# Patient Record
Sex: MALE | Race: WHITE | NOT HISPANIC OR LATINO | URBAN - METROPOLITAN AREA
[De-identification: names, ages, dates, MRNs, and addresses within clinical notes are randomized per-mention and may not be internally consistent; named-entity substitution may affect disease eponyms.]

---

## 2024-03-25 ENCOUNTER — OFFICE VISIT (OUTPATIENT)
Dept: PRIMARY CARE | Facility: CLINIC | Age: 69
End: 2024-03-25
Payer: MEDICARE

## 2024-03-25 VITALS
HEIGHT: 72 IN | OXYGEN SATURATION: 96 % | HEART RATE: 68 BPM | SYSTOLIC BLOOD PRESSURE: 124 MMHG | BODY MASS INDEX: 28.31 KG/M2 | WEIGHT: 209 LBS | DIASTOLIC BLOOD PRESSURE: 75 MMHG

## 2024-03-25 DIAGNOSIS — E78.5 HYPERLIPIDEMIA, UNSPECIFIED HYPERLIPIDEMIA TYPE: ICD-10-CM

## 2024-03-25 DIAGNOSIS — R53.83 MALAISE AND FATIGUE: Primary | ICD-10-CM

## 2024-03-25 DIAGNOSIS — Z13.220 SCREENING CHOLESTEROL LEVEL: ICD-10-CM

## 2024-03-25 DIAGNOSIS — R53.81 MALAISE AND FATIGUE: Primary | ICD-10-CM

## 2024-03-25 DIAGNOSIS — Z12.11 ENCOUNTER FOR SCREENING FOR MALIGNANT NEOPLASM OF COLON: ICD-10-CM

## 2024-03-25 PROCEDURE — 99497 ADVNCD CARE PLAN 30 MIN: CPT | Performed by: FAMILY MEDICINE

## 2024-03-25 PROCEDURE — G0444 DEPRESSION SCREEN ANNUAL: HCPCS | Performed by: FAMILY MEDICINE

## 2024-03-25 PROCEDURE — G0439 PPPS, SUBSEQ VISIT: HCPCS | Performed by: FAMILY MEDICINE

## 2024-03-25 PROCEDURE — 1170F FXNL STATUS ASSESSED: CPT | Performed by: FAMILY MEDICINE

## 2024-03-25 PROCEDURE — 99213 OFFICE O/P EST LOW 20 MIN: CPT | Performed by: FAMILY MEDICINE

## 2024-03-25 ASSESSMENT — PATIENT HEALTH QUESTIONNAIRE - PHQ9
1. LITTLE INTEREST OR PLEASURE IN DOING THINGS: NOT AT ALL
2. FEELING DOWN, DEPRESSED OR HOPELESS: NOT AT ALL
SUM OF ALL RESPONSES TO PHQ9 QUESTIONS 1 AND 2: 0

## 2024-03-25 ASSESSMENT — ENCOUNTER SYMPTOMS
CARDIOVASCULAR NEGATIVE: 1
NEUROLOGICAL NEGATIVE: 1
GASTROINTESTINAL NEGATIVE: 1
MUSCULOSKELETAL NEGATIVE: 1
RESPIRATORY NEGATIVE: 1

## 2024-03-25 ASSESSMENT — ACTIVITIES OF DAILY LIVING (ADL)
DOING_HOUSEWORK: INDEPENDENT
MANAGING_FINANCES: INDEPENDENT
DRESSING: INDEPENDENT
GROCERY_SHOPPING: INDEPENDENT
BATHING: INDEPENDENT
TAKING_MEDICATION: INDEPENDENT

## 2024-03-25 NOTE — PROGRESS NOTES
Subjective   Patient ID: Krish Batista is a 68 y.o. male who presents for Medicare Annual Wellness Visit Subsequent.  HPI  Had acute illness in feb getting better no   Review of Systems   Constitutional:         Diff sleeping has sad   HENT: Negative.     Respiratory: Negative.     Cardiovascular: Negative.    Gastrointestinal: Negative.    Genitourinary: Negative.    Musculoskeletal: Negative.    Neurological: Negative.        Objective   Physical Exam    Assessment/Plan   Problem List Items Addressed This Visit             ICD-10-CM    Malaise and fatigue - Primary R53.81, R53.83    Relevant Orders    Comprehensive Metabolic Panel    Lipid Panel    CBC     Other Visit Diagnoses         Codes    Screening cholesterol level     Z13.220    Relevant Orders    Lipid Panel    Hyperlipidemia, unspecified hyperlipidemia type     E78.5    Relevant Orders    Lipid Panel    Encounter for screening for malignant neoplasm of colon     Z12.11    Relevant Orders    Cologuard® colon cancer screening                 Matthew Small DO 03/25/24 3:36 PM

## 2024-03-28 ENCOUNTER — LAB (OUTPATIENT)
Dept: LAB | Facility: LAB | Age: 69
End: 2024-03-28
Payer: MEDICARE

## 2024-03-28 DIAGNOSIS — R53.81 MALAISE AND FATIGUE: ICD-10-CM

## 2024-03-28 DIAGNOSIS — Z13.220 SCREENING CHOLESTEROL LEVEL: ICD-10-CM

## 2024-03-28 DIAGNOSIS — R53.83 MALAISE AND FATIGUE: ICD-10-CM

## 2024-03-28 DIAGNOSIS — E78.5 HYPERLIPIDEMIA, UNSPECIFIED HYPERLIPIDEMIA TYPE: ICD-10-CM

## 2024-03-28 LAB
ALBUMIN SERPL BCP-MCNC: 4.2 G/DL (ref 3.4–5)
ALP SERPL-CCNC: 61 U/L (ref 33–136)
ALT SERPL W P-5'-P-CCNC: 8 U/L (ref 10–52)
ANION GAP SERPL CALC-SCNC: 11 MMOL/L (ref 10–20)
AST SERPL W P-5'-P-CCNC: 15 U/L (ref 9–39)
BILIRUB SERPL-MCNC: 0.6 MG/DL (ref 0–1.2)
BUN SERPL-MCNC: 19 MG/DL (ref 6–23)
CALCIUM SERPL-MCNC: 9.2 MG/DL (ref 8.6–10.6)
CHLORIDE SERPL-SCNC: 105 MMOL/L (ref 98–107)
CHOLEST SERPL-MCNC: 184 MG/DL (ref 0–199)
CHOLESTEROL/HDL RATIO: 3.4
CO2 SERPL-SCNC: 29 MMOL/L (ref 21–32)
CREAT SERPL-MCNC: 1.02 MG/DL (ref 0.5–1.3)
EGFRCR SERPLBLD CKD-EPI 2021: 80 ML/MIN/1.73M*2
ERYTHROCYTE [DISTWIDTH] IN BLOOD BY AUTOMATED COUNT: 16.9 % (ref 11.5–14.5)
GLUCOSE SERPL-MCNC: 94 MG/DL (ref 74–99)
HCT VFR BLD AUTO: 28.2 % (ref 41–52)
HDLC SERPL-MCNC: 53.8 MG/DL
HGB BLD-MCNC: 8.1 G/DL (ref 13.5–17.5)
LDLC SERPL CALC-MCNC: 116 MG/DL
MCH RBC QN AUTO: 23.5 PG (ref 26–34)
MCHC RBC AUTO-ENTMCNC: 28.7 G/DL (ref 32–36)
MCV RBC AUTO: 82 FL (ref 80–100)
NON HDL CHOLESTEROL: 130 MG/DL (ref 0–149)
NRBC BLD-RTO: 0 /100 WBCS (ref 0–0)
PLATELET # BLD AUTO: 351 X10*3/UL (ref 150–450)
POTASSIUM SERPL-SCNC: 4.3 MMOL/L (ref 3.5–5.3)
PROT SERPL-MCNC: 7 G/DL (ref 6.4–8.2)
RBC # BLD AUTO: 3.44 X10*6/UL (ref 4.5–5.9)
SODIUM SERPL-SCNC: 141 MMOL/L (ref 136–145)
TRIGL SERPL-MCNC: 73 MG/DL (ref 0–149)
VLDL: 15 MG/DL (ref 0–40)
WBC # BLD AUTO: 4.5 X10*3/UL (ref 4.4–11.3)

## 2024-03-28 PROCEDURE — 80053 COMPREHEN METABOLIC PANEL: CPT

## 2024-03-28 PROCEDURE — 80061 LIPID PANEL: CPT

## 2024-03-28 PROCEDURE — 36415 COLL VENOUS BLD VENIPUNCTURE: CPT

## 2024-03-28 PROCEDURE — 85027 COMPLETE CBC AUTOMATED: CPT

## 2024-04-23 LAB — NONINV COLON CA DNA+OCC BLD SCRN STL QL: POSITIVE

## 2024-04-28 DIAGNOSIS — R19.5 POSITIVE COLORECTAL CANCER SCREENING USING COLOGUARD TEST: Primary | ICD-10-CM

## 2024-05-13 ENCOUNTER — TELEPHONE (OUTPATIENT)
Dept: PRIMARY CARE | Facility: CLINIC | Age: 69
End: 2024-05-13
Payer: MEDICARE

## 2024-05-13 NOTE — TELEPHONE ENCOUNTER
----- Message from Matthew Small DO sent at 4/28/2024  7:46 PM EDT -----  Needs fu with gastroenterology

## 2024-05-13 NOTE — TELEPHONE ENCOUNTER
L/M for [place] to call back to hear results of Cologuard:    Needs fu with gastroenterology and the referral has been entered.

## 2024-05-28 ENCOUNTER — TELEPHONE (OUTPATIENT)
Dept: PRIMARY CARE | Facility: CLINIC | Age: 69
End: 2024-05-28
Payer: MEDICARE

## 2024-05-28 NOTE — TELEPHONE ENCOUNTER
L/M (2nd message) for patient to call back for results and further instructions from Dr. Ireland:  Needs fu with gastroenterology .

## 2024-06-06 ENCOUNTER — TELEPHONE (OUTPATIENT)
Dept: PRIMARY CARE | Facility: CLINIC | Age: 69
End: 2024-06-06
Payer: MEDICARE

## 2024-07-11 ENCOUNTER — OFFICE VISIT (OUTPATIENT)
Dept: PRIMARY CARE | Facility: CLINIC | Age: 69
End: 2024-07-11
Payer: MEDICARE

## 2024-07-11 ENCOUNTER — LAB (OUTPATIENT)
Dept: LAB | Facility: LAB | Age: 69
End: 2024-07-11
Payer: MEDICARE

## 2024-07-11 VITALS
HEART RATE: 125 BPM | SYSTOLIC BLOOD PRESSURE: 116 MMHG | DIASTOLIC BLOOD PRESSURE: 78 MMHG | HEIGHT: 73 IN | BODY MASS INDEX: 26.64 KG/M2 | OXYGEN SATURATION: 98 % | WEIGHT: 201 LBS

## 2024-07-11 DIAGNOSIS — D64.9 ANEMIA, UNSPECIFIED TYPE: ICD-10-CM

## 2024-07-11 DIAGNOSIS — R53.81 MALAISE AND FATIGUE: ICD-10-CM

## 2024-07-11 DIAGNOSIS — R53.83 MALAISE AND FATIGUE: ICD-10-CM

## 2024-07-11 DIAGNOSIS — D64.9 ANEMIA, UNSPECIFIED TYPE: Primary | ICD-10-CM

## 2024-07-11 PROCEDURE — 83550 IRON BINDING TEST: CPT

## 2024-07-11 PROCEDURE — 82607 VITAMIN B-12: CPT

## 2024-07-11 PROCEDURE — 83540 ASSAY OF IRON: CPT

## 2024-07-11 PROCEDURE — 85027 COMPLETE CBC AUTOMATED: CPT

## 2024-07-11 PROCEDURE — 82746 ASSAY OF FOLIC ACID SERUM: CPT

## 2024-07-11 PROCEDURE — 1036F TOBACCO NON-USER: CPT | Performed by: FAMILY MEDICINE

## 2024-07-11 PROCEDURE — 36415 COLL VENOUS BLD VENIPUNCTURE: CPT

## 2024-07-11 PROCEDURE — 99214 OFFICE O/P EST MOD 30 MIN: CPT | Performed by: FAMILY MEDICINE

## 2024-07-11 PROCEDURE — 80053 COMPREHEN METABOLIC PANEL: CPT

## 2024-07-11 ASSESSMENT — ENCOUNTER SYMPTOMS
MUSCULOSKELETAL NEGATIVE: 1
FATIGUE: 1
RESPIRATORY NEGATIVE: 1
BLOOD IN STOOL: 1
CARDIOVASCULAR NEGATIVE: 1

## 2024-07-11 ASSESSMENT — PATIENT HEALTH QUESTIONNAIRE - PHQ9
2. FEELING DOWN, DEPRESSED OR HOPELESS: NOT AT ALL
1. LITTLE INTEREST OR PLEASURE IN DOING THINGS: NOT AT ALL
SUM OF ALL RESPONSES TO PHQ9 QUESTIONS 1 AND 2: 0

## 2024-07-11 NOTE — PROGRESS NOTES
Subjective   Patient ID: Krish Batista is a 69 y.o. male who presents for Fatigue and Hoarseness.  HPI  Patient has finally come in after he tried to get in touch with him multiple times send positive Cologuard patient is anemic he still feels lethargic send him for some more blood work I already put in an a referral to a gastroenterologist and also get a put in a referral to a surgeon explained to the patient that these are all signs of colon cancer he said it is what it is says he wants to go on vacation in a week I told him he should probably stay and get the stuff taken care of because of its can get worse.  Review of Systems   Constitutional:  Positive for fatigue.   HENT: Negative.     Respiratory: Negative.     Cardiovascular: Negative.    Gastrointestinal:  Positive for blood in stool.   Genitourinary: Negative.    Musculoskeletal: Negative.        Objective   Physical Exam  Constitutional:       Appearance: Normal appearance.   HENT:      Head: Normocephalic.      Mouth/Throat:      Mouth: Mucous membranes are moist.   Cardiovascular:      Rate and Rhythm: Normal rate and regular rhythm.   Pulmonary:      Effort: Pulmonary effort is normal.      Breath sounds: Normal breath sounds.   Neurological:      Mental Status: He is alert.         Assessment/Plan   Problem List Items Addressed This Visit             ICD-10-CM    Malaise and fatigue R53.81, R53.83    Relevant Orders    Comprehensive metabolic panel     Other Visit Diagnoses         Codes    Anemia, unspecified type    -  Primary D64.9    Relevant Orders    CBC    Folate    Vitamin B12    Iron and TIBC    Referral to General Surgery    Comprehensive metabolic panel                 Matthew Small DO 07/11/24 2:37 PM

## 2024-07-12 LAB
ALBUMIN SERPL BCP-MCNC: 4.6 G/DL (ref 3.4–5)
ALP SERPL-CCNC: 58 U/L (ref 33–136)
ALT SERPL W P-5'-P-CCNC: 9 U/L (ref 10–52)
ANION GAP SERPL CALC-SCNC: 17 MMOL/L (ref 10–20)
AST SERPL W P-5'-P-CCNC: 14 U/L (ref 9–39)
BILIRUB SERPL-MCNC: 0.7 MG/DL (ref 0–1.2)
BUN SERPL-MCNC: 25 MG/DL (ref 6–23)
CALCIUM SERPL-MCNC: 9.9 MG/DL (ref 8.6–10.6)
CHLORIDE SERPL-SCNC: 102 MMOL/L (ref 98–107)
CO2 SERPL-SCNC: 23 MMOL/L (ref 21–32)
CREAT SERPL-MCNC: 1.1 MG/DL (ref 0.5–1.3)
EGFRCR SERPLBLD CKD-EPI 2021: 72 ML/MIN/1.73M*2
ERYTHROCYTE [DISTWIDTH] IN BLOOD BY AUTOMATED COUNT: 20.2 % (ref 11.5–14.5)
FOLATE SERPL-MCNC: >24 NG/ML
GLUCOSE SERPL-MCNC: 129 MG/DL (ref 74–99)
HCT VFR BLD AUTO: 27.6 % (ref 41–52)
HGB BLD-MCNC: 7.2 G/DL (ref 13.5–17.5)
IRON SATN MFR SERPL: ABNORMAL %
IRON SERPL-MCNC: 21 UG/DL (ref 35–150)
MCH RBC QN AUTO: 19.3 PG (ref 26–34)
MCHC RBC AUTO-ENTMCNC: 26.1 G/DL (ref 32–36)
MCV RBC AUTO: 74 FL (ref 80–100)
NRBC BLD-RTO: 0 /100 WBCS (ref 0–0)
PLATELET # BLD AUTO: 475 X10*3/UL (ref 150–450)
POTASSIUM SERPL-SCNC: 4.5 MMOL/L (ref 3.5–5.3)
PROT SERPL-MCNC: 7.4 G/DL (ref 6.4–8.2)
RBC # BLD AUTO: 3.74 X10*6/UL (ref 4.5–5.9)
SODIUM SERPL-SCNC: 137 MMOL/L (ref 136–145)
TIBC SERPL-MCNC: ABNORMAL UG/DL
UIBC SERPL-MCNC: >450 UG/DL (ref 110–370)
VIT B12 SERPL-MCNC: 327 PG/ML (ref 211–911)
WBC # BLD AUTO: 8.1 X10*3/UL (ref 4.4–11.3)

## 2024-07-23 ENCOUNTER — TELEPHONE (OUTPATIENT)
Dept: PRIMARY CARE | Facility: CLINIC | Age: 69
End: 2024-07-23
Payer: MEDICARE

## 2024-07-23 NOTE — TELEPHONE ENCOUNTER
Left a voicemail for the patient to call back. Please inform the patient that Dr. Ireland stated that he should go to the ER because of his anemia and his BW changes  ----- Message from Matthew Small sent at 7/23/2024  8:09 AM EDT -----  Patient shows significant anemia needs follow-up

## 2024-07-30 ENCOUNTER — TELEPHONE (OUTPATIENT)
Dept: PRIMARY CARE | Facility: CLINIC | Age: 69
End: 2024-07-30
Payer: MEDICARE

## 2024-07-31 ENCOUNTER — TELEPHONE (OUTPATIENT)
Dept: PRIMARY CARE | Facility: CLINIC | Age: 69
End: 2024-07-31
Payer: MEDICARE

## 2024-07-31 NOTE — TELEPHONE ENCOUNTER
Patient is in the hospital, he went to the ER yesterday.    Patient would like to speak with MA or  regarding labs results. I did inform that he should go to the ER but he would like to talk more about it.

## 2024-08-01 ENCOUNTER — APPOINTMENT (OUTPATIENT)
Dept: RADIOLOGY | Facility: HOSPITAL | Age: 69
End: 2024-08-01
Payer: MEDICARE

## 2024-08-01 ENCOUNTER — APPOINTMENT (OUTPATIENT)
Dept: CARDIOLOGY | Facility: HOSPITAL | Age: 69
End: 2024-08-01
Payer: MEDICARE

## 2024-08-01 ENCOUNTER — HOSPITAL ENCOUNTER (INPATIENT)
Facility: HOSPITAL | Age: 69
LOS: 2 days | Discharge: HOME | End: 2024-08-03
Attending: EMERGENCY MEDICINE | Admitting: STUDENT IN AN ORGANIZED HEALTH CARE EDUCATION/TRAINING PROGRAM
Payer: MEDICARE

## 2024-08-01 DIAGNOSIS — R53.83 MALAISE AND FATIGUE: ICD-10-CM

## 2024-08-01 DIAGNOSIS — I49.9 CARDIAC ARRHYTHMIA, UNSPECIFIED CARDIAC ARRHYTHMIA TYPE: ICD-10-CM

## 2024-08-01 DIAGNOSIS — R94.31 ABNORMAL ELECTROCARDIOGRAM (ECG) (EKG): ICD-10-CM

## 2024-08-01 DIAGNOSIS — R00.2 PALPITATIONS: ICD-10-CM

## 2024-08-01 DIAGNOSIS — I48.3 TYPICAL ATRIAL FLUTTER (MULTI): ICD-10-CM

## 2024-08-01 DIAGNOSIS — R53.81 MALAISE AND FATIGUE: ICD-10-CM

## 2024-08-01 DIAGNOSIS — D64.9 ANEMIA, UNSPECIFIED TYPE: ICD-10-CM

## 2024-08-01 DIAGNOSIS — R06.02 SHORTNESS OF BREATH: Primary | ICD-10-CM

## 2024-08-01 LAB
ABO GROUP (TYPE) IN BLOOD: NORMAL
ALBUMIN SERPL BCP-MCNC: 4.5 G/DL (ref 3.4–5)
ALP SERPL-CCNC: 56 U/L (ref 33–136)
ALT SERPL W P-5'-P-CCNC: 9 U/L (ref 10–52)
ANION GAP SERPL CALC-SCNC: 16 MMOL/L (ref 10–20)
ANTIBODY SCREEN: NORMAL
APPEARANCE UR: CLEAR
APTT PPP: 29 SECONDS (ref 27–38)
AST SERPL W P-5'-P-CCNC: 15 U/L (ref 9–39)
BASOPHILS # BLD AUTO: 0.04 X10*3/UL (ref 0–0.1)
BASOPHILS NFR BLD AUTO: 0.6 %
BILIRUB SERPL-MCNC: 0.7 MG/DL (ref 0–1.2)
BILIRUB UR STRIP.AUTO-MCNC: NEGATIVE MG/DL
BUN SERPL-MCNC: 19 MG/DL (ref 6–23)
CALCIUM SERPL-MCNC: 9.2 MG/DL (ref 8.6–10.3)
CARDIAC TROPONIN I PNL SERPL HS: 12 NG/L (ref 0–20)
CHLORIDE SERPL-SCNC: 102 MMOL/L (ref 98–107)
CO2 SERPL-SCNC: 21 MMOL/L (ref 21–32)
COLOR UR: COLORLESS
CREAT SERPL-MCNC: 1.16 MG/DL (ref 0.5–1.3)
DACRYOCYTES BLD QL SMEAR: NORMAL
EGFRCR SERPLBLD CKD-EPI 2021: 68 ML/MIN/1.73M*2
EOSINOPHIL # BLD AUTO: 0.01 X10*3/UL (ref 0–0.7)
EOSINOPHIL NFR BLD AUTO: 0.1 %
ERYTHROCYTE [DISTWIDTH] IN BLOOD BY AUTOMATED COUNT: 20.9 % (ref 11.5–14.5)
GLUCOSE SERPL-MCNC: 116 MG/DL (ref 74–99)
GLUCOSE UR STRIP.AUTO-MCNC: NORMAL MG/DL
HCT VFR BLD AUTO: 27.4 % (ref 41–52)
HGB BLD-MCNC: 7.4 G/DL (ref 13.5–17.5)
HGB RETIC QN: 21 PG (ref 28–38)
IMM GRANULOCYTES # BLD AUTO: 0.02 X10*3/UL (ref 0–0.7)
IMM GRANULOCYTES NFR BLD AUTO: 0.3 % (ref 0–0.9)
IMMATURE RETIC FRACTION: 25.5 %
INR PPP: 1.1 (ref 0.9–1.1)
IRON SATN MFR SERPL: 3 % (ref 25–45)
IRON SERPL-MCNC: 13 UG/DL (ref 35–150)
KETONES UR STRIP.AUTO-MCNC: NEGATIVE MG/DL
LACTATE SERPL-SCNC: 1.6 MMOL/L (ref 0.4–2)
LACTATE SERPL-SCNC: 2.2 MMOL/L (ref 0.4–2)
LDH SERPL L TO P-CCNC: 229 U/L (ref 84–246)
LEUKOCYTE ESTERASE UR QL STRIP.AUTO: NEGATIVE
LYMPHOCYTES # BLD AUTO: 0.98 X10*3/UL (ref 1.2–4.8)
LYMPHOCYTES NFR BLD AUTO: 13.8 %
MAGNESIUM SERPL-MCNC: 1.99 MG/DL (ref 1.6–2.4)
MAGNESIUM SERPL-MCNC: 2 MG/DL (ref 1.6–2.4)
MCH RBC QN AUTO: 19.9 PG (ref 26–34)
MCHC RBC AUTO-ENTMCNC: 27 G/DL (ref 32–36)
MCV RBC AUTO: 74 FL (ref 80–100)
MONOCYTES # BLD AUTO: 0.45 X10*3/UL (ref 0.1–1)
MONOCYTES NFR BLD AUTO: 6.3 %
NEUTROPHILS # BLD AUTO: 5.62 X10*3/UL (ref 1.2–7.7)
NEUTROPHILS NFR BLD AUTO: 78.9 %
NITRITE UR QL STRIP.AUTO: NEGATIVE
NRBC BLD-RTO: 0 /100 WBCS (ref 0–0)
OVALOCYTES BLD QL SMEAR: NORMAL
PH UR STRIP.AUTO: 6 [PH]
PLATELET # BLD AUTO: 422 X10*3/UL (ref 150–450)
POTASSIUM SERPL-SCNC: 4 MMOL/L (ref 3.5–5.3)
PROT SERPL-MCNC: 7.4 G/DL (ref 6.4–8.2)
PROT UR STRIP.AUTO-MCNC: NEGATIVE MG/DL
PROTHROMBIN TIME: 12.1 SECONDS (ref 9.8–12.8)
RBC # BLD AUTO: 3.72 X10*6/UL (ref 4.5–5.9)
RBC # UR STRIP.AUTO: NEGATIVE /UL
RBC MORPH BLD: NORMAL
RETICS #: 0.1 X10*6/UL (ref 0.02–0.11)
RETICS/RBC NFR AUTO: 2.5 % (ref 0.5–2)
RH FACTOR (ANTIGEN D): NORMAL
SODIUM SERPL-SCNC: 135 MMOL/L (ref 136–145)
SP GR UR STRIP.AUTO: 1
TARGETS BLD QL SMEAR: NORMAL
TIBC SERPL-MCNC: 457 UG/DL (ref 240–445)
TSH SERPL-ACNC: 1.73 MIU/L (ref 0.44–3.98)
UIBC SERPL-MCNC: 444 UG/DL (ref 110–370)
UROBILINOGEN UR STRIP.AUTO-MCNC: NORMAL MG/DL
WBC # BLD AUTO: 7.1 X10*3/UL (ref 4.4–11.3)

## 2024-08-01 PROCEDURE — 84484 ASSAY OF TROPONIN QUANT: CPT

## 2024-08-01 PROCEDURE — 83615 LACTATE (LD) (LDH) ENZYME: CPT | Performed by: STUDENT IN AN ORGANIZED HEALTH CARE EDUCATION/TRAINING PROGRAM

## 2024-08-01 PROCEDURE — 96376 TX/PRO/DX INJ SAME DRUG ADON: CPT

## 2024-08-01 PROCEDURE — 86920 COMPATIBILITY TEST SPIN: CPT

## 2024-08-01 PROCEDURE — 84443 ASSAY THYROID STIM HORMONE: CPT | Performed by: STUDENT IN AN ORGANIZED HEALTH CARE EDUCATION/TRAINING PROGRAM

## 2024-08-01 PROCEDURE — 83550 IRON BINDING TEST: CPT | Performed by: STUDENT IN AN ORGANIZED HEALTH CARE EDUCATION/TRAINING PROGRAM

## 2024-08-01 PROCEDURE — 2500000005 HC RX 250 GENERAL PHARMACY W/O HCPCS: Performed by: EMERGENCY MEDICINE

## 2024-08-01 PROCEDURE — 71046 X-RAY EXAM CHEST 2 VIEWS: CPT

## 2024-08-01 PROCEDURE — 86901 BLOOD TYPING SEROLOGIC RH(D): CPT

## 2024-08-01 PROCEDURE — G0378 HOSPITAL OBSERVATION PER HR: HCPCS

## 2024-08-01 PROCEDURE — 83735 ASSAY OF MAGNESIUM: CPT

## 2024-08-01 PROCEDURE — 85025 COMPLETE CBC W/AUTO DIFF WBC: CPT

## 2024-08-01 PROCEDURE — 82728 ASSAY OF FERRITIN: CPT | Mod: PARLAB | Performed by: STUDENT IN AN ORGANIZED HEALTH CARE EDUCATION/TRAINING PROGRAM

## 2024-08-01 PROCEDURE — 74177 CT ABD & PELVIS W/CONTRAST: CPT | Mod: FOREIGN READ | Performed by: RADIOLOGY

## 2024-08-01 PROCEDURE — 96375 TX/PRO/DX INJ NEW DRUG ADDON: CPT

## 2024-08-01 PROCEDURE — 96374 THER/PROPH/DIAG INJ IV PUSH: CPT

## 2024-08-01 PROCEDURE — 74177 CT ABD & PELVIS W/CONTRAST: CPT

## 2024-08-01 PROCEDURE — 2060000001 HC INTERMEDIATE ICU ROOM DAILY

## 2024-08-01 PROCEDURE — 83605 ASSAY OF LACTIC ACID: CPT

## 2024-08-01 PROCEDURE — 93005 ELECTROCARDIOGRAM TRACING: CPT

## 2024-08-01 PROCEDURE — 85730 THROMBOPLASTIN TIME PARTIAL: CPT

## 2024-08-01 PROCEDURE — 81003 URINALYSIS AUTO W/O SCOPE: CPT

## 2024-08-01 PROCEDURE — 85045 AUTOMATED RETICULOCYTE COUNT: CPT | Performed by: STUDENT IN AN ORGANIZED HEALTH CARE EDUCATION/TRAINING PROGRAM

## 2024-08-01 PROCEDURE — 2500000004 HC RX 250 GENERAL PHARMACY W/ HCPCS (ALT 636 FOR OP/ED): Performed by: EMERGENCY MEDICINE

## 2024-08-01 PROCEDURE — 83540 ASSAY OF IRON: CPT | Performed by: STUDENT IN AN ORGANIZED HEALTH CARE EDUCATION/TRAINING PROGRAM

## 2024-08-01 PROCEDURE — 84075 ASSAY ALKALINE PHOSPHATASE: CPT

## 2024-08-01 PROCEDURE — 83735 ASSAY OF MAGNESIUM: CPT | Performed by: STUDENT IN AN ORGANIZED HEALTH CARE EDUCATION/TRAINING PROGRAM

## 2024-08-01 PROCEDURE — 2550000001 HC RX 255 CONTRASTS: Performed by: EMERGENCY MEDICINE

## 2024-08-01 PROCEDURE — 99291 CRITICAL CARE FIRST HOUR: CPT | Mod: 25 | Performed by: EMERGENCY MEDICINE

## 2024-08-01 PROCEDURE — 99222 1ST HOSP IP/OBS MODERATE 55: CPT | Performed by: STUDENT IN AN ORGANIZED HEALTH CARE EDUCATION/TRAINING PROGRAM

## 2024-08-01 PROCEDURE — 71046 X-RAY EXAM CHEST 2 VIEWS: CPT | Mod: FOREIGN READ | Performed by: RADIOLOGY

## 2024-08-01 PROCEDURE — 36415 COLL VENOUS BLD VENIPUNCTURE: CPT

## 2024-08-01 PROCEDURE — 85610 PROTHROMBIN TIME: CPT

## 2024-08-01 RX ORDER — MULTIVIT-MIN/IRON FUM/FOLIC AC 7.5 MG-4
1 TABLET ORAL DAILY
COMMUNITY

## 2024-08-01 RX ORDER — DIGOXIN 0.25 MG/ML
250 INJECTION INTRAMUSCULAR; INTRAVENOUS ONCE
Status: COMPLETED | OUTPATIENT
Start: 2024-08-02 | End: 2024-08-02

## 2024-08-01 RX ORDER — DIGOXIN 0.25 MG/ML
500 INJECTION INTRAMUSCULAR; INTRAVENOUS ONCE
Status: COMPLETED | OUTPATIENT
Start: 2024-08-01 | End: 2024-08-01

## 2024-08-01 RX ORDER — DILTIAZEM HYDROCHLORIDE 5 MG/ML
10 INJECTION INTRAVENOUS ONCE
Status: COMPLETED | OUTPATIENT
Start: 2024-08-01 | End: 2024-08-01

## 2024-08-01 RX ORDER — PANTOPRAZOLE SODIUM 40 MG/10ML
40 INJECTION, POWDER, LYOPHILIZED, FOR SOLUTION INTRAVENOUS 2 TIMES DAILY
Status: DISCONTINUED | OUTPATIENT
Start: 2024-08-01 | End: 2024-08-03 | Stop reason: HOSPADM

## 2024-08-01 RX ORDER — DILTIAZEM HCL/D5W 125 MG/125
5-15 PLASTIC BAG, INJECTION (ML) INTRAVENOUS CONTINUOUS
Status: DISCONTINUED | OUTPATIENT
Start: 2024-08-01 | End: 2024-08-02

## 2024-08-01 RX ORDER — FERROUS SULFATE 325(65) MG
65 TABLET, DELAYED RELEASE (ENTERIC COATED) ORAL DAILY
COMMUNITY

## 2024-08-01 RX ORDER — POLYETHYLENE GLYCOL 3350 17 G/17G
17 POWDER, FOR SOLUTION ORAL DAILY
Status: DISCONTINUED | OUTPATIENT
Start: 2024-08-02 | End: 2024-08-03 | Stop reason: HOSPADM

## 2024-08-01 RX ORDER — DILTIAZEM HYDROCHLORIDE 5 MG/ML
15 INJECTION INTRAVENOUS ONCE
Status: COMPLETED | OUTPATIENT
Start: 2024-08-01 | End: 2024-08-01

## 2024-08-01 RX ADMIN — DIGOXIN 500 MCG: 0.25 INJECTION INTRAMUSCULAR; INTRAVENOUS at 18:25

## 2024-08-01 RX ADMIN — DILTIAZEM HYDROCHLORIDE 10 MG: 5 INJECTION, SOLUTION INTRAVENOUS at 16:49

## 2024-08-01 RX ADMIN — DILTIAZEM HYDROCHLORIDE 15 MG: 5 INJECTION, SOLUTION INTRAVENOUS at 14:52

## 2024-08-01 RX ADMIN — IOHEXOL 75 ML: 350 INJECTION, SOLUTION INTRAVENOUS at 16:33

## 2024-08-01 RX ADMIN — Medication 5 MG/HR: at 16:49

## 2024-08-01 SDOH — SOCIAL STABILITY: SOCIAL INSECURITY: DOES ANYONE TRY TO KEEP YOU FROM HAVING/CONTACTING OTHER FRIENDS OR DOING THINGS OUTSIDE YOUR HOME?: NO

## 2024-08-01 SDOH — SOCIAL STABILITY: SOCIAL INSECURITY: HAS ANYONE EVER THREATENED TO HURT YOUR FAMILY OR YOUR PETS?: NO

## 2024-08-01 SDOH — SOCIAL STABILITY: SOCIAL INSECURITY: DO YOU FEEL ANYONE HAS EXPLOITED OR TAKEN ADVANTAGE OF YOU FINANCIALLY OR OF YOUR PERSONAL PROPERTY?: NO

## 2024-08-01 SDOH — SOCIAL STABILITY: SOCIAL INSECURITY: ARE YOU OR HAVE YOU BEEN THREATENED OR ABUSED PHYSICALLY, EMOTIONALLY, OR SEXUALLY BY ANYONE?: NO

## 2024-08-01 SDOH — SOCIAL STABILITY: SOCIAL INSECURITY: ABUSE: ADULT

## 2024-08-01 SDOH — SOCIAL STABILITY: SOCIAL INSECURITY: HAVE YOU HAD ANY THOUGHTS OF HARMING ANYONE ELSE?: NO

## 2024-08-01 SDOH — SOCIAL STABILITY: SOCIAL INSECURITY: ARE THERE ANY APPARENT SIGNS OF INJURIES/BEHAVIORS THAT COULD BE RELATED TO ABUSE/NEGLECT?: NO

## 2024-08-01 SDOH — SOCIAL STABILITY: SOCIAL INSECURITY: DO YOU FEEL UNSAFE GOING BACK TO THE PLACE WHERE YOU ARE LIVING?: NO

## 2024-08-01 SDOH — SOCIAL STABILITY: SOCIAL INSECURITY: HAVE YOU HAD THOUGHTS OF HARMING ANYONE ELSE?: YES

## 2024-08-01 ASSESSMENT — COGNITIVE AND FUNCTIONAL STATUS - GENERAL
MOBILITY SCORE: 24
DAILY ACTIVITIY SCORE: 24
PATIENT BASELINE BEDBOUND: NO

## 2024-08-01 ASSESSMENT — LIFESTYLE VARIABLES
HAVE PEOPLE ANNOYED YOU BY CRITICIZING YOUR DRINKING: NO
HOW OFTEN DO YOU HAVE 6 OR MORE DRINKS ON ONE OCCASION: NEVER
TOTAL SCORE: 0
HOW MANY STANDARD DRINKS CONTAINING ALCOHOL DO YOU HAVE ON A TYPICAL DAY: PATIENT DOES NOT DRINK
EVER HAD A DRINK FIRST THING IN THE MORNING TO STEADY YOUR NERVES TO GET RID OF A HANGOVER: NO
HOW OFTEN DO YOU HAVE A DRINK CONTAINING ALCOHOL: NEVER
HAVE YOU EVER FELT YOU SHOULD CUT DOWN ON YOUR DRINKING: NO
AUDIT-C TOTAL SCORE: 0
EVER FELT BAD OR GUILTY ABOUT YOUR DRINKING: NO
AUDIT-C TOTAL SCORE: 0
SKIP TO QUESTIONS 9-10: 1

## 2024-08-01 ASSESSMENT — ACTIVITIES OF DAILY LIVING (ADL)
HEARING - RIGHT EAR: FUNCTIONAL
BATHING: INDEPENDENT
JUDGMENT_ADEQUATE_SAFELY_COMPLETE_DAILY_ACTIVITIES: YES
DRESSING YOURSELF: INDEPENDENT
GROOMING: INDEPENDENT
HEARING - LEFT EAR: FUNCTIONAL
PATIENT'S MEMORY ADEQUATE TO SAFELY COMPLETE DAILY ACTIVITIES?: YES
LACK_OF_TRANSPORTATION: NO
WALKS IN HOME: INDEPENDENT
ASSISTIVE_DEVICE: EYEGLASSES
FEEDING YOURSELF: INDEPENDENT
TOILETING: INDEPENDENT
ADEQUATE_TO_COMPLETE_ADL: YES

## 2024-08-01 ASSESSMENT — COLUMBIA-SUICIDE SEVERITY RATING SCALE - C-SSRS
6. HAVE YOU EVER DONE ANYTHING, STARTED TO DO ANYTHING, OR PREPARED TO DO ANYTHING TO END YOUR LIFE?: NO
2. HAVE YOU ACTUALLY HAD ANY THOUGHTS OF KILLING YOURSELF?: NO
1. IN THE PAST MONTH, HAVE YOU WISHED YOU WERE DEAD OR WISHED YOU COULD GO TO SLEEP AND NOT WAKE UP?: NO

## 2024-08-01 ASSESSMENT — PATIENT HEALTH QUESTIONNAIRE - PHQ9
SUM OF ALL RESPONSES TO PHQ9 QUESTIONS 1 & 2: 0
1. LITTLE INTEREST OR PLEASURE IN DOING THINGS: NOT AT ALL
2. FEELING DOWN, DEPRESSED OR HOPELESS: NOT AT ALL

## 2024-08-01 NOTE — PROGRESS NOTES
Pharmacy Medication History Review    Krish Batista is a 69 y.o. male admitted for No Principal Problem: There is no principal problem currently on the Problem List. Please update the Problem List and refresh.. Pharmacy reviewed the patient's [id number] medications and allergies for accuracy.    The list below reflectives the updated PTA list. Please review each medication in order reconciliation for additional clarification and justification.  Prior to Admission medications    Medication Sig Start Date End Date Authorizing Provider   ferrous sulfate 325 (65 Fe) MG EC tablet Take 65 mg by mouth once daily. Do not crush, chew, or split.   Historical Provider, MD   multivitamin with minerals tablet Take 1 tablet by mouth once daily.   Historical Provider, MD        The list below reflectives the updated allergy list. Please review each documented allergy for additional clarification and justification.  Allergies  Reviewed by Cam Schwartz LPN on 8/1/2024        Severity Reactions Comments    Amoxicillin Not Specified Unknown Pt cannot remember reaction            Below are additional concerns with the patient's PTA list.      Ivelisse Ann

## 2024-08-01 NOTE — ED PROCEDURE NOTE
Procedure  Critical Care    Performed by: Dileep Nobles MD  Authorized by: Dileep Nobles MD    Critical care provider statement:     Critical care time (minutes):  35    Critical care time was exclusive of:  Separately billable procedures and treating other patients and teaching time    Critical care was necessary to treat or prevent imminent or life-threatening deterioration of the following conditions:  CNS failure or compromise    Critical care was time spent personally by me on the following activities:  Ordering and performing treatments and interventions, ordering and review of laboratory studies, ordering and review of radiographic studies, re-evaluation of patient's condition, review of old charts, examination of patient, discussions with primary provider, discussions with consultants and evaluation of patient's response to treatment    Care discussed with: admitting provider                 Dileep Nobles MD  08/01/24 0398

## 2024-08-01 NOTE — ED TRIAGE NOTES
The patient was seen and examined in triage.     History of Present Illness: The patient is a 69-year-old male presenting to the emergency department with generalized weakness and recent abnormal labs at a primary care visit.  Patient states that over the past couple weeks, he has been feeling increasingly weak and fatigued.  He did just start taking iron supplements a couple weeks ago and notes that his stool is dark after taking these.  He denies any other hematochezia, hematemesis, or hematuria.  Patient does feel lightheaded when he moves from a seated to standing position.  He states that his hemoglobin was very low around 7 on his recent CBC.  No history of anemia or blood transfusions.  He denies any chest pain or shortness of breath at this time.  He does not take any blood thinners.  No fever, chills, nausea, vomiting.  Patient states that his abdomen is slightly tender in the lower quadrants.    Brief Physical Exam: Exam is limited by the patient sitting in a chair in triage.  Heart: Regular rate and rhythm.   Lungs: Clear to auscultation bilaterally.   Abdomen: Soft, nondistended, normoactive bowel sounds, mild tenderness palpation the lower quadrants.  No guarding or rebound.    Plan: Appropriate labs and diagnostic imaging were ordered.     For the remainder of the patient's workup and ED course, please refer to the main ED provider note. We discussed need for diagnostic testing including laboratory studies and imaging.  We also discussed that they may be asked to wait in the waiting room while these tests are pending.  They understand that if they choose to leave without having the testing completed or resulted that we cannot rule out acute life threatening illnesses and the risks involved could lead to worsening condition, permanent disability or even death.      Disclaimer: This note was dictated by speech recognition. Minor errors in transcription may be present. Please call if questions.

## 2024-08-01 NOTE — ED PROVIDER NOTES
HPI   Chief Complaint   Patient presents with    Rapid Heart Rate    Shortness of Breath     Pt arrives private auto from home. States increased Sob with exertion, rapid heart rate x 1 week +. States he's seen his doctor recently and was told his hemoglobin was low.        Krish is a generally healthy 69-year-old male presenting emergency department with generalized weakness and shortness of breath.  Patient states that over the past couple months, he has been feeling generally weak and fatigued.  He states this has worsened over the past couple weeks and he is now having associated shortness of breath.  He presented to his primary care provider earlier this week for a general wellness visit and labs drawn.  He reports that his CBC showed a decreased hemoglobin around 7.  Patient does report dark stools that have been there for several weeks, however they started when he started taking over-the-counter iron supplement.  Patient denies any history of anemia or blood transfusion.  He did recently travel by car to [place], but denies any pleuritic pain, hemoptysis, or history of DVT/PE.  Patient is not currently experiencing chest pain.  He does report some lower abdominal cramping.  No history of intra-abdominal procedures.  No fever, chills, nausea, vomiting, numbness, tingling.  Patient states that he does feel slightly lightheaded when he stands up from a seated position.  Otherwise, no urinary symptoms, hematuria, hematemesis, hematochezia.  Patient does not take blood thinners.  No known history of arrhythmias.              Patient History   Past Medical History:   Diagnosis Date    Personal history of other diseases of the circulatory system     History of Raynaud's syndrome     Past Surgical History:   Procedure Laterality Date    OTHER SURGICAL HISTORY  07/13/2021    Sinus surgery    OTHER SURGICAL HISTORY  07/13/2021    Testicular surgery    OTHER SURGICAL HISTORY  07/13/2021    Tonsillectomy     No family  history on file.  Social History     Tobacco Use    Smoking status: Never    Smokeless tobacco: Never   Substance Use Topics    Alcohol use: Not on file    Drug use: Not on file       Physical Exam   ED Triage Vitals [08/01/24 1420]   Temperature Heart Rate Respirations BP   36.7 °C (98.1 °F) (!) 151 20 149/75      Pulse Ox Temp src Heart Rate Source Patient Position   97 % -- -- --      BP Location FiO2 (%)     -- --       Physical Exam  Constitutional:       Comments: Patient is an elderly, generally well-appearing male that does not appear in acute distress.   HENT:      Mouth/Throat:      Mouth: Mucous membranes are moist.      Pharynx: Oropharynx is clear.   Eyes:      Extraocular Movements: Extraocular movements intact.   Cardiovascular:      Rate and Rhythm: Tachycardia present. Rhythm irregular.      Pulses: Normal pulses.      Heart sounds: Normal heart sounds.   Pulmonary:      Effort: Pulmonary effort is normal.      Breath sounds: Normal breath sounds.   Abdominal:      General: Abdomen is flat.      Palpations: Abdomen is soft.      Tenderness: There is abdominal tenderness.      Comments: Patient with mild tenderness palpation of his lower abdomen.  There is no guarding or rebound.  No distention.   Musculoskeletal:      Cervical back: Normal range of motion. No rigidity.   Skin:     Capillary Refill: Capillary refill takes less than 2 seconds.      Coloration: Skin is not pale.   Neurological:      General: No focal deficit present.      Mental Status: He is alert and oriented to person, place, and time.           ED Course & MDM   ED Course as of 08/01/24 1837   Thu Aug 01, 2024   1451 EKG obtained interpreted by me.  Supraventricular tachycardia.  Rate 168.  Occasional PVC.  No acute ischemia.  No STEMI []   1532 XR chest 2 views  IMPRESSION:  No radiographic evidence of acute cardiopulmonary disease.   []   1551 CBC demonstrates anemia. [MK]   1554 HEMOGLOBIN(!): 7.4 [AH]   1554 HEMATOCRIT(!):  27.4 []   1618 Lactate(!): 2.2 []   1743 EKG repeated and interpreted by me.  Atrial tachycardia.  Rate of 120.  Regular.  No acute ischemia.  No STEMI. []   1746 CT abdomen pelvis w IV contrast  No acute findings in the abdomen and pelvis.  Small hepatic lesions likely representing a combination of cysts  and/or small subcentimeter hemangiomas.  This is not felt to be of  acute clinical significance.  Borderline dilatation of the common bile duct, most likely normal for  this patient..   []      ED Course User Index  [] Melina Field PA-C  [] Dileep Nobles MD         Diagnoses as of 08/01/24 1837   Shortness of breath   Anemia, unspecified type   Palpitations   Cardiac arrhythmia, unspecified cardiac arrhythmia type                       [place] Coma Scale Score: 15                        Medical Decision Making  Krish is a generally healthy 69-year-old male presenting emergency department with generalized weakness and shortness of breath.     Medical Decision Making: He did not appear ill or toxic.  Vital signs reviewed. He is not tachypneic or hypoxic.  No fever.  Patient with rapid heart rate of 151 bpm EKG was obtained and shows questionable SVT at a rate of 160 bpm.  There is no acute ischemia or STEMI.  Patient was administered bolus of Cardizem. Workup included CBC, CMP, magnesium, troponin, APTT, PT/INR, lactate, type and screen, EKG, chest x-ray, CT abdomen pelvis with IV contrast.  CTA PE for pulmonary embolism was considered, but not added at this time as patient is denying pleuritic pain, hemoptysis, or unilateral leg swelling.  He did recently travel to [place], but suspicion is higher for arrhythmia versus PE.  Additionally, heparin would be contraindicated in the setting of significant anemia. No leukocytosis on CBC.  H&H are decreased at 7.4 and 27.4.  Hemoglobin is actually increased from 7.2 since last CBC three weeks ago. We will forego blood products.  Rectal examination was  unremarkable.  Patient is actually well-appearing and tan versus pale. CMP demonstrates hyperglycemia 116.  Sodium is mildly depleted at 135.  Otherwise, no NERI or electrolyte finding.  Troponin, magnesium, APTT, PT/INR unremarkable.  Lactate is elevated at 2.2.  Chest x-ray shows no radiographic evidence of acute cardiopulmonary process.  EKG shows rapid rate at 168 bpm.  Questionable SVT versus a flutter.  No acute ischemia or STEMI.  Again, troponin is normal.  Patient was given Cardizem bolus with mild improvement of his heart rate.  Heart rate remains elevated around 122 bpm.  I am unsure at this time if his heart rate is secondary to anemia versus new onset a flutter. At this time, another 10 mg bolus was ordered and patient was started on Cardizem drip.  He generally feels the same with this intervention. Patient does state SOB is completely improved. Dr. Nobles spoke with Dr. Archibald, cardiologist, who recommended digoxin and admission to the hospital.  I spoke with Dr. Mckeon who accepted patient for admission. Patient informed of plan and he was agreeable.  All question concerns were addressed.     Differential diagnoses considered: Electrolyte disturbance, NERI, arrhythmia, NSTEMI, STEMI, angina, pneumonia, pneumothorax, pleural effusion, CHF, anemia, intra-abdominal hemorrhage, diverticulitis, acute cystitis, etc.  Pulmonary embolism was considered in differentials.  Initially, we elected for no CTA PE as patient is anemic and heparin would have adverse effects on the patient.     Medications given: Cardizem, digoxin      Diagnosis: Anemia, cardiac arrhythmia, palpitations, shortness of breath    Plan: Admission          Procedure  Procedures     Melina Field PA-C  08/01/24 7783

## 2024-08-01 NOTE — H&P
Internal Medicine History and Physical   PATIENT NAME: Krish Batista  MRN: [id number]  DATE: 8/1/2024       Subjective   Krish Batista is a 69 y.o. male with a PMHx notable for anemia seen on outpatient labs [facility] and fatigue.  Patient reports increasing fatigue and nonfocal weakness over the last few weeks prompting his emergency room visit. Reported positive cologuard on outpatient screening, was referref to gastroenterology however states he was feeling ok so never followed up on an outpatient basis Patient endorsing dark stools however started after he began his iron supplementation.  He denies any history of anemia prior to this, no chest pain shortness of breath no history of DVT PE in the past denied any hemoptysis or hematemesis no fever chills nausea vomiting, no reported hematuria or hematochezia patient is not on any blood thinners no known history of arrhythmia in the past.  In the emergency room patient was found to be in atrial flutter, he did undergo CT abdomen and pelvis with IV contrast hemoglobin noted to be 7.4 increased from 7.2 on his last CBC ~3 weeks prior to admission. . Rectal exam performed by ER team unremarkable.  Lactate mildly elevated at 2.2 which trended to normal.  EKG questionable a flutter, troponin WNL, case was discussed with emergency room physician and cardiologist who recommended digoxin and Cardizem drip.  Patient admitted to medicine service at this time    A 10 point ROS was completed and is negative expect as stated in HPI.    PMHx: As above   Past Medical History:   Diagnosis Date    Personal history of other diseases of the circulatory system     History of Raynaud's syndrome     PSHx:   Past Surgical History:   Procedure Laterality Date    OTHER SURGICAL HISTORY  07/13/2021    Sinus surgery    OTHER SURGICAL HISTORY  07/13/2021    Testicular surgery    OTHER SURGICAL HISTORY  07/13/2021    Tonsillectomy     Social Hx: Reviewed ;  Social History: Tobacco -denies EtOH tobacco or illicit  Fam Hx: family history is not on file.        Objective     General: Pleasant and cooperative  HEENT: Normocephalic, atraumatic, mucus membranes moist.  Eyes: EOMI  Neck:  Trachea midline.    Chest: Symmetric chest expansion, CTA bilaterally   CV: A flutter  Abdomen: soft, non-tender, non-distended, no guarding or peritoneal signs   Extremities:  No lower extremity edema  Neurological:  no obvious focal deficits   Psych: appropriate affect and behavior   Skin:  Warm and dry       Medications     Scheduled medications     Continuous medications  dilTIAZem, 5-15 mg/hr, Last Rate: 15 mg/hr (08/01/24 1712)      PRN medications            Assessment / Plan     Microcytic anemia consistent with PIERRE, doubt any hemolysis type picture positive ColoGuard outpatient   New onset a flutter in the setting of anemia   Small hepatic lesion cyst or small subcentimeter hemangiomas  Borderline dilation of CBD most likely normal variant per radiology report    Patient with reported dark stools however is on iron supplementation denied any recent colonoscopy,  positive ColoGuard outpatient, never followed up with GI.   No NSAID use. Doubt any UGIB, no elevated BUN/Cr ratio. Will obtain anemia workup with LDH, Haptoglobin, reticulocyte count, serum iron, serum ferritin, TIBC, Vitamin B12 Level, Folate Level, TSH, consider SPEP outpatient (however no elevated gamma gap). Place IV PPI BID for now however doubt UGIB. Consider IV Venofer. gastroenterology consult. Question of previous positive cologuard outpatient.   Dig loaded in ER per cardiology recs will place 0.25mg IV x1 6 hours from initial dose, remains on cardizem gtt. Obtain TSH given above, mag level,  consult to cardiology. Low threshold for IV magnesium. No formal arrhythmia diagnosis in the past however pt states he has felt palpatations on and off over the years. Holding AC at this time given anemia, defer to cardiology  regarding indication of anticoagulation therapy, add on A1c to calculate [id number]        Diet: NPO midnight   DVT Prophylaxis: SCDS in acute anemia potential GIB   CODE STATUS: DNR-CCA with elective endotracheal intubation. Confirmed on admission     Discharge Planning:  Follow up outpatient for small hepatic cyst and possible dilation of CBD

## 2024-08-02 ENCOUNTER — APPOINTMENT (OUTPATIENT)
Dept: CARDIOLOGY | Facility: HOSPITAL | Age: 69
End: 2024-08-02
Payer: MEDICARE

## 2024-08-02 PROBLEM — I48.3 TYPICAL ATRIAL FLUTTER (MULTI): Status: ACTIVE | Noted: 2024-08-02

## 2024-08-02 LAB
ABO GROUP (TYPE) IN BLOOD: NORMAL
ALBUMIN SERPL BCP-MCNC: 3.7 G/DL (ref 3.4–5)
ALP SERPL-CCNC: 44 U/L (ref 33–136)
ALT SERPL W P-5'-P-CCNC: 7 U/L (ref 10–52)
ANION GAP SERPL CALC-SCNC: 11 MMOL/L (ref 10–20)
AORTIC VALVE MEAN GRADIENT: 3 MMHG
AORTIC VALVE PEAK VELOCITY: 1.05 M/S
AST SERPL W P-5'-P-CCNC: 12 U/L (ref 9–39)
ATRIAL RATE: 120 BPM
AV PEAK GRADIENT: 4.4 MMHG
AVA (PEAK VEL): 3.24 CM2
AVA (VTI): 3.58 CM2
BILIRUB SERPL-MCNC: 0.7 MG/DL (ref 0–1.2)
BLOOD EXPIRATION DATE: NORMAL
BUN SERPL-MCNC: 18 MG/DL (ref 6–23)
CALCIUM SERPL-MCNC: 8.6 MG/DL (ref 8.6–10.3)
CHLORIDE SERPL-SCNC: 108 MMOL/L (ref 98–107)
CO2 SERPL-SCNC: 23 MMOL/L (ref 21–32)
CREAT SERPL-MCNC: 1.12 MG/DL (ref 0.5–1.3)
DISPENSE STATUS: NORMAL
EGFRCR SERPLBLD CKD-EPI 2021: 71 ML/MIN/1.73M*2
EJECTION FRACTION APICAL 4 CHAMBER: 48.1
EJECTION FRACTION: 61 %
ERYTHROCYTE [DISTWIDTH] IN BLOOD BY AUTOMATED COUNT: 20.4 % (ref 11.5–14.5)
ERYTHROCYTE [DISTWIDTH] IN BLOOD BY AUTOMATED COUNT: 20.9 % (ref 11.5–14.5)
EST. AVERAGE GLUCOSE BLD GHB EST-MCNC: 108 MG/DL
FERRITIN SERPL-MCNC: 8 NG/ML (ref 20–300)
FOLATE SERPL-MCNC: 17.8 NG/ML
GLUCOSE BLD MANUAL STRIP-MCNC: 115 MG/DL (ref 74–99)
GLUCOSE BLD MANUAL STRIP-MCNC: 98 MG/DL (ref 74–99)
GLUCOSE SERPL-MCNC: 91 MG/DL (ref 74–99)
HBA1C MFR BLD: 5.4 %
HCT VFR BLD AUTO: 22.7 % (ref 41–52)
HCT VFR BLD AUTO: 25.5 % (ref 41–52)
HGB BLD-MCNC: 6.1 G/DL (ref 13.5–17.5)
HGB BLD-MCNC: 7.2 G/DL (ref 13.5–17.5)
HOLD SPECIMEN: NORMAL
LEFT ATRIUM VOLUME AREA LENGTH INDEX BSA: 27.9 ML/M2
LEFT VENTRICLE INTERNAL DIMENSION DIASTOLE: 4.5 CM (ref 3.5–6)
LEFT VENTRICULAR OUTFLOW TRACT DIAMETER: 2.2 CM
MAGNESIUM SERPL-MCNC: 2.09 MG/DL (ref 1.6–2.4)
MCH RBC QN AUTO: 19.9 PG (ref 26–34)
MCH RBC QN AUTO: 21.2 PG (ref 26–34)
MCHC RBC AUTO-ENTMCNC: 26.9 G/DL (ref 32–36)
MCHC RBC AUTO-ENTMCNC: 28.2 G/DL (ref 32–36)
MCV RBC AUTO: 74 FL (ref 80–100)
MCV RBC AUTO: 75 FL (ref 80–100)
NRBC BLD-RTO: 0 /100 WBCS (ref 0–0)
NRBC BLD-RTO: 0.4 /100 WBCS (ref 0–0)
P AXIS: 255 DEGREES
PLATELET # BLD AUTO: 304 X10*3/UL (ref 150–450)
PLATELET # BLD AUTO: 307 X10*3/UL (ref 150–450)
POTASSIUM SERPL-SCNC: 4.2 MMOL/L (ref 3.5–5.3)
PR INTERVAL: 156 MS
PRODUCT BLOOD TYPE: 6200
PRODUCT CODE: NORMAL
PROT SERPL-MCNC: 5.8 G/DL (ref 6.4–8.2)
Q ONSET: 251 MS
QRS COUNT: 20 BEATS
QRS DURATION: 87 MS
QT INTERVAL: 342 MS
QTC CALCULATION(BAZETT): 484 MS
QTC FREDERICIA: 430 MS
R AXIS: 38 DEGREES
RBC # BLD AUTO: 3.07 X10*6/UL (ref 4.5–5.9)
RBC # BLD AUTO: 3.4 X10*6/UL (ref 4.5–5.9)
RH FACTOR (ANTIGEN D): NORMAL
RIGHT VENTRICLE FREE WALL PEAK S': 14.4 CM/S
RIGHT VENTRICLE PEAK SYSTOLIC PRESSURE: 24.5 MMHG
SODIUM SERPL-SCNC: 138 MMOL/L (ref 136–145)
T AXIS: 42 DEGREES
T OFFSET: 422 MS
TRICUSPID ANNULAR PLANE SYSTOLIC EXCURSION: 1.7 CM
UNIT ABO: NORMAL
UNIT NUMBER: NORMAL
UNIT RH: NORMAL
UNIT VOLUME: 350
VENTRICULAR RATE: 120 BPM
VIT B12 SERPL-MCNC: 317 PG/ML (ref 211–911)
WBC # BLD AUTO: 5.5 X10*3/UL (ref 4.4–11.3)
WBC # BLD AUTO: 5.8 X10*3/UL (ref 4.4–11.3)
XM INTEP: NORMAL

## 2024-08-02 PROCEDURE — 2500000004 HC RX 250 GENERAL PHARMACY W/ HCPCS (ALT 636 FOR OP/ED)

## 2024-08-02 PROCEDURE — 36415 COLL VENOUS BLD VENIPUNCTURE: CPT | Performed by: STUDENT IN AN ORGANIZED HEALTH CARE EDUCATION/TRAINING PROGRAM

## 2024-08-02 PROCEDURE — 36430 TRANSFUSION BLD/BLD COMPNT: CPT

## 2024-08-02 PROCEDURE — G0378 HOSPITAL OBSERVATION PER HR: HCPCS

## 2024-08-02 PROCEDURE — P9016 RBC LEUKOCYTES REDUCED: HCPCS

## 2024-08-02 PROCEDURE — 85027 COMPLETE CBC AUTOMATED: CPT | Performed by: STUDENT IN AN ORGANIZED HEALTH CARE EDUCATION/TRAINING PROGRAM

## 2024-08-02 PROCEDURE — 93306 TTE W/DOPPLER COMPLETE: CPT

## 2024-08-02 PROCEDURE — 82947 ASSAY GLUCOSE BLOOD QUANT: CPT

## 2024-08-02 PROCEDURE — 99232 SBSQ HOSP IP/OBS MODERATE 35: CPT | Performed by: STUDENT IN AN ORGANIZED HEALTH CARE EDUCATION/TRAINING PROGRAM

## 2024-08-02 PROCEDURE — 2500000001 HC RX 250 WO HCPCS SELF ADMINISTERED DRUGS (ALT 637 FOR MEDICARE OP)

## 2024-08-02 PROCEDURE — 93306 TTE W/DOPPLER COMPLETE: CPT | Performed by: INTERNAL MEDICINE

## 2024-08-02 PROCEDURE — 2060000001 HC INTERMEDIATE ICU ROOM DAILY

## 2024-08-02 PROCEDURE — 85027 COMPLETE CBC AUTOMATED: CPT

## 2024-08-02 PROCEDURE — 99221 1ST HOSP IP/OBS SF/LOW 40: CPT | Performed by: NURSE PRACTITIONER

## 2024-08-02 PROCEDURE — 82746 ASSAY OF FOLIC ACID SERUM: CPT | Mod: PARLAB | Performed by: STUDENT IN AN ORGANIZED HEALTH CARE EDUCATION/TRAINING PROGRAM

## 2024-08-02 PROCEDURE — 83010 ASSAY OF HAPTOGLOBIN QUANT: CPT | Performed by: STUDENT IN AN ORGANIZED HEALTH CARE EDUCATION/TRAINING PROGRAM

## 2024-08-02 PROCEDURE — C9113 INJ PANTOPRAZOLE SODIUM, VIA: HCPCS | Performed by: STUDENT IN AN ORGANIZED HEALTH CARE EDUCATION/TRAINING PROGRAM

## 2024-08-02 PROCEDURE — 99222 1ST HOSP IP/OBS MODERATE 55: CPT

## 2024-08-02 PROCEDURE — 2500000004 HC RX 250 GENERAL PHARMACY W/ HCPCS (ALT 636 FOR OP/ED): Performed by: STUDENT IN AN ORGANIZED HEALTH CARE EDUCATION/TRAINING PROGRAM

## 2024-08-02 PROCEDURE — 82607 VITAMIN B-12: CPT | Mod: PARLAB | Performed by: STUDENT IN AN ORGANIZED HEALTH CARE EDUCATION/TRAINING PROGRAM

## 2024-08-02 PROCEDURE — 83036 HEMOGLOBIN GLYCOSYLATED A1C: CPT | Performed by: STUDENT IN AN ORGANIZED HEALTH CARE EDUCATION/TRAINING PROGRAM

## 2024-08-02 PROCEDURE — 2500000005 HC RX 250 GENERAL PHARMACY W/O HCPCS: Performed by: STUDENT IN AN ORGANIZED HEALTH CARE EDUCATION/TRAINING PROGRAM

## 2024-08-02 PROCEDURE — 83735 ASSAY OF MAGNESIUM: CPT

## 2024-08-02 PROCEDURE — 80053 COMPREHEN METABOLIC PANEL: CPT | Performed by: STUDENT IN AN ORGANIZED HEALTH CARE EDUCATION/TRAINING PROGRAM

## 2024-08-02 RX ORDER — METOPROLOL TARTRATE 25 MG/1
12.5 TABLET, FILM COATED ORAL 4 TIMES DAILY
Status: DISCONTINUED | OUTPATIENT
Start: 2024-08-02 | End: 2024-08-03

## 2024-08-02 RX ADMIN — PANTOPRAZOLE SODIUM 40 MG: 40 INJECTION, POWDER, FOR SOLUTION INTRAVENOUS at 09:29

## 2024-08-02 RX ADMIN — Medication 10 MG/HR: at 12:49

## 2024-08-02 RX ADMIN — PANTOPRAZOLE SODIUM 40 MG: 40 INJECTION, POWDER, FOR SOLUTION INTRAVENOUS at 19:53

## 2024-08-02 RX ADMIN — METOPROLOL TARTRATE 12.5 MG: 25 TABLET, FILM COATED ORAL at 17:01

## 2024-08-02 RX ADMIN — METOPROLOL TARTRATE 12.5 MG: 25 TABLET, FILM COATED ORAL at 19:52

## 2024-08-02 RX ADMIN — IRON SUCROSE 200 MG: 20 INJECTION, SOLUTION INTRAVENOUS at 15:14

## 2024-08-02 RX ADMIN — Medication 15 MG/HR: at 00:39

## 2024-08-02 RX ADMIN — PANTOPRAZOLE SODIUM 40 MG: 40 INJECTION, POWDER, FOR SOLUTION INTRAVENOUS at 00:27

## 2024-08-02 RX ADMIN — DIGOXIN 250 MCG: 0.25 INJECTION INTRAMUSCULAR; INTRAVENOUS at 00:27

## 2024-08-02 ASSESSMENT — COGNITIVE AND FUNCTIONAL STATUS - GENERAL
MOBILITY SCORE: 24
DAILY ACTIVITIY SCORE: 24

## 2024-08-02 ASSESSMENT — PAIN SCALES - GENERAL
PAINLEVEL_OUTOF10: 0 - NO PAIN
PAINLEVEL_OUTOF10: 0 - NO PAIN

## 2024-08-02 ASSESSMENT — ACTIVITIES OF DAILY LIVING (ADL): LACK_OF_TRANSPORTATION: NO

## 2024-08-02 NOTE — CONSULTS
Reason For Consult  Microcytic anemia, ?GIB with +Cologuard as an outpatient      This note was created using voice recognition transcription software. Despite proofreading, unintentional typographical errors may be present. Please contact the GI office with any questions or concerns.       This is a 68 yo Male with a PMH of anemia, and Raynaud's who presented to [facility] on 8/1/24 with reports of weakness/fatigue.  GI was consulted for Microcytic anemia, ?GIB with +Cologuard as an outpatient.          Subjective  States he has been having SOB, dizziness, lightheadedness, chest tightness for the last few weeks.  His nephew is a paramedic and advised him to take OTC iron.  Since then, he's had black stools.  No prior history of anemia.  No overt bleeding.  Denies any GI symptoms.      States he has previously been exposed to asbestos.         EGD-Denies   Colonoscopy-Denies   BM-Daily.  Normal consistency.  No bleeding or weight loss.  FHX-Dad had multiple myeloma, Mgrandfather had some sort of CA, sister had ovarian CA  SHX-No tobacco/illicits/ETOH  Ab Sx-Orchipexy  NSAIDs-Very seldom         Allergies: as mentioned in H&P      A 10 point review of system is negative except for what is mentioned in the HPI    Vital Signs: Reviewed    Physical Exam:  General: Polite, calm, resting  Skin:  Warm and dry, no jaundice  HEENT: No scleral icterus, no conjunctival pallor, normocephalic, atraumatic, mucous membranes moist  Neck:  atraumatic, trachea midline, no JVD  Chest:  Clear to auscultation bilaterally. No wheezes, rales, or rhonchi  CV:  Regular rate and rhythm.  Positive S1/S2  Abdomen: no distension, +BS, soft, non-tender to palpation, no rebound tenderness, no guarding, no rigidity, no discernible ascites   Extremities: no lower extremity edema, chronic pigmentary changes, no cyanosis  Neurological:  A&Ox3  Psychiatric: cooperative     Investigations:  Labs, radiological imaging and cardiac work up were  "reviewed      Objective:         8/1/2024     8:12 PM 8/1/2024    10:00 PM 8/1/2024    11:36 PM 8/2/2024     2:22 AM 8/2/2024     4:06 AM 8/2/2024     6:12 AM 8/2/2024     7:55 AM   Vitals   Systolic 133  109 98 96 90 106   Diastolic 79  60 54 51 56 58   Heart Rate 120  119 59  80 95   Temp 36.3 °C (97.3 °F)  36.4 °C (97.5 °F) 36.4 °C (97.5 °F) 36.4 °C (97.5 °F) 36.1 °C (97 °F) 36.2 °C (97.2 °F)   Resp   19    16   Height (in)  1.854 m (6' 0.99\")        Weight (lb)  200.84        BMI  26.5 kg/m2        BSA (m2)  2.17 m2               Medications:  pantoprazole, 40 mg, intravenous, BID  polyethylene glycol, 17 g, oral, Daily  psyllium, 1 packet, oral, Daily         Recent Results (from the past 72 hour(s))   CBC and Auto Differential    Collection Time: 08/01/24  2:49 PM   Result Value Ref Range    WBC 7.1 4.4 - 11.3 x10*3/uL    nRBC 0.0 0.0 - 0.0 /100 WBCs    RBC 3.72 (L) 4.50 - 5.90 x10*6/uL    Hemoglobin 7.4 (L) 13.5 - 17.5 g/dL    Hematocrit 27.4 (L) 41.0 - 52.0 %    MCV 74 (L) 80 - 100 fL    MCH 19.9 (L) 26.0 - 34.0 pg    MCHC 27.0 (L) 32.0 - 36.0 g/dL    RDW 20.9 (H) 11.5 - 14.5 %    Platelets 422 150 - 450 x10*3/uL    Neutrophils % 78.9 40.0 - 80.0 %    Immature Granulocytes %, Automated 0.3 0.0 - 0.9 %    Lymphocytes % 13.8 13.0 - 44.0 %    Monocytes % 6.3 2.0 - 10.0 %    Eosinophils % 0.1 0.0 - 6.0 %    Basophils % 0.6 0.0 - 2.0 %    Neutrophils Absolute 5.62 1.20 - 7.70 x10*3/uL    Immature Granulocytes Absolute, Automated 0.02 0.00 - 0.70 x10*3/uL    Lymphocytes Absolute 0.98 (L) 1.20 - 4.80 x10*3/uL    Monocytes Absolute 0.45 0.10 - 1.00 x10*3/uL    Eosinophils Absolute 0.01 0.00 - 0.70 x10*3/uL    Basophils Absolute 0.04 0.00 - 0.10 x10*3/uL   Comprehensive metabolic panel    Collection Time: 08/01/24  2:49 PM   Result Value Ref Range    Glucose 116 (H) 74 - 99 mg/dL    Sodium 135 (L) 136 - 145 mmol/L    Potassium 4.0 3.5 - 5.3 mmol/L    Chloride 102 98 - 107 mmol/L    Bicarbonate 21 21 - 32 mmol/L    " Anion Gap 16 10 - 20 mmol/L    Urea Nitrogen 19 6 - 23 mg/dL    Creatinine 1.16 0.50 - 1.30 mg/dL    eGFR 68 >60 mL/min/1.73m*2    Calcium 9.2 8.6 - 10.3 mg/dL    Albumin 4.5 3.4 - 5.0 g/dL    Alkaline Phosphatase 56 33 - 136 U/L    Total Protein 7.4 6.4 - 8.2 g/dL    AST 15 9 - 39 U/L    Bilirubin, Total 0.7 0.0 - 1.2 mg/dL    ALT 9 (L) 10 - 52 U/L   Magnesium    Collection Time: 08/01/24  2:49 PM   Result Value Ref Range    Magnesium 1.99 1.60 - 2.40 mg/dL   aPTT    Collection Time: 08/01/24  2:49 PM   Result Value Ref Range    aPTT 29 27 - 38 seconds   Protime-INR    Collection Time: 08/01/24  2:49 PM   Result Value Ref Range    Protime 12.1 9.8 - 12.8 seconds    INR 1.1 0.9 - 1.1   Troponin I, High Sensitivity    Collection Time: 08/01/24  2:49 PM   Result Value Ref Range    Troponin I, High Sensitivity 12 0 - 20 ng/L   Type And Screen    Collection Time: 08/01/24  2:49 PM   Result Value Ref Range    ABO TYPE AB     Rh TYPE POS     ANTIBODY SCREEN NEG    Lactate    Collection Time: 08/01/24  2:49 PM   Result Value Ref Range    Lactate 2.2 (H) 0.4 - 2.0 mmol/L   Morphology    Collection Time: 08/01/24  2:49 PM   Result Value Ref Range    RBC Morphology See Below     Target Cells Few     Ovalocytes Few     Teardrop Cells Few    TSH with reflex to Free T4 if abnormal    Collection Time: 08/01/24  2:49 PM   Result Value Ref Range    Thyroid Stimulating Hormone 1.73 0.44 - 3.98 mIU/L   Ferritin    Collection Time: 08/01/24  2:49 PM   Result Value Ref Range    Ferritin 8 (L) 20 - 300 ng/mL   Iron and TIBC    Collection Time: 08/01/24  2:49 PM   Result Value Ref Range    Iron 13 (L) 35 - 150 ug/dL    UIBC 444 (H) 110 - 370 ug/dL    TIBC 457 (H) 240 - 445 ug/dL    % Saturation 3 (L) 25 - 45 %   Reticulocytes    Collection Time: 08/01/24  2:49 PM   Result Value Ref Range    Retic % 2.5 (H) 0.5 - 2.0 %    Retic Absolute 0.095 0.017 - 0.110 x10*6/uL    Reticulocyte Hemoglobin 21 (L) 28 - 38 pg    Immature Retic fraction  25.5 (H) <=16.0 %   Lactate Dehydrogenase    Collection Time: 08/01/24  2:49 PM   Result Value Ref Range     84 - 246 U/L   Magnesium    Collection Time: 08/01/24  2:49 PM   Result Value Ref Range    Magnesium 2.00 1.60 - 2.40 mg/dL   Urinalysis with Reflex Culture and Microscopic    Collection Time: 08/01/24  4:42 PM   Result Value Ref Range    Color, Urine Colorless (N) Light-Yellow, Yellow, Dark-Yellow    Appearance, Urine Clear Clear    Specific Gravity, Urine 1.003 (N) 1.005 - 1.035    pH, Urine 6.0 5.0, 5.5, 6.0, 6.5, 7.0, 7.5, 8.0    Protein, Urine NEGATIVE NEGATIVE, 10 (TRACE), 20 (TRACE) mg/dL    Glucose, Urine Normal Normal mg/dL    Blood, Urine NEGATIVE NEGATIVE    Ketones, Urine NEGATIVE NEGATIVE mg/dL    Bilirubin, Urine NEGATIVE NEGATIVE    Urobilinogen, Urine Normal Normal mg/dL    Nitrite, Urine NEGATIVE NEGATIVE    Leukocyte Esterase, Urine NEGATIVE NEGATIVE   Extra Urine Gray Tube    Collection Time: 08/01/24  4:42 PM   Result Value Ref Range    Extra Tube Hold for add-ons.    Lactate    Collection Time: 08/01/24  4:42 PM   Result Value Ref Range    Lactate 1.6 0.4 - 2.0 mmol/L   CBC    Collection Time: 08/02/24  5:14 AM   Result Value Ref Range    WBC 5.8 4.4 - 11.3 x10*3/uL    nRBC 0.0 0.0 - 0.0 /100 WBCs    RBC 3.07 (L) 4.50 - 5.90 x10*6/uL    Hemoglobin 6.1 (LL) 13.5 - 17.5 g/dL    Hematocrit 22.7 (L) 41.0 - 52.0 %    MCV 74 (L) 80 - 100 fL    MCH 19.9 (L) 26.0 - 34.0 pg    MCHC 26.9 (L) 32.0 - 36.0 g/dL    RDW 20.9 (H) 11.5 - 14.5 %    Platelets 304 150 - 450 x10*3/uL   Comprehensive metabolic panel    Collection Time: 08/02/24  5:14 AM   Result Value Ref Range    Glucose 91 74 - 99 mg/dL    Sodium 138 136 - 145 mmol/L    Potassium 4.2 3.5 - 5.3 mmol/L    Chloride 108 (H) 98 - 107 mmol/L    Bicarbonate 23 21 - 32 mmol/L    Anion Gap 11 10 - 20 mmol/L    Urea Nitrogen 18 6 - 23 mg/dL    Creatinine 1.12 0.50 - 1.30 mg/dL    eGFR 71 >60 mL/min/1.73m*2    Calcium 8.6 8.6 - 10.3 mg/dL     Albumin 3.7 3.4 - 5.0 g/dL    Alkaline Phosphatase 44 33 - 136 U/L    Total Protein 5.8 (L) 6.4 - 8.2 g/dL    AST 12 9 - 39 U/L    Bilirubin, Total 0.7 0.0 - 1.2 mg/dL    ALT 7 (L) 10 - 52 U/L   Hemoglobin A1C    Collection Time: 08/02/24  5:14 AM   Result Value Ref Range    Hemoglobin A1C 5.4 see below %    Estimated Average Glucose 108 Not Established mg/dL   Magnesium    Collection Time: 08/02/24  5:14 AM   Result Value Ref Range    Magnesium 2.09 1.60 - 2.40 mg/dL   Prepare RBC: 1 Units    Collection Time: 08/02/24  6:58 AM   Result Value Ref Range    PRODUCT CODE O9977Y97     Unit Number [id number]     Unit ABO A     Unit RH POS     XM INTEP COMP     Dispense Status XM     Blood Expiration Date 8/21/2024 11:59:00 PM EDT     PRODUCT BLOOD TYPE 6200     UNIT VOLUME 350    Verify ABO/Rh Group Test (VERAB)    Collection Time: 08/02/24  7:24 AM   Result Value Ref Range    ABO TYPE AB     Rh TYPE POS    Lavender Top    Collection Time: 08/02/24  7:29 AM   Result Value Ref Range    Extra Tube Hold for add-ons.    SST TOP    Collection Time: 08/02/24  7:29 AM   Result Value Ref Range    Extra Tube Hold for add-ons.    POCT GLUCOSE    Collection Time: 08/02/24  7:54 AM   Result Value Ref Range    POCT Glucose 115 (H) 74 - 99 mg/dL          Assessment:  This is a 70 yo Male with a PMH of anemia, and Raynaud's who presented to [facility] on 8/1/24 with reports of weakness/fatigue.  GI was consulted for Microcytic anemia, ?GIB with +Cologuard as an outpatient.  Symptomatic of anemia.  Recently started OTC iron by recommendation of a family member.  No prior scopes performed.  Most likely anemia of chronic disease.  Possible PIERRE.  No overt bleeding.         Plan  1.)  Microcytic anemia, ?GIB with +Cologuard as an outpatient-Outpatient EGD/colonoscopy.  I will have my office arrange this.      Will sign off.      Discussed patient with Dr. Sarmiento.    I spent 60 minutes in the professional and overall care of this  patient.      08/02/24 at 10:19 AM - LEYLA Skelton-CNP

## 2024-08-02 NOTE — PROGRESS NOTES
08/02/24 1120   Discharge Planning   Living Arrangements Alone   Support Systems Family members   Assistance Needed Independent, patient does drive   Type of Residence Private residence   Number of Stairs to Enter Residence 3   Home or Post Acute Services None   Expected Discharge Disposition Home   Financial Resource Strain   How hard is it for you to pay for the very basics like food, housing, medical care, and heating? Not hard   Housing Stability   In the last 12 months, was there a time when you were not able to pay the mortgage or rent on time? N   At any time in the past 12 months, were you homeless or living in a shelter (including now)? N   Transportation Needs   In the past 12 months, has lack of transportation kept you from medical appointments or from getting medications? no   In the past 12 months, has lack of transportation kept you from meetings, work, or from getting things needed for daily living? No     Met with patient at bedside, introduced self and role on care transitions team. Admission assessment completed with patient. Address, insurance and contact information verified. Patient lives at home. Patient preference is to return home at discharge. Patient has declined any home going needs.  PCP: Dr. Matthew Small, last visit was July 11th  Pharmacy: [facility] in [place], patient able to afford and obtain his home medications

## 2024-08-02 NOTE — CONSULTS
Inpatient consult to Cardiology  Consult performed by: Dileep Tristan DO  Consult ordered by: Gray Singleton DO  Reason for consult: Atrial flutter          Reason For Consult  Atrial flutter    History Of Present Illness  Krish Batista is a 69 y.o. male presenting with generalized weakness.    HPI  Krish Batista is a 69 y.o. male with a past medical history of anemia presents to [facility] ED with generalized weakness.  He states that over the past few months he has progressively gotten weaker and is felt more short of breath on exertion.  He states he has some mild midsternal nonradiating chest tightness when doing activities of daily living.  Of note, he has a positive Cologuard from 4/2024 that he has never followed up on.  He denies any fever, chills, nausea/vomiting, coughing, palpitations, diaphoresis, numbness/tingling.    ED course: Afebrile.   RR 20 /75 SpO2 97% RA.  EKG significant for , SVT, no acute ischemic changes.  Repeat EKG , atrial tachycardia, no acute ischemic changes.  Labs are significant for hemoglobin 7.4,, troponin 12, negative x1, lactate 2.2.  CXR no acute cardiopulmonary process.  CT A/P with contrast showed no acute findings with small hepatic lesions likely representing a combination of cysts.  Patient was administered bolus of Cardizem 10mg x2.  Cardiology was consulted and started digoxin.     Past Medical History  He has a past medical history of Personal history of other diseases of the circulatory system.    Surgical History  He has a past surgical history that includes Other surgical history (07/13/2021); Other surgical history (07/13/2021); and Other surgical history (07/13/2021).     Social History  He reports that he has never smoked. He has never used smokeless tobacco. No history on file for alcohol use and drug use.    Family History  No family history on file.     Allergies  Amoxicillin    Review of Systems  See HPI     Physical  Exam  Constitutional: well developed, awake, alert, no acute distress  ENMT: mucous membranes moist, EOMI, conjunctivae clear  Head/Neck: normocephalic, atraumatic; supple, trachea midline  Respiratory/Thorax: patent airways, CTAB; no wheezes, rales, or rhonchi  Cardiovascular: Irregular rhythm  Gastrointestinal: soft, nondistended, non-tender, bowel sounds appreciated  Extremities: palpable peripheral pulses, no edema or cyanosis  Neurological: AO x3, no focal deficits  Psychological: Normal  Skin: Pale but warm and dry       Last Recorded Vitals  /58 (BP Location: Right arm, Patient Position: Lying)   Pulse 95   Temp 36.2 °C (97.2 °F) (Temporal)   Resp 16   Wt 91.1 kg (200 lb 13.4 oz)   SpO2 99%        Assessment/Plan     1.  Atrial flutter  2.  Dyspnea on exertion  3.  Iron deficiency anemia in setting of positive Cologuard    -In ED, EKG significant for , SVT, no acute ischemic changes.  Patient was given bolus Cardizem 15 mg and repeat EKG , atrial tachycardia, no acute ischemic changes.  Patient was given bolus Cardizem 10 mg and began digoxin at 500 mcg and to 50 mcg before switching back to Cardizem drip.  -Patient has no prior diagnosis of arrhythmia and has never seen a cardiologist.  -Patient's new onset arrhythmia may be due to anemia and and thus may have resolution of this arrhythmia by addressing cause of anemia.  -Given patient's low hemoglobin and current bleeding, [id number] 1, anticoagulation will be held at this time.  Will consider restarting after bleeding has been stabilized.  -Will wean off Cardizem drip and begin metoprolol 12.5 mg every 6h.  Pending echo results to evaluate heart for any abnormalities that may be contributing to arrhythmia.      Dr. Dileep Tristan   Internal Medicine PGY-1  Available on [facility] for any questions.    This is a preliminary note pending signature from the attending physician.

## 2024-08-02 NOTE — PROGRESS NOTES
Assessment / Plan   Krish Batista is a 69 y.o. male with a past medical history of anemia presented 8/1 for increased weakness and fatigue.    #Iron deficiency anemia  -In the setting of positive Cologuard on 4/9/2024, concerning for slow GI bleed from possible colon cancer  -Patient was given outpatient referral to GI at time of positive test but never followed up  -Hemoglobin 7.4, MCV 74 on admission, iron studies indicating iron deficiency anemia, LDH within normal limits  -CT A/P 8/1 showing no acute findings in the abdomen or pelvis  -Given 1 unit PRBCs 8/2  -IV PPI BID, will give IV iron 200 mg x 3 days, anemia workup with B12, folate, haptoglobin pending. GI consulted    #New onset atrial flutter  -Likely secondary to increased demand from anemia as above  -EKG 8/1 showing ectopic atrial tachycardia, heart rate of 120, prolonged QT  -Loaded with digoxin in the ED and placed on Cardizem drip  -TSH normal, [id number] 1, holding anticoagulation in the setting of possible GI bleed as above  -Cardiology consulted    #Incidental CT findings  -CT A/P 8/1 showing small hepatic lesions likely representing cysts    Mark Dickens MD  PGY-1, Internal Medicine    This is a preliminary note, please await attending attestation for final recommendations    Subjective     Patient seen and examined. No acute overnight events.  He says that his fatigue started becoming a major issue at the end of May or early June.  Says that in the last couple weeks he has been unable to even take a shower without becoming short of breath and needing to take a break.  He also says that he had a positive Cologuard this spring and was scheduled for a colonoscopy but had a family vacation during the time of his appointment and was lost to follow-up after that.  He denies any recent blood in his stool, states that his stools are dark because he takes iron pills.  He has felt some palpitations recently but never thought of them as an  "issue.    Objective   Physical Exam  Vitals reviewed.   Constitutional:       Appearance: Normal appearance.   Cardiovascular:      Rate and Rhythm: Tachycardia present.   Pulmonary:      Effort: Pulmonary effort is normal.      Breath sounds: Normal breath sounds.   Abdominal:      General: Abdomen is flat.      Palpations: Abdomen is soft.      Tenderness: There is no abdominal tenderness.   Musculoskeletal:      Right lower leg: No edema.      Left lower leg: No edema.   Skin:     General: Skin is warm and dry.   Neurological:      General: No focal deficit present.      Mental Status: He is alert and oriented to person, place, and time.       Last Recorded Vitals  Blood pressure 90/56, pulse 80, temperature 36.1 °C (97 °F), resp. rate 19, height 1.854 m (6' 0.99\"), weight 91.1 kg (200 lb 13.4 oz), SpO2 98%.  Intake/Output last 3 Shifts:  I/O last 3 completed shifts:  In: 154.7 (1.7 mL/kg) [I.V.:154.7 (1.7 mL/kg)]  Out: - (0 mL/kg)   Weight: 91.1 kg     Last CBC & BMP  Lab Results   Component Value Date    GLUCOSE 91 08/02/2024    CALCIUM 8.6 08/02/2024     08/02/2024    K 4.2 08/02/2024    CO2 23 08/02/2024     (H) 08/02/2024    BUN 18 08/02/2024    CREATININE 1.12 08/02/2024     Lab Results   Component Value Date    WBC 5.8 08/02/2024    HGB 6.1 (LL) 08/02/2024    HCT 22.7 (L) 08/02/2024    MCV 74 (L) 08/02/2024     08/02/2024       "

## 2024-08-03 VITALS
HEART RATE: 71 BPM | HEIGHT: 73 IN | TEMPERATURE: 96.4 F | BODY MASS INDEX: 26.62 KG/M2 | SYSTOLIC BLOOD PRESSURE: 109 MMHG | RESPIRATION RATE: 18 BRPM | OXYGEN SATURATION: 100 % | DIASTOLIC BLOOD PRESSURE: 65 MMHG | WEIGHT: 200.84 LBS

## 2024-08-03 PROBLEM — R06.02 SHORTNESS OF BREATH: Status: RESOLVED | Noted: 2024-08-01 | Resolved: 2024-08-03

## 2024-08-03 PROBLEM — I48.3 TYPICAL ATRIAL FLUTTER (MULTI): Status: RESOLVED | Noted: 2024-08-02 | Resolved: 2024-08-03

## 2024-08-03 LAB
ALBUMIN SERPL BCP-MCNC: 3.8 G/DL (ref 3.4–5)
ANION GAP SERPL CALC-SCNC: 13 MMOL/L (ref 10–20)
BUN SERPL-MCNC: 19 MG/DL (ref 6–23)
CALCIUM SERPL-MCNC: 9 MG/DL (ref 8.6–10.3)
CHLORIDE SERPL-SCNC: 106 MMOL/L (ref 98–107)
CO2 SERPL-SCNC: 25 MMOL/L (ref 21–32)
CREAT SERPL-MCNC: 1.18 MG/DL (ref 0.5–1.3)
EGFRCR SERPLBLD CKD-EPI 2021: 66 ML/MIN/1.73M*2
ERYTHROCYTE [DISTWIDTH] IN BLOOD BY AUTOMATED COUNT: 20.4 % (ref 11.5–14.5)
GLUCOSE SERPL-MCNC: 99 MG/DL (ref 74–99)
HAPTOGLOB SERPL-MCNC: 107 MG/DL (ref 37–246)
HCT VFR BLD AUTO: 27 % (ref 41–52)
HGB BLD-MCNC: 7.6 G/DL (ref 13.5–17.5)
MAGNESIUM SERPL-MCNC: 2.02 MG/DL (ref 1.6–2.4)
MCH RBC QN AUTO: 20.9 PG (ref 26–34)
MCHC RBC AUTO-ENTMCNC: 28.1 G/DL (ref 32–36)
MCV RBC AUTO: 74 FL (ref 80–100)
NRBC BLD-RTO: 0.5 /100 WBCS (ref 0–0)
PHOSPHATE SERPL-MCNC: 3.7 MG/DL (ref 2.5–4.9)
PLATELET # BLD AUTO: 312 X10*3/UL (ref 150–450)
POTASSIUM SERPL-SCNC: 4.7 MMOL/L (ref 3.5–5.3)
RBC # BLD AUTO: 3.64 X10*6/UL (ref 4.5–5.9)
SODIUM SERPL-SCNC: 139 MMOL/L (ref 136–145)
WBC # BLD AUTO: 6 X10*3/UL (ref 4.4–11.3)

## 2024-08-03 PROCEDURE — 2500000005 HC RX 250 GENERAL PHARMACY W/O HCPCS: Performed by: STUDENT IN AN ORGANIZED HEALTH CARE EDUCATION/TRAINING PROGRAM

## 2024-08-03 PROCEDURE — G0378 HOSPITAL OBSERVATION PER HR: HCPCS

## 2024-08-03 PROCEDURE — C9113 INJ PANTOPRAZOLE SODIUM, VIA: HCPCS | Performed by: STUDENT IN AN ORGANIZED HEALTH CARE EDUCATION/TRAINING PROGRAM

## 2024-08-03 PROCEDURE — 36415 COLL VENOUS BLD VENIPUNCTURE: CPT

## 2024-08-03 PROCEDURE — 83735 ASSAY OF MAGNESIUM: CPT

## 2024-08-03 PROCEDURE — 99239 HOSP IP/OBS DSCHRG MGMT >30: CPT | Performed by: STUDENT IN AN ORGANIZED HEALTH CARE EDUCATION/TRAINING PROGRAM

## 2024-08-03 PROCEDURE — 99232 SBSQ HOSP IP/OBS MODERATE 35: CPT | Performed by: STUDENT IN AN ORGANIZED HEALTH CARE EDUCATION/TRAINING PROGRAM

## 2024-08-03 PROCEDURE — 2500000004 HC RX 250 GENERAL PHARMACY W/ HCPCS (ALT 636 FOR OP/ED)

## 2024-08-03 PROCEDURE — 36430 TRANSFUSION BLD/BLD COMPNT: CPT

## 2024-08-03 PROCEDURE — 2500000001 HC RX 250 WO HCPCS SELF ADMINISTERED DRUGS (ALT 637 FOR MEDICARE OP): Performed by: STUDENT IN AN ORGANIZED HEALTH CARE EDUCATION/TRAINING PROGRAM

## 2024-08-03 PROCEDURE — 80069 RENAL FUNCTION PANEL: CPT

## 2024-08-03 PROCEDURE — 2500000001 HC RX 250 WO HCPCS SELF ADMINISTERED DRUGS (ALT 637 FOR MEDICARE OP)

## 2024-08-03 PROCEDURE — 85027 COMPLETE CBC AUTOMATED: CPT

## 2024-08-03 PROCEDURE — 2500000004 HC RX 250 GENERAL PHARMACY W/ HCPCS (ALT 636 FOR OP/ED): Performed by: STUDENT IN AN ORGANIZED HEALTH CARE EDUCATION/TRAINING PROGRAM

## 2024-08-03 RX ORDER — METOPROLOL TARTRATE 50 MG/1
50 TABLET ORAL 2 TIMES DAILY
Qty: 60 TABLET | Refills: 1 | Status: SHIPPED | OUTPATIENT
Start: 2024-08-03 | End: 2024-08-06 | Stop reason: SDUPTHER

## 2024-08-03 RX ORDER — METOPROLOL TARTRATE 25 MG/1
25 TABLET, FILM COATED ORAL 4 TIMES DAILY
Status: DISCONTINUED | OUTPATIENT
Start: 2024-08-03 | End: 2024-08-03

## 2024-08-03 RX ORDER — PANTOPRAZOLE SODIUM 40 MG/1
40 TABLET, DELAYED RELEASE ORAL DAILY
Qty: 30 TABLET | Refills: 1 | Status: SHIPPED | OUTPATIENT
Start: 2024-08-03 | End: 2024-10-02

## 2024-08-03 RX ORDER — METOPROLOL TARTRATE 50 MG/1
50 TABLET ORAL 2 TIMES DAILY
Status: DISCONTINUED | OUTPATIENT
Start: 2024-08-03 | End: 2024-08-03 | Stop reason: HOSPADM

## 2024-08-03 RX ORDER — METOPROLOL TARTRATE 1 MG/ML
5 INJECTION, SOLUTION INTRAVENOUS ONCE
Status: COMPLETED | OUTPATIENT
Start: 2024-08-03 | End: 2024-08-03

## 2024-08-03 RX ORDER — METOPROLOL TARTRATE 25 MG/1
12.5 TABLET, FILM COATED ORAL 4 TIMES DAILY
Status: DISCONTINUED | OUTPATIENT
Start: 2024-08-03 | End: 2024-08-03

## 2024-08-03 RX ADMIN — METOPROLOL TARTRATE 50 MG: 50 TABLET, FILM COATED ORAL at 13:10

## 2024-08-03 RX ADMIN — METOPROLOL TARTRATE 12.5 MG: 25 TABLET, FILM COATED ORAL at 05:33

## 2024-08-03 RX ADMIN — PANTOPRAZOLE SODIUM 40 MG: 40 INJECTION, POWDER, FOR SOLUTION INTRAVENOUS at 09:40

## 2024-08-03 RX ADMIN — POLYETHYLENE GLYCOL 3350 17 G: 17 POWDER, FOR SOLUTION ORAL at 09:53

## 2024-08-03 RX ADMIN — IRON SUCROSE 200 MG: 20 INJECTION, SOLUTION INTRAVENOUS at 05:33

## 2024-08-03 RX ADMIN — METOPROLOL TARTRATE 5 MG: 5 INJECTION INTRAVENOUS at 00:57

## 2024-08-03 RX ADMIN — PSYLLIUM HUSK 1 PACKET: 3.4 POWDER ORAL at 09:00

## 2024-08-03 ASSESSMENT — COGNITIVE AND FUNCTIONAL STATUS - GENERAL: MOBILITY SCORE: 24

## 2024-08-03 ASSESSMENT — PAIN SCALES - GENERAL: PAINLEVEL_OUTOF10: 0 - NO PAIN

## 2024-08-03 NOTE — CARE PLAN
The patient's goals for the shift include      The clinical goals for the shift include BP stable    Problem: Pain  Goal: Performs ADL's with improved pain control throughout shift  8/3/2024 0750 by Collette Smith, RN  Outcome: Progressing  8/3/2024 0749 by Collette Smith, RN  Outcome: Progressing

## 2024-08-03 NOTE — DISCHARGE SUMMARY
Discharge Diagnosis  Shortness of breath    Issues Requiring Follow-Up  Follow up for EGD and colonoscopy    Discharge Meds     Your medication list        START taking these medications        Instructions Last Dose Given Next Dose Due   metoprolol tartrate 50 mg tablet  Commonly known as: Lopressor      Take 1 tablet by mouth 2 times a day.       pantoprazole 40 mg EC tablet  Commonly known as: ProtoNix      Take 1 tablet (40 mg) by mouth once daily. Do not crush, chew, or split.              CONTINUE taking these medications        Instructions Last Dose Given Next Dose Due   ferrous sulfate 325 (65 Fe) MG EC tablet           multivitamin with minerals tablet                     Where to Get Your Medications        These medications were sent to [facility]/pharmacy #[id number] - [place], [place] - [address] [facility] [address]  [address] [address]      Phone: [phone]   metoprolol tartrate 50 mg tablet  pantoprazole 40 mg EC tablet       Hospital Course   70 yo M with PMHx notable for anemia seen on outpatient labs presented to [facility] with increased generalized weakness and fatigue.  He reported increasing fatigue and nonfocal weakness over the last few weeks prompting his emergency room visit. He did have a recent positive cologuard on outpatient screening, was referref to gastroenterology however states he was feeling ok so never followed up on an outpatient basis. He endorsed dark stools however this started after he began his iron supplementation.   In the emergency room he was found to be in typical atrial flutter with RVR and also found to have a microcytic anemia with Hgb of 7.4 which did drop to 6.1. He was given a unit of PRBCs and started on IV iron. In addition, he was started on an empiric PPI. CT abdomen/pelvis was grossly unremarkable with regards to pathology that may contribute to his anemia. His Hgb remained stable after transfusion. GI was consulted and recommended outpatient EGD and  colonoscopy which they will arrange as an outpatient. He was started on a cardizem drip and digoxin loaded with regards to his new atrial flutter. Cardiology was consulted and he was transitioned to PO lopressor and did spontaneously convert to NSR prior to discharge. ECHO with intact EF. He was not anticoagulated due to his anemia in addition to low CHADSVasc. An order for an outpatient holter was placed (should be mailed to him) to assess for burden of PAF/flutter. He was cleared by both GI and cardiology for discharge. Pt discharged home in stable condition and arrangements will be made for an outpatient EGD/colonoscopy.     35 min   Pertinent Physical Exam At Time of Discharge  Physical Exam  G: aox3, NAD, cooperative  HENT: neck supple, no JVD  Eyes: clear sclera  CV: RRR s1 s2  L: clear  Abd: soft, NT, non distended  Ext: no c/c/e  N: no appreciable acute focal deficits  Psych: appropriate mood and behavior      Jason Paulino, DO

## 2024-08-03 NOTE — PROGRESS NOTES
Subjective Data:  Patient converted back into normal sinus rhythm.  He is doing well overall.  His most recent hemoglobin 7.6.     Objective Data:  Last Recorded Vitals:  Vitals:    08/02/24 2354 08/03/24 0406 08/03/24 0756 08/03/24 1111   BP: 118/65 130/63 131/75 109/65   BP Location: Right arm Right arm Right arm Right arm   Patient Position: Lying Lying Lying Sitting   Pulse: 105 91 95 71   Resp: 18 18 18 18   Temp: 36.2 °C (97.2 °F) 36.4 °C (97.5 °F) 35.9 °C (96.6 °F) 35.8 °C (96.4 °F)   TempSrc: Temporal Temporal Temporal Temporal   SpO2: 98% 98% 96% 100%   Weight:       Height:           Last Labs:  CBC - 8/3/2024:  5:45 AM  6.0 7.6 312    27.0      CMP - 8/3/2024:  5:45 AM  9.0 5.8 12 --- 0.7   3.7 3.8 7 44      PTT - 8/1/2024:  2:49 PM  1.1   12.1 29     TROPHS   Date/Time Value Ref Range Status   08/01/2024 02:49 PM 12 0 - 20 ng/L Final     HGBA1C   Date/Time Value Ref Range Status   08/02/2024 05:14 AM 5.4 see below % Final     LDLCALC   Date/Time Value Ref Range Status   03/28/2024 11:11  <=99 mg/dL Final     Comment:                                 Near   Borderline      AGE      Desirable  Optimal    High     High     Very High     0-19 Y     0 - 109     ---    110-129   >/= 130     ----    20-24 Y     0 - 119     ---    120-159   >/= 160     ----      >24 Y     0 -  99   100-129  130-159   160-189     >/=190       VLDL   Date/Time Value Ref Range Status   03/28/2024 11:11 AM 15 0 - 40 mg/dL Final      Last I/O:  I/O last 3 completed shifts:  In: 613 (6.7 mL/kg) [I.V.:248.4 (2.7 mL/kg); Blood:364.6]  Out: - (0 mL/kg)   Weight: 91.1 kg     Past Cardiology Tests (Last 3 Years):  EKG:  ECG 12 lead 08/01/2024 (Preliminary)    Echo:  Transthoracic Echo (TTE) Complete 08/02/2024    Ejection Fractions:  EF   Date/Time Value Ref Range Status   08/02/2024 10:35 AM 61 %      Cath:  No results found for this or any previous visit from the past 1095 days.    Stress Test:  No results found for this or any  previous visit from the past 1095 days.    Cardiac Imaging:  No results found for this or any previous visit from the past 1095 days.      Inpatient Medications:  Scheduled medications   Medication Dose Route Frequency    iron sucrose  200 mg intravenous Daily    metoprolol tartrate  25 mg oral 4x daily    pantoprazole  40 mg intravenous BID    polyethylene glycol  17 g oral Daily    psyllium  1 packet oral Daily     PRN medications   Medication     Continuous Medications   Medication Dose Last Rate       Physical Exam:  General: No acute distress,  A&O x3  Skin: Warm and dry  Neck: JVD is not elevated  ENT: Moist mucous membranes no lesions appreciated  Pulmonary: CTAB  Cards: Regular rate rhythm, no murmurs gallops or rubs appreciated normal S1-S2  Abdomen: Soft nontender nondistended  Extremities: No edema or cyanosis  Psych: Appropriate mood and affect        Assessment/Plan     1.  Newly discovered atrial fibrillation/flutter with RVR in the setting of severe micro anemia.  CHADS2 Vascor of 1.  No need to initiate oral anticoagulation.  spontaneous conversion back into normal sinus rhythm.  Echo results reviewed.  Consolidate oral metoprolol to tartrate to 50 mg twice a day.  Outpatient Holter monitor to assess burden of atrial fibrillation.    2.  Severe microcytic anemia: Hemoglobin 6 which improved with transfusion now to 7.6.  GI following.  Plans for eventual EGD/colonoscopy as an outpatient    Code Status:  Full Code    Luis Daniel Archibald MD PhD

## 2024-08-03 NOTE — CARE PLAN
The patient's goals for the shift include      The clinical goals for the shift include BP stable      Problem: Pain  Goal: Performs ADL's with improved pain control throughout shift  Outcome: Progressing

## 2024-08-05 ENCOUNTER — TELEPHONE (OUTPATIENT)
Dept: GASTROENTEROLOGY | Facility: CLINIC | Age: 69
End: 2024-08-05
Payer: MEDICARE

## 2024-08-05 ENCOUNTER — TELEPHONE (OUTPATIENT)
Dept: PRIMARY CARE | Facility: CLINIC | Age: 69
End: 2024-08-05
Payer: MEDICARE

## 2024-08-05 ENCOUNTER — PATIENT OUTREACH (OUTPATIENT)
Dept: PRIMARY CARE | Facility: CLINIC | Age: 69
End: 2024-08-05
Payer: MEDICARE

## 2024-08-05 PROBLEM — Z86.79 HISTORY OF CARDIOVASCULAR DISORDER: Status: ACTIVE | Noted: 2024-08-05

## 2024-08-05 PROBLEM — H66.90 OTITIS MEDIA: Status: ACTIVE | Noted: 2024-08-05

## 2024-08-05 LAB
ATRIAL RATE: 120 BPM
ATRIAL RATE: 83 BPM
P AXIS: -67 DEGREES
P AXIS: 255 DEGREES
PR INTERVAL: 156 MS
PR INTERVAL: 82 MS
Q ONSET: 251 MS
Q ONSET: 251 MS
QRS COUNT: 20 BEATS
QRS COUNT: 26 BEATS
QRS DURATION: 85 MS
QRS DURATION: 87 MS
QT INTERVAL: 287 MS
QT INTERVAL: 342 MS
QTC CALCULATION(BAZETT): 480 MS
QTC CALCULATION(BAZETT): 484 MS
QTC FREDERICIA: 404 MS
QTC FREDERICIA: 430 MS
R AXIS: 38 DEGREES
R AXIS: 43 DEGREES
T AXIS: -8 DEGREES
T AXIS: 42 DEGREES
T OFFSET: 394 MS
T OFFSET: 422 MS
VENTRICULAR RATE: 120 BPM
VENTRICULAR RATE: 168 BPM

## 2024-08-05 NOTE — PROGRESS NOTES
Discharge Facility: [facility]  Discharge Diagnosis: Shortness of Breath  Admission Date: 8/1/24  Discharge Date: 8/3/24    PCP Appointment Date: None avail. Task to office  Specialist Appointment Date: 8/6/24 (Cardio)  Hospital Encounter and Summary Linked: Yes    See discharge assessment below for further details    Engagement  Call Start Time: 1310 (8/5/2024  1:15 PM)    Medications  Medications reviewed with patient/caregiver?: Yes (8/5/2024  1:15 PM)  Is the patient having any side effects they believe may be caused by any medication additions or changes?: No (8/5/2024  1:15 PM)  Does the patient have all medications ordered at discharge?: Yes (8/5/2024  1:15 PM)  Care Management Interventions: No intervention needed (8/5/2024  1:15 PM)  Prescription Comments: pt sent home with script (8/5/2024  1:15 PM)  Is the patient taking all medications as directed (includes completed medication regime)?: Yes (8/5/2024  1:15 PM)  Care Management Interventions: Provided patient education (8/5/2024  1:15 PM)  Medication Comments: Pt denies problems obtaining or affording medication (8/5/2024  1:15 PM)    Appointments  Does the patient have a primary care provider?: Yes (no avail apt. task to office) (8/5/2024  1:15 PM)  Care Management Interventions: Educated patient on importance of making appointment (8/5/2024  1:15 PM)  Has the patient kept scheduled appointments due by today?: Yes (8/5/2024  1:15 PM)  Care Management Interventions: Educated on importance of keeping appointment (8/5/2024  1:15 PM)    Self Management  What is the home health agency?: n/a (8/5/2024  1:15 PM)  Has home health visited the patient within 72 hours of discharge?: Not applicable (8/5/2024  1:15 PM)    Patient Teaching  Does the patient have access to their discharge instructions?: Yes (8/5/2024  1:15 PM)  Care Management Interventions: Reviewed instructions with patient (8/5/2024  1:15 PM)  What is the patient's perception of their  health status since discharge?: New symptoms unrelated to diagnosis (8/5/2024  1:15 PM)  Is the patient/caregiver able to teach back the hierarchy of who to call/visit for symptoms/problems? PCP, Specialist, Home Health nurse, Urgent Care, ED, 911: Yes (8/5/2024  1:15 PM)  Patient/Caregiver Education Comments: This CM spoke with pt via phone. Pt reports doing well at home since discharge. New meds reviewed. Pt denies CP and SOB. Pt aware of my availability for non-emergent concerns. Contact info provided to patient (8/5/2024  1:15 PM)      Bernabe Mcclure LPN

## 2024-08-05 NOTE — TELEPHONE ENCOUNTER
CALLED PT FOR A HOSPITAL FOLLOW UP VISIT AND HE SAID HE WAS GOING TO CARDIOLOGIST TOMORROW AND WILL MAKE AN APPT IF NEEDS IT.

## 2024-08-06 ENCOUNTER — OFFICE VISIT (OUTPATIENT)
Dept: CARDIOLOGY | Facility: CLINIC | Age: 69
End: 2024-08-06
Payer: MEDICARE

## 2024-08-06 ENCOUNTER — HOSPITAL ENCOUNTER (OUTPATIENT)
Dept: CARDIOLOGY | Facility: CLINIC | Age: 69
Discharge: HOME | End: 2024-08-06
Payer: MEDICARE

## 2024-08-06 VITALS
WEIGHT: 203 LBS | HEIGHT: 73 IN | OXYGEN SATURATION: 99 % | DIASTOLIC BLOOD PRESSURE: 67 MMHG | HEART RATE: 68 BPM | SYSTOLIC BLOOD PRESSURE: 133 MMHG | BODY MASS INDEX: 26.9 KG/M2

## 2024-08-06 DIAGNOSIS — I48.3 TYPICAL ATRIAL FLUTTER (MULTI): ICD-10-CM

## 2024-08-06 DIAGNOSIS — I48.0 PAROXYSMAL ATRIAL FIBRILLATION (MULTI): Primary | ICD-10-CM

## 2024-08-06 DIAGNOSIS — D50.9 MICROCYTIC ANEMIA: ICD-10-CM

## 2024-08-06 DIAGNOSIS — I48.0 PAROXYSMAL ATRIAL FIBRILLATION (MULTI): ICD-10-CM

## 2024-08-06 LAB — BODY SURFACE AREA: 2.18 M2

## 2024-08-06 PROCEDURE — 99214 OFFICE O/P EST MOD 30 MIN: CPT | Performed by: STUDENT IN AN ORGANIZED HEALTH CARE EDUCATION/TRAINING PROGRAM

## 2024-08-06 PROCEDURE — 3008F BODY MASS INDEX DOCD: CPT | Performed by: STUDENT IN AN ORGANIZED HEALTH CARE EDUCATION/TRAINING PROGRAM

## 2024-08-06 PROCEDURE — 93246 EXT ECG>7D<15D RECORDING: CPT

## 2024-08-06 PROCEDURE — 1111F DSCHRG MED/CURRENT MED MERGE: CPT | Performed by: STUDENT IN AN ORGANIZED HEALTH CARE EDUCATION/TRAINING PROGRAM

## 2024-08-06 PROCEDURE — 1159F MED LIST DOCD IN RCRD: CPT | Performed by: STUDENT IN AN ORGANIZED HEALTH CARE EDUCATION/TRAINING PROGRAM

## 2024-08-06 RX ORDER — METOPROLOL TARTRATE 50 MG/1
50 TABLET ORAL 2 TIMES DAILY
Qty: 60 TABLET | Refills: 3 | Status: SHIPPED | OUTPATIENT
Start: 2024-09-02 | End: 2025-09-02

## 2024-08-06 ASSESSMENT — COLUMBIA-SUICIDE SEVERITY RATING SCALE - C-SSRS
6. HAVE YOU EVER DONE ANYTHING, STARTED TO DO ANYTHING, OR PREPARED TO DO ANYTHING TO END YOUR LIFE?: NO
1. IN THE PAST MONTH, HAVE YOU WISHED YOU WERE DEAD OR WISHED YOU COULD GO TO SLEEP AND NOT WAKE UP?: NO
2. HAVE YOU ACTUALLY HAD ANY THOUGHTS OF KILLING YOURSELF?: NO

## 2024-08-06 ASSESSMENT — PATIENT HEALTH QUESTIONNAIRE - PHQ9
SUM OF ALL RESPONSES TO PHQ9 QUESTIONS 1 AND 2: 0
2. FEELING DOWN, DEPRESSED OR HOPELESS: NOT AT ALL
1. LITTLE INTEREST OR PLEASURE IN DOING THINGS: NOT AT ALL

## 2024-08-06 NOTE — PROGRESS NOTES
"Chief Complaint:   No chief complaint on file.     History Of Present Illness:    Krish Batista is a 69 y.o. male most recently seen in the hospital for episode of newly discovered atrial fibrillation in the context of severe microcytic anemia with a hemoglobin globin dropping down to 6.  Received transfusion and was discharged with his hemoglobin 7.6 hematocrit of 27.  CT abdomen pelvis was unremarkable.  Patient was treated with empiric PPI.  GI saw patient plan for outpatient EGD and colonoscopy.    Concerns of atrial fibrillation patient responded well to Cardizem drip and did digoxin load.  Echo showed normal ejection fraction without significant valvular pathology.  CHADS2 Vascor of 1 based on age.  Anticoagulation was not initiated in this context.  Patient was discharged and is here for follow-up.    Overall he feels fatigued and tired.  Has a lot of abdominal discomfort has a planned outpatient follow-up with GI in October of this year.  He is compliant metoprolol is taken over-the-counter iron supplements.     Last Recorded Vitals:  Vitals:    08/06/24 1404   BP: 133/67   BP Location: Right arm   Patient Position: Sitting   Pulse: 68   SpO2: 99%   Weight: 92.1 kg (203 lb)   Height: 1.854 m (6' 1\")       Review of Systems  ROS      Allergies:  Amoxicillin    Outpatient Medications:  Current Outpatient Medications   Medication Instructions    ferrous sulfate 65 mg, oral, Daily, Do not crush, chew, or split.    [START ON 9/2/2024] metoprolol tartrate (LOPRESSOR) 50 mg, oral, 2 times daily    multivitamin with minerals tablet 1 tablet, oral, Daily    pantoprazole (PROTONIX) 40 mg, oral, Daily, Do not crush, chew, or split.       Physical Exam:  General: No acute distress,  A&O x3  Skin: Warm and dry  Neck: JVD is not elevated  ENT: Moist mucous membranes no lesions appreciated  Pulmonary: CTAB  Cards: Regular rate rhythm, no murmurs gallops or rubs appreciated normal S1-S2  Abdomen: Soft nontender " nondistended  Extremities: No edema or cyanosis  Psych: Appropriate mood and affect        Last Labs:  CBC -  Lab Results   Component Value Date    WBC 6.0 08/03/2024    HGB 7.6 (L) 08/03/2024    HCT 27.0 (L) 08/03/2024    MCV 74 (L) 08/03/2024     08/03/2024       CMP -  Lab Results   Component Value Date    CALCIUM 9.0 08/03/2024    PHOS 3.7 08/03/2024    PROT 5.8 (L) 08/02/2024    ALBUMIN 3.8 08/03/2024    AST 12 08/02/2024    ALT 7 (L) 08/02/2024    ALKPHOS 44 08/02/2024    BILITOT 0.7 08/02/2024       LIPID PANEL -   Lab Results   Component Value Date    CHOL 184 03/28/2024    TRIG 73 03/28/2024    HDL 53.8 03/28/2024    CHHDL 3.4 03/28/2024    VLDL 15 03/28/2024    NHDL 130 03/28/2024       RENAL FUNCTION PANEL -   Lab Results   Component Value Date    GLUCOSE 99 08/03/2024     08/03/2024    K 4.7 08/03/2024     08/03/2024    CO2 25 08/03/2024    ANIONGAP 13 08/03/2024    BUN 19 08/03/2024    CREATININE 1.18 08/03/2024    CALCIUM 9.0 08/03/2024    PHOS 3.7 08/03/2024    ALBUMIN 3.8 08/03/2024        Lab Results   Component Value Date    HGBA1C 5.4 08/02/2024       Last Cardiology Tests:  ECG:  Encounter Date: 08/01/24   ECG 12 lead   Result Value    Ventricular Rate 120    Atrial Rate 120    AL Interval 156    QRS Duration 87    QT Interval 342    QTC Calculation(Bazett) 484    P Axis 255    R Axis 38    T Axis 42    QRS Count 20    Q Onset 251    T Offset 422    QTC Fredericia 430    Narrative    Ectopic atrial tachycardia, unifocal  Abnormal R-wave progression, early transition  ST depr, consider ischemia, inferior leads  Borderline prolonged QT interval    See ED provider note for full interpretation and clinical correlation  Confirmed by Ema Messer ([id number]) on 8/5/2024 5:52:21 PM        Echo:  No results found for this or any previous visit from the past 1095 days.       Ejection Fractions:  EF   Date/Time Value Ref Range Status   08/02/2024 10:35 AM 61 %      Assessment and  Plan    1.  Newly discovered atrial fibrillation/flutter with RVR in the setting of severe micro anemia.  CHADS2 Vascor of 1.  Spontaneous conversion back into normal sinus rhythm.  Normal LV function with severe normal pathology per echocardiogram.  Continue metoprolol tartrate 50 mg twice daily.   -Holter monitor today to assess burden of atrial fibrillation  -No need to initiate oral anticoagulation due to low [id number] score      2.  Severe microcytic anemia: Hemoglobin 6 which improved with transfusion discharged with hemoglobin of 7.6.  He is on PPIs and taking iron supplements.  has an appointment with GI in October.  Needs EGD/colonoscopy.      14-day Holter monitor  Follow-up in 6 months    (This note was generated with voice recognition software and may contain errors including spelling, grammar, syntax and missed recognition of what was dictated, of which may not have been fully corrected)       Update: Patient went EGD and colonoscopy.  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  He is slated to undergo surgery.  Surgical risk is as follows.  RCRI score of 0.  Intermediate risk surgery.  Functionally intact at baseline.  Baseline ECG is normal sinus rhythm.  Episodes of atrial fibrillation that occurred in the setting of severe microcytic anemia spontaneous conversion back to normal sinus rhythm.  Normal biventricular size and function without significant valve pathology.  Patient is otherwise medically optimized.  His overall risk of major adverse cardiac events is low to intermediate at best and therefore nonprohibitive to proceed with surgery.        Luis Daniel Archibald MD PhD

## 2024-08-06 NOTE — H&P (VIEW-ONLY)
"Chief Complaint:   No chief complaint on file.     History Of Present Illness:    Krish Batista is a 69 y.o. male most recently seen in the hospital for episode of newly discovered atrial fibrillation in the context of severe microcytic anemia with a hemoglobin globin dropping down to 6.  Received transfusion and was discharged with his hemoglobin 7.6 hematocrit of 27.  CT abdomen pelvis was unremarkable.  Patient was treated with empiric PPI.  GI saw patient plan for outpatient EGD and colonoscopy.    Concerns of atrial fibrillation patient responded well to Cardizem drip and did digoxin load.  Echo showed normal ejection fraction without significant valvular pathology.  CHADS2 Vascor of 1 based on age.  Anticoagulation was not initiated in this context.  Patient was discharged and is here for follow-up.    Overall he feels fatigued and tired.  Has a lot of abdominal discomfort has a planned outpatient follow-up with GI in October of this year.  He is compliant metoprolol is taken over-the-counter iron supplements.     Last Recorded Vitals:  Vitals:    08/06/24 1404   BP: 133/67   BP Location: Right arm   Patient Position: Sitting   Pulse: 68   SpO2: 99%   Weight: 92.1 kg (203 lb)   Height: 1.854 m (6' 1\")       Review of Systems  ROS      Allergies:  Amoxicillin    Outpatient Medications:  Current Outpatient Medications   Medication Instructions    ferrous sulfate 65 mg, oral, Daily, Do not crush, chew, or split.    [START ON 9/2/2024] metoprolol tartrate (LOPRESSOR) 50 mg, oral, 2 times daily    multivitamin with minerals tablet 1 tablet, oral, Daily    pantoprazole (PROTONIX) 40 mg, oral, Daily, Do not crush, chew, or split.       Physical Exam:  General: No acute distress,  A&O x3  Skin: Warm and dry  Neck: JVD is not elevated  ENT: Moist mucous membranes no lesions appreciated  Pulmonary: CTAB  Cards: Regular rate rhythm, no murmurs gallops or rubs appreciated normal S1-S2  Abdomen: Soft nontender " nondistended  Extremities: No edema or cyanosis  Psych: Appropriate mood and affect        Last Labs:  CBC -  Lab Results   Component Value Date    WBC 6.0 08/03/2024    HGB 7.6 (L) 08/03/2024    HCT 27.0 (L) 08/03/2024    MCV 74 (L) 08/03/2024     08/03/2024       CMP -  Lab Results   Component Value Date    CALCIUM 9.0 08/03/2024    PHOS 3.7 08/03/2024    PROT 5.8 (L) 08/02/2024    ALBUMIN 3.8 08/03/2024    AST 12 08/02/2024    ALT 7 (L) 08/02/2024    ALKPHOS 44 08/02/2024    BILITOT 0.7 08/02/2024       LIPID PANEL -   Lab Results   Component Value Date    CHOL 184 03/28/2024    TRIG 73 03/28/2024    HDL 53.8 03/28/2024    CHHDL 3.4 03/28/2024    VLDL 15 03/28/2024    NHDL 130 03/28/2024       RENAL FUNCTION PANEL -   Lab Results   Component Value Date    GLUCOSE 99 08/03/2024     08/03/2024    K 4.7 08/03/2024     08/03/2024    CO2 25 08/03/2024    ANIONGAP 13 08/03/2024    BUN 19 08/03/2024    CREATININE 1.18 08/03/2024    CALCIUM 9.0 08/03/2024    PHOS 3.7 08/03/2024    ALBUMIN 3.8 08/03/2024        Lab Results   Component Value Date    HGBA1C 5.4 08/02/2024       Last Cardiology Tests:  ECG:  Encounter Date: 08/01/24   ECG 12 lead   Result Value    Ventricular Rate 120    Atrial Rate 120    IL Interval 156    QRS Duration 87    QT Interval 342    QTC Calculation(Bazett) 484    P Axis 255    R Axis 38    T Axis 42    QRS Count 20    Q Onset 251    T Offset 422    QTC Fredericia 430    Narrative    Ectopic atrial tachycardia, unifocal  Abnormal R-wave progression, early transition  ST depr, consider ischemia, inferior leads  Borderline prolonged QT interval    See ED provider note for full interpretation and clinical correlation  Confirmed by Ema Messer ([id number]) on 8/5/2024 5:52:21 PM        Echo:  No results found for this or any previous visit from the past 1095 days.       Ejection Fractions:  EF   Date/Time Value Ref Range Status   08/02/2024 10:35 AM 61 %      Assessment and  Plan    1.  Newly discovered atrial fibrillation/flutter with RVR in the setting of severe micro anemia.  CHADS2 Vascor of 1.  Spontaneous conversion back into normal sinus rhythm.  Normal LV function with severe normal pathology per echocardiogram.  Continue metoprolol tartrate 50 mg twice daily.   -Holter monitor today to assess burden of atrial fibrillation  -No need to initiate oral anticoagulation due to low [id number] score      2.  Severe microcytic anemia: Hemoglobin 6 which improved with transfusion discharged with hemoglobin of 7.6.  He is on PPIs and taking iron supplements.  has an appointment with GI in October.  Needs EGD/colonoscopy.      14-day Holter monitor  Follow-up in 6 months    (This note was generated with voice recognition software and may contain errors including spelling, grammar, syntax and missed recognition of what was dictated, of which may not have been fully corrected)     Luis Daneil Archibald MD PhD

## 2024-08-07 ENCOUNTER — TELEPHONE (OUTPATIENT)
Dept: PRIMARY CARE | Facility: CLINIC | Age: 69
End: 2024-08-07
Payer: MEDICARE

## 2024-08-07 NOTE — TELEPHONE ENCOUNTER
Patient went to the ED on 8/1/24. He is following up with GI and Cardiology   ----- Message from Matthwe Small sent at 7/23/2024  8:09 AM EDT -----  Patient shows significant anemia needs follow-up

## 2024-08-08 DIAGNOSIS — Z12.11 ENCOUNTER FOR SCREENING COLONOSCOPY: Primary | ICD-10-CM

## 2024-08-08 RX ORDER — POLYETHYLENE GLYCOL 3350, SODIUM SULFATE ANHYDROUS, SODIUM BICARBONATE, SODIUM CHLORIDE, POTASSIUM CHLORIDE 236; 22.74; 6.74; 5.86; 2.97 G/4L; G/4L; G/4L; G/4L; G/4L
4 POWDER, FOR SOLUTION ORAL ONCE
Qty: 4000 ML | Refills: 0 | Status: SHIPPED | OUTPATIENT
Start: 2024-08-08 | End: 2024-08-08

## 2024-08-15 ENCOUNTER — ANESTHESIA EVENT (OUTPATIENT)
Dept: GASTROENTEROLOGY | Facility: HOSPITAL | Age: 69
End: 2024-08-15
Payer: MEDICARE

## 2024-08-15 ENCOUNTER — ANESTHESIA (OUTPATIENT)
Dept: GASTROENTEROLOGY | Facility: HOSPITAL | Age: 69
End: 2024-08-15
Payer: MEDICARE

## 2024-08-15 ENCOUNTER — HOSPITAL ENCOUNTER (OUTPATIENT)
Dept: GASTROENTEROLOGY | Facility: HOSPITAL | Age: 69
Discharge: HOME | End: 2024-08-15
Payer: MEDICARE

## 2024-08-15 VITALS
RESPIRATION RATE: 16 BRPM | TEMPERATURE: 97.7 F | SYSTOLIC BLOOD PRESSURE: 127 MMHG | HEART RATE: 51 BPM | OXYGEN SATURATION: 100 % | DIASTOLIC BLOOD PRESSURE: 60 MMHG

## 2024-08-15 DIAGNOSIS — D64.9 ANEMIA, UNSPECIFIED TYPE: ICD-10-CM

## 2024-08-15 DIAGNOSIS — R19.5 POSITIVE COLORECTAL CANCER SCREENING USING COLOGUARD TEST: ICD-10-CM

## 2024-08-15 PROCEDURE — 3700000002 HC GENERAL ANESTHESIA TIME - EACH INCREMENTAL 1 MINUTE

## 2024-08-15 PROCEDURE — 7100000009 HC PHASE TWO TIME - INITIAL BASE CHARGE

## 2024-08-15 PROCEDURE — 2500000004 HC RX 250 GENERAL PHARMACY W/ HCPCS (ALT 636 FOR OP/ED): Performed by: STUDENT IN AN ORGANIZED HEALTH CARE EDUCATION/TRAINING PROGRAM

## 2024-08-15 PROCEDURE — 2500000004 HC RX 250 GENERAL PHARMACY W/ HCPCS (ALT 636 FOR OP/ED): Performed by: NURSE ANESTHETIST, CERTIFIED REGISTERED

## 2024-08-15 PROCEDURE — 43239 EGD BIOPSY SINGLE/MULTIPLE: CPT | Performed by: STUDENT IN AN ORGANIZED HEALTH CARE EDUCATION/TRAINING PROGRAM

## 2024-08-15 PROCEDURE — 3700000001 HC GENERAL ANESTHESIA TIME - INITIAL BASE CHARGE

## 2024-08-15 PROCEDURE — 45381 COLONOSCOPY SUBMUCOUS NJX: CPT | Performed by: STUDENT IN AN ORGANIZED HEALTH CARE EDUCATION/TRAINING PROGRAM

## 2024-08-15 PROCEDURE — 2500000005 HC RX 250 GENERAL PHARMACY W/O HCPCS: Performed by: NURSE ANESTHETIST, CERTIFIED REGISTERED

## 2024-08-15 PROCEDURE — 7100000010 HC PHASE TWO TIME - EACH INCREMENTAL 1 MINUTE

## 2024-08-15 PROCEDURE — 2720000007 HC OR 272 NO HCPCS

## 2024-08-15 RX ORDER — LIDOCAINE HCL/PF 100 MG/5ML
SYRINGE (ML) INTRAVENOUS AS NEEDED
Status: DISCONTINUED | OUTPATIENT
Start: 2024-08-15 | End: 2024-08-15

## 2024-08-15 RX ORDER — FENTANYL CITRATE 50 UG/ML
INJECTION, SOLUTION INTRAMUSCULAR; INTRAVENOUS AS NEEDED
Status: DISCONTINUED | OUTPATIENT
Start: 2024-08-15 | End: 2024-08-15

## 2024-08-15 RX ORDER — SODIUM CHLORIDE, SODIUM LACTATE, POTASSIUM CHLORIDE, CALCIUM CHLORIDE 600; 310; 30; 20 MG/100ML; MG/100ML; MG/100ML; MG/100ML
20 INJECTION, SOLUTION INTRAVENOUS CONTINUOUS
Status: DISCONTINUED | OUTPATIENT
Start: 2024-08-15 | End: 2024-08-16 | Stop reason: HOSPADM

## 2024-08-15 RX ORDER — ONDANSETRON HYDROCHLORIDE 2 MG/ML
4 INJECTION, SOLUTION INTRAVENOUS ONCE AS NEEDED
Status: DISCONTINUED | OUTPATIENT
Start: 2024-08-15 | End: 2024-08-16 | Stop reason: HOSPADM

## 2024-08-15 RX ORDER — PROPOFOL 10 MG/ML
INJECTION, EMULSION INTRAVENOUS AS NEEDED
Status: DISCONTINUED | OUTPATIENT
Start: 2024-08-15 | End: 2024-08-15

## 2024-08-15 SDOH — HEALTH STABILITY: MENTAL HEALTH: CURRENT SMOKER: 0

## 2024-08-15 ASSESSMENT — PAIN SCALES - GENERAL
PAINLEVEL_OUTOF10: 0 - NO PAIN
PAIN_LEVEL: 1
PAINLEVEL_OUTOF10: 0 - NO PAIN

## 2024-08-15 ASSESSMENT — PAIN - FUNCTIONAL ASSESSMENT
PAIN_FUNCTIONAL_ASSESSMENT: 0-10
PAIN_FUNCTIONAL_ASSESSMENT: 0-10

## 2024-08-15 NOTE — ANESTHESIA PREPROCEDURE EVALUATION
Patient: Krish Batista    Procedure Information       Date/Time: 08/15/24 1100    Scheduled providers: Alton Steiner MD; Kendall Nichols MD    Procedures:       COLONOSCOPY      EGD    Location: [facility]            Relevant Problems   Anesthesia (within normal limits)      Cardiac   (+) Paroxysmal atrial fibrillation (Multi)   (+) Typical atrial flutter (Multi)      Hematology   (+) Microcytic anemia       Clinical information reviewed:   Tobacco  Allergies  Meds  Problems  Med Hx  Surg Hx   Fam Hx  Soc   Hx        NPO Detail:  NPO/Void Status  Date of Last Liquid: 08/14/24  Time of Last Liquid: 2000  Date of Last Solid: 08/13/24  Time of Last Solid: 1000  Last Intake Type: Light meal  Time of Last Void: 1026         Physical Exam    Airway  Mallampati: II  TM distance: >3 FB  Neck ROM: full     Cardiovascular - normal exam     Dental - normal exam     Pulmonary - normal exam     Abdominal - normal exam             Anesthesia Plan    History of general anesthesia?: yes  History of complications of general anesthesia?: no    ASA 3     MAC     The patient is not a current smoker.    intravenous induction   Anesthetic plan and risks discussed with patient.    Plan discussed with CRNA.

## 2024-08-15 NOTE — ANESTHESIA POSTPROCEDURE EVALUATION
Patient: Krish Batista    Procedure Summary       Date: 08/15/24 Room / Location: [facility]    Anesthesia Start: 1053 Anesthesia Stop:     Procedures:       COLONOSCOPY      EGD Diagnosis:       Positive colorectal cancer screening using Cologuard test      Anemia, unspecified type    Scheduled Providers: Alton Steiner MD; Kendall Nichols MD Responsible Provider: Kendall Nichols MD    Anesthesia Type: MAC ASA Status: 3            Anesthesia Type: MAC    Vitals Value Taken Time   BP 84/50 08/15/24 1136   Temp 36.5 08/15/24 1137   Pulse 54 08/15/24 1137   Resp 16 08/15/24 1137   SpO2 100 % 08/15/24 1135   Vitals shown include unfiled device data.    Anesthesia Post Evaluation    Patient location during evaluation: bedside  Patient participation: complete - patient participated  Level of consciousness: awake  Pain score: 1  Pain management: adequate  Multimodal analgesia pain management approach  Airway patency: patent  Cardiovascular status: acceptable  Respiratory status: acceptable  Hydration status: acceptable  Postoperative Nausea and Vomiting: none        There were no known notable events for this encounter.

## 2024-08-15 NOTE — DISCHARGE INSTRUCTIONS

## 2024-08-16 ENCOUNTER — PATIENT OUTREACH (OUTPATIENT)
Dept: PRIMARY CARE | Facility: CLINIC | Age: 69
End: 2024-08-16
Payer: MEDICARE

## 2024-08-16 LAB
LAB AP ASR DISCLAIMER: NORMAL
LAB AP BLOCK FOR ADDITIONAL STUDIES: NORMAL
LABORATORY COMMENT REPORT: NORMAL
PATH REPORT.FINAL DX SPEC: NORMAL
PATH REPORT.GROSS SPEC: NORMAL
PATH REPORT.RELEVANT HX SPEC: NORMAL
PATH REPORT.TOTAL CANCER: NORMAL

## 2024-08-16 NOTE — PROGRESS NOTES
Call regarding appt. with PCP on (n/a) after hospitalization.  At time of outreach call the patient feels as if their condition has (improved) since last visit.  Reviewed the PCP appointment with the pt and addressed any questions or concerns.    Bernabe Mcclure LPN

## 2024-08-19 ENCOUNTER — TELEPHONE (OUTPATIENT)
Dept: SURGERY | Facility: CLINIC | Age: 69
End: 2024-08-19
Payer: MEDICARE

## 2024-08-20 LAB
LAB AP ASR DISCLAIMER: NORMAL
LAB AP BLOCK FOR ADDITIONAL STUDIES: NORMAL
LABORATORY COMMENT REPORT: NORMAL
PATH REPORT.ADDENDUM SPEC: NORMAL
PATH REPORT.FINAL DX SPEC: NORMAL
PATH REPORT.GROSS SPEC: NORMAL
PATH REPORT.RELEVANT HX SPEC: NORMAL
PATH REPORT.TOTAL CANCER: NORMAL

## 2024-08-20 NOTE — PROGRESS NOTES
History Of Present Illness :  Krish Batista is a 69 y.o. male who presents on referral from Dr. Alton Steiner for surgical evaluation and treatment of a biopsy-proven left colon adenocarcinoma.  The patient's primary physician is Dr. Matthew Small.      The patient saw Dr. Small on 3/25/2024 complaining of fatigue and malaise.  Workup ensued.  Laboratory values from 3/28/2024 revealed significant anemia at 8.1 and 28.2.  Cologuard was ordered and this was collected on 4/9/2024.  This resulted as positive on 4/23/2024.  Shortly thereafter, on 4/28/2024, Dr. Small referred the patient to gastroenterology.  The patient however failed to follow through.  Dr. Small saw him back in the office on 7/11/2024 to emphasize the importance of the referral.  He was also referred to general surgery.  Again the patient never followed through.  Repeat H&H on 7/11/2024 was 7.2 and 27.6.    The patient ultimately presented to the emergency department at [facility] on 8/1/2024 secondary to increasing fatigue and weakness.  He was diagnosed with severe microcytic anemia.  H&H on admission was 7.4 and 27.4.  He was transfused 2 units of packed red blood cells.  Gastroenterology was consulted and recommended outpatient follow-up upper and lower endoscopy.  On discharge 2 days later, on 8/3/2024, his H&H was 7.6 and 27.0.   In the emergency department, the patient was found to be in atrial flutter/fibrillation with RVR.  Cardiology was consulted.  He spontaneously converted to normal sinus rhythm.  Echocardiogram during that admission revealed normal ejection fraction without significant valvular pathology.  Anticoagulation was not initiated.    The patient did follow-up with Dr. Luis Daniel Archibald from cardiology on 8/6/2024.  Recommendation was continued metoprolol 50 mg p.o. twice daily with a Holter monitor.  Again, anticoagulation was not recommended.    Thereafter, the patient underwent upper and lower endoscopy on 8/15/2024 per   Alton Steiner as an outpatient:    EGD revealed:  Findings  Z-line 42 cm from the incisors. Mild erythema of the left corniculate tubercle in the larynx  Mild erythematous mucosa in the body of the stomach and antrum; performed cold forceps biopsy to rule out H. pylori  The duodenal bulb, 1st part of the duodenum and 2nd part of the duodenum appeared normal. Performed random biopsy using biopsy forceps to rule out celiac disease.  Colonoscopy revealed:  Findings  Internal small hemorrhoids  One 8 mm sessile polyp 20 cm from the anal verge; lift performed with eleview injected into the submucosa; completely removed target lesion en bloc by EMR and retrieved specimen. EMR was performed with a cold snare. Post-procedure bleeding was not visualized  Single friable and ulcerated mass (not traversable) 40 cm from the anal verge, covering the whole circumference; bleeding occurred before intervention; performed cold forceps biopsy with partial removal; tattooed distal to the finding with spot    Pathology revealed:   FINAL DIAGNOSIS   A.  Duodenum, first part, biopsy:  Duodenal mucosa within normal limits.     B.  Stomach, biopsy:  Antrum and corpus mucosa within normal limits.     C.  Colon, mass at 40 cm, biopsy:  Fragments of invasive adenocarcinoma, moderately differentiated.    D.  Colon, 20 cm, polypectomy:  Tubular adenoma.     On admission to the hospital on 8/1/2024, the patient did undergo imaging.  PA lateral chest x-ray was negative.  CT scan was performed of the abdomen pelvis with IV contrast.  I personally reviewed the report and the images.  This was read as essentially negative.  Please see the report for details.  On my retrospective review, there may be a mass at the splenic flexure.      To me, the patient notes a similar history as above.  He notes that he did not pursue workup with a Cologuard initially because he had more important family things to take care of.  Nonetheless, the patient ultimately  presented to the emergency department on the advice of Dr. Small as his tiredness and fatigue progressed to the point that he could not walk back and forth to the mailbox.  He became very tired and somewhat short of breath.    Since discharge from the hospital, the patient still has tiredness and fatigue.  He is eating.  He notes that he does have left mid abdominal pain for a number of months.  He notes that the tiredness and fatigue started in March of this year but really worsened in May and June.  At that same time he noticed some black stools.  He noted occasional blood with the bowel movements also.  He denies fever chills sweats nausea vomiting diarrhea or constipation.    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  He still has a high riding right testicle.    Patient is single and lives in a house in [place].  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  He has never been .  He has no children.  He presents today general Arriola, his niece and medical POA.    Past Medical History   Past Medical History:   Diagnosis Date    Personal history of other diseases of the circulatory system     History of Raynaud's syndrome        Surgical History    Past Surgical History:   Procedure Laterality Date    OTHER SURGICAL HISTORY  07/13/2021    Sinus surgery    OTHER SURGICAL HISTORY  07/13/2021    Testicular surgery    OTHER SURGICAL HISTORY  07/13/2021    Tonsillectomy        Allergies   Allergies   Allergen Reactions    Amoxicillin Unknown     Pt cannot remember reaction        Home Meds  Current Outpatient Medications   Medication Instructions    ferrous sulfate 65 mg, oral, Daily, Do not crush, chew, or split.    [START ON 9/2/2024] metoprolol tartrate (LOPRESSOR) 50 mg, oral, 2 times daily    multivitamin with minerals tablet 1 tablet, oral, Daily    pantoprazole (PROTONIX) 40 mg, oral, Daily, Do not crush, chew, or  split.        Family History    No family history on file.     Social History  Social History     Tobacco Use    Smoking status: Never    Smokeless tobacco: Never        Review Of Systems    Review of Systems    General: Not Present- Obesity, HIV, MRSA, Recent Cold/Flu,  and VRE.  HEENT: Not Present- Migraine, Cataracts, Glaucoma, Macular Degeneration and Retinal Detachment.  Respiratory:Not Present- Asthma, Chronic Cough, Difficulty Breathing on Exertion, Difficulty Breathing at Rest, Emphysema, Frequent Bronchitis, Home CPAP/BiPAP, Home Oxygen, Pulmonary Embolus, Pneumonia/TB, Sleep Apnea and Snoring.  Cardiovascular: Not Present- Chest Pain, Congestive Heart Failure, Heart Attack, Coronary Artery Disease, Heart Stent, High Cholesterol/Lipids,Hypertension, Internal Defibrillator, Irregular Heart Beat, Mitral Valve Prolapse, Murmur, Pacemaker and Peripheral Vascular Disease.  Gastrointestinal: Not Present- Heartburn, Hepatitis, Hiatal Hernia, Jaundice, Reflux, Stomach Ulcer and IBS.  Male Genitourinary: Not Present- Enlarged Prostate, Kidney Failure, Kidney Stones, Dialysis and Urinary Tract Infection.  Musculoskeletal: Not Present- Arthritis, Back Pain and Fibromyalgia.  Neurological: Not Present- Headaches, Numbness, Tingling, Seizures, Stroke,  Shunt and Weakness.  Psychiatric: Not Present- Anxiety, Bipolar, Depression and Panic Attacks.  Endocrine: Not Present- Diabetes, Hyperthyroidism, Hypothyroidism and Low Blood Sugar.  Hematology: Not Present- Abnormal Bleeding and Blood Clots.    Vitals  There were no vitals taken for this visit.     Physical Exam   General Complete Physical Exam    The physical exam findings are as follows:    General Appearance - Cooperative and Well groomed. Not in acute distress. Build & Nutrition - Well nourished.  Posture - Normal posture. Gait - Normal. Hydration - Well hydrated.    Integumentary  General Characteristics: Overall examination of the patient's skin reveals - no  rashes, no suspicious lesions, no bruises and no evidence of scars. Color - normal coloration of skin. Skin Moisture - normal skin moisture. Temperature - normal  warmth is noted. Texture - normal skin texture.    Head and Neck  Head - normocephalic, atraumatic with no lesions or palpable masses.  Neck  Global Assessment - full range of motion. no bruit auscultated on the right, no bruit auscultated on the left, non-tender, no lymphadenopathy, no palpable mass on the right and no palpable mass on the left.  Trachea - midline.  Thyroid Gland Characteristics - no palpable nodules, normal position, symmetric and smooth.    Eyes  Sclera/Conjunctiva - Bilateral - Normal. Pupil - Bilateral - Accommodating, Direct reaction to light normal and Equal.    ENMT  Global Assessment  Upon examination of the ears, nose, mouth and throat - examination of the oral cavity reveals normal lips, teeth, gums and oral mucosa and hard and soft palates, tongue, tonsils and posterior pharynx are moist and symmetric without lesions.    Chest and Lung Exam  Inspection: Shape - Symmetric. Movements - Symmetrical. Accessory muscles - No use of accessory muscles in breathing.  Percussion:  Quality and Intensity: - Percussion normal.  Palpation: - Palpation normal.  Auscultation:  Breath sounds: - Normal and equal bilaterally.  Adventitious sounds: - none bilaterally.    Cardiovascular  Inspection: Carotid artery - Bilateral - Inspection Normal.  Palpation/Percussion: Examination by palpation and percussion reveals - No Thrills.  Cardiac Borders - Normal.  Auscultation: Rhythm - Regular. Rate: Regular.  Heart Sounds - Normal heart sounds, S1 WNL and S2 WNL.  Murmurs & Other Heart Sounds: Auscultation of the heart reveals - No Murmurs or other extra heart sounds.    Abdomen  Inspection: Inspection of the abdomen reveals - No Hernias.  Palpation/Percussion: Palpation and Percussion of the abdomen reveal -left mid abdominal tenderness with also some  tenderness in the left lower quadrant; there may be a slight fullness in the left mid abdomen associated with the tenderness; and No Palpable abdominal masses.  Liver: - Normal.  Spleen: - Normal.  Auscultation: Auscultation of the abdomen reveals - Bowel sounds normal.  Surgical scars: Tangential right inguinal    Inguinal and External Genitalia    The patient was examined supine, and standing, with and without Valsalva. There is no evidence of inguinal or femoral hernia or lymphadenopathy bilaterally. The testes are mildly atrophic and symmetric, with no masses, nodules, or tenderness.  The patient's left testicle in its normal position at the base of the scrotum.  The right testicle is very high riding near the right external ring.    Rectal - Did not examine.    Peripheral Vascular  Lower Extremity: Inspection - Bilateral - No Varicose veins.  Palpation: Edema - Bilateral - No edema.    Musculoskeletal  Global Assessment  Right Upper Extremity - no deformities, masses or tenderness, no known fractures, normal strength and tone and  normal range of motion without pain. Left Upper Extremity - no deformities, masses or tenderness, no known fractures,  normal strength and tone and normal range of motion without pain. Right Lower Extremity - no deformities, masses or  tenderness, no known fractures, normal strength and tone and normal range of motion without pain. Left Lower  Extremity - no deformities, masses or tenderness, no known fractures, normal strength and tone and normal range of  motion without pain.    Lymphatic  Head & Neck  General Head & Neck Lymphatics:  Bilateral: Description - Normal.  Axillary  General Axillary Region:  Bilateral: Description - Normal.  Femoral & Inguinal  Generalized Femoral & Inguinal Lymphatics:  Bilateral: Description - Normal.    Assessment/Plan     Mr. Batista has a bleeding adenocarcinoma of the left colon.  This is biopsy-proven on colonoscopy.  The bleeding has caused  anemia.  This is required 2 units PRBC on recent hospital admission.  This is circumferential but does not appear to be near obstructing at this point.  This is 40 cm from the anal verge.  The exact location cannot be identified on CAT scan imaging although there is a suggestion of the splenic flexure.  However, he is tender along the left mid abdomen.  I think this is likely located somewhere between the distal transverse colon and the mid descending colon.    Definitive operative therapy is indicated and recommended, namely a segmental colectomy.  He is a good candidate for laparoscopic approach.  Mobilization of the splenic flexure will be necessary.  The lesion has been tattooed.  This will be a laparoscopic, possible open procedure.    Laparoscopic CPT 17467+07264  Open CPT 23278+33620    Prior to proceeding, completion of staging will be necessary.  He has had a satisfactory CT scan abdomen pelvis.  I will order a CT of the chest with IV contrast.  He will also need a serum CEA level in addition to repeat CBC and BMP.    In addition, clearance will need to be obtained from Dr. Luis Daniel Archibald his cardiologist.  He did have A-fib with RVR transiently during his recent hospital admission.  He has been in sinus rhythm since.  He has no functional or valvular issue with his heart.  Holter monitor is pending.  I have reached out to Dr. Archibald.    We will proceed with the operation on Thursday, 8/29/2024 at [facility].  The patient require a routine preoperative antibiotic and mechanical bowel preparation, and this has been prescribed and explained.  I have given him a detailed instructional handout regarding this preparation..  We will follow the  Enhanced Recovery Protocol (EHR).  He will see PAT (preadmission testing) preoperatively.      Colon Cancer Surgery Consent: The procedure was explained to the patient in detail, including the risks, benefits and alternatives. The risks include, but not limited to,  infection, bleeding, hematoma, seroma, poor wound healing, incisional hernia, evisceration, need for further surgery, postoperative bowel obstruction, anastomotic leak, fistula, and local or distant recurrence of the cancer.  The patient agreed with the plan and signed the electronic consent.

## 2024-08-23 ENCOUNTER — LAB (OUTPATIENT)
Dept: LAB | Facility: LAB | Age: 69
End: 2024-08-23
Payer: MEDICARE

## 2024-08-23 ENCOUNTER — OFFICE VISIT (OUTPATIENT)
Dept: SURGERY | Facility: CLINIC | Age: 69
End: 2024-08-23
Payer: MEDICARE

## 2024-08-23 ENCOUNTER — PREP FOR PROCEDURE (OUTPATIENT)
Dept: SURGERY | Facility: HOSPITAL | Age: 69
End: 2024-08-23

## 2024-08-23 ENCOUNTER — PREP FOR PROCEDURE (OUTPATIENT)
Dept: SURGERY | Facility: CLINIC | Age: 69
End: 2024-08-23

## 2024-08-23 VITALS
HEIGHT: 73 IN | DIASTOLIC BLOOD PRESSURE: 65 MMHG | OXYGEN SATURATION: 96 % | RESPIRATION RATE: 16 BRPM | BODY MASS INDEX: 28.28 KG/M2 | WEIGHT: 213.4 LBS | HEART RATE: 65 BPM | SYSTOLIC BLOOD PRESSURE: 148 MMHG | TEMPERATURE: 98 F

## 2024-08-23 DIAGNOSIS — C18.9 ADENOCARCINOMA, COLON (MULTI): Primary | ICD-10-CM

## 2024-08-23 DIAGNOSIS — I48.0 PAROXYSMAL ATRIAL FIBRILLATION (MULTI): ICD-10-CM

## 2024-08-23 DIAGNOSIS — C18.9 COLON ADENOCARCINOMA (MULTI): ICD-10-CM

## 2024-08-23 DIAGNOSIS — C18.9 COLON ADENOCARCINOMA (MULTI): Primary | ICD-10-CM

## 2024-08-23 DIAGNOSIS — C18.9 ADENOCARCINOMA, COLON (MULTI): ICD-10-CM

## 2024-08-23 DIAGNOSIS — D64.9 ANEMIA, UNSPECIFIED TYPE: ICD-10-CM

## 2024-08-23 DIAGNOSIS — D50.0 IRON DEFICIENCY ANEMIA DUE TO CHRONIC BLOOD LOSS: ICD-10-CM

## 2024-08-23 LAB
ABO GROUP (TYPE) IN BLOOD: NORMAL
ANION GAP SERPL CALC-SCNC: 14 MMOL/L (ref 10–20)
ANTIBODY SCREEN: NORMAL
BUN SERPL-MCNC: 16 MG/DL (ref 6–23)
CALCIUM SERPL-MCNC: 9.2 MG/DL (ref 8.6–10.3)
CEA SERPL-MCNC: 1.5 UG/L
CHLORIDE SERPL-SCNC: 106 MMOL/L (ref 98–107)
CO2 SERPL-SCNC: 23 MMOL/L (ref 21–32)
CREAT SERPL-MCNC: 1.02 MG/DL (ref 0.5–1.3)
EGFRCR SERPLBLD CKD-EPI 2021: 79 ML/MIN/1.73M*2
ERYTHROCYTE [DISTWIDTH] IN BLOOD BY AUTOMATED COUNT: 25.3 % (ref 11.5–14.5)
GLUCOSE SERPL-MCNC: 94 MG/DL (ref 74–99)
HCT VFR BLD AUTO: 32.2 % (ref 41–52)
HGB BLD-MCNC: 9 G/DL (ref 13.5–17.5)
MCH RBC QN AUTO: 23.1 PG (ref 26–34)
MCHC RBC AUTO-ENTMCNC: 28 G/DL (ref 32–36)
MCV RBC AUTO: 83 FL (ref 80–100)
NRBC BLD-RTO: 0 /100 WBCS (ref 0–0)
PLATELET # BLD AUTO: 292 X10*3/UL (ref 150–450)
POTASSIUM SERPL-SCNC: 4.7 MMOL/L (ref 3.5–5.3)
RBC # BLD AUTO: 3.89 X10*6/UL (ref 4.5–5.9)
RH FACTOR (ANTIGEN D): NORMAL
SODIUM SERPL-SCNC: 138 MMOL/L (ref 136–145)
WBC # BLD AUTO: 6.2 X10*3/UL (ref 4.4–11.3)

## 2024-08-23 PROCEDURE — 86901 BLOOD TYPING SEROLOGIC RH(D): CPT

## 2024-08-23 PROCEDURE — 36415 COLL VENOUS BLD VENIPUNCTURE: CPT

## 2024-08-23 PROCEDURE — 82378 CARCINOEMBRYONIC ANTIGEN: CPT

## 2024-08-23 PROCEDURE — 86900 BLOOD TYPING SEROLOGIC ABO: CPT

## 2024-08-23 PROCEDURE — 86850 RBC ANTIBODY SCREEN: CPT

## 2024-08-23 PROCEDURE — 80048 BASIC METABOLIC PNL TOTAL CA: CPT

## 2024-08-23 PROCEDURE — 85027 COMPLETE CBC AUTOMATED: CPT

## 2024-08-23 RX ORDER — NEOMYCIN SULFATE 500 MG/1
TABLET ORAL
Qty: 6 TABLET | Refills: 0 | Status: SHIPPED | OUTPATIENT
Start: 2024-08-23

## 2024-08-23 RX ORDER — GABAPENTIN 300 MG/1
300 CAPSULE ORAL ONCE
OUTPATIENT
Start: 2024-08-29

## 2024-08-23 RX ORDER — ACETAMINOPHEN 325 MG/1
1000 TABLET ORAL ONCE
OUTPATIENT
Start: 2024-08-23 | End: 2024-08-23

## 2024-08-23 RX ORDER — ALVIMOPAN 12 MG/1
12 CAPSULE ORAL ONCE
OUTPATIENT
Start: 2024-08-29 | End: 2024-09-05

## 2024-08-23 RX ORDER — HEPARIN SODIUM 5000 [USP'U]/ML
5000 INJECTION, SOLUTION INTRAVENOUS; SUBCUTANEOUS ONCE
OUTPATIENT
Start: 2024-08-23 | End: 2024-08-23

## 2024-08-23 RX ORDER — METRONIDAZOLE 500 MG/1
500 TABLET ORAL 3 TIMES DAILY
Qty: 3 TABLET | Refills: 0 | Status: SHIPPED | OUTPATIENT
Start: 2024-08-23 | End: 2024-08-24

## 2024-08-23 RX ORDER — METRONIDAZOLE 500 MG/100ML
500 INJECTION, SOLUTION INTRAVENOUS ONCE
OUTPATIENT
Start: 2024-08-23 | End: 2024-08-23

## 2024-08-23 RX ORDER — POLYETHYLENE GLYCOL 3350, SODIUM SULFATE ANHYDROUS, SODIUM BICARBONATE, SODIUM CHLORIDE, POTASSIUM CHLORIDE 236; 22.74; 6.74; 5.86; 2.97 G/4L; G/4L; G/4L; G/4L; G/4L
POWDER, FOR SOLUTION ORAL
Qty: 4000 ML | Refills: 0 | Status: SHIPPED | OUTPATIENT
Start: 2024-08-23

## 2024-08-23 RX ORDER — SODIUM CHLORIDE, SODIUM LACTATE, POTASSIUM CHLORIDE, CALCIUM CHLORIDE 600; 310; 30; 20 MG/100ML; MG/100ML; MG/100ML; MG/100ML
100 INJECTION, SOLUTION INTRAVENOUS CONTINUOUS
OUTPATIENT
Start: 2024-08-23

## 2024-08-23 ASSESSMENT — PAIN SCALES - GENERAL: PAINLEVEL: 5

## 2024-08-26 ENCOUNTER — HOSPITAL ENCOUNTER (OUTPATIENT)
Dept: RADIOLOGY | Facility: CLINIC | Age: 69
Discharge: HOME | End: 2024-08-26
Payer: MEDICARE

## 2024-08-26 ENCOUNTER — PRE-ADMISSION TESTING (OUTPATIENT)
Dept: PREADMISSION TESTING | Facility: HOSPITAL | Age: 69
End: 2024-08-26
Payer: MEDICARE

## 2024-08-26 DIAGNOSIS — Z01.818 PREOP TESTING: Primary | ICD-10-CM

## 2024-08-26 DIAGNOSIS — C18.9 COLON ADENOCARCINOMA (MULTI): ICD-10-CM

## 2024-08-26 LAB
ERYTHROCYTE [DISTWIDTH] IN BLOOD BY AUTOMATED COUNT: 24.8 % (ref 11.5–14.5)
HCT VFR BLD AUTO: 31.8 % (ref 41–52)
HGB BLD-MCNC: 9.1 G/DL (ref 13.5–17.5)
MCH RBC QN AUTO: 23.3 PG (ref 26–34)
MCHC RBC AUTO-ENTMCNC: 28.6 G/DL (ref 32–36)
MCV RBC AUTO: 82 FL (ref 80–100)
NRBC BLD-RTO: 0 /100 WBCS (ref 0–0)
PLATELET # BLD AUTO: 259 X10*3/UL (ref 150–450)
RBC # BLD AUTO: 3.9 X10*6/UL (ref 4.5–5.9)
WBC # BLD AUTO: 4 X10*3/UL (ref 4.4–11.3)

## 2024-08-26 PROCEDURE — 93005 ELECTROCARDIOGRAM TRACING: CPT

## 2024-08-26 PROCEDURE — 2550000001 HC RX 255 CONTRASTS: Performed by: SURGERY

## 2024-08-26 PROCEDURE — 71260 CT THORAX DX C+: CPT

## 2024-08-26 PROCEDURE — 85027 COMPLETE CBC AUTOMATED: CPT

## 2024-08-26 PROCEDURE — 36415 COLL VENOUS BLD VENIPUNCTURE: CPT

## 2024-08-26 PROCEDURE — 71260 CT THORAX DX C+: CPT | Performed by: RADIOLOGY

## 2024-08-26 ASSESSMENT — DUKE ACTIVITY SCORE INDEX (DASI)
CAN YOU HAVE SEXUAL RELATIONS: YES
CAN YOU PARTICIPATE IN MODERATE RECREATIONAL ACTIVITIES LIKE GOLF, BOWLING, DANCING, DOUBLES TENNIS OR THROWING A BASEBALL OR FOOTBALL: NO
CAN YOU WALK INDOORS, SUCH AS AROUND YOUR HOUSE: YES
CAN YOU TAKE CARE OF YOURSELF (EAT, DRESS, BATHE, OR USE TOILET): YES
CAN YOU DO HEAVY WORK AROUND THE HOUSE LIKE SCRUBBING FLOORS OR LIFTING AND MOVING HEAVY FURNITURE: NO
CAN YOU WALK A BLOCK OR TWO ON LEVEL GROUND: YES
CAN YOU DO LIGHT WORK AROUND THE HOUSE LIKE DUSTING OR WASHING DISHES: YES
CAN YOU RUN A SHORT DISTANCE: YES
CAN YOU PARTICIPATE IN STRENOUS SPORTS LIKE SWIMMING, SINGLES TENNIS, FOOTBALL, BASKETBALL, OR SKIING: NO
DASI METS SCORE: 7.3
CAN YOU CLIMB A FLIGHT OF STAIRS OR WALK UP A HILL: YES
CAN YOU DO MODERATE WORK AROUND THE HOUSE LIKE VACUUMING, SWEEPING FLOORS OR CARRYING GROCERIES: YES
TOTAL_SCORE: 36.7
CAN YOU DO YARD WORK LIKE RAKING LEAVES, WEEDING OR PUSHING A MOWER: YES

## 2024-08-26 NOTE — PREPROCEDURE INSTRUCTIONS
Medication List            Accurate as of August 26, 2024 11:13 AM. Always use your most recent med list.                ferrous sulfate 325 (65 Fe) MG EC tablet  Medication Adjustments for Surgery: Stop 7 days before surgery     metoprolol tartrate 50 mg tablet  Commonly known as: Lopressor  Take 1 tablet by mouth 2 times a day. Do not start before September 2, 2024.  Start taking on: September 2, 2024     multivitamin with minerals tablet  Medication Adjustments for Surgery: Stop 7 days before surgery     neomycin 500 mg tablet  Commonly known as: Mycifradin  Take two tablets (1000 mg) by mouth at 3:00pm, 4:00pm and at bed time on the day prior to surgery  Medication Adjustments for Surgery: Other (Comment)  Notes to patient: Continue as prescribed     pantoprazole 40 mg EC tablet  Commonly known as: ProtoNix  Take 1 tablet (40 mg) by mouth once daily. Do not crush, chew, or split.  Medication Adjustments for Surgery: Other (Comment)  Notes to patient: Continue as prescribed     polyethylene glycol 236-22.74-6.74 -5.86 gram solution  Commonly known as: GaviLyte-G  Starting at noon on day prior to procedure drink 8 ounces (240 mL) every 30 minutes until all gone or stools are clear. May add flavor packet.                              NPO Instructions:    Do not eat any food after midnight the night before your surgery/procedure.    Additional Instructions:     Day of Surgery:  Review your medication instructions, take indicated medications  ERAS patients, drink your Carbohydrate drink TWO hours before surgery  Wear  comfortable loose fitting clothing  Do not use moisturizers, creams, lotions or perfume  All jewelry and valuables should be left at home          Pre-operative Instructions for Bowel Surgery    6 days prior to surgery:  Drink  Ensure drinks according to your specific guidelines  1 day prior to surgery:  Start bowel prep according to your physicians order, then drink clear liquids the rest of the day  (No red dyes).  Non diabetic, take morning medications (if any), and drink Ensure Pre-Surgery 1 bottle at dinner and 1 at bedtime  Type 1 diabetes, take morning medications (if any) with sip of water  Type 2 diabetes, take morning medications (if any)  and drink Ensure Pre-Surgery 1 bottle at dinner and 1 bottle at bedtime  Take oral antibiotics as directed by doctor's office  Shower with CHG soap, follow instructions provided     Morning of surgery:   Non diabetic, take morning medications (if any), drink Ensure Pre-Surgery 2 hours before your surgery arrival time  Type 1 diabetes, take morning medications (if any) with sip of water  Type 2 diabetes, take morning medications (if any)    Shower with CHG soap, follow instructions provided   -    Take any medications discussed with pre-admission testing nurse with a small sip of water.    Practice exercises and incentive spirometer provided in bag    NUTRITIONAL INFORMATION    This is an explanation of your clear liquid diet    OBJECTIVE:  This diet provides fluid and calories in a form that requires minimal work for the gastrointestinal tract.    INDICATION: The diet is used for many types of surgeries and tests.  It is also  used after surgery, in cases of gastroenteritis, after I.V. feedings, or in the first step to oral feedings.     GENERAL INFORMATION:  This diet is not adequate in protein or calories and should be  used only on a temporary basis.  If this diet is used for more than three days, supplements would be indicated.      FOOD GROUPS:      BEVERAGES ALLOWED: Clear fruit juice or strained juice, clear coffee or tea, carbonated beverages as tolerated  NOT ALLOWED: Nectars, juice with pulp, milk, cream or cocoa    SOUPS ALLOWED: Clear broths or boullion  NOT ALLOWED: All others    SWEETS/DESSERTS ALLOWED: Clear gelatins, fruit ice from clear juice, plain unfilled hard candies, sugar,  honey, sugar substitutes, plain frozen pops  NOT ALLOWED:  All  others    MISCELLANEOUS ALLOWED:  Specialty products such as low residue nutritional supplements with your doctor's approval  NOT ALLOWED:  All others    SAMPLE MENU:    BREAKFAST:  Apple juice, Gelatin, Tea/coffee, sugar    LUNCH:  Broth, Cranberry juice, Carbonated Soda, Fruit ice Tea/coffee    DINNER:  Broth, Strained juice, Gelatin, Carbonated soda, Tea/coffee    SNACKS INCLUDE GELATIN, FROZEN POPS, HARD CANDIES, SUPPLEMENTS PER DOCTOR'S ORDERS       .PMC

## 2024-08-28 ENCOUNTER — DOCUMENTATION (OUTPATIENT)
Dept: SURGERY | Facility: CLINIC | Age: 69
End: 2024-08-28
Payer: MEDICARE

## 2024-08-28 ENCOUNTER — TELEPHONE (OUTPATIENT)
Dept: SURGERY | Facility: CLINIC | Age: 69
End: 2024-08-28
Payer: MEDICARE

## 2024-08-28 DIAGNOSIS — D50.0 IRON DEFICIENCY ANEMIA DUE TO CHRONIC BLOOD LOSS: ICD-10-CM

## 2024-08-28 DIAGNOSIS — C18.9 COLON ADENOCARCINOMA (MULTI): Primary | ICD-10-CM

## 2024-08-28 DIAGNOSIS — C18.9 ADENOCARCINOMA, COLON (MULTI): Primary | ICD-10-CM

## 2024-08-28 DIAGNOSIS — J90 BILATERAL PLEURAL EFFUSION: ICD-10-CM

## 2024-08-28 DIAGNOSIS — I48.0 PAROXYSMAL ATRIAL FIBRILLATION (MULTI): ICD-10-CM

## 2024-08-28 DIAGNOSIS — R59.0 MEDIASTINAL LYMPHADENOPATHY: ICD-10-CM

## 2024-08-28 DIAGNOSIS — D64.9 ANEMIA, UNSPECIFIED TYPE: ICD-10-CM

## 2024-08-28 LAB
ATRIAL RATE: 61 BPM
P AXIS: 40 DEGREES
P OFFSET: 215 MS
P ONSET: 158 MS
PR INTERVAL: 126 MS
Q ONSET: 221 MS
QRS COUNT: 10 BEATS
QRS DURATION: 84 MS
QT INTERVAL: 472 MS
QTC CALCULATION(BAZETT): 475 MS
QTC FREDERICIA: 474 MS
R AXIS: 19 DEGREES
T AXIS: 18 DEGREES
T OFFSET: 457 MS
VENTRICULAR RATE: 61 BPM

## 2024-08-28 NOTE — PROGRESS NOTES
General Surgery Follow-Up    Patient was seen by me for initial office consultation for a biopsy-proven adenocarcinoma the left colon on 8/23/2024.  Please see that note for details.    Preoperative laboratory values 8/23/2024 revealed that the H&H improved to 9.0, and 32.2, from previous of 7.6 and 27.0 on 8/3/2024.  WBC and platelet count were normal.  BMP that same day was normal.  Serum CEA was also normal at 1.5 UG/L.    CT of the chest with IV contrast was performed on 8/27/2024 and this revealed new bilateral pleural effusions, moderately large on the right and small on the left with atelectasis.  This is new since his recent CT scan of the abdomen pelvis on 8/1/2024.  In addition, enlarged mediastinal lymph nodes are identified measuring 6 to 10 mm in diameter.  There are multiple, at least 3.  These findings are worrisome for metastatic disease.    I did talk to Dr. Luis Daniel Archibald from cardiology on 8/26/2024 and the patient is cleared for operative therapy.  Please see the update of Dr. Archibald's note dated 8/6/2024.    Impression: Adenocarcinoma, left colon.  CT imaging reveals mediastinal lymphadenopathy and new bilateral pleural effusions, right greater than left.  Rule out metastatic disease.    Plan:    Will cancel operation scheduled for 8/29/2024  PET scan to rule out metastatic disease with regard to the mediastinal lymph nodes  Interventional radiology for right pleurocentesis  to obtain pleural fluid for cytology to rule out metastatic disease  Refer the patient to Dr. Rio Lawson from medical oncology  Will call patient with the above results, and further management plan

## 2024-08-28 NOTE — TELEPHONE ENCOUNTER
Spoke with patient and scheduled PET CT [place] location 08/29/2024 at 1:15. Patient understood instructions of nothing to eat or drink 6hours before test and no jewelry wearing.   Notified pt waiting to hear back from interventional radiology to schedule STAT as well but left dept voicemail.

## 2024-08-29 ENCOUNTER — HOSPITAL ENCOUNTER (OUTPATIENT)
Dept: RADIOLOGY | Facility: CLINIC | Age: 69
Discharge: HOME | End: 2024-08-29
Payer: MEDICARE

## 2024-08-29 DIAGNOSIS — R59.0 MEDIASTINAL LYMPHADENOPATHY: ICD-10-CM

## 2024-08-29 DIAGNOSIS — J90 BILATERAL PLEURAL EFFUSION: ICD-10-CM

## 2024-08-29 DIAGNOSIS — C18.9 COLON ADENOCARCINOMA (MULTI): ICD-10-CM

## 2024-08-29 PROCEDURE — A9552 F18 FDG: HCPCS | Performed by: SURGERY

## 2024-08-29 PROCEDURE — 78815 PET IMAGE W/CT SKULL-THIGH: CPT | Mod: PI

## 2024-08-29 PROCEDURE — 3430000001 HC RX 343 DIAGNOSTIC RADIOPHARMACEUTICALS: Performed by: SURGERY

## 2024-08-29 RX ORDER — FLUDEOXYGLUCOSE F 18 200 MCI/ML
12.8 INJECTION, SOLUTION INTRAVENOUS
Status: COMPLETED | OUTPATIENT
Start: 2024-08-29 | End: 2024-08-29

## 2024-09-03 ENCOUNTER — HOSPITAL ENCOUNTER (OUTPATIENT)
Dept: RADIOLOGY | Facility: HOSPITAL | Age: 69
Discharge: HOME | End: 2024-09-03
Payer: MEDICARE

## 2024-09-03 VITALS
OXYGEN SATURATION: 100 % | TEMPERATURE: 97.9 F | DIASTOLIC BLOOD PRESSURE: 81 MMHG | WEIGHT: 195 LBS | SYSTOLIC BLOOD PRESSURE: 122 MMHG | BODY MASS INDEX: 25.84 KG/M2 | HEIGHT: 73 IN | RESPIRATION RATE: 18 BRPM | HEART RATE: 63 BPM

## 2024-09-03 DIAGNOSIS — J90 BILATERAL PLEURAL EFFUSION: ICD-10-CM

## 2024-09-03 DIAGNOSIS — R59.0 MEDIASTINAL LYMPHADENOPATHY: ICD-10-CM

## 2024-09-03 DIAGNOSIS — C18.9 COLON ADENOCARCINOMA (MULTI): ICD-10-CM

## 2024-09-03 PROCEDURE — 2720000007 HC OR 272 NO HCPCS

## 2024-09-03 PROCEDURE — 2500000005 HC RX 250 GENERAL PHARMACY W/O HCPCS: Performed by: NURSE PRACTITIONER

## 2024-09-03 PROCEDURE — 32555 ASPIRATE PLEURA W/ IMAGING: CPT

## 2024-09-03 PROCEDURE — 88112 CYTOPATH CELL ENHANCE TECH: CPT | Mod: TC | Performed by: SURGERY

## 2024-09-03 RX ORDER — LIDOCAINE HYDROCHLORIDE 10 MG/ML
INJECTION, SOLUTION EPIDURAL; INFILTRATION; INTRACAUDAL; PERINEURAL
Status: COMPLETED | OUTPATIENT
Start: 2024-09-03 | End: 2024-09-03

## 2024-09-03 ASSESSMENT — PAIN - FUNCTIONAL ASSESSMENT
PAIN_FUNCTIONAL_ASSESSMENT: 0-10
PAIN_FUNCTIONAL_ASSESSMENT: 0-10

## 2024-09-03 ASSESSMENT — PAIN SCALES - GENERAL
PAINLEVEL_OUTOF10: 3
PAINLEVEL_OUTOF10: 0 - NO PAIN

## 2024-09-03 NOTE — Clinical Note
Right thoracentesis complete. 575cc of clear yellow fluid removed. Samples sent to lab. Site without bleeding or hematoma. Dressed with 2x2 and tegaderm. Pt tolerated well. Denies pain or nausea. Returned to post op for recovery and discharge.

## 2024-09-03 NOTE — POST-PROCEDURE NOTE
Interventional Radiology Brief Postprocedure Note    Procedure: Right thoracentesis    Provider: LEYLA Monroe-CNP    Assistant: None    Diagnosis: Right pleural effusion    Description of procedure: A time out was performed and Right Hemithorax was examined with US and appropriate entry point was confirmed and marked.  The patient was prepped and draped in a sterile manner, 1% lidocaine was used to anesthesize the skin and subcutaneous tissue. A 5F Centesis needle was then introduced through the skin into the pleural space, the centesis catheter was then threaded without difficulty. 575 ml of yellow fluid was removed without difficulty. The catheter was then removed. Specimens labeled and sent to lab per primary team. No immediate complications were noted during and immediately following the procedure.          Medications (Filter: Administrations occurring from 0940 to 1004 on 09/03/24) As of 09/03/24 1004      lidocaine PF (Xylocaine) 10 mg/mL (1 %) injection (mL) Total volume:  10 mL      Date/Time Rate/Dose/Volume Action       09/03/24  0952 10 mL Given                     Complications: None    Estimated Blood Loss: none        See detailed result report with images in PACS.    The patient tolerated the procedure well without incident or complication and is in stable condition.

## 2024-09-04 ENCOUNTER — OFFICE VISIT (OUTPATIENT)
Dept: HEMATOLOGY/ONCOLOGY | Facility: CLINIC | Age: 69
End: 2024-09-04
Payer: MEDICARE

## 2024-09-04 VITALS
RESPIRATION RATE: 18 BRPM | SYSTOLIC BLOOD PRESSURE: 137 MMHG | HEART RATE: 65 BPM | HEIGHT: 72 IN | OXYGEN SATURATION: 100 % | DIASTOLIC BLOOD PRESSURE: 65 MMHG | BODY MASS INDEX: 26.78 KG/M2 | TEMPERATURE: 97.3 F | WEIGHT: 197.75 LBS

## 2024-09-04 DIAGNOSIS — C18.9 ADENOCARCINOMA, COLON (MULTI): ICD-10-CM

## 2024-09-04 DIAGNOSIS — D50.9 MICROCYTIC ANEMIA: Primary | ICD-10-CM

## 2024-09-04 PROCEDURE — 99205 OFFICE O/P NEW HI 60 MIN: CPT | Performed by: INTERNAL MEDICINE

## 2024-09-04 PROCEDURE — 99215 OFFICE O/P EST HI 40 MIN: CPT | Performed by: INTERNAL MEDICINE

## 2024-09-04 PROCEDURE — 1126F AMNT PAIN NOTED NONE PRSNT: CPT | Performed by: INTERNAL MEDICINE

## 2024-09-04 PROCEDURE — 1159F MED LIST DOCD IN RCRD: CPT | Performed by: INTERNAL MEDICINE

## 2024-09-04 PROCEDURE — 3008F BODY MASS INDEX DOCD: CPT | Performed by: INTERNAL MEDICINE

## 2024-09-04 PROCEDURE — 1036F TOBACCO NON-USER: CPT | Performed by: INTERNAL MEDICINE

## 2024-09-04 RX ORDER — DIPHENHYDRAMINE HYDROCHLORIDE 50 MG/ML
50 INJECTION INTRAMUSCULAR; INTRAVENOUS AS NEEDED
OUTPATIENT
Start: 2024-09-11

## 2024-09-04 RX ORDER — FAMOTIDINE 10 MG/ML
20 INJECTION INTRAVENOUS ONCE AS NEEDED
OUTPATIENT
Start: 2024-09-11

## 2024-09-04 RX ORDER — ALBUTEROL SULFATE 0.83 MG/ML
3 SOLUTION RESPIRATORY (INHALATION) AS NEEDED
OUTPATIENT
Start: 2024-09-11

## 2024-09-04 RX ORDER — HEPARIN SODIUM,PORCINE/PF 10 UNIT/ML
50 SYRINGE (ML) INTRAVENOUS AS NEEDED
OUTPATIENT
Start: 2024-09-04

## 2024-09-04 RX ORDER — HEPARIN 100 UNIT/ML
500 SYRINGE INTRAVENOUS AS NEEDED
OUTPATIENT
Start: 2024-09-04

## 2024-09-04 RX ORDER — EPINEPHRINE 0.3 MG/.3ML
0.3 INJECTION SUBCUTANEOUS EVERY 5 MIN PRN
OUTPATIENT
Start: 2024-09-11

## 2024-09-04 ASSESSMENT — COLUMBIA-SUICIDE SEVERITY RATING SCALE - C-SSRS
2. HAVE YOU ACTUALLY HAD ANY THOUGHTS OF KILLING YOURSELF?: NO
6. HAVE YOU EVER DONE ANYTHING, STARTED TO DO ANYTHING, OR PREPARED TO DO ANYTHING TO END YOUR LIFE?: NO
1. IN THE PAST MONTH, HAVE YOU WISHED YOU WERE DEAD OR WISHED YOU COULD GO TO SLEEP AND NOT WAKE UP?: NO

## 2024-09-04 ASSESSMENT — PAIN SCALES - GENERAL: PAINLEVEL: 0-NO PAIN

## 2024-09-04 NOTE — PROGRESS NOTES
Patient ID: Krish Batista is a 69 y.o. male.  Referring Physician: Shai Clark MD  [address] [place] [address]  Primary Care Provider: Matthew Small,    8/15/24, EGD and colonoscopy  A.  Duodenum, first part, biopsy:  Duodenal mucosa within normal limits.     B.  Stomach, biopsy:  Antrum and corpus mucosa within normal limits.     C.  Colon, mass at 40 cm, biopsy:  Fragments of invasive adenocarcinoma, moderately differentiated.  See note.     NOTE: Immunohistochemical stains for mismatch repair proteins are pending and the result will be reported as an addendum.     D.  Colon, 20 cm, polypectomy:  Tubular adenoma.                               MISMATCH REPAIR PROTEIN EXPRESSION     C. Colon, mass at 40 cm, biopsy: Adenocarcinoma     Protein:  Result     MLH-1:  Expression Present                                    PMS-2: Expression Present                                    MSH-2: Expression Present                                    MSH-6: Expression Present    CEA at 1.5 NG per mL on August 2, 2024  Subjective    HPI  The patient was referred to me by Dr. Clark for further evaluation and management of newly diagnosed biopsy-proven left colon adenocarcinoma.  The patient presented to Dr. Small on March 25, 2024 complaining of fatigue and malaise.  Further evaluation revealed significant anemia at hemoglobin 8.1 g/dL ferritin at 8 NG per mL iron saturation 3%.  The patient was referred to GI services however he failed to follow through.  He was referred to surgery as well.  Repeat hemoglobin on July 11, 2024 was 7.2 g/dL.  He then presented to ER on August 1, 2024 secondary to progressive fatigue and weakness.  He was diagnosed with severe microcytic anemia hemoglobin 7.4 g/dL.  The patient received 2 units of packed red blood cell transfusions.  Upper and lower endoscopy on August 3, 2024 revealed a mass at 40 cm in the anal verge.  Histology consistent with adenocarcinoma.  In ER the  "patient was found to be in atrial fibrillation/flutter with RVR.  Cardiology was consulted.  He spontaneously converted to normal sinus rhythm.  Color Doppler echocardiogram revealed normal ejection fraction.  He was placed on metoprolol 50 mg p.o. twice daily with a Holter monitor.  Anticoagulation was not recommended.  Colonoscopy on August 15, 2024 revealed internal small hemorrhoids.  1 8 mm sessile polyp 20 centimeter from anal verge.  This was snared.  Single friable and ulcerated mass not transferred stable 40 cm from the anal verge, covering the whole circumference; bleeding occurred before intervention.  Performed cold forceps biopsy with partial removal: Tattooed distal to the finding with spot.  CT scan of chest abdomen pelvis on August 1, 2024 with IV contrast did not reveal any obvious metastatic disease.  A PET scan revealed bilateral pleural effusion right greater than left.  Thoracentesis was performed on September 3, 2024 cytology is pending.  The patient claims that he is feeling better after thoracentesis.    At interview on September 4, 2024 he was accompanied by his niece denied history of weight loss, fevers, night sweats, chest pain, nausea, vomiting, hematemesis, melena, hematochezia and hematuria.  Past medical history:    Iron deficiency anemia, adenocarcinoma of descending colon, history of Raynaud's phenomenon.  Past surgical history:    Sinus surgery July 13, 2021, history of undescended testis surgery, and tonsillectomy.      Review of Systems   All other systems reviewed and are negative.       Objective   BSA: 2.14 meters squared  /65   Pulse 65   Temp 36.3 °C (97.3 °F)   Resp 18   Ht 1.837 m (6' 0.32\")   Wt 89.7 kg (197 lb 12 oz)   SpO2 100%   BMI 26.58 kg/m²     No family history on file.  Oncology History    No history exists.     The patient is 69 years old, single has no children retired as a .  Krish Batista  reports that he has never smoked. He has never " used smokeless tobacco.  He  reports current alcohol use.  He  reports no history of drug use.    Physical Exam  Constitutional:       Appearance: Normal appearance.   HENT:      Head: Normocephalic and atraumatic.      Nose: Nose normal.      Mouth/Throat:      Mouth: Mucous membranes are moist.      Pharynx: Oropharynx is clear.   Eyes:      Extraocular Movements: Extraocular movements intact.      Conjunctiva/sclera: Conjunctivae normal.      Pupils: Pupils are equal, round, and reactive to light.   Cardiovascular:      Rate and Rhythm: Normal rate and regular rhythm.   Pulmonary:      Effort: Pulmonary effort is normal.      Breath sounds: Normal breath sounds.   Abdominal:      General: Abdomen is flat. Bowel sounds are normal.      Palpations: Abdomen is soft.   Musculoskeletal:         General: Normal range of motion.      Cervical back: Normal range of motion and neck supple.   Neurological:      General: No focal deficit present.      Mental Status: He is alert and oriented to person, place, and time. Mental status is at baseline.   Psychiatric:         Mood and Affect: Mood normal.         Behavior: Behavior normal.         Thought Content: Thought content normal.         Judgment: Judgment normal.         Performance Status:  Symptomatic; in bed <50% of the day    Assessment/Plan    The patient is a 69-year-old man presented with easy fatigability shortness of breath on exertion.  Diagnosed with iron deficiency anemia.  Cologuard was positive.  The patient was referred to GI services but did not follow through.  Eventually presented to ER with above symptoms did receive 2 units of packed red blood cell transfusions.  He did develop atrial fibrillation/flutter while in ER but spontaneously converted to normal sinus rhythm was placed on metoprolol.  Colonoscopy revealed a mass 40 cm from anal verge biopsy/histology consistent with adenocarcinoma.  Initially CT scan of chest abdomen pelvis did not reveal any  evidence of metastatic disease.  But a PET scan revealed large right pleural effusion and small left pleural effusion.  Thoracentesis performed on right pleural effusion on September 3, 2024 cytology is pending.  If negative would seek opinion about hilar/mediastinal lymphadenopathy.    Iron deficiency anemia:    The patient is still symptomatic with anemia.  I have recommended intravenous Feraheme 510 mg/week x 2 weeks.  Effect side effects explained.  The patient understood appreciated all the details provided and was grateful.    Thank you for allowing me to participate in care of your patient if you have any questions please feel free to call me.      Diagnoses and all orders for this visit:  Microcytic anemia  -     Clinic Appointment Request ([place] appt needed please); Future  Adenocarcinoma, colon (Multi)  -     Referral to Hematology and Oncology  -     Clinic Appointment Request ([place] appt needed please); Future  Other orders  -     heparin flush 10 unit/mL syringe 50 Units  -     heparin flush 100 unit/mL syringe 500 Units  -     alteplase (Cathflo Activase) injection 2 mg  -     ferumoxytol (Feraheme) 510 mg in sodium chloride 0.9% 117 mL IV  -     sodium chloride 0.9 % bolus 500 mL  -     dextrose 5 % in water (D5W) bolus  -     diphenhydrAMINE (BENADryl) injection 50 mg  -     methylPREDNISolone sod succinate (SOLU-Medrol) 40 mg/mL injection 40 mg  -     famotidine PF (Pepcid) injection 20 mg  -     EPINEPHrine (Epipen) injection syringe 0.3 mg  -     albuterol 2.5 mg /3 mL (0.083 %) nebulizer solution 3 mL           Rio Lawson MD

## 2024-09-05 ENCOUNTER — PATIENT OUTREACH (OUTPATIENT)
Dept: PRIMARY CARE | Facility: CLINIC | Age: 69
End: 2024-09-05
Payer: MEDICARE

## 2024-09-05 LAB
LABORATORY COMMENT REPORT: NORMAL
LABORATORY COMMENT REPORT: NORMAL
PATH REPORT.FINAL DX SPEC: NORMAL
PATH REPORT.GROSS SPEC: NORMAL
PATH REPORT.RELEVANT HX SPEC: NORMAL
PATH REPORT.TOTAL CANCER: NORMAL

## 2024-09-05 NOTE — PROGRESS NOTES
Unable to reach patient for discharge follow up call.   LVM with call back number for patient to call if needed   If no voicemail available call attempts x 2 were made to contact the patient to assist with any questions or concerns patient may have.    Bernabe Mcclure LPN

## 2024-09-06 LAB — BODY SURFACE AREA: 2.18 M2

## 2024-09-09 ENCOUNTER — PATIENT OUTREACH (OUTPATIENT)
Dept: HEMATOLOGY/ONCOLOGY | Facility: CLINIC | Age: 69
End: 2024-09-09
Payer: MEDICARE

## 2024-09-09 ENCOUNTER — TELEPHONE (OUTPATIENT)
Dept: SCHEDULING | Age: 69
End: 2024-09-09
Payer: MEDICARE

## 2024-09-09 DIAGNOSIS — D50.9 MICROCYTIC ANEMIA: ICD-10-CM

## 2024-09-09 DIAGNOSIS — C18.9 ADENOCARCINOMA, COLON (MULTI): Primary | ICD-10-CM

## 2024-09-09 RX ORDER — DIPHENHYDRAMINE HYDROCHLORIDE 50 MG/ML
50 INJECTION INTRAMUSCULAR; INTRAVENOUS AS NEEDED
OUTPATIENT
Start: 2024-09-16

## 2024-09-09 RX ORDER — FAMOTIDINE 10 MG/ML
20 INJECTION INTRAVENOUS ONCE AS NEEDED
OUTPATIENT
Start: 2024-09-16

## 2024-09-09 RX ORDER — ALBUTEROL SULFATE 0.83 MG/ML
3 SOLUTION RESPIRATORY (INHALATION) AS NEEDED
OUTPATIENT
Start: 2024-09-16

## 2024-09-09 RX ORDER — EPINEPHRINE 0.3 MG/.3ML
0.3 INJECTION SUBCUTANEOUS EVERY 5 MIN PRN
OUTPATIENT
Start: 2024-09-16

## 2024-09-09 NOTE — PROGRESS NOTES
Initial outreach call placed to patient.  Introduction and overview provided of what to expect at upcoming new patient visit.  Patient aware of appt date for [facility] Dr. Lawson.  Informed Venofer requests are being sent to scheduling.  Pt aware of [place] address and confirms location of office.  Questions and concerns addressed.  Call back instructions reviewed.  Patient verbalized understanding.

## 2024-09-09 NOTE — PROGRESS NOTES
"Spoke to patient and he is requesting Venofer treatments to be done at [place].  Pt is an existing patient of Dr. Lawson and transferring to [place] as this location is closer to his home. Pt aware scheduling will call him with new Venofer appts.  See \"Outreach\" note from today.   "

## 2024-09-09 NOTE — TELEPHONE ENCOUNTER
Placed call to patient to advise of upcoming infusion appointments added for this week. No answer. Generic VM message with just the phone number. Left generic VM requesting patient to call our office to obtain details of appointment was added on this week for him.

## 2024-09-11 ENCOUNTER — APPOINTMENT (OUTPATIENT)
Dept: HEMATOLOGY/ONCOLOGY | Facility: CLINIC | Age: 69
End: 2024-09-11
Payer: MEDICARE

## 2024-09-11 ENCOUNTER — HOSPITAL ENCOUNTER (INPATIENT)
Facility: HOSPITAL | Age: 69
End: 2024-09-11
Attending: INTERNAL MEDICINE | Admitting: INTERNAL MEDICINE
Payer: MEDICARE

## 2024-09-11 DIAGNOSIS — C18.9 ADENOCARCINOMA, COLON (MULTI): ICD-10-CM

## 2024-09-12 ENCOUNTER — HOSPITAL ENCOUNTER (INPATIENT)
Facility: HOSPITAL | Age: 69
End: 2024-09-12
Attending: STUDENT IN AN ORGANIZED HEALTH CARE EDUCATION/TRAINING PROGRAM | Admitting: STUDENT IN AN ORGANIZED HEALTH CARE EDUCATION/TRAINING PROGRAM
Payer: MEDICARE

## 2024-09-12 ENCOUNTER — APPOINTMENT (OUTPATIENT)
Dept: CARDIOLOGY | Facility: HOSPITAL | Age: 69
DRG: 264 | End: 2024-09-12
Payer: MEDICARE

## 2024-09-12 ENCOUNTER — APPOINTMENT (OUTPATIENT)
Dept: CARDIOLOGY | Facility: HOSPITAL | Age: 69
End: 2024-09-12
Payer: MEDICARE

## 2024-09-12 DIAGNOSIS — I48.3 TYPICAL ATRIAL FLUTTER (MULTI): ICD-10-CM

## 2024-09-12 DIAGNOSIS — I21.4 NSTEMI (NON-ST ELEVATED MYOCARDIAL INFARCTION) (MULTI): Primary | ICD-10-CM

## 2024-09-12 DIAGNOSIS — C18.9 ADENOCARCINOMA, COLON (MULTI): ICD-10-CM

## 2024-09-12 LAB
ABO GROUP (TYPE) IN BLOOD: NORMAL
ALBUMIN SERPL BCP-MCNC: 3.8 G/DL (ref 3.4–5)
ALP SERPL-CCNC: 60 U/L (ref 33–136)
ALT SERPL W P-5'-P-CCNC: 11 U/L (ref 10–52)
ANION GAP SERPL CALC-SCNC: 13 MMOL/L (ref 10–20)
ANTIBODY SCREEN: NORMAL
APPEARANCE UR: CLEAR
APTT PPP: 93 SECONDS (ref 27–38)
AST SERPL W P-5'-P-CCNC: 36 U/L (ref 9–39)
BILIRUB SERPL-MCNC: 0.6 MG/DL (ref 0–1.2)
BILIRUB UR STRIP.AUTO-MCNC: NEGATIVE MG/DL
BUN SERPL-MCNC: 25 MG/DL (ref 6–23)
CALCIUM SERPL-MCNC: 9 MG/DL (ref 8.6–10.3)
CARDIAC TROPONIN I PNL SERPL HS: 6106 NG/L (ref 0–20)
CHLORIDE SERPL-SCNC: 106 MMOL/L (ref 98–107)
CHOLEST SERPL-MCNC: 158 MG/DL (ref 0–199)
CHOLESTEROL/HDL RATIO: 3.6
CO2 SERPL-SCNC: 23 MMOL/L (ref 21–32)
COLOR UR: YELLOW
CREAT SERPL-MCNC: 0.98 MG/DL (ref 0.5–1.3)
EGFRCR SERPLBLD CKD-EPI 2021: 83 ML/MIN/1.73M*2
EJECTION FRACTION APICAL 4 CHAMBER: 59.3
EJECTION FRACTION: 53 %
ERYTHROCYTE [DISTWIDTH] IN BLOOD BY AUTOMATED COUNT: 23 % (ref 11.5–14.5)
GLUCOSE SERPL-MCNC: 85 MG/DL (ref 74–99)
GLUCOSE UR STRIP.AUTO-MCNC: NEGATIVE MG/DL
HCT VFR BLD AUTO: 32.8 % (ref 41–52)
HDLC SERPL-MCNC: 44.1 MG/DL
HGB BLD-MCNC: 9.3 G/DL (ref 13.5–17.5)
INR PPP: 1.3 (ref 0.9–1.1)
KETONES UR STRIP.AUTO-MCNC: ABNORMAL MG/DL
LDLC SERPL CALC-MCNC: 105 MG/DL
LEFT VENTRICLE INTERNAL DIMENSION DIASTOLE: 5.3 CM (ref 3.5–6)
LEUKOCYTE ESTERASE UR QL STRIP.AUTO: NEGATIVE
MAGNESIUM SERPL-MCNC: 1.93 MG/DL (ref 1.6–2.4)
MCH RBC QN AUTO: 23 PG (ref 26–34)
MCHC RBC AUTO-ENTMCNC: 28.4 G/DL (ref 32–36)
MCV RBC AUTO: 81 FL (ref 80–100)
MUCOUS THREADS #/AREA URNS AUTO: NORMAL /LPF
NITRITE UR QL STRIP.AUTO: NEGATIVE
NON HDL CHOLESTEROL: 114 MG/DL (ref 0–149)
NRBC BLD-RTO: 0 /100 WBCS (ref 0–0)
PH UR STRIP.AUTO: 5.5 [PH]
PLATELET # BLD AUTO: 278 X10*3/UL (ref 150–450)
POTASSIUM SERPL-SCNC: 4.3 MMOL/L (ref 3.5–5.3)
PROT SERPL-MCNC: 6.1 G/DL (ref 6.4–8.2)
PROT UR STRIP.AUTO-MCNC: ABNORMAL MG/DL
PROTHROMBIN TIME: 14.1 SECONDS (ref 9.8–12.8)
RBC # BLD AUTO: 4.04 X10*6/UL (ref 4.5–5.9)
RBC # UR STRIP.AUTO: ABNORMAL /UL
RBC #/AREA URNS AUTO: NORMAL /HPF
RH FACTOR (ANTIGEN D): NORMAL
SODIUM SERPL-SCNC: 138 MMOL/L (ref 136–145)
SP GR UR STRIP.AUTO: >1.03
TRIGL SERPL-MCNC: 45 MG/DL (ref 0–149)
UFH PPP CHRO-ACNC: 0.7 IU/ML
UROBILINOGEN UR STRIP.AUTO-MCNC: ABNORMAL MG/DL
VLDL: 9 MG/DL (ref 0–40)
WBC # BLD AUTO: 5.4 X10*3/UL (ref 4.4–11.3)
WBC #/AREA URNS AUTO: NORMAL /HPF

## 2024-09-12 PROCEDURE — 2500000005 HC RX 250 GENERAL PHARMACY W/O HCPCS: Performed by: STUDENT IN AN ORGANIZED HEALTH CARE EDUCATION/TRAINING PROGRAM

## 2024-09-12 PROCEDURE — 99153 MOD SED SAME PHYS/QHP EA: CPT | Performed by: STUDENT IN AN ORGANIZED HEALTH CARE EDUCATION/TRAINING PROGRAM

## 2024-09-12 PROCEDURE — 99223 1ST HOSP IP/OBS HIGH 75: CPT | Performed by: STUDENT IN AN ORGANIZED HEALTH CARE EDUCATION/TRAINING PROGRAM

## 2024-09-12 PROCEDURE — 84484 ASSAY OF TROPONIN QUANT: CPT | Performed by: STUDENT IN AN ORGANIZED HEALTH CARE EDUCATION/TRAINING PROGRAM

## 2024-09-12 PROCEDURE — 85027 COMPLETE CBC AUTOMATED: CPT | Performed by: STUDENT IN AN ORGANIZED HEALTH CARE EDUCATION/TRAINING PROGRAM

## 2024-09-12 PROCEDURE — 86901 BLOOD TYPING SEROLOGIC RH(D): CPT | Performed by: STUDENT IN AN ORGANIZED HEALTH CARE EDUCATION/TRAINING PROGRAM

## 2024-09-12 PROCEDURE — 2780000003 HC OR 278 NO HCPCS: Performed by: STUDENT IN AN ORGANIZED HEALTH CARE EDUCATION/TRAINING PROGRAM

## 2024-09-12 PROCEDURE — 1200000002 HC GENERAL ROOM WITH TELEMETRY DAILY

## 2024-09-12 PROCEDURE — 93308 TTE F-UP OR LMTD: CPT | Performed by: STUDENT IN AN ORGANIZED HEALTH CARE EDUCATION/TRAINING PROGRAM

## 2024-09-12 PROCEDURE — 99291 CRITICAL CARE FIRST HOUR: CPT | Performed by: STUDENT IN AN ORGANIZED HEALTH CARE EDUCATION/TRAINING PROGRAM

## 2024-09-12 PROCEDURE — 81001 URINALYSIS AUTO W/SCOPE: CPT | Performed by: STUDENT IN AN ORGANIZED HEALTH CARE EDUCATION/TRAINING PROGRAM

## 2024-09-12 PROCEDURE — 85610 PROTHROMBIN TIME: CPT | Performed by: STUDENT IN AN ORGANIZED HEALTH CARE EDUCATION/TRAINING PROGRAM

## 2024-09-12 PROCEDURE — 2500000004 HC RX 250 GENERAL PHARMACY W/ HCPCS (ALT 636 FOR OP/ED): Mod: JZ | Performed by: STUDENT IN AN ORGANIZED HEALTH CARE EDUCATION/TRAINING PROGRAM

## 2024-09-12 PROCEDURE — 93458 L HRT ARTERY/VENTRICLE ANGIO: CPT | Performed by: STUDENT IN AN ORGANIZED HEALTH CARE EDUCATION/TRAINING PROGRAM

## 2024-09-12 PROCEDURE — 93005 ELECTROCARDIOGRAM TRACING: CPT

## 2024-09-12 PROCEDURE — 93325 DOPPLER ECHO COLOR FLOW MAPG: CPT

## 2024-09-12 PROCEDURE — 99152 MOD SED SAME PHYS/QHP 5/>YRS: CPT | Performed by: STUDENT IN AN ORGANIZED HEALTH CARE EDUCATION/TRAINING PROGRAM

## 2024-09-12 PROCEDURE — C1887 CATHETER, GUIDING: HCPCS | Performed by: STUDENT IN AN ORGANIZED HEALTH CARE EDUCATION/TRAINING PROGRAM

## 2024-09-12 PROCEDURE — 7100000009 HC PHASE TWO TIME - INITIAL BASE CHARGE: Performed by: STUDENT IN AN ORGANIZED HEALTH CARE EDUCATION/TRAINING PROGRAM

## 2024-09-12 PROCEDURE — 93010 ELECTROCARDIOGRAM REPORT: CPT | Performed by: INTERNAL MEDICINE

## 2024-09-12 PROCEDURE — 2500000001 HC RX 250 WO HCPCS SELF ADMINISTERED DRUGS (ALT 637 FOR MEDICARE OP): Performed by: STUDENT IN AN ORGANIZED HEALTH CARE EDUCATION/TRAINING PROGRAM

## 2024-09-12 PROCEDURE — C1760 CLOSURE DEV, VASC: HCPCS | Performed by: STUDENT IN AN ORGANIZED HEALTH CARE EDUCATION/TRAINING PROGRAM

## 2024-09-12 PROCEDURE — 2500000004 HC RX 250 GENERAL PHARMACY W/ HCPCS (ALT 636 FOR OP/ED): Performed by: NURSE PRACTITIONER

## 2024-09-12 PROCEDURE — 80053 COMPREHEN METABOLIC PANEL: CPT | Performed by: STUDENT IN AN ORGANIZED HEALTH CARE EDUCATION/TRAINING PROGRAM

## 2024-09-12 PROCEDURE — 7100000010 HC PHASE TWO TIME - EACH INCREMENTAL 1 MINUTE: Performed by: STUDENT IN AN ORGANIZED HEALTH CARE EDUCATION/TRAINING PROGRAM

## 2024-09-12 PROCEDURE — 2500000004 HC RX 250 GENERAL PHARMACY W/ HCPCS (ALT 636 FOR OP/ED): Performed by: STUDENT IN AN ORGANIZED HEALTH CARE EDUCATION/TRAINING PROGRAM

## 2024-09-12 PROCEDURE — 80061 LIPID PANEL: CPT | Performed by: STUDENT IN AN ORGANIZED HEALTH CARE EDUCATION/TRAINING PROGRAM

## 2024-09-12 PROCEDURE — 2720000007 HC OR 272 NO HCPCS: Performed by: STUDENT IN AN ORGANIZED HEALTH CARE EDUCATION/TRAINING PROGRAM

## 2024-09-12 PROCEDURE — 83735 ASSAY OF MAGNESIUM: CPT | Performed by: STUDENT IN AN ORGANIZED HEALTH CARE EDUCATION/TRAINING PROGRAM

## 2024-09-12 PROCEDURE — 93321 DOPPLER ECHO F-UP/LMTD STD: CPT | Performed by: STUDENT IN AN ORGANIZED HEALTH CARE EDUCATION/TRAINING PROGRAM

## 2024-09-12 PROCEDURE — 36415 COLL VENOUS BLD VENIPUNCTURE: CPT | Performed by: STUDENT IN AN ORGANIZED HEALTH CARE EDUCATION/TRAINING PROGRAM

## 2024-09-12 PROCEDURE — 93325 DOPPLER ECHO COLOR FLOW MAPG: CPT | Performed by: STUDENT IN AN ORGANIZED HEALTH CARE EDUCATION/TRAINING PROGRAM

## 2024-09-12 PROCEDURE — 2550000001 HC RX 255 CONTRASTS: Performed by: STUDENT IN AN ORGANIZED HEALTH CARE EDUCATION/TRAINING PROGRAM

## 2024-09-12 PROCEDURE — C1894 INTRO/SHEATH, NON-LASER: HCPCS | Performed by: STUDENT IN AN ORGANIZED HEALTH CARE EDUCATION/TRAINING PROGRAM

## 2024-09-12 PROCEDURE — 85520 HEPARIN ASSAY: CPT | Performed by: STUDENT IN AN ORGANIZED HEALTH CARE EDUCATION/TRAINING PROGRAM

## 2024-09-12 RX ORDER — ACETAMINOPHEN 325 MG/1
650 TABLET ORAL EVERY 4 HOURS PRN
Status: DISCONTINUED | OUTPATIENT
Start: 2024-09-12 | End: 2024-09-15

## 2024-09-12 RX ORDER — NAPROXEN SODIUM 220 MG/1
81 TABLET, FILM COATED ORAL DAILY
Status: DISCONTINUED | OUTPATIENT
Start: 2024-09-12 | End: 2024-09-19 | Stop reason: HOSPADM

## 2024-09-12 RX ORDER — METOPROLOL SUCCINATE 50 MG/1
50 TABLET, EXTENDED RELEASE ORAL DAILY
Status: DISCONTINUED | OUTPATIENT
Start: 2024-09-13 | End: 2024-09-19 | Stop reason: HOSPADM

## 2024-09-12 RX ORDER — ATORVASTATIN CALCIUM 80 MG/1
80 TABLET, FILM COATED ORAL NIGHTLY
Status: DISCONTINUED | OUTPATIENT
Start: 2024-09-12 | End: 2024-09-19 | Stop reason: HOSPADM

## 2024-09-12 RX ORDER — LIDOCAINE HYDROCHLORIDE 20 MG/ML
INJECTION, SOLUTION INFILTRATION; PERINEURAL AS NEEDED
Status: DISCONTINUED | OUTPATIENT
Start: 2024-09-12 | End: 2024-09-12 | Stop reason: HOSPADM

## 2024-09-12 RX ORDER — NITROGLYCERIN 40 MG/100ML
INJECTION INTRAVENOUS AS NEEDED
Status: DISCONTINUED | OUTPATIENT
Start: 2024-09-12 | End: 2024-09-12 | Stop reason: HOSPADM

## 2024-09-12 RX ORDER — FENTANYL CITRATE 50 UG/ML
INJECTION, SOLUTION INTRAMUSCULAR; INTRAVENOUS AS NEEDED
Status: DISCONTINUED | OUTPATIENT
Start: 2024-09-12 | End: 2024-09-12 | Stop reason: HOSPADM

## 2024-09-12 RX ORDER — HEPARIN SODIUM 1000 [USP'U]/ML
INJECTION, SOLUTION INTRAVENOUS; SUBCUTANEOUS AS NEEDED
Status: DISCONTINUED | OUTPATIENT
Start: 2024-09-12 | End: 2024-09-12 | Stop reason: HOSPADM

## 2024-09-12 RX ORDER — HEPARIN SODIUM 10000 [USP'U]/100ML
0-4000 INJECTION, SOLUTION INTRAVENOUS CONTINUOUS
Status: DISCONTINUED | OUTPATIENT
Start: 2024-09-12 | End: 2024-09-12

## 2024-09-12 RX ORDER — NITROGLYCERIN 0.4 MG/1
0.4 TABLET SUBLINGUAL EVERY 5 MIN PRN
Status: DISCONTINUED | OUTPATIENT
Start: 2024-09-12 | End: 2024-09-19 | Stop reason: HOSPADM

## 2024-09-12 RX ORDER — PANTOPRAZOLE SODIUM 40 MG/1
40 TABLET, DELAYED RELEASE ORAL DAILY
Status: DISCONTINUED | OUTPATIENT
Start: 2024-09-12 | End: 2024-09-19 | Stop reason: HOSPADM

## 2024-09-12 RX ORDER — ENOXAPARIN SODIUM 100 MG/ML
40 INJECTION SUBCUTANEOUS EVERY 24 HOURS
Status: DISCONTINUED | OUTPATIENT
Start: 2024-09-13 | End: 2024-09-14

## 2024-09-12 RX ORDER — ONDANSETRON HYDROCHLORIDE 2 MG/ML
4 INJECTION, SOLUTION INTRAVENOUS EVERY 6 HOURS PRN
Status: DISCONTINUED | OUTPATIENT
Start: 2024-09-12 | End: 2024-09-19 | Stop reason: HOSPADM

## 2024-09-12 RX ORDER — METOPROLOL TARTRATE 50 MG/1
50 TABLET ORAL 2 TIMES DAILY
Status: DISCONTINUED | OUTPATIENT
Start: 2024-09-12 | End: 2024-09-12

## 2024-09-12 RX ORDER — SODIUM CHLORIDE 9 MG/ML
1 INJECTION, SOLUTION INTRAVENOUS CONTINUOUS
Status: ACTIVE | OUTPATIENT
Start: 2024-09-12 | End: 2024-09-12

## 2024-09-12 RX ORDER — VERAPAMIL HYDROCHLORIDE 2.5 MG/ML
INJECTION, SOLUTION INTRAVENOUS AS NEEDED
Status: DISCONTINUED | OUTPATIENT
Start: 2024-09-12 | End: 2024-09-12 | Stop reason: HOSPADM

## 2024-09-12 RX ORDER — SODIUM CHLORIDE 9 MG/ML
75 INJECTION, SOLUTION INTRAVENOUS CONTINUOUS
Status: DISCONTINUED | OUTPATIENT
Start: 2024-09-12 | End: 2024-09-12

## 2024-09-12 RX ORDER — MIDAZOLAM HYDROCHLORIDE 1 MG/ML
INJECTION, SOLUTION INTRAMUSCULAR; INTRAVENOUS AS NEEDED
Status: DISCONTINUED | OUTPATIENT
Start: 2024-09-12 | End: 2024-09-12 | Stop reason: HOSPADM

## 2024-09-12 SDOH — SOCIAL STABILITY: SOCIAL INSECURITY: WERE YOU ABLE TO COMPLETE ALL THE BEHAVIORAL HEALTH SCREENINGS?: YES

## 2024-09-12 SDOH — SOCIAL STABILITY: SOCIAL INSECURITY: ABUSE: ADULT

## 2024-09-12 SDOH — SOCIAL STABILITY: SOCIAL INSECURITY: HAVE YOU HAD ANY THOUGHTS OF HARMING ANYONE ELSE?: NO

## 2024-09-12 SDOH — SOCIAL STABILITY: SOCIAL INSECURITY: HAVE YOU HAD THOUGHTS OF HARMING ANYONE ELSE?: NO

## 2024-09-12 SDOH — SOCIAL STABILITY: SOCIAL INSECURITY: ARE THERE ANY APPARENT SIGNS OF INJURIES/BEHAVIORS THAT COULD BE RELATED TO ABUSE/NEGLECT?: NO

## 2024-09-12 SDOH — SOCIAL STABILITY: SOCIAL INSECURITY: DOES ANYONE TRY TO KEEP YOU FROM HAVING/CONTACTING OTHER FRIENDS OR DOING THINGS OUTSIDE YOUR HOME?: NO

## 2024-09-12 SDOH — SOCIAL STABILITY: SOCIAL INSECURITY: HAS ANYONE EVER THREATENED TO HURT YOUR FAMILY OR YOUR PETS?: NO

## 2024-09-12 SDOH — SOCIAL STABILITY: SOCIAL INSECURITY: DO YOU FEEL ANYONE HAS EXPLOITED OR TAKEN ADVANTAGE OF YOU FINANCIALLY OR OF YOUR PERSONAL PROPERTY?: NO

## 2024-09-12 SDOH — SOCIAL STABILITY: SOCIAL INSECURITY: ARE YOU OR HAVE YOU BEEN THREATENED OR ABUSED PHYSICALLY, EMOTIONALLY, OR SEXUALLY BY ANYONE?: NO

## 2024-09-12 SDOH — SOCIAL STABILITY: SOCIAL INSECURITY: DO YOU FEEL UNSAFE GOING BACK TO THE PLACE WHERE YOU ARE LIVING?: NO

## 2024-09-12 ASSESSMENT — PAIN SCALES - GENERAL
PAINLEVEL_OUTOF10: 0 - NO PAIN

## 2024-09-12 ASSESSMENT — COGNITIVE AND FUNCTIONAL STATUS - GENERAL
TOILETING: A LITTLE
MOVING TO AND FROM BED TO CHAIR: A LITTLE
TOILETING: A LITTLE
STANDING UP FROM CHAIR USING ARMS: A LITTLE
STANDING UP FROM CHAIR USING ARMS: A LITTLE
DRESSING REGULAR LOWER BODY CLOTHING: A LITTLE
MOBILITY SCORE: 20
HELP NEEDED FOR BATHING: A LITTLE
DRESSING REGULAR LOWER BODY CLOTHING: A LITTLE
WALKING IN HOSPITAL ROOM: A LITTLE
PATIENT BASELINE BEDBOUND: NO
CLIMB 3 TO 5 STEPS WITH RAILING: A LITTLE
DAILY ACTIVITIY SCORE: 21
CLIMB 3 TO 5 STEPS WITH RAILING: A LITTLE
MOBILITY SCORE: 20
MOVING TO AND FROM BED TO CHAIR: A LITTLE
HELP NEEDED FOR BATHING: A LITTLE
WALKING IN HOSPITAL ROOM: A LITTLE
DAILY ACTIVITIY SCORE: 21

## 2024-09-12 ASSESSMENT — ACTIVITIES OF DAILY LIVING (ADL)
BATHING: NEEDS ASSISTANCE
HEARING - LEFT EAR: FUNCTIONAL
HEARING - RIGHT EAR: FUNCTIONAL
DRESSING YOURSELF: INDEPENDENT
PATIENT'S MEMORY ADEQUATE TO SAFELY COMPLETE DAILY ACTIVITIES?: YES
FEEDING YOURSELF: INDEPENDENT
TOILETING: NEEDS ASSISTANCE
JUDGMENT_ADEQUATE_SAFELY_COMPLETE_DAILY_ACTIVITIES: YES
WALKS IN HOME: NEEDS ASSISTANCE
LACK_OF_TRANSPORTATION: NO
GROOMING: INDEPENDENT
ADEQUATE_TO_COMPLETE_ADL: YES

## 2024-09-12 ASSESSMENT — PAIN - FUNCTIONAL ASSESSMENT
PAIN_FUNCTIONAL_ASSESSMENT: 0-10

## 2024-09-12 ASSESSMENT — PATIENT HEALTH QUESTIONNAIRE - PHQ9
1. LITTLE INTEREST OR PLEASURE IN DOING THINGS: NOT AT ALL
2. FEELING DOWN, DEPRESSED OR HOPELESS: NOT AT ALL
SUM OF ALL RESPONSES TO PHQ9 QUESTIONS 1 & 2: 0

## 2024-09-12 ASSESSMENT — LIFESTYLE VARIABLES
AUDIT-C TOTAL SCORE: 0
PRESCIPTION_ABUSE_PAST_12_MONTHS: NO
HOW OFTEN DO YOU HAVE 6 OR MORE DRINKS ON ONE OCCASION: NEVER
HOW OFTEN DO YOU HAVE A DRINK CONTAINING ALCOHOL: NEVER
HOW MANY STANDARD DRINKS CONTAINING ALCOHOL DO YOU HAVE ON A TYPICAL DAY: PATIENT DOES NOT DRINK
SUBSTANCE_ABUSE_PAST_12_MONTHS: NO
AUDIT-C TOTAL SCORE: 0
SKIP TO QUESTIONS 9-10: 1

## 2024-09-12 NOTE — CONSULTS
Inpatient consult to Cardiology  Consult performed by: Bob Rosario MD PhD  Consult ordered by: Jason Paulino DO  Reason for consult: Non-STEMI        History Of Present Illness:    68 yo M with PMHx of recently diagnosed colon CA, iron deficiency anemia, recent admission for PAF/flutter RVR presents as a transfer from [facility] with chest pain/NSTEMI. Pt initially presented to the ED earlier in the morning for palpitations and sob. He was found to be in Afib RVR, given medications for rate control, and ultimately converted to NSR. His troponins during the morning ED visit for Afib RVR were 22, 20, and then 17. He was ultimately discharged home from the ED yesterday afternoon and then later at approximately midnight started having 10/10 midsternal chest pressure with radiation to his L arm and mild sob which made him present again at the ED. A CT PE was negative for PE. Troponins did uptrend from 163 to 392. He was given ASA load, started on heparin drip, and given 2 SL nitroglycerin with resolution of pain. He was transferred to [facility] for further care. He was just recently here for anemia and Afib RVR 8/1 to 8/3. During that admission he was given IV iron, transfused, started on Bblocker, and ultimately converted to NSR prior to discharge. He was not started no anticoagulation due to his anemia and plans for EGD/colonoscopy (had recent positive Cologuard). He did end up undergoing endoscopy which did confirm colon CA.      Surgical History    12 point review of systems including (Constitutional, Eyes, ENMT, Respiratory, Cardiac, Gastrointestinal, Neurological, Psychiatric, and Hematologic) was performed and is otherwise negative.    Past medical history:  As above.    Medications were reviewed.    Allergies were reviewed.    Social history:  Patient denies smoking, alcohol abuse, or illicit drug use.    Family history: Was reviewed and not contributory to the current presentation of the patient.        Last Recorded Vitals:  Vitals:    09/12/24 0900 09/12/24 1100 09/12/24 1346 09/12/24 1400   BP: (!) 186/84  116/63    BP Location: Left arm      Patient Position: Sitting      Pulse: 76  56    Resp: 18  20    Temp: 36.2 °C (97.2 °F)      TempSrc: Temporal      SpO2: 100%  100% 99%   Weight:  88.7 kg (195 lb 10.5 oz)         Last Labs:  CBC - 9/12/2024: 10:37 AM  5.4 9.3 278    32.8      CMP - 9/12/2024: 10:37 AM  9.0 6.1 36 --- 0.6   3.7 3.8 11 60      PTT - 9/12/2024: 10:37 AM  1.3   14.1 93     Troponin I, High Sensitivity   Date/Time Value Ref Range Status   09/12/2024 10:37 AM 6,106 (HH) 0 - 20 ng/L Final   08/01/2024 02:49 PM 12 0 - 20 ng/L Final     Hemoglobin A1C   Date/Time Value Ref Range Status   08/02/2024 05:14 AM 5.4 see below % Final     LDL Calculated   Date/Time Value Ref Range Status   09/12/2024 10:37  (H) <=99 mg/dL Final     Comment:                                 Near   Borderline      AGE      Desirable  Optimal    High     High     Very High     0-19 Y     0 - 109     ---    110-129   >/= 130     ----    20-24 Y     0 - 119     ---    120-159   >/= 160     ----      >24 Y     0 -  99   100-129  130-159   160-189     >/=190     03/28/2024 11:11  (H) <=99 mg/dL Final     Comment:                                 Near   Borderline      AGE      Desirable  Optimal    High     High     Very High     0-19 Y     0 - 109     ---    110-129   >/= 130     ----    20-24 Y     0 - 119     ---    120-159   >/= 160     ----      >24 Y     0 -  99   100-129  130-159   160-189     >/=190       VLDL   Date/Time Value Ref Range Status   09/12/2024 10:37 AM 9 0 - 40 mg/dL Final   03/28/2024 11:11 AM 15 0 - 40 mg/dL Final      Last I/O:  No intake/output data recorded.    Past Cardiology Tests (Last 3 Years):  EKG:  ECG 12 lead 08/26/2024      ECG 12 lead 08/01/2024      ECG 12 lead 08/01/2024    Echo:  Transthoracic Echo (TTE) Complete 09/12/2024      Transthoracic Echo (TTE) Complete  08/02/2024    Ejection Fractions:  EF   Date/Time Value Ref Range Status   09/12/2024 11:41 AM 53 %    08/02/2024 10:35 AM 61 %      Cath:  Cardiac Catheterization Procedure 09/12/2024    Stress Test:  No results found for this or any previous visit from the past 1095 days.    Cardiac Imaging:  No results found for this or any previous visit from the past 1095 days.      Past Medical History:  He has a past medical history of Personal history of other diseases of the circulatory system.    Past Surgical History:  He has a past surgical history that includes Other surgical history (07/13/2021); Other surgical history (07/13/2021); and Other surgical history (07/13/2021).      Social History:  He reports that he has never smoked. He has never used smokeless tobacco. He reports current alcohol use. He reports that he does not use drugs.    Family History:  No family history on file.     Allergies:  Amoxicillin    Inpatient Medications:  Scheduled medications   Medication Dose Route Frequency    aspirin  81 mg oral Daily    atorvastatin  80 mg oral Nightly    [START ON 9/13/2024] enoxaparin  40 mg subcutaneous q24h    [START ON 9/13/2024] metoprolol succinate XL  50 mg oral Daily    oxygen   inhalation Continuous - Inhalation    pantoprazole  40 mg oral Daily     PRN medications   Medication    acetaminophen    nitroglycerin    ondansetron    oxygen     Continuous Medications   Medication Dose Last Rate    sodium chloride 0.9%  75 mL/hr 100 mL/hr (09/12/24 1322)    sodium chloride 0.9%  1 mL/kg/hr       Outpatient Medications:  Current Outpatient Medications   Medication Instructions    metoprolol tartrate (LOPRESSOR) 50 mg, oral, 2 times daily    neomycin (Mycifradin) 500 mg tablet Take two tablets (1000 mg) by mouth at 3:00pm, 4:00pm and at bed time on the day prior to surgery    pantoprazole (PROTONIX) 40 mg, oral, Daily, Do not crush, chew, or split.    polyethylene glycol (GaviLyte-G) 236-22.74-6.74 -5.86 gram  solution Starting at noon on day prior to procedure drink 8 ounces (240 mL) every 30 minutes until all gone or stools are clear. May add flavor packet.       Physical Exam:  General: Awake, alert/oriented x3, well developed, no acute distress  Head: Atraumatic/Normocephalic  Eyes: Normal external exam, EOMI, PERRLA  ENT: Oropharynx normal, moist mucous membranes  Cardiovascular: RRR, S1/S2, no murmurs, rubs, or gallops, radial pulses +2, no edema of extremities  Pulmonary: CTAB, no respiratory distress. No wheezes, rales, or ronchi  Abdomen: +BS, soft, non-tender, nondistended, no guarding or rebound  MSK: No joint swelling, normal movements of all extremities. Range of motion- normal.  Extremities: no edema, no cyanosis  Neuro: Alert/oriented x3, no focal motor or sensory deficits  Psychiatric: Judgment intact. Appropriate mood and behavior     Assessment/Plan   Non-ST elevation MI  Atrial fibrillation  Colon cancer  Severe anemia  Hypertension  Hyperlipidemia    Considering the patient's significant rise in troponin, I discussed with him the option of proceeding with left heart catheterization and coronary angiography which he agreed.  The procedure was done for him this afternoon that showed moderate coronary artery disease appropriate for medical therapy at this point.  After recovery from current admission, patient should be able to undergo colon Resection surgery as planned by surgical team.  Will continue with baby aspirin, high intensity statin and beta-blocker and treat his non-ST elevation MI medically.  Echocardiogram showed preserved ejection fraction with mild wall motion abnormality.    Considering patient has atrial fibrillation and chads vascular score of at least 3, he will require to be on long-term anticoagulation.  However, with active colon cancer this would be challenging.  Perhaps it might be best if he can have his surgery sooner so we can start anticoagulation afterwards. Will further  discuss with primary team and surgery options.        Discussed with patient and family.  Primary team was updated.    Code Status:  Full Code    Bob Rosario MD, PhD, FACC, [facility]  Interventional Cardiology, [facility]  Associate Professor of Medicine, [facility]   Office: [phone]

## 2024-09-12 NOTE — H&P
History and Physical   Pre Surgical Review (< 30 days)      History & Physical Reviewed    I have reviewed the History and Physical dated:  9/11/24   History and Physical reviewed and relevant findings noted. Patient examined to review pertinent physical  findings.: No significant changes   Home Medications Reviewed: see EMR   Allergies Reviewed: no changes noted      Home Medications  Current Outpatient Medications   Medication Instructions    metoprolol tartrate (LOPRESSOR) 50 mg, oral, 2 times daily    neomycin (Mycifradin) 500 mg tablet Take two tablets (1000 mg) by mouth at 3:00pm, 4:00pm and at bed time on the day prior to surgery    pantoprazole (PROTONIX) 40 mg, oral, Daily, Do not crush, chew, or split.    polyethylene glycol (GaviLyte-G) 236-22.74-6.74 -5.86 gram solution Starting at noon on day prior to procedure drink 8 ounces (240 mL) every 30 minutes until all gone or stools are clear. May add flavor packet.        Allergies  Allergies   Allergen Reactions    Amoxicillin Chills       Physical Exam  Physical Exam  Constitutional:       General: He is not in acute distress.  Cardiovascular:      Comments: + pulses all extremities.  Pulmonary:      Effort: Pulmonary effort is normal. No respiratory distress.      Comments: Clear bilaterally.  Abdominal:      General: Bowel sounds are normal.   Skin:     General: Skin is warm and dry.   Neurological:      General: No focal deficit present.      Mental Status: He is alert.   Psychiatric:         Mood and Affect: Mood normal.         Behavior: Behavior normal.        Airway/Sedation Assessment     Assessment by anesthesia N/A     ·  Mouth Opening OK yes      Neck Flexibility OK yes      Loose Teeth No   ·  Oropharyngeal Classification Grade II   ·  ASA PS Classification ASA III - Patient with severe systemic disease       Sedation Plan Moderate     Risks, benefits, and alternatives discussed with patient.     ERAS (Enhanced Recovery After  Surgery):  ·  ERAS Patient: No      Consent:   COVID-19 Consent:  ·  COVID-19 Risk Consent Surgeon has reviewed key risks related to the risk of lyndon COVID-19 and if they contract COVID-19 what the risks are.     Marlin Modi, APRN-CNP

## 2024-09-12 NOTE — NURSING NOTE
Patient back from cath procedure.  VSS, Awake and Alert.  Family at bedside.  Taking nourishment.  No complaints of pain.

## 2024-09-12 NOTE — DISCHARGE INSTRUCTIONS
CARDIAC CATHETERIZATION DISCHARGE INSTRUCTIONS     FOR SUDDEN AND SEVERE CHEST PAIN, SHORTNESS OF BREATH, EXCESSIVE BLEEDING, SIGNS OF STROKE, OR CHANGES IN MENTAL STATUS YOU SHOULD CALL 911 IMMEDIATELY.     If your provider has prescribed aspirin and/or clopidogrel (Plavix), or prasugrel (Effient), or ticagrelor (Brilinta), DO NOT STOP THESE MEDICATIONS for any reason without talking to your cardiologist first. If any of these were prescribed, you must take them every day without missing a single dose. If you are getting low on these medications, contact your provider immediately for a refill.     FOR NEXT 24 HOURS  - Upon discharge, you should return home and rest for the remainder of the day and evening. You do not have to stay on bed rest but should not be very active.  It is recommended a responsible adult be with you for the first 24 hours after the procedure.    - No driving for 24 hours after procedure. Please arrange for someone to drive you home from the hospital today.     - Do not drive, operate machinery, or use power tools for 24 hours after your procedure.     - Do not make any legal decisions for 24 hours after your procedure.     - Do not drink alcoholic beverages for 24 hours after your procedure.    WOUND CARE   *FOR RADIAL (WRIST) ACCESS*  ·      No lifting anything with affected arm, no bending or flexing affected wrist for 24 hours - for example, treat your wrist as if it is sprained.        ·      No lifting greater that 5 pounds with your affected arm for 5 days.  ·      Do not engage in vigorous activities (tennis, golf, bowling, weights) for at least 5 days after the procedure.  ·      Do not submerge the wrist in water for 7 days after the procedure.  ·      You should expect mild tingling in your hand and tenderness at the puncture site for up to 3 days.    - The transparent dressing should be removed from the site 24 hours after the procedure.  Wash the site gently with soap and  water. Rinse well and pat dry. Keep the area clean and dry. You may apply a Band-Aid to the site. Avoid lotions, ointments, or powders until fully healed.     - You may shower the day after your procedure.      - It is normal to notice a small bruise around the puncture site and/or a small grape sized or smaller lump. Any large bruising or large lump warrants a call to the office.     - If bleeding should occur, lay down and apply pressure to the affected area for 10 minutes.  If the bleeding stops notify your physician.  If there is a large amount of bleeding or spurting of blood CALL 911 immediately.  DO NOT drive yourself to the hospital.    - You may experience some tenderness, bruising or minimal inflammation.  If you have any concerns, you may contact the Cath Lab or if any of these symptoms become excessive, contact your cardiologist or go to the emergency room.     OTHER INSTRUCTIONS  - You may take acetaminophen (Tylenol) as directed for discomfort.  If pain is not relieved with acetaminophen (Tylenol), contact your doctor.    - If you notice or experience any of the following, you should notify your doctor or seek medical attention  Chest pain or discomfort  Change in mental status or weakness in extremities.  Dizziness, light headedness, or feeling faint.  Change in the site where the procedure was performed, such as bleeding or an increased area of bruising or swelling.  Tingling, numbness, pain, or coolness in the leg/arm beyond the site where the procedure was performed.  Signs of infection (i.e. shaking chills, temperature > 100 degrees Fahrenheit, warmth, redness) in the leg/arm area where the procedure was performed.  Changes in urination   Bloody or black stools  Vomiting blood  Severe nose bleeds  Any excessive bleeding    - Please follow with Dr. Archibald in 2-3 weeks as scheduled, [phone]

## 2024-09-12 NOTE — H&P
History Of Present Illness  68 yo M with PMHx of recently diagnosed colon CA, iron deficiency anemia, recent admission for PAF/flutter RVR presents as a transfer from [facility] with chest pain/NSTEMI. Pt initially presented to the ED earlier in the morning for palpitations and sob. He was found to be in Afib RVR, given medications for rate control, and ultimately converted to NSR. His troponins during the morning ED visit for Afib RVR were 22, 20, and then 17. He was ultimately discharged home from the ED yesterday afternoon and then later at approximately midnight started having 10/10 midsternal chest pressure with radiation to his L arm and mild sob which made him present again at the ED. A CT PE was negative for PE. Troponins did uptrend from 163 to 392. He was given ASA load, started on heparin drip, and given 2 SL nitroglycerin with resolution of pain. He was transferred to [facility] for further care. He was just recently here for anemia and Afib RVR 8/1 to 8/3. During that admission he was given IV iron, transfused, started on Bblocker, and ultimately converted to NSR prior to discharge. He was not started no anticoagulation due to his anemia and plans for EGD/colonoscopy (had recent positive Cologuard). He did end up undergoing endoscopy which did confirm colon CA.     Surgical History  EGD, colonoscopy, tonsillectomy, sinus sx, testicular sx     Social History  Denies tobacco, EtOH, and illicit    Family History  HTN, HLD, PE, multiple myeloma      Allergies  Amoxicillin    Review of Systems   Cardiovascular:  Positive for chest pain.   All other systems reviewed and are negative.    Physical Exam  G: aox3, NAD, cooperative  HENT: neck supple, no JVD  Eyes: clear sclera  CV: RRR s1 s2  L: clear  Abd: soft, NT, non distended  Ext: no c/c/e  N: no appreciable acute focal deficits  Psych: appropriate mood and behavior    Last Recorded Vitals  /63   Pulse 56   Temp 36.2 °C (97.2 °F) (Temporal)   Resp 20    Wt 86.6 kg (190 lb 14.7 oz)   SpO2 99%      Assessment/Plan   Assessment & Plan      NSTEMI  PAF/flutter with RVR    HTN, HLD  Colon CA (recent dx)   Iron deficiency anemia  DVT ppx  Full code    Plan:  - Continue heparin drip, ASA, high intensity statin, Blocker. NPO. ECHO. Cardiology consultation. Discussed with cardiology and will be going for [facility] this afternoon. He is NSR at this time and CP free.            Jason Paulino, DO

## 2024-09-12 NOTE — H&P
History Of Present Illness  68 yo M with PMHx of recently diagnosed colon CA, iron deficiency anemia, recent admission for PAF/flutter RVR presents as a transfer from [facility] with chest pain/NSTEMI. Pt initially presented to the ED earlier in the morning for palpitations and sob. He was found to be in Afib RVR, given medications for rate control, and ultimately converted to NSR. His troponins during the morning ED visit for Afib RVR were 22, 20, and then 17. He was ultimately discharged home from the ED yesterday afternoon and then later at approximately midnight started having 10/10 midsternal chest pressure with radiation to his L arm and mild sob which made him present again at the ED. A CT PE was negative for PE. Troponins did uptrend from 163 to 392. He was given ASA load, started on heparin drip, and given 2 SL nitroglycerin with resolution of pain. He was transferred to [facility] for further care. He was just recently here for anemia and Afib RVR 8/1 to 8/3. During that admission he was given IV iron, transfused, started on Bblocker, and ultimately converted to NSR prior to discharge. He was not started no anticoagulation due to his anemia and plans for EGD/colonoscopy (had recent positive Cologuard). He did end up undergoing endoscopy which did confirm colon CA.     Surgical History  EGD, colonoscopy, tonsillectomy, sinus sx, testicular sx     Social History  Denies tobacco, EtOH, and illicit    Family History  HTN, HLD, PE, multiple myeloma      Allergies  Amoxicillin    Review of Systems   Cardiovascular:  Positive for chest pain.   All other systems reviewed and are negative.    Physical Exam  G: aox3, NAD, cooperative  HENT: neck supple, no JVD  Eyes: clear sclera  CV: RRR s1 s2  L: clear  Abd: soft, NT, non distended  Ext: no c/c/e  N: no appreciable acute focal deficits  Psych: appropriate mood and behavior    Last Recorded Vitals  /63   Pulse 56   Temp 36.2 °C (97.2 °F) (Temporal)   Resp 20    Wt 86.6 kg (190 lb 14.7 oz)   SpO2 99%        Assessment & Plan      NSTEMI  PAF/flutter with RVR    HTN, HLD  Colon CA (recent dx)   Iron deficiency anemia  DVT ppx  Full code    Plan:  - Continue heparin drip, ASA, high intensity statin, Blocker. NPO. ECHO. Cardiology consultation. Discussed with cardiology and will be going for [facility] this afternoon. He is NSR at this time and CP free.            Jason Paulino, DO

## 2024-09-12 NOTE — NURSING NOTE
Pt transferred to 9th floor. Right radial site intact with no signs of bleeding or hematoma. VSS and no complaints of pain.

## 2024-09-12 NOTE — CARE PLAN
TR band pulled off and manual pressure held for 15 minutes to establish hemostasis due to TR band leaking. 1605 Hemostasis obtained. Clean dry dressing with splint applied. No complaints of pain or nausea. IV patent. Report called to 9th floor RN.

## 2024-09-12 NOTE — CARE PLAN
The patient's goals for the shift include      The clinical goals for the shift include        Problem: Pain - Adult  Goal: Verbalizes/displays adequate comfort level or baseline comfort level  Outcome: Progressing     Problem: Safety - Adult  Goal: Free from fall injury  Outcome: Progressing

## 2024-09-12 NOTE — CARE PLAN
Problem: Pain - Adult  Goal: Verbalizes/displays adequate comfort level or baseline comfort level  Outcome: Progressing     Problem: Safety - Adult  Goal: Free from fall injury  Outcome: Progressing     Problem: Discharge Planning  Goal: Discharge to home or other facility with appropriate resources  Outcome: Progressing     Problem: Chronic Conditions and Co-morbidities  Goal: Patient's chronic conditions and co-morbidity symptoms are monitored and maintained or improved  Outcome: Progressing     Problem: ACS/CP/NSTEMI/STEMI  Goal: Chest pain managed (free from pain or at acceptable level)  Outcome: Progressing  Goal: Lab values return to normal range  Outcome: Progressing  Goal: Promote self management  Outcome: Progressing  Goal: Serial ECG will return to baseline  Outcome: Progressing  Goal: Verbalize understanding of procedures/devices  Outcome: Progressing  Goal: Wean vasopressors/achieve hemodynamic stability  Outcome: Progressing     Problem: Arrythmia/Dysrhythmia  Goal: Lab values return to normal range  Outcome: Progressing  Goal: No evidence of post procedure complications  Outcome: Progressing  Goal: Promote self management  Outcome: Progressing  Goal: Serial ECG will return to baseline  Outcome: Progressing  Goal: Verbalize understanding of procedures/devices  Outcome: Progressing  Goal: Vital signs return to baseline  Outcome: Progressing  Goal: Care and maintenance of device (specify)  Outcome: Progressing     Problem: Cardiac catheterization  Goal: Free from dysrhythmias  Outcome: Progressing  Goal: Free from pain  Outcome: Progressing  Goal: No evidence of post procedure complications  Outcome: Progressing  Goal: Promote self management  Outcome: Progressing  Goal: Verbalize understanding of procedure  Outcome: Progressing  Goal: Care and maintenance of device (specify)  Outcome: Progressing     Problem: Hypertensive Emergency/Crisis  Goal: Blood pressure gradually reduced to goal range  Outcome:  Progressing  Goal: Free from signs of organ damage  Outcome: Progressing  Goal: Lab values return to normal range  Outcome: Progressing  Goal: Promote self management  Outcome: Progressing   The patient's goals for the shift include      The clinical goals for the shift include maintain comfort/safety throughout shift

## 2024-09-12 NOTE — POST-PROCEDURE NOTE
Physician Transition of Care Summary  Invasive Cardiovascular Lab    Procedure Date: 9/12/2024  Attending:    * Bob Rosario - Primary  Complications:  No in-lab complications observed.     Cardiac Cath Post Procedure Notes:  Post Procedure Diagnosis: Nonobstructive coronary artery disease as above.  Blood Loss:               Estimated blood loss during the procedure was 5 mls.  Specimens Removed:        Number of specimen(s) removed: none.        Recommendations:  Maximize medical therapy.  Agressive risk factor modification efforts.  Follow-up with cardiology clinic.  Lipid lowering agent or Statin therapy.  Medical management of coronary artery disease.  Aspirin therapy.     ____________________________________________________________________________________  CONCLUSIONS:   1. Short left main without significant stenosis            Luminal irregularities in LAD            Lower branch of the OM has an ostial 60% stenosis. Otherwise no significant disease in left circumflex system            Proximal to mid RCA diffuse 20 to 30% stenosis. Ostial RPL V has a 60% stenosis.            Right dominant coronary system            LVEDP 16 mmHg with preserved ejection fraction and LV gram with mild inferior wall hypokinesis.          Please refer to the full report that is in the system.    Electronically signed by: Bob Rosario MD PhD, 9/12/2024 2:36 PM

## 2024-09-13 ENCOUNTER — ANESTHESIA EVENT (OUTPATIENT)
Dept: OPERATING ROOM | Facility: HOSPITAL | Age: 69
End: 2024-09-13
Payer: MEDICARE

## 2024-09-13 LAB
ANION GAP SERPL CALC-SCNC: 10 MMOL/L (ref 10–20)
ATRIAL RATE: 66 BPM
BUN SERPL-MCNC: 17 MG/DL (ref 6–23)
CALCIUM SERPL-MCNC: 8.5 MG/DL (ref 8.6–10.3)
CHLORIDE SERPL-SCNC: 107 MMOL/L (ref 98–107)
CO2 SERPL-SCNC: 25 MMOL/L (ref 21–32)
CREAT SERPL-MCNC: 0.78 MG/DL (ref 0.5–1.3)
EGFRCR SERPLBLD CKD-EPI 2021: >90 ML/MIN/1.73M*2
ERYTHROCYTE [DISTWIDTH] IN BLOOD BY AUTOMATED COUNT: 22.5 % (ref 11.5–14.5)
GLUCOSE SERPL-MCNC: 86 MG/DL (ref 74–99)
HCT VFR BLD AUTO: 28.1 % (ref 41–52)
HGB BLD-MCNC: 8.2 G/DL (ref 13.5–17.5)
HOLD SPECIMEN: NORMAL
MCH RBC QN AUTO: 23.5 PG (ref 26–34)
MCHC RBC AUTO-ENTMCNC: 29.2 G/DL (ref 32–36)
MCV RBC AUTO: 81 FL (ref 80–100)
NRBC BLD-RTO: 0 /100 WBCS (ref 0–0)
P AXIS: 37 DEGREES
P OFFSET: 209 MS
P ONSET: 148 MS
PLATELET # BLD AUTO: 260 X10*3/UL (ref 150–450)
POTASSIUM SERPL-SCNC: 4.1 MMOL/L (ref 3.5–5.3)
PR INTERVAL: 146 MS
Q ONSET: 221 MS
QRS COUNT: 11 BEATS
QRS DURATION: 80 MS
QT INTERVAL: 432 MS
QTC CALCULATION(BAZETT): 452 MS
QTC FREDERICIA: 446 MS
R AXIS: 14 DEGREES
RBC # BLD AUTO: 3.49 X10*6/UL (ref 4.5–5.9)
SODIUM SERPL-SCNC: 138 MMOL/L (ref 136–145)
T AXIS: 17 DEGREES
T OFFSET: 437 MS
VENTRICULAR RATE: 66 BPM
WBC # BLD AUTO: 4.5 X10*3/UL (ref 4.4–11.3)

## 2024-09-13 PROCEDURE — 2500000004 HC RX 250 GENERAL PHARMACY W/ HCPCS (ALT 636 FOR OP/ED): Performed by: STUDENT IN AN ORGANIZED HEALTH CARE EDUCATION/TRAINING PROGRAM

## 2024-09-13 PROCEDURE — B2111ZZ FLUOROSCOPY OF MULTIPLE CORONARY ARTERIES USING LOW OSMOLAR CONTRAST: ICD-10-PCS | Performed by: SURGERY

## 2024-09-13 PROCEDURE — 2500000001 HC RX 250 WO HCPCS SELF ADMINISTERED DRUGS (ALT 637 FOR MEDICARE OP): Performed by: STUDENT IN AN ORGANIZED HEALTH CARE EDUCATION/TRAINING PROGRAM

## 2024-09-13 PROCEDURE — 4A023N7 MEASUREMENT OF CARDIAC SAMPLING AND PRESSURE, LEFT HEART, PERCUTANEOUS APPROACH: ICD-10-PCS | Performed by: SURGERY

## 2024-09-13 PROCEDURE — 30233K1 TRANSFUSION OF NONAUTOLOGOUS FROZEN PLASMA INTO PERIPHERAL VEIN, PERCUTANEOUS APPROACH: ICD-10-PCS | Performed by: SURGERY

## 2024-09-13 PROCEDURE — 2500000004 HC RX 250 GENERAL PHARMACY W/ HCPCS (ALT 636 FOR OP/ED): Performed by: NURSE PRACTITIONER

## 2024-09-13 PROCEDURE — 80048 BASIC METABOLIC PNL TOTAL CA: CPT | Performed by: STUDENT IN AN ORGANIZED HEALTH CARE EDUCATION/TRAINING PROGRAM

## 2024-09-13 PROCEDURE — 99233 SBSQ HOSP IP/OBS HIGH 50: CPT | Performed by: STUDENT IN AN ORGANIZED HEALTH CARE EDUCATION/TRAINING PROGRAM

## 2024-09-13 PROCEDURE — 99223 1ST HOSP IP/OBS HIGH 75: CPT | Performed by: SURGERY

## 2024-09-13 PROCEDURE — 2500000005 HC RX 250 GENERAL PHARMACY W/O HCPCS: Performed by: STUDENT IN AN ORGANIZED HEALTH CARE EDUCATION/TRAINING PROGRAM

## 2024-09-13 PROCEDURE — 2500000001 HC RX 250 WO HCPCS SELF ADMINISTERED DRUGS (ALT 637 FOR MEDICARE OP): Performed by: NURSE PRACTITIONER

## 2024-09-13 PROCEDURE — 99232 SBSQ HOSP IP/OBS MODERATE 35: CPT | Performed by: STUDENT IN AN ORGANIZED HEALTH CARE EDUCATION/TRAINING PROGRAM

## 2024-09-13 PROCEDURE — 0DBL4ZZ EXCISION OF TRANSVERSE COLON, PERCUTANEOUS ENDOSCOPIC APPROACH: ICD-10-PCS | Performed by: SURGERY

## 2024-09-13 PROCEDURE — 30233N1 TRANSFUSION OF NONAUTOLOGOUS RED BLOOD CELLS INTO PERIPHERAL VEIN, PERCUTANEOUS APPROACH: ICD-10-PCS | Performed by: SURGERY

## 2024-09-13 PROCEDURE — 1200000002 HC GENERAL ROOM WITH TELEMETRY DAILY

## 2024-09-13 PROCEDURE — 85027 COMPLETE CBC AUTOMATED: CPT | Performed by: STUDENT IN AN ORGANIZED HEALTH CARE EDUCATION/TRAINING PROGRAM

## 2024-09-13 PROCEDURE — 36415 COLL VENOUS BLD VENIPUNCTURE: CPT | Performed by: STUDENT IN AN ORGANIZED HEALTH CARE EDUCATION/TRAINING PROGRAM

## 2024-09-13 RX ORDER — SIMETHICONE 80 MG
120 TABLET,CHEWABLE ORAL ONCE
Status: COMPLETED | OUTPATIENT
Start: 2024-09-13 | End: 2024-09-13

## 2024-09-13 ASSESSMENT — COGNITIVE AND FUNCTIONAL STATUS - GENERAL
CLIMB 3 TO 5 STEPS WITH RAILING: A LITTLE
MOBILITY SCORE: 23
DAILY ACTIVITIY SCORE: 24

## 2024-09-13 ASSESSMENT — PAIN - FUNCTIONAL ASSESSMENT: PAIN_FUNCTIONAL_ASSESSMENT: 0-10

## 2024-09-13 ASSESSMENT — PAIN SCALES - GENERAL: PAINLEVEL_OUTOF10: 0 - NO PAIN

## 2024-09-13 NOTE — CONSULTS
Reason For Consult  Continued surgical evaluation of splenic flexure colon adenocarcinoma    History Of Present Illness  Krish Batista is a 69 y.o. male, well-known to me, presenting with chest pain.  The patient presented with PAF/flutter with RVR as well as chest pain on 9/12/2024.  EMR was reviewed.  Cardiac workup revealed a elevated troponin.  Patient was medically converted to sinus rhythm.  Cardiac catheterization revealed nonobstructive coronary disease.    General surgery service asked to evaluate this patient with regard to continued treatment of his known adenocarcinoma of the colon.  For details, please see my detailed initial office consultation dated 8/23/2024 as well as chart update/documentation dated 8/28/2024.    Patient underwent PET scan imaging on 8/23/2024 which revealed:    IMPRESSION:  1. Intense focus of hypermetabolic activity corresponding to masslike  thickening within the left descending colon, likely corresponding to  patient's biopsy-proven adenocarcinoma of the colon.  2. Moderate right and small left pleural effusion with superimposed  atelectasis, without hypermetabolic activity. No definite  hypermetabolic lung nodules, with 2-3 mm nodules within the lungs  below resolution of PET. Recommend short-term CT chest for follow-up.  3. Mild hypermetabolic mediastinal and hilar lymph nodes are likely  reactive. This can also be followed up with a short-term CT chest.  4. No other evidence of hypermetabolic disease throughout the body.      I personally reviewed the report and the images.  The hypermetabolic cavity in the colon is at the splenic flexure.  There is only mild hypermetabolic activity within the mediastinum and hilar lymph nodes and these were thought to be reactive.    Patient underwent ultrasound-guided right thoracentesis on 9/3/2024.  Please see that report for details.  This 575 cc of clear fluid was obtained and the cytology was negative.     Past Medical History  He  "has a past medical history of Personal history of other diseases of the circulatory system.    Surgical History  He has a past surgical history that includes Other surgical history (07/13/2021); Other surgical history (07/13/2021); Other surgical history (07/13/2021); and Cardiac catheterization (N/A, 9/12/2024).     Social History  He reports that he has never smoked. He has never used smokeless tobacco. He reports current alcohol use. He reports that he does not use drugs.    Family History  No family history on file.     Allergies  Amoxicillin       Physical Exam  Patient looks well.  No acute distress.  Last Recorded Vitals  Blood pressure 133/60, pulse 75, temperature 36.6 °C (97.9 °F), temperature source Temporal, resp. rate 18, height 1.837 m (6' 0.32\"), weight 86.6 kg (190 lb 14.7 oz), SpO2 95%.    Relevant Results  Laboratory values reviewed and are satisfactory otherwise.     Assessment/Plan     Mr. Batista has a known moderately-differentiated, invasive adenocarcinoma of the splenic flexure.  This has caused bleeding and anemia in the past.    Staging workup revealed mediastinal and hilar lymphadenopathy, which on PET scan imaging did not appear suspicious.  Patient also was found to have bilateral pleural effusions right greater than left.  Cytology of the pleural fluid has proved negative also.    Patient is presently admitted for A-fib RVR and chest pain.  Patient is now in sinus rhythm.  Cardiac catheterization was satisfactory.    Recommendations/Plan:    I discussed with Dr. Rosario from cardiology today in quite detail.  There is significant concern with regard to the patient's thromboembolic risk with the paroxysmal atrial fibrillation and neurologic/cardiac consequences thereof.  Patient cannot be presently anticoagulated secondary to the presence of the colon adenocarcinoma, which has previously bled.  Cardiology has recommended expedient surgical care of the tumor, so anticoagulation may " be commenced.    I discussed this case with Dr. Lawson from medical oncology on 9/10/24, and again today.  Prior to proceeding with definitive surgical therapy, he does recommend thoracic surgery consultation with regard to the mediastinal lymphadenopathy and any indication for biopsy.  I did discuss this with John Elizabeth the KATHY with thoracic surgery.  That service will evaluate.    I discussed this with Dr. Jason Paulino from the hospitalist service as well.    I explained the above to the patient with the evaluation today.  He understands.  If mediastinoscopy and biopsy is not recommended from the thoracic surgery standpoint, we will proceed with definitive operative therapy of the colon carcinoma, namely a segmental colectomy.  He is a good candidate for laparoscopic approach.  Mobilization of the splenic flexure will be necessary.  The lesion has been tattooed.  This will be a laparoscopic, possible open procedure.    When appropriate postoperative, the patient will be anticoagulated to decrease his thromboembolic risk from his paroxysmal atrial fibrillation.    I spent greater than 80 minutes in the professional and overall care of this patient.      Shai Clark MD

## 2024-09-13 NOTE — PROGRESS NOTES
Subjective Data:  No acute issues overnight.  In sinus rhythm with heart rate in 60s and 70s.    Objective Data:  Last Recorded Vitals:  Vitals:    09/12/24 1929 09/12/24 2321 09/13/24 0410 09/13/24 0700   BP: 141/71 106/53 125/65 133/60   BP Location: Left arm Left arm Left arm Left arm   Patient Position: Lying Lying Lying Lying   Pulse: 65 68 67 75   Resp: 18 18 16 18   Temp: 37 °C (98.6 °F) 36.7 °C (98.1 °F) 37.1 °C (98.8 °F) 36.6 °C (97.9 °F)   TempSrc: Temporal Temporal Temporal Temporal   SpO2: 100% 97% 96% 95%   Weight:       Height:           Last Labs:  CBC - 9/13/2024:  4:50 AM  4.5 8.2 260    28.1      CMP - 9/13/2024:  4:50 AM  8.5 6.1 36 --- 0.6   3.7 3.8 11 60      PTT - 9/12/2024: 10:37 AM  1.3   14.1 93     TROPHS   Date/Time Value Ref Range Status   09/12/2024 10:37 AM 6,106 0 - 20 ng/L Final   08/01/2024 02:49 PM 12 0 - 20 ng/L Final     HGBA1C   Date/Time Value Ref Range Status   08/02/2024 05:14 AM 5.4 see below % Final     LDLCALC   Date/Time Value Ref Range Status   09/12/2024 10:37  <=99 mg/dL Final     Comment:                                 Near   Borderline      AGE      Desirable  Optimal    High     High     Very High     0-19 Y     0 - 109     ---    110-129   >/= 130     ----    20-24 Y     0 - 119     ---    120-159   >/= 160     ----      >24 Y     0 -  99   100-129  130-159   160-189     >/=190     03/28/2024 11:11  <=99 mg/dL Final     Comment:                                 Near   Borderline      AGE      Desirable  Optimal    High     High     Very High     0-19 Y     0 - 109     ---    110-129   >/= 130     ----    20-24 Y     0 - 119     ---    120-159   >/= 160     ----      >24 Y     0 -  99   100-129  130-159   160-189     >/=190       VLDL   Date/Time Value Ref Range Status   09/12/2024 10:37 AM 9 0 - 40 mg/dL Final   03/28/2024 11:11 AM 15 0 - 40 mg/dL Final      Last I/O:  I/O last 3 completed shifts:  In: 1118.4 (12.9 mL/kg) [I.V.:1118.4 (12.9 mL/kg)]  Out:  405 (4.7 mL/kg) [Urine:400 (0.1 mL/kg/hr); Blood:5]  Weight: 86.6 kg     Past Cardiology Tests (Last 3 Years):  EKG:  ECG 12 lead 09/12/2024 (Preliminary)      ECG 12 lead 08/26/2024      ECG 12 lead 08/01/2024      ECG 12 lead 08/01/2024    Echo:  Transthoracic Echo (TTE) Complete 09/12/2024      Transthoracic Echo (TTE) Complete 08/02/2024    Ejection Fractions:  EF   Date/Time Value Ref Range Status   09/12/2024 11:41 AM 53 %    08/02/2024 10:35 AM 61 %      Cath:  Cardiac Catheterization Procedure 09/12/2024    Stress Test:  No results found for this or any previous visit from the past 1095 days.    Cardiac Imaging:  No results found for this or any previous visit from the past 1095 days.      Inpatient Medications:  Scheduled medications   Medication Dose Route Frequency    aspirin  81 mg oral Daily    atorvastatin  80 mg oral Nightly    enoxaparin  40 mg subcutaneous q24h    iron sucrose  200 mg intravenous Daily    metoprolol succinate XL  50 mg oral Daily    oxygen   inhalation Continuous - Inhalation    pantoprazole  40 mg oral Daily     PRN medications   Medication    acetaminophen    nitroglycerin    ondansetron    oxygen     Continuous Medications   Medication Dose Last Rate       Physical Exam:  Physical Exam:  General: Awake, alert/oriented x3, well developed, no acute distress  Head: Atraumatic/Normocephalic  Eyes: Normal external exam, EOMI, PERRLA  ENT: Oropharynx normal, moist mucous membranes  Cardiovascular: RRR, S1/S2, no murmurs, rubs, or gallops, radial pulses +2, no edema of extremities.  Good right radial access site healing.  Pulmonary: CTAB, no respiratory distress. No wheezes, rales, or ronchi  Abdomen: +BS, soft, non-tender, nondistended, no guarding or rebound  MSK: No joint swelling, normal movements of all extremities. Range of motion- normal.  Extremities: no edema, no cyanosis  Neuro: Alert/oriented x3, no focal motor or sensory deficits  Psychiatric: Judgment intact. Appropriate  mood and behavior        Assessment/Plan  Non-ST elevation MI  Atrial fibrillation  Colon cancer  Severe anemia  Hypertension  Hyperlipidemia     Considering the patient's significant rise in troponin, I discussed with him the option of proceeding with left heart catheterization and coronary angiography which he agreed.  The procedure was done for him this afternoon that showed moderate coronary artery disease appropriate for medical therapy at this point.  After recovery from current admission, patient should be able to undergo colon Resection surgery as planned by surgical team.  Will continue with baby aspirin, high intensity statin and beta-blocker and treat his non-ST elevation MI medically.  Echocardiogram showed preserved ejection fraction with mild wall motion abnormality.     Considering patient has atrial fibrillation and chads vascular score of at least 3, he will require to be on long-term anticoagulation.  However, with active colon cancer this would be challenging.  Perhaps it might be best if he can have his surgery sooner so we can start anticoagulation afterwards. Will further discuss with primary team and surgery options.          Discussed with patient and family.  Primary team was updated.      September 13, 2024  Patient is chest pain-free.  Remains in sinus rhythm now.  Continue suggest baby aspirin and statin and beta-blocker as they are.  Will need to initiate anticoagulation as soon as surgery is done considering his elevated CHADS2 vascular score.    Also, I do have concern that there is some possibility that his non-ST elevation MI could be a result of a small distal embolization in coronary arteries possibly in the setting of atrial fibrillation without anticoagulation and hence it is very important for the patient to undergo surgical evaluation and treatment to be followed by initiation of anticoagulation when the risk of bleeding is lower to decrease the risk of a stroke or possibly an  acute MI.  I discussed this in extent with Dr. Paulino.  I have been trying to reach to Dr. Clark as well but no success yet.  Will keep trying.         Code Status:  Full Code     Bob Rosario MD, PhD, FACC, [facility]  Interventional Cardiology, [facility]  Associate Professor of Medicine, [facility]   Office: [phone]

## 2024-09-13 NOTE — PROGRESS NOTES
09/13/24 1405   Discharge Planning   Living Arrangements Alone   Support Systems Family members   Type of Residence Private residence   Who is requesting discharge planning? Provider   Expected Discharge Disposition Home     Met with pt , introduced myself and role.  Pt admit for NSTEMI.  Pt is s/p cath.  Pt lives alone and is very independent, has a very supportive family if he needs assistance.  Home address and insurance confirmed,  I gave pt a list of  PCP's from [place] as his current PCP is from [facility].    Team to follow if assistance is needed.   Sarita Chery RN TCC

## 2024-09-13 NOTE — PROGRESS NOTES
Krish Batista is a 69 y.o. male on day 1 of admission presenting with NSTEMI (non-ST elevated myocardial infarction) (Multi).      Subjective   Doing well, no complaints, no further CP, remains in NSR     Objective     Last Recorded Vitals  /60 (BP Location: Left arm, Patient Position: Lying)   Pulse 75   Temp 36.6 °C (97.9 °F) (Temporal)   Resp 18   Wt 86.6 kg (190 lb 14.7 oz)   SpO2 95%     Physical Exam  G: aox3, NAD, cooperative  HENT: neck supple, no JVD  Eyes: clear sclera  CV: RRR s1 s2  L: clear  Abd: soft, NT, non distended  Ext: no c/c/e  N: no appreciable acute focal deficits  Psych: appropriate mood and behavior    Assessment/Plan     NSTEMI  PAF/flutter with RVR  Colon CA (recent dx)     HTN, HLD  Iron deficiency anemia  DVT ppx  Full code     Plan:  - s/p LHC, no intervention needed, medical management, continue ASA, statin, Bblocker.  - Ideally patient should be on anticoagulation and antiplatelet but has had bleeding issues with his colon CA. He has been following with gen sx Dr. Clark, as well heme/onc Dr. Lawson as an outpatient, had PET scan showing pleural effusion and mediastinal/hilar lymph node enlargement, pleural fluid from thoracentesis negative for malignant cells, discussed with his surgeon Dr. Clark who discussed with oncology Dr. Lawson and was going to see if needed a mediastinal lymph node bx and was going to be referred to thoracic sx, to expedite this as ideally would like to have surgery as soon as possible so can get on appropriate anticoagulation/antiplatelet will ask for inpatient thoracic consult to see if they feel mediastinal lymph nodes need biopsied, if they do not feel it is necessary after discussing with Dr. Clark he may be able to expedite his surgery as soon as Sunday.   - Continue IV iron, was scheduled as an outpatient       Jason Paulino,

## 2024-09-14 ENCOUNTER — PREP FOR PROCEDURE (OUTPATIENT)
Dept: SURGERY | Facility: HOSPITAL | Age: 69
End: 2024-09-14

## 2024-09-14 ENCOUNTER — APPOINTMENT (OUTPATIENT)
Dept: CARDIOLOGY | Facility: HOSPITAL | Age: 69
End: 2024-09-14
Payer: MEDICARE

## 2024-09-14 LAB
ANION GAP SERPL CALC-SCNC: 11 MMOL/L (ref 10–20)
BUN SERPL-MCNC: 12 MG/DL (ref 6–23)
CALCIUM SERPL-MCNC: 8.9 MG/DL (ref 8.6–10.3)
CHLORIDE SERPL-SCNC: 105 MMOL/L (ref 98–107)
CO2 SERPL-SCNC: 27 MMOL/L (ref 21–32)
CREAT SERPL-MCNC: 0.95 MG/DL (ref 0.5–1.3)
EGFRCR SERPLBLD CKD-EPI 2021: 86 ML/MIN/1.73M*2
ERYTHROCYTE [DISTWIDTH] IN BLOOD BY AUTOMATED COUNT: 22.5 % (ref 11.5–14.5)
GLUCOSE SERPL-MCNC: 91 MG/DL (ref 74–99)
HCT VFR BLD AUTO: 30.4 % (ref 41–52)
HGB BLD-MCNC: 8.9 G/DL (ref 13.5–17.5)
HOLD SPECIMEN: NORMAL
MAGNESIUM SERPL-MCNC: 1.82 MG/DL (ref 1.6–2.4)
MCH RBC QN AUTO: 23.5 PG (ref 26–34)
MCHC RBC AUTO-ENTMCNC: 29.3 G/DL (ref 32–36)
MCV RBC AUTO: 80 FL (ref 80–100)
NRBC BLD-RTO: 0 /100 WBCS (ref 0–0)
PLATELET # BLD AUTO: 260 X10*3/UL (ref 150–450)
POTASSIUM SERPL-SCNC: 3.8 MMOL/L (ref 3.5–5.3)
RBC # BLD AUTO: 3.79 X10*6/UL (ref 4.5–5.9)
SODIUM SERPL-SCNC: 139 MMOL/L (ref 136–145)
WBC # BLD AUTO: 4 X10*3/UL (ref 4.4–11.3)

## 2024-09-14 PROCEDURE — 36415 COLL VENOUS BLD VENIPUNCTURE: CPT | Performed by: INTERNAL MEDICINE

## 2024-09-14 PROCEDURE — 2500000004 HC RX 250 GENERAL PHARMACY W/ HCPCS (ALT 636 FOR OP/ED)

## 2024-09-14 PROCEDURE — 2500000001 HC RX 250 WO HCPCS SELF ADMINISTERED DRUGS (ALT 637 FOR MEDICARE OP)

## 2024-09-14 PROCEDURE — 93010 ELECTROCARDIOGRAM REPORT: CPT | Performed by: INTERNAL MEDICINE

## 2024-09-14 PROCEDURE — 99233 SBSQ HOSP IP/OBS HIGH 50: CPT | Performed by: STUDENT IN AN ORGANIZED HEALTH CARE EDUCATION/TRAINING PROGRAM

## 2024-09-14 PROCEDURE — 2060000001 HC INTERMEDIATE ICU ROOM DAILY

## 2024-09-14 PROCEDURE — 2500000001 HC RX 250 WO HCPCS SELF ADMINISTERED DRUGS (ALT 637 FOR MEDICARE OP): Performed by: NURSE PRACTITIONER

## 2024-09-14 PROCEDURE — 2500000004 HC RX 250 GENERAL PHARMACY W/ HCPCS (ALT 636 FOR OP/ED): Performed by: INTERNAL MEDICINE

## 2024-09-14 PROCEDURE — 2500000005 HC RX 250 GENERAL PHARMACY W/O HCPCS: Performed by: INTERNAL MEDICINE

## 2024-09-14 PROCEDURE — 85027 COMPLETE CBC AUTOMATED: CPT | Performed by: NURSE PRACTITIONER

## 2024-09-14 PROCEDURE — 2500000005 HC RX 250 GENERAL PHARMACY W/O HCPCS: Performed by: NURSE PRACTITIONER

## 2024-09-14 PROCEDURE — 99233 SBSQ HOSP IP/OBS HIGH 50: CPT | Performed by: SURGERY

## 2024-09-14 PROCEDURE — 83735 ASSAY OF MAGNESIUM: CPT | Performed by: INTERNAL MEDICINE

## 2024-09-14 PROCEDURE — 80048 BASIC METABOLIC PNL TOTAL CA: CPT | Performed by: INTERNAL MEDICINE

## 2024-09-14 PROCEDURE — 2500000004 HC RX 250 GENERAL PHARMACY W/ HCPCS (ALT 636 FOR OP/ED): Performed by: NURSE PRACTITIONER

## 2024-09-14 PROCEDURE — 99232 SBSQ HOSP IP/OBS MODERATE 35: CPT

## 2024-09-14 PROCEDURE — 93005 ELECTROCARDIOGRAM TRACING: CPT

## 2024-09-14 PROCEDURE — 99232 SBSQ HOSP IP/OBS MODERATE 35: CPT | Performed by: STUDENT IN AN ORGANIZED HEALTH CARE EDUCATION/TRAINING PROGRAM

## 2024-09-14 RX ORDER — METRONIDAZOLE 500 MG/100ML
500 INJECTION, SOLUTION INTRAVENOUS ONCE
Status: COMPLETED | OUTPATIENT
Start: 2024-09-15 | End: 2024-09-15

## 2024-09-14 RX ORDER — SODIUM CHLORIDE, SODIUM LACTATE, POTASSIUM CHLORIDE, CALCIUM CHLORIDE 600; 310; 30; 20 MG/100ML; MG/100ML; MG/100ML; MG/100ML
100 INJECTION, SOLUTION INTRAVENOUS CONTINUOUS
Status: DISCONTINUED | OUTPATIENT
Start: 2024-09-14 | End: 2024-09-17

## 2024-09-14 RX ORDER — ALVIMOPAN 12 MG/1
12 CAPSULE ORAL 2 TIMES DAILY
Status: DISCONTINUED | OUTPATIENT
Start: 2024-09-15 | End: 2024-09-17

## 2024-09-14 RX ORDER — METOPROLOL TARTRATE 1 MG/ML
5 INJECTION, SOLUTION INTRAVENOUS EVERY 6 HOURS PRN
Status: DISCONTINUED | OUTPATIENT
Start: 2024-09-14 | End: 2024-09-14

## 2024-09-14 RX ORDER — METRONIDAZOLE 500 MG/1
500 TABLET ORAL 3 TIMES DAILY
Status: COMPLETED | OUTPATIENT
Start: 2024-09-14 | End: 2024-09-14

## 2024-09-14 RX ORDER — ACETAMINOPHEN 325 MG/1
975 TABLET ORAL 3 TIMES DAILY
Status: DISCONTINUED | OUTPATIENT
Start: 2024-09-15 | End: 2024-09-19 | Stop reason: HOSPADM

## 2024-09-14 RX ORDER — CEFAZOLIN SODIUM 2 G/100ML
2 INJECTION, SOLUTION INTRAVENOUS ONCE
Status: COMPLETED | OUTPATIENT
Start: 2024-09-15 | End: 2024-09-15

## 2024-09-14 RX ORDER — POLYETHYLENE GLYCOL 3350, SODIUM CHLORIDE, SODIUM BICARBONATE, POTASSIUM CHLORIDE 420; 11.2; 5.72; 1.48 G/4L; G/4L; G/4L; G/4L
4000 POWDER, FOR SOLUTION ORAL ONCE
Status: COMPLETED | OUTPATIENT
Start: 2024-09-14 | End: 2024-09-14

## 2024-09-14 RX ORDER — METOPROLOL TARTRATE 1 MG/ML
5 INJECTION, SOLUTION INTRAVENOUS ONCE
Status: COMPLETED | OUTPATIENT
Start: 2024-09-14 | End: 2024-09-14

## 2024-09-14 RX ORDER — HEPARIN SODIUM 5000 [USP'U]/ML
5000 INJECTION, SOLUTION INTRAVENOUS; SUBCUTANEOUS EVERY 8 HOURS
Status: DISCONTINUED | OUTPATIENT
Start: 2024-09-14 | End: 2024-09-17

## 2024-09-14 RX ORDER — GABAPENTIN 300 MG/1
600 CAPSULE ORAL ONCE
Status: COMPLETED | OUTPATIENT
Start: 2024-09-15 | End: 2024-09-15

## 2024-09-14 RX ORDER — NEOMYCIN SULFATE 500 MG/1
1000 TABLET ORAL 3 TIMES DAILY
Status: COMPLETED | OUTPATIENT
Start: 2024-09-14 | End: 2024-09-14

## 2024-09-14 ASSESSMENT — PAIN SCALES - GENERAL
PAINLEVEL_OUTOF10: 0 - NO PAIN

## 2024-09-14 ASSESSMENT — COGNITIVE AND FUNCTIONAL STATUS - GENERAL
MOBILITY SCORE: 23
MOBILITY SCORE: 24
DAILY ACTIVITIY SCORE: 24
CLIMB 3 TO 5 STEPS WITH RAILING: A LITTLE
DAILY ACTIVITIY SCORE: 24

## 2024-09-14 ASSESSMENT — PAIN - FUNCTIONAL ASSESSMENT
PAIN_FUNCTIONAL_ASSESSMENT: 0-10

## 2024-09-14 NOTE — SIGNIFICANT EVENT
[facility]   Thoracic Surgery   Clinical Update Note    Interval HPI: Briefly, this is a 68 y/o M with a PMH significant for paroxysmal atrial fibrillation, HTN, HLD, PIERRE, and colon cancer who was admitted to [facility] with a NSTEMI.     The patient originally presented to [facility] on 9/12 with complaints of chest discomfort; he was transferred to [facility] for workup and eval of a NSTEMI. He as previously undergoing workup and evaluation with general surgery for a known colon adenocarcinoma, and was scheduled for a segmental colectomy.     During OP workup, the patient was noted to be with a R pleural effusion and mediastinal lymphadenopathy. He had undergone drainage of his R effusion (cytology negative for malignancy) and CT PET did not reveal any FDG  lesions in the lung parenchyma.     As he is now admitted with a NSTEMI, prior to his segmental colectomy, thoracic surgery was asked to evaluate the need for a mediastinal lymph node biopsy.     Case and imaging was reviewed with thoracic surgeon, Dr. Clyde Suh. Given the patients admit with a NSTEMI and previously staged segmental colectomy, a mediastinal biopsy should not change the current staged management. Patient should continue current inpatient workup and management per Primary, General Surgery and Oncology.     If deemed appropriate per oncology, a mediastinal lymph node biopsy could be obtained once the patient has recovered from his hospitalization and staged surgical procedure.     Will sign off at this time. Reach out with questions.     Objective   Visit Vitals  /60 (BP Location: Left arm, Patient Position: Lying)   Pulse 60   Temp 36.4 °C (97.5 °F) (Temporal)   Resp 18     No current facility-administered medications on file prior to encounter.     Current Outpatient Medications on File Prior to Encounter   Medication Sig Dispense Refill    metoprolol tartrate (Lopressor) 50 mg tablet Take 1 tablet by mouth 2 times a day. Do not start  before September 2, 2024. 60 tablet 3    pantoprazole (ProtoNix) 40 mg EC tablet Take 1 tablet (40 mg) by mouth once daily. Do not crush, chew, or split. 30 tablet 1    neomycin (Mycifradin) 500 mg tablet Take two tablets (1000 mg) by mouth at 3:00pm, 4:00pm and at bed time on the day prior to surgery 6 tablet 0    polyethylene glycol (GaviLyte-G) 236-22.74-6.74 -5.86 gram solution Starting at noon on day prior to procedure drink 8 ounces (240 mL) every 30 minutes until all gone or stools are clear. May add flavor packet. 4000 mL 0    [DISCONTINUED] multivitamin with minerals tablet Take 1 tablet by mouth once daily.            John Elizabeth, APRN-CNP

## 2024-09-14 NOTE — CARE PLAN
The patient's goals for the shift include      The clinical goals for the shift include Patient will complete bowel prep and maintain clear liquid diet.

## 2024-09-14 NOTE — PROGRESS NOTES
Subjective Data:  Patient went back to atrial fibrillation with rapid ventricular rate.  He has palpitation now.  Heart rate currently is in 110 in atrial fibrillation.  He was started on IV amiodarone overnight.    Objective Data:  Last Recorded Vitals:  Vitals:    09/14/24 0100 09/14/24 0117 09/14/24 0309 09/14/24 0917   BP: (!) 133/92 99/64  134/78   BP Location: Left arm Left arm  Left arm   Patient Position: Lying Lying     Pulse: (!) 145 (!) 125  (!) 111   Resp: 20 18 18    Temp: 36.7 °C (98.1 °F) 35.9 °C (96.6 °F) 37 °C (98.6 °F) 36.3 °C (97.3 °F)   TempSrc: Temporal Temporal     SpO2: 99% 95% 96%    Weight:       Height:           Last Labs:  CBC - 9/14/2024:  5:50 AM  4.0 8.9 260    30.4      CMP - 9/14/2024:  5:50 AM  8.9 6.1 36 --- 0.6   3.7 3.8 11 60      PTT - 9/12/2024: 10:37 AM  1.3   14.1 93     TROPHS   Date/Time Value Ref Range Status   09/12/2024 10:37 AM 6,106 0 - 20 ng/L Final   08/01/2024 02:49 PM 12 0 - 20 ng/L Final     HGBA1C   Date/Time Value Ref Range Status   08/02/2024 05:14 AM 5.4 see below % Final     LDLCALC   Date/Time Value Ref Range Status   09/12/2024 10:37  <=99 mg/dL Final     Comment:                                 Near   Borderline      AGE      Desirable  Optimal    High     High     Very High     0-19 Y     0 - 109     ---    110-129   >/= 130     ----    20-24 Y     0 - 119     ---    120-159   >/= 160     ----      >24 Y     0 -  99   100-129  130-159   160-189     >/=190     03/28/2024 11:11  <=99 mg/dL Final     Comment:                                 Near   Borderline      AGE      Desirable  Optimal    High     High     Very High     0-19 Y     0 - 109     ---    110-129   >/= 130     ----    20-24 Y     0 - 119     ---    120-159   >/= 160     ----      >24 Y     0 -  99   100-129  130-159   160-189     >/=190       VLDL   Date/Time Value Ref Range Status   09/12/2024 10:37 AM 9 0 - 40 mg/dL Final   03/28/2024 11:11 AM 15 0 - 40 mg/dL Final      Last  I/O:  I/O last 3 completed shifts:  In: 706.2 (8.2 mL/kg) [I.V.:706.2 (8.2 mL/kg)]  Out: - (0 mL/kg)   Weight: 86.6 kg     Past Cardiology Tests (Last 3 Years):  EKG:  ECG 12 lead 09/12/2024 (Preliminary)      ECG 12 lead 08/26/2024      ECG 12 lead 08/01/2024      ECG 12 lead 08/01/2024    Echo:  Transthoracic Echo (TTE) Complete 09/12/2024      Transthoracic Echo (TTE) Complete 08/02/2024    Ejection Fractions:  EF   Date/Time Value Ref Range Status   09/12/2024 11:41 AM 53 %    08/02/2024 10:35 AM 61 %      Cath:  Cardiac Catheterization Procedure 09/12/2024    Stress Test:  No results found for this or any previous visit from the past 1095 days.    Cardiac Imaging:  No results found for this or any previous visit from the past 1095 days.      Inpatient Medications:  Scheduled medications   Medication Dose Route Frequency    aspirin  81 mg oral Daily    atorvastatin  80 mg oral Nightly    enoxaparin  40 mg subcutaneous q24h    iron sucrose  200 mg intravenous Daily    metoprolol succinate XL  50 mg oral Daily    oxygen   inhalation Continuous - Inhalation    pantoprazole  40 mg oral Daily     PRN medications   Medication    acetaminophen    nitroglycerin    ondansetron    oxygen     Continuous Medications   Medication Dose Last Rate    amiodarone  0.5-1 mg/min 0.5 mg/min (09/14/24 0913)       Physical Exam:  Physical Exam:  General: Awake, alert/oriented x3, well developed, no acute distress  Head: Atraumatic/Normocephalic  Eyes: Normal external exam, EOMI, PERRLA  ENT: Oropharynx normal, moist mucous membranes  Cardiovascular: Tachycardic, irregularly irregular, no edema of extremities.  Good right radial access site healing.  Pulmonary: CTAB, no respiratory distress. No wheezes, rales, or ronchi  Abdomen: +BS, soft, non-tender, nondistended, no guarding or rebound  MSK: No joint swelling, normal movements of all extremities. Range of motion- normal.  Extremities: no edema, no cyanosis  Neuro: Alert/oriented x3,  no focal motor or sensory deficits  Psychiatric: Judgment intact. Appropriate mood and behavior        Assessment/Plan  Non-ST elevation MI  Atrial fibrillation  Colon cancer  Severe anemia  Hypertension  Hyperlipidemia     Considering the patient's significant rise in troponin, I discussed with him the option of proceeding with left heart catheterization and coronary angiography which he agreed.  The procedure was done for him this afternoon that showed moderate coronary artery disease appropriate for medical therapy at this point.  After recovery from current admission, patient should be able to undergo colon Resection surgery as planned by surgical team.  Will continue with baby aspirin, high intensity statin and beta-blocker and treat his non-ST elevation MI medically.  Echocardiogram showed preserved ejection fraction with mild wall motion abnormality.     Considering patient has atrial fibrillation and chads vascular score of at least 3, he will require to be on long-term anticoagulation.  However, with active colon cancer this would be challenging.  Perhaps it might be best if he can have his surgery sooner so we can start anticoagulation afterwards. Will further discuss with primary team and surgery options.          Discussed with patient and family.  Primary team was updated.      September 13, 2024  Patient is chest pain-free.  Remains in sinus rhythm now.  Continue suggest baby aspirin and statin and beta-blocker as they are.  Will need to initiate anticoagulation as soon as surgery is done considering his elevated CHADS2 vascular score.    Also, I do have concern that there is some possibility that his non-ST elevation MI could be a result of a small distal embolization in coronary arteries possibly in the setting of atrial fibrillation without anticoagulation and hence it is very important for the patient to undergo surgical evaluation and treatment to be followed by initiation of anticoagulation when  the risk of bleeding is lower to decrease the risk of a stroke or possibly an acute MI.  I discussed this in extent with Dr. Paulino.  I have been trying to reach to Dr. Clark as well but no success yet.  Will keep trying.    September 14, 2024  Patient is back in atrial fibrillation with RVR.  I agree with IV amiodarone.  Unfortunately we cannot anticoagulate him due to the presence of colon cancer and recent severe bleeding.  I was able to further discuss the case with Dr. Clark yesterday.  Especially with now him going back to atrial fibrillation RVR, I believe he will benefit from having his surgery done sooner than later so we can initiate anticoagulation when it is safe afterwards.  Will continue with current medications.  Discussed with Dr. Paulino.        code Status:  Full Code     Bob Rosario MD, PhD, FACC, [facility]  Interventional Cardiology, [facility] [place]  Associate Professor of Medicine, [facility]   Office: [phone]

## 2024-09-14 NOTE — PROGRESS NOTES
General Surgery Attending Note    My Acute Care Surgery KATHY (Advanced Practice Provider) evaluated this patient today.  Please see that documentation for details.    I saw the patient with the KATHY today, and personally evaluated and examined the patient.  My findings are consistent with those of the KATHY.        Impression:      Recurrent episode of atrial fibrillation RVR last night -patient on amiodarone drip    Without complaints  Afebrile; normal blood pressure; heart rate 111-145 secondary to A-fib RVR  Abdomen is soft and benign    Impression is mildly differential adenocarcinoma splenic flexure    Plan:      Our service discussed with John from the thoracic service and mediastinoscopy/mediastinal lymph node biopsy not indicated at this point    We also discussed with Dr. Jason Paulino from the hospitalist service    Will proceed with operative therapy tomorrow morning as previously described -laparoscopic, possible open, segmental colectomy with mobilization of splenic flexure    Preoperative preoperative orders instituted  Mechanical and antibiotic bowel preparation  Will follow [facility] perioperatively    Change prophylactic Lovenox to heparin  Will likely institute for anticoagulation postoperative day 1 or 2    Consent previously signed

## 2024-09-14 NOTE — PROGRESS NOTES
"Krish Batista is a 69 y.o. male on day 2 of admission presenting with NSTEMI (non-ST elevated myocardial infarction) (Multi).    Subjective   Patient states he is doing well today. Previously had complaints of LLQ pain but it resolved with defecation. He had some blood in that stool but Dr Clark has already reassured him that is not alarming given his current state. He denies N/V, or problems with urination. He had an episode of A Fib overnight and was placed back on to an Amiodarone drip.       Objective     Physical Exam  Vitals reviewed.   HENT:      Head: Normocephalic.      Mouth/Throat:      Mouth: Mucous membranes are moist.   Cardiovascular:      Rate and Rhythm: Normal rate and regular rhythm.   Pulmonary:      Effort: Pulmonary effort is normal. No respiratory distress.      Breath sounds: Normal breath sounds.   Abdominal:      General: Bowel sounds are normal.      Palpations: Abdomen is soft.      Tenderness: There is no abdominal tenderness.   Skin:     General: Skin is warm and dry.   Neurological:      General: No focal deficit present.      Mental Status: He is alert and oriented to person, place, and time.   Psychiatric:         Mood and Affect: Mood normal.         Behavior: Behavior normal.         Last Recorded Vitals  Blood pressure 118/60, pulse 60, temperature 36.4 °C (97.5 °F), temperature source Temporal, resp. rate 18, height 1.837 m (6' 0.32\"), weight 86.6 kg (190 lb 14.7 oz), SpO2 100%.  Intake/Output last 3 Shifts:  I/O last 3 completed shifts:  In: 706.2 (8.2 mL/kg) [I.V.:706.2 (8.2 mL/kg)]  Out: - (0 mL/kg)   Weight: 86.6 kg     Relevant Results  Results for orders placed or performed during the hospital encounter of 09/12/24 (from the past 24 hour(s))   CBC   Result Value Ref Range    WBC 4.0 (L) 4.4 - 11.3 x10*3/uL    nRBC 0.0 0.0 - 0.0 /100 WBCs    RBC 3.79 (L) 4.50 - 5.90 x10*6/uL    Hemoglobin 8.9 (L) 13.5 - 17.5 g/dL    Hematocrit 30.4 (L) 41.0 - 52.0 %    MCV 80 80 - 100 fL    " MCH 23.5 (L) 26.0 - 34.0 pg    MCHC 29.3 (L) 32.0 - 36.0 g/dL    RDW 22.5 (H) 11.5 - 14.5 %    Platelets 260 150 - 450 x10*3/uL   Basic Metabolic Panel   Result Value Ref Range    Glucose 91 74 - 99 mg/dL    Sodium 139 136 - 145 mmol/L    Potassium 3.8 3.5 - 5.3 mmol/L    Chloride 105 98 - 107 mmol/L    Bicarbonate 27 21 - 32 mmol/L    Anion Gap 11 10 - 20 mmol/L    Urea Nitrogen 12 6 - 23 mg/dL    Creatinine 0.95 0.50 - 1.30 mg/dL    eGFR 86 >60 mL/min/1.73m*2    Calcium 8.9 8.6 - 10.3 mg/dL   Magnesium   Result Value Ref Range    Magnesium 1.82 1.60 - 2.40 mg/dL   Lavender Top   Result Value Ref Range    Extra Tube Hold for add-ons.      No results found.         Assessment/Plan   Krish Batista  is a 68 yo male who is diagnosed with colon CA and has PMH of iron deficiency anemia was transferred from [facility] on 9/12 due to chest pain/NSTEMI where he was found to be in A Fib RVR and was given rate control medications which converted him back to NSR. General surgery was consulted on the patient due to his established relationship with Dr. Clark in the treatment of his known adenocarcinoma of the colon. Before the patient can be treated with anticoagulation for his cardiology concerns, he will have a segmental colectomy done for the treatment of his colon adenocarcinoma. The procedure is planned for the morning of 9/15. He had an episode of A Fib overnight and was placed back on to an Amiodarone drip.    Impressions  -Adenocarcinoma of colon with history of bleeding  -A Fib RVR    Recommendations  -Segmental colectomy planned for 9/15 with Dr. Clark  -Bowel prep for procedure    > 4L within 5 hrs    >Flagyl/Neomycin PO at 1700, 1800, 2300    > IV flagyl/Ancef at 0900 on 9/15  -Type and screen prior to procedure  - Amiodarone drip per medicine team  - SubQ heparin prior to procedure, likely transition to heparin drip 24 hrs post-op per medicine team  - IV fluids  -Will add multimodal pain management post-op  - Entereg  pre-op x1 and post-op until first bowel movement  - Hibiclens both 9/14 and 9/15 prior to surgery  -Clear liquid diet today, NPO at midnight  -Trend CBC    Dispo: anticipate another 2-3 days of hospitalization after surgery    Patient seen and discussed with attending surgeon, Dr. Clark.    Denise Monroe PA-C

## 2024-09-14 NOTE — CARE PLAN
Problem: Pain - Adult  Goal: Verbalizes/displays adequate comfort level or baseline comfort level  Outcome: Progressing     Problem: Safety - Adult  Goal: Free from fall injury  Outcome: Progressing     Problem: Chronic Conditions and Co-morbidities  Goal: Patient's chronic conditions and co-morbidity symptoms are monitored and maintained or improved  Outcome: Progressing    The patient's goals for the shift include  prepare for surgery tomorrow AM    The clinical goals for the shift include Prepare for bowel sugery tomorrow morning    Patient having uneventful shift overall. Patient Aox4, RA, independent in the room. Patient continues to be on amiodarone, HR in the 60's, Afib but then later converted to NSR again. Patient completed bowel prep, having mostly clear bowel movements, watery. Patient getting prophylactic flagyl and neomycin as ordered. Patient aware he is to be NPO after midnight, no questions from patient about prep for bowel surgery. Patient free from complaints. Safety maintained.

## 2024-09-14 NOTE — PROGRESS NOTES
Krish Batista is a 69 y.o. male on day 2 of admission presenting with NSTEMI (non-ST elevated myocardial infarction) (Multi).      Subjective   Overnight back into afib RVR, started on amiodarone drip     Objective     Last Recorded Vitals  /78 (BP Location: Left arm)   Pulse (!) 111   Temp 36.3 °C (97.3 °F)   Resp 18   Wt 86.6 kg (190 lb 14.7 oz)   SpO2 96%     Physical Exam  G: aox3, NAD, cooperative  HENT: neck supple, no JVD  Eyes: clear sclera  CV: RRR (converted) s1 s2  L: clear  Abd: soft, NT, non distended  Ext: no c/c/e  N: no appreciable acute focal deficits  Psych: appropriate mood and behavior    Assessment/Plan     NSTEMI  PAF/flutter with RVR  Colon CA (recent dx)     HTN, HLD  Iron deficiency anemia  DVT ppx  Full code     Plan:  - s/p LHC, no intervention needed, medical management, continue ASA, statin, Bblocker.  - Overnight went back to afib RVR, ultimately started on amiodarone drip, at time of exam back in NSR, cardiology following, continue amiodarone drip   - Ideally patient should be on anticoagulation and antiplatelet but has had bleeding issues with his colon CA. Thoracic sx did not feel patient needed a mediastinal lymph node bx, discussed with his surgeon Dr. Clark and tentative plan now is to expedite his surgery, this may take place tomorrow, NPO after midnight. Post operatively while hospitalized can trial blood thinners.  - Continue IV iron, 3rd dose this evening       Jason Paulino, DO

## 2024-09-15 ENCOUNTER — ANESTHESIA (OUTPATIENT)
Dept: OPERATING ROOM | Facility: HOSPITAL | Age: 69
End: 2024-09-15
Payer: MEDICARE

## 2024-09-15 VITALS
RESPIRATION RATE: 18 BRPM | SYSTOLIC BLOOD PRESSURE: 110 MMHG | HEART RATE: 63 BPM | OXYGEN SATURATION: 99 % | WEIGHT: 190.92 LBS | BODY MASS INDEX: 25.86 KG/M2 | HEIGHT: 72 IN | DIASTOLIC BLOOD PRESSURE: 58 MMHG | TEMPERATURE: 97.5 F

## 2024-09-15 LAB
ANION GAP SERPL CALC-SCNC: 14 MMOL/L (ref 10–20)
APTT PPP: 44 SECONDS (ref 27–38)
BUN SERPL-MCNC: 8 MG/DL (ref 6–23)
CALCIUM SERPL-MCNC: 8.9 MG/DL (ref 8.6–10.3)
CHLORIDE SERPL-SCNC: 106 MMOL/L (ref 98–107)
CO2 SERPL-SCNC: 23 MMOL/L (ref 21–32)
CREAT SERPL-MCNC: 0.98 MG/DL (ref 0.5–1.3)
EGFRCR SERPLBLD CKD-EPI 2021: 83 ML/MIN/1.73M*2
ERYTHROCYTE [DISTWIDTH] IN BLOOD BY AUTOMATED COUNT: 22.5 % (ref 11.5–14.5)
GLUCOSE SERPL-MCNC: 87 MG/DL (ref 74–99)
HCT VFR BLD AUTO: 30.4 % (ref 41–52)
HGB BLD-MCNC: 9 G/DL (ref 13.5–17.5)
INR PPP: 1.2 (ref 0.9–1.1)
MAGNESIUM SERPL-MCNC: 1.7 MG/DL (ref 1.6–2.4)
MCH RBC QN AUTO: 23.5 PG (ref 26–34)
MCHC RBC AUTO-ENTMCNC: 29.6 G/DL (ref 32–36)
MCV RBC AUTO: 79 FL (ref 80–100)
NRBC BLD-RTO: 0 /100 WBCS (ref 0–0)
PLATELET # BLD AUTO: 255 X10*3/UL (ref 150–450)
POTASSIUM SERPL-SCNC: 4 MMOL/L (ref 3.5–5.3)
PROTHROMBIN TIME: 13.6 SECONDS (ref 9.8–12.8)
RBC # BLD AUTO: 3.83 X10*6/UL (ref 4.5–5.9)
SODIUM SERPL-SCNC: 139 MMOL/L (ref 136–145)
WBC # BLD AUTO: 5.2 X10*3/UL (ref 4.4–11.3)

## 2024-09-15 PROCEDURE — C1751 CATH, INF, PER/CENT/MIDLINE: HCPCS | Performed by: SURGERY

## 2024-09-15 PROCEDURE — 3700000001 HC GENERAL ANESTHESIA TIME - INITIAL BASE CHARGE: Performed by: SURGERY

## 2024-09-15 PROCEDURE — 3600000004 HC OR TIME - INITIAL BASE CHARGE - PROCEDURE LEVEL FOUR: Performed by: SURGERY

## 2024-09-15 PROCEDURE — 99140 ANES COMP EMERGENCY COND: CPT | Performed by: ANESTHESIOLOGY

## 2024-09-15 PROCEDURE — 2500000001 HC RX 250 WO HCPCS SELF ADMINISTERED DRUGS (ALT 637 FOR MEDICARE OP)

## 2024-09-15 PROCEDURE — 2060000001 HC INTERMEDIATE ICU ROOM DAILY

## 2024-09-15 PROCEDURE — A44204 PR LAP,SURG,COLECTOMY, PARTIAL, W/ANAST: Performed by: NURSE ANESTHETIST, CERTIFIED REGISTERED

## 2024-09-15 PROCEDURE — 2500000004 HC RX 250 GENERAL PHARMACY W/ HCPCS (ALT 636 FOR OP/ED): Performed by: INTERNAL MEDICINE

## 2024-09-15 PROCEDURE — 2500000005 HC RX 250 GENERAL PHARMACY W/O HCPCS: Performed by: NURSE ANESTHETIST, CERTIFIED REGISTERED

## 2024-09-15 PROCEDURE — 2500000004 HC RX 250 GENERAL PHARMACY W/ HCPCS (ALT 636 FOR OP/ED): Performed by: NURSE ANESTHETIST, CERTIFIED REGISTERED

## 2024-09-15 PROCEDURE — 99233 SBSQ HOSP IP/OBS HIGH 50: CPT | Performed by: STUDENT IN AN ORGANIZED HEALTH CARE EDUCATION/TRAINING PROGRAM

## 2024-09-15 PROCEDURE — 2500000001 HC RX 250 WO HCPCS SELF ADMINISTERED DRUGS (ALT 637 FOR MEDICARE OP): Performed by: NURSE PRACTITIONER

## 2024-09-15 PROCEDURE — 2780000003 HC OR 278 NO HCPCS: Performed by: SURGERY

## 2024-09-15 PROCEDURE — 36415 COLL VENOUS BLD VENIPUNCTURE: CPT | Performed by: STUDENT IN AN ORGANIZED HEALTH CARE EDUCATION/TRAINING PROGRAM

## 2024-09-15 PROCEDURE — 44204 LAPARO PARTIAL COLECTOMY: CPT | Performed by: SURGERY

## 2024-09-15 PROCEDURE — A44204 PR LAP,SURG,COLECTOMY, PARTIAL, W/ANAST: Performed by: ANESTHESIOLOGY

## 2024-09-15 PROCEDURE — 2500000004 HC RX 250 GENERAL PHARMACY W/ HCPCS (ALT 636 FOR OP/ED): Performed by: NURSE PRACTITIONER

## 2024-09-15 PROCEDURE — 7100000002 HC RECOVERY ROOM TIME - EACH INCREMENTAL 1 MINUTE: Performed by: SURGERY

## 2024-09-15 PROCEDURE — 3700000002 HC GENERAL ANESTHESIA TIME - EACH INCREMENTAL 1 MINUTE: Performed by: SURGERY

## 2024-09-15 PROCEDURE — 2500000004 HC RX 250 GENERAL PHARMACY W/ HCPCS (ALT 636 FOR OP/ED): Performed by: ANESTHESIOLOGY

## 2024-09-15 PROCEDURE — 80048 BASIC METABOLIC PNL TOTAL CA: CPT | Performed by: STUDENT IN AN ORGANIZED HEALTH CARE EDUCATION/TRAINING PROGRAM

## 2024-09-15 PROCEDURE — 2720000007 HC OR 272 NO HCPCS: Performed by: SURGERY

## 2024-09-15 PROCEDURE — 7100000001 HC RECOVERY ROOM TIME - INITIAL BASE CHARGE: Performed by: SURGERY

## 2024-09-15 PROCEDURE — 85027 COMPLETE CBC AUTOMATED: CPT | Performed by: STUDENT IN AN ORGANIZED HEALTH CARE EDUCATION/TRAINING PROGRAM

## 2024-09-15 PROCEDURE — 99232 SBSQ HOSP IP/OBS MODERATE 35: CPT | Performed by: STUDENT IN AN ORGANIZED HEALTH CARE EDUCATION/TRAINING PROGRAM

## 2024-09-15 PROCEDURE — 3600000009 HC OR TIME - EACH INCREMENTAL 1 MINUTE - PROCEDURE LEVEL FOUR: Performed by: SURGERY

## 2024-09-15 PROCEDURE — 85610 PROTHROMBIN TIME: CPT | Performed by: STUDENT IN AN ORGANIZED HEALTH CARE EDUCATION/TRAINING PROGRAM

## 2024-09-15 PROCEDURE — 83735 ASSAY OF MAGNESIUM: CPT | Performed by: STUDENT IN AN ORGANIZED HEALTH CARE EDUCATION/TRAINING PROGRAM

## 2024-09-15 PROCEDURE — 2500000004 HC RX 250 GENERAL PHARMACY W/ HCPCS (ALT 636 FOR OP/ED): Mod: JZ

## 2024-09-15 PROCEDURE — 2500000004 HC RX 250 GENERAL PHARMACY W/ HCPCS (ALT 636 FOR OP/ED)

## 2024-09-15 PROCEDURE — 44213 LAP MOBIL SPLENIC FL ADD-ON: CPT | Performed by: SURGERY

## 2024-09-15 PROCEDURE — 2500000002 HC RX 250 W HCPCS SELF ADMINISTERED DRUGS (ALT 637 FOR MEDICARE OP, ALT 636 FOR OP/ED): Performed by: STUDENT IN AN ORGANIZED HEALTH CARE EDUCATION/TRAINING PROGRAM

## 2024-09-15 RX ORDER — KETOROLAC TROMETHAMINE 30 MG/ML
INJECTION, SOLUTION INTRAMUSCULAR; INTRAVENOUS AS NEEDED
Status: DISCONTINUED | OUTPATIENT
Start: 2024-09-15 | End: 2024-09-15

## 2024-09-15 RX ORDER — NORETHINDRONE AND ETHINYL ESTRADIOL 0.5-0.035
KIT ORAL AS NEEDED
Status: DISCONTINUED | OUTPATIENT
Start: 2024-09-15 | End: 2024-09-15

## 2024-09-15 RX ORDER — METRONIDAZOLE 500 MG/100ML
INJECTION, SOLUTION INTRAVENOUS
Status: COMPLETED
Start: 2024-09-15 | End: 2024-09-15

## 2024-09-15 RX ORDER — SODIUM CHLORIDE, SODIUM LACTATE, POTASSIUM CHLORIDE, CALCIUM CHLORIDE 600; 310; 30; 20 MG/100ML; MG/100ML; MG/100ML; MG/100ML
100 INJECTION, SOLUTION INTRAVENOUS CONTINUOUS
Status: DISCONTINUED | OUTPATIENT
Start: 2024-09-15 | End: 2024-09-15

## 2024-09-15 RX ORDER — LIDOCAINE HYDROCHLORIDE 10 MG/ML
0.1 INJECTION, SOLUTION INFILTRATION; PERINEURAL ONCE
Status: DISCONTINUED | OUTPATIENT
Start: 2024-09-15 | End: 2024-09-18

## 2024-09-15 RX ORDER — LIDOCAINE HCL/PF 100 MG/5ML
SYRINGE (ML) INTRAVENOUS AS NEEDED
Status: DISCONTINUED | OUTPATIENT
Start: 2024-09-15 | End: 2024-09-15

## 2024-09-15 RX ORDER — PROPOFOL 10 MG/ML
INJECTION, EMULSION INTRAVENOUS AS NEEDED
Status: DISCONTINUED | OUTPATIENT
Start: 2024-09-15 | End: 2024-09-15

## 2024-09-15 RX ORDER — HYDROMORPHONE HYDROCHLORIDE 1 MG/ML
1 INJECTION, SOLUTION INTRAMUSCULAR; INTRAVENOUS; SUBCUTANEOUS EVERY 5 MIN PRN
Status: DISCONTINUED | OUTPATIENT
Start: 2024-09-15 | End: 2024-09-15

## 2024-09-15 RX ORDER — METRONIDAZOLE 500 MG/100ML
INJECTION, SOLUTION INTRAVENOUS AS NEEDED
Status: DISCONTINUED | OUTPATIENT
Start: 2024-09-15 | End: 2024-09-15

## 2024-09-15 RX ORDER — GABAPENTIN 300 MG/1
300 CAPSULE ORAL 3 TIMES DAILY
Status: DISCONTINUED | OUTPATIENT
Start: 2024-09-15 | End: 2024-09-19 | Stop reason: HOSPADM

## 2024-09-15 RX ORDER — HYDROMORPHONE HYDROCHLORIDE 1 MG/ML
0.8 INJECTION, SOLUTION INTRAMUSCULAR; INTRAVENOUS; SUBCUTANEOUS
Status: DISCONTINUED | OUTPATIENT
Start: 2024-09-15 | End: 2024-09-18

## 2024-09-15 RX ORDER — AMIODARONE HYDROCHLORIDE 200 MG/1
200 TABLET ORAL 2 TIMES DAILY
Status: DISCONTINUED | OUTPATIENT
Start: 2024-09-15 | End: 2024-09-19 | Stop reason: HOSPADM

## 2024-09-15 RX ORDER — FENTANYL CITRATE 50 UG/ML
INJECTION, SOLUTION INTRAMUSCULAR; INTRAVENOUS AS NEEDED
Status: DISCONTINUED | OUTPATIENT
Start: 2024-09-15 | End: 2024-09-15

## 2024-09-15 RX ORDER — KETAMINE HCL IN NACL, ISO-OSM 100MG/10ML
SYRINGE (ML) INJECTION AS NEEDED
Status: DISCONTINUED | OUTPATIENT
Start: 2024-09-15 | End: 2024-09-15

## 2024-09-15 RX ORDER — MEPERIDINE HYDROCHLORIDE 25 MG/ML
12.5 INJECTION INTRAMUSCULAR; INTRAVENOUS; SUBCUTANEOUS EVERY 10 MIN PRN
Status: DISCONTINUED | OUTPATIENT
Start: 2024-09-15 | End: 2024-09-15

## 2024-09-15 RX ORDER — ROCURONIUM BROMIDE 10 MG/ML
INJECTION, SOLUTION INTRAVENOUS AS NEEDED
Status: DISCONTINUED | OUTPATIENT
Start: 2024-09-15 | End: 2024-09-15

## 2024-09-15 RX ORDER — CEFAZOLIN 1 G/1
INJECTION, POWDER, FOR SOLUTION INTRAVENOUS AS NEEDED
Status: DISCONTINUED | OUTPATIENT
Start: 2024-09-15 | End: 2024-09-15

## 2024-09-15 RX ORDER — MIDAZOLAM HYDROCHLORIDE 1 MG/ML
INJECTION, SOLUTION INTRAMUSCULAR; INTRAVENOUS AS NEEDED
Status: DISCONTINUED | OUTPATIENT
Start: 2024-09-15 | End: 2024-09-15

## 2024-09-15 RX ADMIN — SUGAMMADEX 200 MG: 100 INJECTION, SOLUTION INTRAVENOUS at 12:43

## 2024-09-15 RX ADMIN — DEXAMETHASONE SODIUM PHOSPHATE 8 MG: 4 INJECTION, SOLUTION INTRAMUSCULAR; INTRAVENOUS at 10:45

## 2024-09-15 RX ADMIN — FENTANYL CITRATE 50 MCG: 50 INJECTION, SOLUTION INTRAMUSCULAR; INTRAVENOUS at 10:54

## 2024-09-15 RX ADMIN — SODIUM CHLORIDE, SODIUM LACTATE, POTASSIUM CHLORIDE, AND CALCIUM CHLORIDE: 600; 310; 30; 20 INJECTION, SOLUTION INTRAVENOUS at 09:57

## 2024-09-15 RX ADMIN — MIDAZOLAM 2 MG: 1 INJECTION INTRAMUSCULAR; INTRAVENOUS at 10:04

## 2024-09-15 RX ADMIN — FENTANYL CITRATE 100 MCG: 50 INJECTION, SOLUTION INTRAMUSCULAR; INTRAVENOUS at 10:05

## 2024-09-15 RX ADMIN — FENTANYL CITRATE 50 MCG: 50 INJECTION, SOLUTION INTRAMUSCULAR; INTRAVENOUS at 12:50

## 2024-09-15 RX ADMIN — PROPOFOL 200 MG: 10 INJECTION, EMULSION INTRAVENOUS at 10:05

## 2024-09-15 RX ADMIN — Medication 30 MG: at 10:05

## 2024-09-15 RX ADMIN — SODIUM CHLORIDE: 9 INJECTION, SOLUTION INTRAVENOUS at 10:00

## 2024-09-15 RX ADMIN — ONDANSETRON 4 MG: 2 INJECTION INTRAMUSCULAR; INTRAVENOUS at 12:30

## 2024-09-15 RX ADMIN — ROCURONIUM BROMIDE 50 MG: 10 INJECTION, SOLUTION INTRAVENOUS at 10:06

## 2024-09-15 RX ADMIN — ROCURONIUM BROMIDE 20 MG: 10 INJECTION, SOLUTION INTRAVENOUS at 10:43

## 2024-09-15 RX ADMIN — ROCURONIUM BROMIDE 10 MG: 10 INJECTION, SOLUTION INTRAVENOUS at 11:55

## 2024-09-15 RX ADMIN — ROCURONIUM BROMIDE 20 MG: 10 INJECTION, SOLUTION INTRAVENOUS at 11:16

## 2024-09-15 RX ADMIN — CEFAZOLIN 2 G: 1 INJECTION, POWDER, FOR SOLUTION INTRAMUSCULAR; INTRAVENOUS at 10:15

## 2024-09-15 RX ADMIN — EPHEDRINE SULFATE 10 MG: 50 INJECTION, SOLUTION INTRAVENOUS at 10:49

## 2024-09-15 RX ADMIN — LIDOCAINE HYDROCHLORIDE 100 MG: 20 INJECTION INTRAVENOUS at 10:05

## 2024-09-15 RX ADMIN — METRONIDAZOLE 500 MG: 500 INJECTION, SOLUTION INTRAVENOUS at 10:00

## 2024-09-15 RX ADMIN — KETOROLAC TROMETHAMINE 15 MG: 30 INJECTION, SOLUTION INTRAMUSCULAR at 12:30

## 2024-09-15 SDOH — HEALTH STABILITY: MENTAL HEALTH: CURRENT SMOKER: 0

## 2024-09-15 ASSESSMENT — PAIN - FUNCTIONAL ASSESSMENT
PAIN_FUNCTIONAL_ASSESSMENT: 0-10

## 2024-09-15 ASSESSMENT — COGNITIVE AND FUNCTIONAL STATUS - GENERAL
MOBILITY SCORE: 24
DAILY ACTIVITIY SCORE: 24
DAILY ACTIVITIY SCORE: 24
MOBILITY SCORE: 24

## 2024-09-15 ASSESSMENT — PAIN SCALES - GENERAL
PAINLEVEL_OUTOF10: 4
PAINLEVEL_OUTOF10: 4
PAINLEVEL_OUTOF10: 5 - MODERATE PAIN
PAINLEVEL_OUTOF10: 0 - NO PAIN
PAINLEVEL_OUTOF10: 4
PAIN_LEVEL: 0
PAINLEVEL_OUTOF10: 4
PAINLEVEL_OUTOF10: 5 - MODERATE PAIN

## 2024-09-15 ASSESSMENT — PAIN DESCRIPTION - DESCRIPTORS
DESCRIPTORS: SORE

## 2024-09-15 ASSESSMENT — PAIN DESCRIPTION - LOCATION
LOCATION: ABDOMEN
LOCATION: ABDOMEN

## 2024-09-15 NOTE — ANESTHESIA PROCEDURE NOTES
Airway  Date/Time: 9/15/2024 10:09 AM  Urgency: elective      Staffing  Performed: CRNA   Authorized by: ANDREAS River    Performed by: ANDREAS River  Patient location during procedure: OR    Indications and Patient Condition  Indications for airway management: anesthesia  Spontaneous Ventilation: absent  Sedation level: deep  Preoxygenated: yes  Patient position: sniffing  Mask difficulty assessment: 1 - vent by mask    Final Airway Details  Final airway type: endotracheal airway      Successful airway: ETT  Cuffed: yes   Successful intubation technique: video laryngoscopy  Facilitating devices/methods: intubating stylet  Endotracheal tube insertion site: oral  Blade: Tara  Blade size: #4  ETT size (mm): 7.5  Cormack-Lehane Classification: grade I - full view of glottis  Placement verified by: chest auscultation and capnometry   Measured from: lips  ETT to lips (cm): 22  Number of attempts at approach: 1  Ventilation between attempts: none  Number of other approaches attempted: 0    Additional Comments  Elective Puentes mac 4 utilized without difficulty

## 2024-09-15 NOTE — CARE PLAN
The patient's goals for the shift include      The clinical goals for the shift include Maintain NPO status for surgery this day

## 2024-09-15 NOTE — ANESTHESIA POSTPROCEDURE EVALUATION
Patient: Krish Batista    Procedure Summary       Date: 09/15/24 Room / Location: [facility] OR 05 / Virtual [facility] OR    Anesthesia Start: 0957 Anesthesia Stop:     Procedure: LAPAROSCOPIC SEGMENTAL COLECTOMY WITH MOBILIZATION OF SPLENIC FLEXURE Diagnosis:       Adenocarcinoma, colon (Multi)      (Adenocarcinoma, colon (Multi) [C18.9])    Surgeons: Shai Clark MD Responsible Provider: Dileep Godoy MD    Anesthesia Type: general ASA Status: 3 - Emergent            Anesthesia Type: general    Vitals Value Taken Time   /58 09/15/24 1255   Temp 36.2 09/15/24 1255   Pulse 62 09/15/24 1255   Resp 16 09/15/24 1255   SpO2 100 09/15/24 1255       Anesthesia Post Evaluation    Patient location during evaluation: PACU  Patient participation: complete - patient participated  Level of consciousness: awake and alert  Pain score: 0  Pain management: adequate  Airway patency: patent  Cardiovascular status: acceptable  Respiratory status: acceptable  Hydration status: acceptable  Postoperative Nausea and Vomiting: none        No notable events documented.

## 2024-09-15 NOTE — PROGRESS NOTES
Krish Batista is a 69 y.o. male on day 3 of admission presenting with NSTEMI (non-ST elevated myocardial infarction) (Multi).      Subjective   Seen prior to surgery, in NSR     Objective     Last Recorded Vitals  /56   Pulse 70   Temp 35.6 °C (96.1 °F) (Temporal)   Resp 18   Wt 86.6 kg (190 lb 14.7 oz)   SpO2 97%     Physical Exam  Physical Exam  G: aox3, NAD, cooperative  HENT: neck supple, no JVD  Eyes: clear sclera  CV: RRR (converted) s1 s2  L: clear  Abd: soft, NT, non distended  Ext: no c/c/e  N: no appreciable acute focal deficits  Psych: appropriate mood and behavior    Assessment/Plan     NSTEMI  PAF/flutter with RVR  Colon CA (recent dx)      HTN, HLD  Iron deficiency anemia  DVT ppx  Full code     Plan:  - s/p LHC, no intervention needed, medical management, continue ASA, statin, Bblocker.  - At time of exam on amiodarone drip, now cardiology has transitioned to PO amiodarone, can trial anticoagulation post operatively when felt ok from surgery  - s/p 3 doses of IV iron  - to the OR later today laparoscopic segmental colectomy     Jason Paulino, DO

## 2024-09-15 NOTE — PROGRESS NOTES
Subjective Data:  Patient cardioverted into sinus rhythm on IV amiodarone.  Heart rate in the 70s.  Going to the OR today for colon resection.      Objective Data:  Last Recorded Vitals:  Vitals:    09/14/24 1300 09/14/24 1500 09/15/24 0337 09/15/24 0700   BP: 118/60 149/74 128/69 122/59   BP Location: Left arm Left arm  Left arm   Patient Position: Lying Lying  Sitting   Pulse: 60 61 58 75   Resp:   18    Temp: 36.4 °C (97.5 °F) 36.5 °C (97.7 °F) 36.4 °C (97.5 °F) 36.9 °C (98.4 °F)   TempSrc: Temporal Temporal  Temporal   SpO2: 100% 100% 100% 100%   Weight:       Height:           Last Labs:  CBC - 9/15/2024:  6:34 AM  5.2 9.0 255    30.4      CMP - 9/15/2024:  6:34 AM  8.9 6.1 36 --- 0.6   3.7 3.8 11 60      PTT - 9/15/2024:  6:34 AM  1.2   13.6 44     TROPHS   Date/Time Value Ref Range Status   09/12/2024 10:37 AM 6,106 0 - 20 ng/L Final   08/01/2024 02:49 PM 12 0 - 20 ng/L Final     HGBA1C   Date/Time Value Ref Range Status   08/02/2024 05:14 AM 5.4 see below % Final     LDLCALC   Date/Time Value Ref Range Status   09/12/2024 10:37  <=99 mg/dL Final     Comment:                                 Near   Borderline      AGE      Desirable  Optimal    High     High     Very High     0-19 Y     0 - 109     ---    110-129   >/= 130     ----    20-24 Y     0 - 119     ---    120-159   >/= 160     ----      >24 Y     0 -  99   100-129  130-159   160-189     >/=190     03/28/2024 11:11  <=99 mg/dL Final     Comment:                                 Near   Borderline      AGE      Desirable  Optimal    High     High     Very High     0-19 Y     0 - 109     ---    110-129   >/= 130     ----    20-24 Y     0 - 119     ---    120-159   >/= 160     ----      >24 Y     0 -  99   100-129  130-159   160-189     >/=190       VLDL   Date/Time Value Ref Range Status   09/12/2024 10:37 AM 9 0 - 40 mg/dL Final   03/28/2024 11:11 AM 15 0 - 40 mg/dL Final      Last I/O:  I/O last 3 completed shifts:  In: 2072.6 (23.9 mL/kg)  [I.V.:2072.6 (23.9 mL/kg)]  Out: - (0 mL/kg)   Weight: 86.6 kg     Past Cardiology Tests (Last 3 Years):  EKG:  ECG 12 lead 09/12/2024 (Preliminary)      ECG 12 lead 08/26/2024      ECG 12 lead 08/01/2024      ECG 12 lead 08/01/2024    Echo:  Transthoracic Echo (TTE) Complete 09/12/2024      Transthoracic Echo (TTE) Complete 08/02/2024    Ejection Fractions:  EF   Date/Time Value Ref Range Status   09/12/2024 11:41 AM 53 %    08/02/2024 10:35 AM 61 %      Cath:  Cardiac Catheterization Procedure 09/12/2024    Stress Test:  No results found for this or any previous visit from the past 1095 days.    Cardiac Imaging:  No results found for this or any previous visit from the past 1095 days.      Inpatient Medications:  Scheduled medications   Medication Dose Route Frequency    acetaminophen  975 mg oral TID    alvimopan  12 mg oral BID    amiodarone  200 mg oral BID    aspirin  81 mg oral Daily    atorvastatin  80 mg oral Nightly    gabapentin  600 mg oral Once    heparin (porcine)  5,000 Units subcutaneous q8h    metoprolol succinate XL  50 mg oral Daily    oxygen   inhalation Continuous - Inhalation    pantoprazole  40 mg oral Daily     PRN medications   Medication    acetaminophen    nitroglycerin    ondansetron    oxygen     Continuous Medications   Medication Dose Last Rate    lactated Ringer's  75 mL/hr 100 mL/hr (09/15/24 0957)       Physical Exam:  Physical Exam:  General: Awake, alert/oriented x3, well developed, no acute distress  Head: Atraumatic/Normocephalic  Eyes: Normal external exam, EOMI, PERRLA  ENT: Oropharynx normal, moist mucous membranes  Cardiovascular: regular rate and rhythm, no edema of extremities.  Good right radial access site healing.  Pulmonary: CTAB, no respiratory distress. No wheezes, rales, or ronchi  Abdomen: +BS, soft, non-tender, nondistended, no guarding or rebound  MSK: No joint swelling, normal movements of all extremities. Range of motion- normal.  Extremities: no edema, no  cyanosis  Neuro: Alert/oriented x3, no focal motor or sensory deficits  Psychiatric: Judgment intact. Appropriate mood and behavior        Assessment/Plan  Non-ST elevation MI  Atrial fibrillation  Colon cancer  Severe anemia  Hypertension  Hyperlipidemia     Considering the patient's significant rise in troponin, I discussed with him the option of proceeding with left heart catheterization and coronary angiography which he agreed.  The procedure was done for him this afternoon that showed moderate coronary artery disease appropriate for medical therapy at this point.  After recovery from current admission, patient should be able to undergo colon Resection surgery as planned by surgical team.  Will continue with baby aspirin, high intensity statin and beta-blocker and treat his non-ST elevation MI medically.  Echocardiogram showed preserved ejection fraction with mild wall motion abnormality.     Considering patient has atrial fibrillation and chads vascular score of at least 3, he will require to be on long-term anticoagulation.  However, with active colon cancer this would be challenging.  Perhaps it might be best if he can have his surgery sooner so we can start anticoagulation afterwards. Will further discuss with primary team and surgery options.          Discussed with patient and family.  Primary team was updated.      September 13, 2024  Patient is chest pain-free.  Remains in sinus rhythm now.  Continue suggest baby aspirin and statin and beta-blocker as they are.  Will need to initiate anticoagulation as soon as surgery is done considering his elevated CHADS2 vascular score.    Also, I do have concern that there is some possibility that his non-ST elevation MI could be a result of a small distal embolization in coronary arteries possibly in the setting of atrial fibrillation without anticoagulation and hence it is very important for the patient to undergo surgical evaluation and treatment to be followed by  initiation of anticoagulation when the risk of bleeding is lower to decrease the risk of a stroke or possibly an acute MI.  I discussed this in extent with Dr. Paulino.  I have been trying to reach to Dr. Clark as well but no success yet.  Will keep trying.    September 14, 2024  Patient is back in atrial fibrillation with RVR.  I agree with IV amiodarone.  Unfortunately we cannot anticoagulate him due to the presence of colon cancer and recent severe bleeding.  I was able to further discuss the case with Dr. Clark yesterday.  Especially with now him going back to atrial fibrillation RVR, I believe he will benefit from having his surgery done sooner than later so we can initiate anticoagulation when it is safe afterwards.  Will continue with current medications.  Discussed with Dr. Paulino.    September 15, 2024  Okay to switch from IV amiodarone to p.o. amiodarone 20 mg twice daily.  Will continue with beta-blocker.  Going for OR today.  Will initiate anticoagulation with DOAC when it is safe from surgical standpoint.  Follow-up with him tomorrow.      code Status:  Full Code     Bob Rosario MD, PhD, FACC, [facility]  Interventional Cardiology, [facility]  Associate Professor of Medicine, [facility]   Office: [phone]

## 2024-09-15 NOTE — ADDENDUM NOTE
Addendum  created 09/15/24 1306 by LEYLA River[facility]    Narcotic reconciliation edited

## 2024-09-15 NOTE — ANESTHESIA PREPROCEDURE EVALUATION
Patient: Krish Batista    Procedure Information       Date/Time: 09/15/24 7150    Procedure: LAPAROSCOPIC SEGMENTAL COLECTOMY WITH MOBILIZATION OF SPLENIC FLEXURE/ POSSIBLE OPEN - Laparoscopic, possible open, segmental colectomy with mobilization of splenic flexure    Location: [facility] 05 / [facility]    Surgeons: Shai Clark MD            Relevant Problems   Cardiac   (+) NSTEMI (non-ST elevated myocardial infarction) (Multi)   (+) Paroxysmal atrial fibrillation (Multi)   (+) Typical atrial flutter (Multi)      GI   (+) Adenocarcinoma, colon (Multi)      Liver   (+) Adenocarcinoma, colon (Multi)      Hematology   (+) Microcytic anemia       Clinical information reviewed:    Allergies  Meds               NPO Detail:  No data recorded     Physical Exam    Airway  Mallampati: I  TM distance: >3 FB  Neck ROM: full     Cardiovascular - normal exam     Dental - normal exam     Pulmonary - normal exam     Abdominal - normal exam             Anesthesia Plan    History of general anesthesia?: yes  History of complications of general anesthesia?: no    ASA 3 - emergent     general     The patient is not a current smoker.  Patient was previously instructed to abstain from smoking on day of procedure.  Patient did not smoke on day of procedure.    intravenous induction   Postoperative administration of opioids is intended.  Trial extubation is planned.  Anesthetic plan and risks discussed with patient.  Use of blood products discussed with patient who consented to blood products.    Plan discussed with CRNA and attending.

## 2024-09-15 NOTE — OP NOTE
LAPAROSCOPIC SEGMENTAL COLECTOMY WITH MOBILIZATION OF SPLENIC FLEXURE/ POSSIBLE OPEN Operative Note     Date: 2024 - 9/15/2024  OR Location: [facility]    Name: Krish Batista, : 1955, Age: 69 y.o., MRN: [id number], Sex: male    Diagnosis  Pre-op Diagnosis      * Adenocarcinoma, colon (Multi) [C18.9] Post-op Diagnosis     * Adenocarcinoma, colon (Multi) [C18.9]     Procedures  LAPAROSCOPIC SEGMENTAL COLECTOMY WITH MOBILIZATION OF SPLENIC FLEXURE/ POSSIBLE OPEN  40124 - MD LAPAROSCOPY COLECTOMY PARTIAL W/ANASTOMOSIS    MD LAPS MOBLJ SPLENIC FLXR PFRMD W/PRTL COLECTOMY [84155]    Surgeons      * Shai Clark - Primary    Resident/Fellow/Other Assistant:  Lupillo Vázquez SA    Procedure Summary  Anesthesia: General Endotracheal ASA: ASA status not filed in the log.  Anesthesia Staff: Dileep Godoy M.D.;  CRNA: LEYLA River-CRNA  Estimated Blood Loss: Less than 30 mL  Intra-op Medications: * Intraprocedure medication information is unavailable because the case start and end events have not been set *           Anesthesia Record               Intraprocedure I/O Totals       None           Specimen: See below    Staff:   Circulator: Fadumo Sibley Person: Janessa         Drains and/or Catheters: * None in log *    Tourniquet Times:         Implants:     Findings: See below    Indications: Krish Batista is an 69 y.o. male who is having surgery for Adenocarcinoma, colon (Multi) [C18.9].     The patient was seen in the preoperative area. The risks, benefits, complications, treatment options, non-operative alternatives, expected recovery and outcomes were discussed with the patient. The possibilities of reaction to medication, pulmonary aspiration, injury to surrounding structures, bleeding, recurrent infection, the need for additional procedures, failure to diagnose a condition, and creating a complication requiring transfusion or operation were discussed with the patient. The patient concurred with the  proposed plan, giving informed consent.  The site of surgery was properly noted/marked if necessary per policy. The patient has been actively warmed in preoperative area. Preoperative antibiotics have been ordered and given within 1 hours of incision. Venous thrombosis prophylaxis have been ordered including bilateral sequential compression devices and chemical prophylaxis    Procedure Details:       Findings:   At the splenic flexure, the patient had a 4 cm apple core lesion.  Just distal to this, the proximal descending colon was tattooed.    Specimens:  Splenic flexure with suture proximal; bowel trimmings    Procedure:    Preoperative huddle was accomplished with the OR team and the patient in the preop holding area.  The patient was taken to the operating room placed on the table in the supine position.  Satisfactory general endotracheal anesthesia was achieved.  A Villela catheter was placed sterilely in the urinary bladder.  This was removed at the conclusion of the case.  An orogastric tube was placed in the stomach and this was removed also at the conclusion of the case.  The patient was placed in the peritoneal lithotomy position with Antonio stirrups.  The abdomen was prepared and draped in normal sterile fashion.  After the appropriate timeout, the case commenced.    A 2 cm infraumbilical sagittal incision was made in the skin and the subcutaneous tissues divided down to the infraumbilical fascia.  A stay suture was placed inferiorly of the fascia, and the fascia was elevated.  A 10 mm incision was made in the supraumbilical fascia and access was obtained to the peritoneal cavity.  The Plunkett trocar was placed through this defect into the peritoneal cavity, and secured with the balloon.  Pneumoperitoneum was achieved the normal fashion.  Following this, the above findings were noted.    The patient was placed in reverse Trendelenburg position and tilted to the patient's right.  Four 5 mm trocars were placed,  1 in each quadrant.  The proximal descending colon and splenic flexure were grasped and retracted anteriorly and medially.  The lateral peritoneal attachments of the descending colon from the splenic flexure distally to the mid sigmoid colon were divided along the white line of Toldt using cauterized scissors.    Following this, the splenic flexure was mobilized.  The splenic flexure and distal transverse colon were grasped and retracted inferiorly and medially.   The splenocolic ligament was then scored using cauterized scissors, and thereafter carefully meticulously ligated and divided using a LigaSure device.  Following this, the omentum was elevated and the gastrocolic ligament was divided.  The lesser sac was entered.    Following this, distal transverse colon and splenic flexure were elevated and the mesocolon was sequentially ligated divided to its base using a LigaSure device.  At the base of the superior/ascending branch of the left colic artery, the vascular pedicle was isolated, and ligated divided using a 45 mm linear endoscopic vascular stapling device.  An additional more proximal pedicle was ligated and divided in a similar fashion.  The middle colic vascular pedicle was preserved.  The remainder of the mesentery was ligated bided using a LigaSure device.  Following this, there was complete mobilization of the distal transverse colon, splenic flexure, and proximal descending colon with the accompanying mesentery.    Following this, the distal transverse colon was grasped just proximal to the tumor via the Plunkett trocar.  An small Abner wound protector was placed around the grasper and the Plunkett trocar, and the Plunkett trocar fascial incision was extended superiorly to roughly 6 cm.  The wound protector was placed and blue towels were placed.  The segment of colon in question with the tumor was then completely mobilized extracorporeally through the wound protector.    After mobilization, additional  mesentery was ligated divided where necessary.  Proximal and distal to the colon adenocarcinoma and tattoo, the colon was ligated divided using single firings of 75 mm TONG stapling devices.  The proximal staple line was marked with a silk suture.  This was sent to pathology.  This measured centimeters in length.    Following this, a side-to-side, functional end-to-end, stapled anastomosis was performed between the 2 bowel segments. The bowel segments were isolated with blue towels, aligned longitudinally, and secured along the antimesenteric surface using interrupted 3-0 silk Lembert seromuscular sutures.     Following this, a 1 cm segment of staple line along the antimesenteric border of each segment of bowel was divided, providing intraluminal access. The TONG 80 stapling device was placed with one limb each lumen of the bowel, and this was secured and fired, forming the anastomosis. The resulting enterotomy was then approximated using a single firing of the TA 60 stapling device.  This staple line was then inverted using interrupted 3-0 silk Lembert or seromuscular sutures.  This completed the anastomosis.    Following this, the anastomosis was placed intraperitoneally in this normal anatomic position in the left upper quadrant.  The Abner wound protector was twisted and clamped.  Pneumoperitoneum was achieved.  The peritoneal cavity was inspected with a 5 mm laparoscope and there was excellent positioning of the anastomosis and the bowel proximal and distal to this was normal with no twisting.  There was no evidence of bleeding.  This completed the operation.    Following this, the 5 mm trocars were removed under direct vision with no evidence of bleeding.  The wound protector was removed.  The sponge needle and instrument was correct x 2.  The midline abdominal fascia along the 6 cm periumbilical incision was approximated using a running barbed #1 STRATAFIX suture in the standard fashion.  Following this, all  the incisions were approximated using stainless steel skin staples.  The wounds are cleaned dried and dressed with gauze and occlusive tape.    The patient tolerated the procedure well. Sponge, needle, and instrument counts were correct times 2. Total IVF were 2000 cc of crystalloid, EBL was < 30 cc, and urine output 50  cc over 120 minutes.  The patient was extubated in the operating room, and taken to the PACU with stable vital signs and in excellent condition.    Shai Clark M.D.  Complications:  None; patient tolerated the procedure well.    Disposition: PACU - hemodynamically stable.  Condition: stable     Colon Resection  Operation performed with curative intent Yes   Tumor Location Splenic flexure   Extent of colon and vascular resection Other ascending branch of the left colic artery         Additional Details: none    Attending Attestation: I performed the procedure.    Shai Clark  Phone Number: [phone]

## 2024-09-15 NOTE — NURSING NOTE
1354: Patient in PACU, VSS. Per patient, pain is tolerable and declines pain medicine when offered. Report called to floor RN. Patient denies nausea. Surgical site c/d/I. Patient awake/alert. Meets criteria for transfer to floor.

## 2024-09-15 NOTE — ANESTHESIA PROCEDURE NOTES
Arterial Line:    Date/Time: 9/15/2024 10:15 AM    Staffing  Performed: attending   Authorized by: ANDREAS River    Performed by: ANDREAS River    An arterial line was placed. Procedure performed using surface landmarks.in the OR for the following indication(s): continuous blood pressure monitoring.    A 20 gauge (size), 5 cm (length), Arrow (type) catheter was placed into the Left radial artery, secured by Tegaderm,   Seldinger technique used.  Events:  patient tolerated procedure well with no complications.

## 2024-09-16 ENCOUNTER — APPOINTMENT (OUTPATIENT)
Dept: HEMATOLOGY/ONCOLOGY | Facility: CLINIC | Age: 69
End: 2024-09-16
Payer: MEDICARE

## 2024-09-16 ENCOUNTER — APPOINTMENT (OUTPATIENT)
Dept: CARDIOLOGY | Facility: HOSPITAL | Age: 69
End: 2024-09-16
Payer: MEDICARE

## 2024-09-16 PROBLEM — Z86.79 HISTORY OF CARDIOVASCULAR DISORDER: Status: RESOLVED | Noted: 2024-08-05 | Resolved: 2024-09-16

## 2024-09-16 LAB
ABO GROUP (TYPE) IN BLOOD: NORMAL
ANION GAP SERPL CALC-SCNC: 12 MMOL/L (ref 10–20)
ANTIBODY SCREEN: NORMAL
BLOOD EXPIRATION DATE: NORMAL
BUN SERPL-MCNC: 16 MG/DL (ref 6–23)
CALCIUM SERPL-MCNC: 8.3 MG/DL (ref 8.6–10.3)
CHLORIDE SERPL-SCNC: 106 MMOL/L (ref 98–107)
CO2 SERPL-SCNC: 23 MMOL/L (ref 21–32)
CREAT SERPL-MCNC: 0.98 MG/DL (ref 0.5–1.3)
DISPENSE STATUS: NORMAL
EGFRCR SERPLBLD CKD-EPI 2021: 83 ML/MIN/1.73M*2
ERYTHROCYTE [DISTWIDTH] IN BLOOD BY AUTOMATED COUNT: 22.4 % (ref 11.5–14.5)
ERYTHROCYTE [DISTWIDTH] IN BLOOD BY AUTOMATED COUNT: 22.5 % (ref 11.5–14.5)
GLUCOSE SERPL-MCNC: 133 MG/DL (ref 74–99)
HCT VFR BLD AUTO: 24.2 % (ref 41–52)
HCT VFR BLD AUTO: 24.4 % (ref 41–52)
HCT VFR BLD AUTO: 26.2 % (ref 41–52)
HCT VFR BLD AUTO: 26.6 % (ref 41–52)
HGB BLD-MCNC: 7.1 G/DL (ref 13.5–17.5)
HGB BLD-MCNC: 7.2 G/DL (ref 13.5–17.5)
HGB BLD-MCNC: 7.9 G/DL (ref 13.5–17.5)
HGB BLD-MCNC: 8 G/DL (ref 13.5–17.5)
HOLD SPECIMEN: NORMAL
MAGNESIUM SERPL-MCNC: 1.62 MG/DL (ref 1.6–2.4)
MCH RBC QN AUTO: 23.8 PG (ref 26–34)
MCH RBC QN AUTO: 24 PG (ref 26–34)
MCHC RBC AUTO-ENTMCNC: 29.5 G/DL (ref 32–36)
MCHC RBC AUTO-ENTMCNC: 29.7 G/DL (ref 32–36)
MCV RBC AUTO: 81 FL (ref 80–100)
MCV RBC AUTO: 81 FL (ref 80–100)
NRBC BLD-RTO: 0 /100 WBCS (ref 0–0)
NRBC BLD-RTO: 0 /100 WBCS (ref 0–0)
PLATELET # BLD AUTO: 263 X10*3/UL (ref 150–450)
PLATELET # BLD AUTO: 279 X10*3/UL (ref 150–450)
POTASSIUM SERPL-SCNC: 4.7 MMOL/L (ref 3.5–5.3)
PRODUCT BLOOD TYPE: 7300
PRODUCT CODE: NORMAL
RBC # BLD AUTO: 3.03 X10*6/UL (ref 4.5–5.9)
RBC # BLD AUTO: 3.29 X10*6/UL (ref 4.5–5.9)
RH FACTOR (ANTIGEN D): NORMAL
SODIUM SERPL-SCNC: 136 MMOL/L (ref 136–145)
UNIT ABO: NORMAL
UNIT NUMBER: NORMAL
UNIT RH: NORMAL
UNIT VOLUME: 350
WBC # BLD AUTO: 10.4 X10*3/UL (ref 4.4–11.3)
WBC # BLD AUTO: 13 X10*3/UL (ref 4.4–11.3)
XM INTEP: NORMAL

## 2024-09-16 PROCEDURE — 83735 ASSAY OF MAGNESIUM: CPT | Performed by: STUDENT IN AN ORGANIZED HEALTH CARE EDUCATION/TRAINING PROGRAM

## 2024-09-16 PROCEDURE — 86920 COMPATIBILITY TEST SPIN: CPT

## 2024-09-16 PROCEDURE — 80048 BASIC METABOLIC PNL TOTAL CA: CPT | Performed by: STUDENT IN AN ORGANIZED HEALTH CARE EDUCATION/TRAINING PROGRAM

## 2024-09-16 PROCEDURE — 99232 SBSQ HOSP IP/OBS MODERATE 35: CPT | Performed by: NURSE PRACTITIONER

## 2024-09-16 PROCEDURE — 2500000004 HC RX 250 GENERAL PHARMACY W/ HCPCS (ALT 636 FOR OP/ED)

## 2024-09-16 PROCEDURE — 2500000001 HC RX 250 WO HCPCS SELF ADMINISTERED DRUGS (ALT 637 FOR MEDICARE OP): Performed by: NURSE PRACTITIONER

## 2024-09-16 PROCEDURE — 2500000004 HC RX 250 GENERAL PHARMACY W/ HCPCS (ALT 636 FOR OP/ED): Performed by: NURSE PRACTITIONER

## 2024-09-16 PROCEDURE — 36415 COLL VENOUS BLD VENIPUNCTURE: CPT | Performed by: STUDENT IN AN ORGANIZED HEALTH CARE EDUCATION/TRAINING PROGRAM

## 2024-09-16 PROCEDURE — 86901 BLOOD TYPING SEROLOGIC RH(D): CPT | Performed by: STUDENT IN AN ORGANIZED HEALTH CARE EDUCATION/TRAINING PROGRAM

## 2024-09-16 PROCEDURE — 99024 POSTOP FOLLOW-UP VISIT: CPT | Performed by: SURGERY

## 2024-09-16 PROCEDURE — 85027 COMPLETE CBC AUTOMATED: CPT | Performed by: STUDENT IN AN ORGANIZED HEALTH CARE EDUCATION/TRAINING PROGRAM

## 2024-09-16 PROCEDURE — 99233 SBSQ HOSP IP/OBS HIGH 50: CPT | Performed by: STUDENT IN AN ORGANIZED HEALTH CARE EDUCATION/TRAINING PROGRAM

## 2024-09-16 PROCEDURE — 2500000002 HC RX 250 W HCPCS SELF ADMINISTERED DRUGS (ALT 637 FOR MEDICARE OP, ALT 636 FOR OP/ED): Performed by: STUDENT IN AN ORGANIZED HEALTH CARE EDUCATION/TRAINING PROGRAM

## 2024-09-16 PROCEDURE — 99232 SBSQ HOSP IP/OBS MODERATE 35: CPT | Performed by: STUDENT IN AN ORGANIZED HEALTH CARE EDUCATION/TRAINING PROGRAM

## 2024-09-16 PROCEDURE — 2500000004 HC RX 250 GENERAL PHARMACY W/ HCPCS (ALT 636 FOR OP/ED): Performed by: STUDENT IN AN ORGANIZED HEALTH CARE EDUCATION/TRAINING PROGRAM

## 2024-09-16 PROCEDURE — 2060000001 HC INTERMEDIATE ICU ROOM DAILY

## 2024-09-16 PROCEDURE — 36430 TRANSFUSION BLD/BLD COMPNT: CPT

## 2024-09-16 PROCEDURE — 85014 HEMATOCRIT: CPT | Performed by: STUDENT IN AN ORGANIZED HEALTH CARE EDUCATION/TRAINING PROGRAM

## 2024-09-16 PROCEDURE — 85027 COMPLETE CBC AUTOMATED: CPT | Performed by: NURSE PRACTITIONER

## 2024-09-16 PROCEDURE — 36415 COLL VENOUS BLD VENIPUNCTURE: CPT | Performed by: NURSE PRACTITIONER

## 2024-09-16 PROCEDURE — P9016 RBC LEUKOCYTES REDUCED: HCPCS

## 2024-09-16 PROCEDURE — 2500000001 HC RX 250 WO HCPCS SELF ADMINISTERED DRUGS (ALT 637 FOR MEDICARE OP)

## 2024-09-16 PROCEDURE — 93005 ELECTROCARDIOGRAM TRACING: CPT

## 2024-09-16 RX ORDER — MAGNESIUM SULFATE HEPTAHYDRATE 40 MG/ML
2 INJECTION, SOLUTION INTRAVENOUS ONCE
Status: COMPLETED | OUTPATIENT
Start: 2024-09-16 | End: 2024-09-16

## 2024-09-16 RX ORDER — OXYCODONE HYDROCHLORIDE 5 MG/1
5 TABLET ORAL EVERY 6 HOURS PRN
Status: DISCONTINUED | OUTPATIENT
Start: 2024-09-16 | End: 2024-09-18

## 2024-09-16 ASSESSMENT — COGNITIVE AND FUNCTIONAL STATUS - GENERAL
DAILY ACTIVITIY SCORE: 24
DAILY ACTIVITIY SCORE: 23
WALKING IN HOSPITAL ROOM: A LITTLE
TOILETING: A LITTLE
CLIMB 3 TO 5 STEPS WITH RAILING: A LITTLE
MOBILITY SCORE: 24
MOBILITY SCORE: 22

## 2024-09-16 ASSESSMENT — PAIN SCALES - GENERAL
PAINLEVEL_OUTOF10: 0 - NO PAIN
PAINLEVEL_OUTOF10: 3
PAINLEVEL_OUTOF10: 6
PAINLEVEL_OUTOF10: 0 - NO PAIN

## 2024-09-16 ASSESSMENT — PAIN - FUNCTIONAL ASSESSMENT
PAIN_FUNCTIONAL_ASSESSMENT: 0-10

## 2024-09-16 NOTE — PROGRESS NOTES
Subjective Data:  Heart rate is good today in 60s.  No chest pain or shortness of breath.  Had his surgery done yesterday.        Objective Data:  Last Recorded Vitals:  Vitals:    09/15/24 2032 09/15/24 2332 09/16/24 0322 09/16/24 0700   BP: 134/63 110/58 102/54 133/63   BP Location:    Left arm   Patient Position:    Sitting   Pulse: 63 60 59 66   Resp: 18 18 18 16   Temp: 36.4 °C (97.5 °F) 36.1 °C (97 °F) 36.2 °C (97.2 °F) 36.2 °C (97.2 °F)   TempSrc:    Temporal   SpO2: 99% 97% 98% 100%   Weight:       Height:           Last Labs:  CBC - 9/16/2024:  5:39 AM  13.0 7.2 279    24.4      CMP - 9/16/2024:  5:39 AM  8.3 6.1 36 --- 0.6   3.7 3.8 11 60      PTT - 9/15/2024:  6:34 AM  1.2   13.6 44     TROPHS   Date/Time Value Ref Range Status   09/12/2024 10:37 AM 6,106 0 - 20 ng/L Final   08/01/2024 02:49 PM 12 0 - 20 ng/L Final     HGBA1C   Date/Time Value Ref Range Status   08/02/2024 05:14 AM 5.4 see below % Final     LDLCALC   Date/Time Value Ref Range Status   09/12/2024 10:37  <=99 mg/dL Final     Comment:                                 Near   Borderline      AGE      Desirable  Optimal    High     High     Very High     0-19 Y     0 - 109     ---    110-129   >/= 130     ----    20-24 Y     0 - 119     ---    120-159   >/= 160     ----      >24 Y     0 -  99   100-129  130-159   160-189     >/=190     03/28/2024 11:11  <=99 mg/dL Final     Comment:                                 Near   Borderline      AGE      Desirable  Optimal    High     High     Very High     0-19 Y     0 - 109     ---    110-129   >/= 130     ----    20-24 Y     0 - 119     ---    120-159   >/= 160     ----      >24 Y     0 -  99   100-129  130-159   160-189     >/=190       VLDL   Date/Time Value Ref Range Status   09/12/2024 10:37 AM 9 0 - 40 mg/dL Final   03/28/2024 11:11 AM 15 0 - 40 mg/dL Final      Last I/O:  I/O last 3 completed shifts:  In: 4418 (51 mL/kg) [I.V.:3118 (36 mL/kg); IV Piggyback:1300]  Out: 80 (0.9 mL/kg)  [Urine:50 (0 mL/kg/hr); Blood:30]  Weight: 86.6 kg     Past Cardiology Tests (Last 3 Years):  EKG:  ECG 12 lead 09/12/2024 (Preliminary)      ECG 12 lead 08/26/2024      ECG 12 lead 08/01/2024      ECG 12 lead 08/01/2024    Echo:  Transthoracic Echo (TTE) Complete 09/12/2024      Transthoracic Echo (TTE) Complete 08/02/2024    Ejection Fractions:  EF   Date/Time Value Ref Range Status   09/12/2024 11:41 AM 53 %    08/02/2024 10:35 AM 61 %      Cath:  Cardiac Catheterization Procedure 09/12/2024    Stress Test:  No results found for this or any previous visit from the past 1095 days.    Cardiac Imaging:  No results found for this or any previous visit from the past 1095 days.      Inpatient Medications:  Scheduled medications   Medication Dose Route Frequency    acetaminophen  975 mg oral TID    alvimopan  12 mg oral BID    amiodarone  200 mg oral BID    aspirin  81 mg oral Daily    atorvastatin  80 mg oral Nightly    gabapentin  300 mg oral TID    heparin (porcine)  5,000 Units subcutaneous q8h    lidocaine  0.1 mL subcutaneous Once    magnesium sulfate  2 g intravenous Once    metoprolol succinate XL  50 mg oral Daily    oxygen   inhalation Continuous - Inhalation    pantoprazole  40 mg oral Daily     PRN medications   Medication    HYDROmorphone    HYDROmorphone    nitroglycerin    ondansetron    oxyCODONE    oxygen    promethazine     Continuous Medications   Medication Dose Last Rate    lactated Ringer's  75 mL/hr 75 mL/hr (09/16/24 0100)       Physical Exam:  Physical Exam:  General: Awake, alert/oriented x3, well developed, no acute distress  Head: Atraumatic/Normocephalic  Eyes: Normal external exam, EOMI, PERRLA  ENT: Oropharynx normal, moist mucous membranes  Cardiovascular: regular rate and rhythm, no edema of extremities.  Good right radial access site healing.  Pulmonary: CTAB, no respiratory distress. No wheezes, rales, or ronchi  Abdomen: +BS, soft, non-tender, nondistended, no guarding or  rebound  MSK: No joint swelling, normal movements of all extremities. Range of motion- normal.  Extremities: no edema, no cyanosis  Neuro: Alert/oriented x3, no focal motor or sensory deficits  Psychiatric: Judgment intact. Appropriate mood and behavior        Assessment/Plan  Non-ST elevation MI  Atrial fibrillation  Colon cancer  Severe anemia  Hypertension  Hyperlipidemia     Considering the patient's significant rise in troponin, I discussed with him the option of proceeding with left heart catheterization and coronary angiography which he agreed.  The procedure was done for him this afternoon that showed moderate coronary artery disease appropriate for medical therapy at this point.  After recovery from current admission, patient should be able to undergo colon Resection surgery as planned by surgical team.  Will continue with baby aspirin, high intensity statin and beta-blocker and treat his non-ST elevation MI medically.  Echocardiogram showed preserved ejection fraction with mild wall motion abnormality.     Considering patient has atrial fibrillation and chads vascular score of at least 3, he will require to be on long-term anticoagulation.  However, with active colon cancer this would be challenging.  Perhaps it might be best if he can have his surgery sooner so we can start anticoagulation afterwards. Will further discuss with primary team and surgery options.          Discussed with patient and family.  Primary team was updated.      September 13, 2024  Patient is chest pain-free.  Remains in sinus rhythm now.  Continue suggest baby aspirin and statin and beta-blocker as they are.  Will need to initiate anticoagulation as soon as surgery is done considering his elevated CHADS2 vascular score.    Also, I do have concern that there is some possibility that his non-ST elevation MI could be a result of a small distal embolization in coronary arteries possibly in the setting of atrial fibrillation without  anticoagulation and hence it is very important for the patient to undergo surgical evaluation and treatment to be followed by initiation of anticoagulation when the risk of bleeding is lower to decrease the risk of a stroke or possibly an acute MI.  I discussed this in extent with Dr. Paulino.  I have been trying to reach to Dr. Clark as well but no success yet.  Will keep trying.    September 14, 2024  Patient is back in atrial fibrillation with RVR.  I agree with IV amiodarone.  Unfortunately we cannot anticoagulate him due to the presence of colon cancer and recent severe bleeding.  I was able to further discuss the case with Dr. Clark yesterday.  Especially with now him going back to atrial fibrillation RVR, I believe he will benefit from having his surgery done sooner than later so we can initiate anticoagulation when it is safe afterwards.  Will continue with current medications.  Discussed with Dr. Paulino.    September 15, 2024  Okay to switch from IV amiodarone to p.o. amiodarone 20 mg twice daily.  Will continue with beta-blocker.  Going for OR today.  Will initiate anticoagulation with DOAC when it is safe from surgical standpoint.  Follow-up with him tomorrow.      September 16, 2024  Heart rate is well-controlled now.  Hemoglobin slightly low around 7.  Discussed with surgery and Dr. Clark is okay with starting anticoagulation.  I agree to start with IV heparin for now to reassure hemoglobin stays stable.  Will continue to monitor.    code Status:  Full Code     Bob Rosario MD, PhD, [facility], [facility]  Interventional Cardiology, [facility]  Associate Professor of Medicine, [facility]   Office: [phone]

## 2024-09-16 NOTE — CARE PLAN
The patient's goals for the shift include      The clinical goals for the shift include patient will have decrease c/o lightheaded

## 2024-09-16 NOTE — PROGRESS NOTES
Krish Batista is a 69 y.o. male on day 4 of admission presenting with NSTEMI (non-ST elevated myocardial infarction) (Multi).      Subjective   Positional lightheadedness, post operative abd pain, Hgb just above 7     Objective     Last Recorded Vitals  /63 (BP Location: Left arm, Patient Position: Sitting)   Pulse 66   Temp 36.2 °C (97.2 °F) (Temporal)   Resp 16   Wt 86.6 kg (190 lb 14.7 oz)   SpO2 100%     Physical Exam  Physical Exam  G: aox3, NAD, cooperative  HENT: neck supple, no JVD  Eyes: clear sclera  CV: RRR (converted) s1 s2  L: clear  Abd: soft, NT, non distended  Ext: no c/c/e  N: no appreciable acute focal deficits  Psych: appropriate mood and behavior    Assessment/Plan     Colon CA (recent dx) s/p laparoscopic segmental colectomy, POD #1  Acute blood loss anemia  NSTEMI  PAF/flutter with RVR  Colon CA (recent dx)      HTN, HLD  Iron deficiency anemia  DVT ppx  Full code     Plan:  - Taken to the OR yesterday, multimodal pain control, diet per surgery  - Would like to trial patient on anticoagulation, blood loss anemia, symptomatic, will transfuse 1 unit PRBC today, will make sure increments appropriately and stays stable with AM CBC, If Hgb stable in AM will start IV heparin without bolus dosing and then if tolerates transition to Eliquis, case discussed with surgery and cardiology, for now continue DVT ppx heparin subcutaneous  - Today in NSR, has been transitioned to PO amiodarone, optimize Mg  - s/p C, no intervention needed, medical management, continue ASA, statin, Bblocker.  - s/p 3 doses of IV iron      Jason Paulino, DO

## 2024-09-16 NOTE — PROGRESS NOTES
Krish Batista is a 69 y.o. male on day 4 of admission presenting with NSTEMI (non-ST elevated myocardial infarction) (Multi).    Subjective   Patient is doing well today.  Does report generalized abdominal pain/tenderness due to the surgical incisions, however, pain is managed well with analgesics. Patient was able to tolerate ice chips and sips of clears.  Will advance to [facility] for breakfast and lunch and hopefully progressing to FLD for dinner, depending on how he tolerates clears.  Patient's H/H was 7.2/24.4 this morning, down from 7.9/26.6 just after midnight.  Will repeat H/H at noon today.  Patient had bump in WBCs, likely from surgical procedure response.    Objective   Physical Exam  Constitutional:       General: He is not in acute distress.     Appearance: Normal appearance. He is not ill-appearing.   HENT:      Head: Normocephalic.      Mouth/Throat:      Mouth: Mucous membranes are moist.   Eyes:      Extraocular Movements: Extraocular movements intact.      Pupils: Pupils are equal, round, and reactive to light.   Cardiovascular:      Rate and Rhythm: Normal rate and regular rhythm.      Pulses: Normal pulses.      Heart sounds: Normal heart sounds.   Pulmonary:      Effort: Pulmonary effort is normal. No respiratory distress.      Breath sounds: Normal breath sounds.   Abdominal:      General: Bowel sounds are normal. There is slight distension.      Palpations: Abdomen is soft.      Tenderness: There is generalized abdominal tenderness.      Comments: Dressings dry and intact to lap sites with gauze and Medipore tape  Skin:     General: Skin is warm and dry.   Neurological:      General: No focal deficit present.      Mental Status: He is alert and oriented to person, place, and time.   Psychiatric:         Mood and Affect: Mood normal.         Behavior: Behavior normal.        Last Recorded Vitals  Blood pressure 133/63, pulse 66, temperature 36.2 °C (97.2 °F), temperature source Temporal, resp. rate  "16, height 1.837 m (6' 0.32\"), weight 86.6 kg (190 lb 14.7 oz), SpO2 100%.  Intake/Output last 3 Shifts:  I/O last 3 completed shifts:  In: 4418 (51 mL/kg) [I.V.:3118 (36 mL/kg); IV Piggyback:1300]  Out: 80 (0.9 mL/kg) [Urine:50 (0 mL/kg/hr); Blood:30]  Weight: 86.6 kg     Relevant Results  Results for orders placed or performed during the hospital encounter of 09/12/24 (from the past 24 hour(s))   CBC   Result Value Ref Range    WBC 10.4 4.4 - 11.3 x10*3/uL    nRBC 0.0 0.0 - 0.0 /100 WBCs    RBC 3.29 (L) 4.50 - 5.90 x10*6/uL    Hemoglobin 7.9 (L) 13.5 - 17.5 g/dL    Hematocrit 26.6 (L) 41.0 - 52.0 %    MCV 81 80 - 100 fL    MCH 24.0 (L) 26.0 - 34.0 pg    MCHC 29.7 (L) 32.0 - 36.0 g/dL    RDW 22.4 (H) 11.5 - 14.5 %    Platelets 263 150 - 450 x10*3/uL   CBC   Result Value Ref Range    WBC 13.0 (H) 4.4 - 11.3 x10*3/uL    nRBC 0.0 0.0 - 0.0 /100 WBCs    RBC 3.03 (L) 4.50 - 5.90 x10*6/uL    Hemoglobin 7.2 (L) 13.5 - 17.5 g/dL    Hematocrit 24.4 (L) 41.0 - 52.0 %    MCV 81 80 - 100 fL    MCH 23.8 (L) 26.0 - 34.0 pg    MCHC 29.5 (L) 32.0 - 36.0 g/dL    RDW 22.5 (H) 11.5 - 14.5 %    Platelets 279 150 - 450 x10*3/uL   Basic Metabolic Panel   Result Value Ref Range    Glucose 133 (H) 74 - 99 mg/dL    Sodium 136 136 - 145 mmol/L    Potassium 4.7 3.5 - 5.3 mmol/L    Chloride 106 98 - 107 mmol/L    Bicarbonate 23 21 - 32 mmol/L    Anion Gap 12 10 - 20 mmol/L    Urea Nitrogen 16 6 - 23 mg/dL    Creatinine 0.98 0.50 - 1.30 mg/dL    eGFR 83 >60 mL/min/1.73m*2    Calcium 8.3 (L) 8.6 - 10.3 mg/dL   Magnesium   Result Value Ref Range    Magnesium 1.62 1.60 - 2.40 mg/dL         Assessment/Plan   Krish Batista  is a 68 yo male who is diagnosed with colon CA and has PMH of iron deficiency anemia was transferred from [facility] on 9/12 due to chest pain/NSTEMI where he was found to be in A Fib RVR and was given rate control medications which converted him back to NSR. General surgery was consulted on the patient due to his established " relationship with Dr. Clark in the treatment of his known adenocarcinoma of the colon. Before the patient can be treated with anticoagulation for his cardiology concerns, he will have a segmental colectomy done for the treatment of his colon adenocarcinoma. The procedure is planned for the morning of 9/15. He had an episode of A Fib overnight and was placed back on to an Amiodarone drip.    Procedures  9/15: Laparoscopic segmental colectomy with mobilization of splenic flexure    Impressions  -Adenocarcinoma of colon with history of bleeding: S/P laparoscopic colectomy  -A Fib RVR    Recommendations  -Advance to [facility] for breakfast and lunch, likely advance to FLD for dinner  -Repeat H/H at noon today  -Continue IV fluids until tolerating FLD  -Continue multimodal pain management   -I/S  -OOB to chair/castro  -Trend CBC  -If H&H at noon is stable, okay for cardiology to start IV heparin drip, with no bolus dosing  -Amiodarone was converted to p.o.    Dispo: anticipate another 1-2 days of hospitalization     Patient seen and discussed with attending surgeon, Dr. Clark.    Cindy Montana, LEYLA-CNP

## 2024-09-17 LAB
ANION GAP SERPL CALC-SCNC: 10 MMOL/L (ref 10–20)
ATRIAL RATE: 119 BPM
ATRIAL RATE: 216 BPM
ATRIAL RATE: 66 BPM
BLOOD EXPIRATION DATE: NORMAL
BUN SERPL-MCNC: 18 MG/DL (ref 6–23)
CALCIUM SERPL-MCNC: 8.1 MG/DL (ref 8.6–10.3)
CHLORIDE SERPL-SCNC: 106 MMOL/L (ref 98–107)
CO2 SERPL-SCNC: 25 MMOL/L (ref 21–32)
CREAT SERPL-MCNC: 1.23 MG/DL (ref 0.5–1.3)
DISPENSE STATUS: NORMAL
EGFRCR SERPLBLD CKD-EPI 2021: 63 ML/MIN/1.73M*2
ERYTHROCYTE [DISTWIDTH] IN BLOOD BY AUTOMATED COUNT: 22.5 % (ref 11.5–14.5)
GLUCOSE SERPL-MCNC: 102 MG/DL (ref 74–99)
HCT VFR BLD AUTO: 22.8 % (ref 41–52)
HCT VFR BLD AUTO: 30.5 % (ref 41–52)
HGB BLD-MCNC: 6.9 G/DL (ref 13.5–17.5)
HGB BLD-MCNC: 9 G/DL (ref 13.5–17.5)
MAGNESIUM SERPL-MCNC: 1.78 MG/DL (ref 1.6–2.4)
MCH RBC QN AUTO: 24.6 PG (ref 26–34)
MCHC RBC AUTO-ENTMCNC: 30.3 G/DL (ref 32–36)
MCV RBC AUTO: 81 FL (ref 80–100)
NRBC BLD-RTO: 0 /100 WBCS (ref 0–0)
P AXIS: 268 DEGREES
P AXIS: 37 DEGREES
P OFFSET: 197 MS
P OFFSET: 209 MS
P ONSET: 148 MS
P ONSET: 155 MS
PLATELET # BLD AUTO: 251 X10*3/UL (ref 150–450)
POTASSIUM SERPL-SCNC: 4.1 MMOL/L (ref 3.5–5.3)
PR INTERVAL: 146 MS
PRODUCT BLOOD TYPE: 7300
PRODUCT CODE: NORMAL
Q ONSET: 221 MS
Q ONSET: 222 MS
Q ONSET: 225 MS
QRS COUNT: 11 BEATS
QRS COUNT: 18 BEATS
QRS COUNT: 24 BEATS
QRS DURATION: 74 MS
QRS DURATION: 78 MS
QRS DURATION: 80 MS
QT INTERVAL: 278 MS
QT INTERVAL: 316 MS
QT INTERVAL: 432 MS
QTC CALCULATION(BAZETT): 372 MS
QTC CALCULATION(BAZETT): 452 MS
QTC CALCULATION(BAZETT): 486 MS
QTC FREDERICIA: 338 MS
QTC FREDERICIA: 421 MS
QTC FREDERICIA: 446 MS
R AXIS: 14 DEGREES
R AXIS: 20 DEGREES
R AXIS: 4 DEGREES
RBC # BLD AUTO: 2.81 X10*6/UL (ref 4.5–5.9)
SODIUM SERPL-SCNC: 137 MMOL/L (ref 136–145)
T AXIS: -37 DEGREES
T AXIS: -67 DEGREES
T AXIS: 17 DEGREES
T OFFSET: 361 MS
T OFFSET: 383 MS
T OFFSET: 437 MS
UNIT ABO: NORMAL
UNIT NUMBER: NORMAL
UNIT RH: NORMAL
UNIT VOLUME: 350
VENTRICULAR RATE: 108 BPM
VENTRICULAR RATE: 142 BPM
VENTRICULAR RATE: 66 BPM
WBC # BLD AUTO: 13.3 X10*3/UL (ref 4.4–11.3)
XM INTEP: NORMAL

## 2024-09-17 PROCEDURE — P9016 RBC LEUKOCYTES REDUCED: HCPCS

## 2024-09-17 PROCEDURE — 36430 TRANSFUSION BLD/BLD COMPNT: CPT

## 2024-09-17 PROCEDURE — 2500000001 HC RX 250 WO HCPCS SELF ADMINISTERED DRUGS (ALT 637 FOR MEDICARE OP): Performed by: STUDENT IN AN ORGANIZED HEALTH CARE EDUCATION/TRAINING PROGRAM

## 2024-09-17 PROCEDURE — 99232 SBSQ HOSP IP/OBS MODERATE 35: CPT | Performed by: NURSE PRACTITIONER

## 2024-09-17 PROCEDURE — 85027 COMPLETE CBC AUTOMATED: CPT | Performed by: NURSE PRACTITIONER

## 2024-09-17 PROCEDURE — 2500000001 HC RX 250 WO HCPCS SELF ADMINISTERED DRUGS (ALT 637 FOR MEDICARE OP): Performed by: NURSE PRACTITIONER

## 2024-09-17 PROCEDURE — 83735 ASSAY OF MAGNESIUM: CPT | Performed by: STUDENT IN AN ORGANIZED HEALTH CARE EDUCATION/TRAINING PROGRAM

## 2024-09-17 PROCEDURE — 85014 HEMATOCRIT: CPT | Performed by: STUDENT IN AN ORGANIZED HEALTH CARE EDUCATION/TRAINING PROGRAM

## 2024-09-17 PROCEDURE — 2500000001 HC RX 250 WO HCPCS SELF ADMINISTERED DRUGS (ALT 637 FOR MEDICARE OP)

## 2024-09-17 PROCEDURE — 2060000001 HC INTERMEDIATE ICU ROOM DAILY

## 2024-09-17 PROCEDURE — 2500000005 HC RX 250 GENERAL PHARMACY W/O HCPCS: Performed by: NURSE PRACTITIONER

## 2024-09-17 PROCEDURE — 36415 COLL VENOUS BLD VENIPUNCTURE: CPT | Performed by: NURSE PRACTITIONER

## 2024-09-17 PROCEDURE — 2500000004 HC RX 250 GENERAL PHARMACY W/ HCPCS (ALT 636 FOR OP/ED): Performed by: NURSE PRACTITIONER

## 2024-09-17 PROCEDURE — 99024 POSTOP FOLLOW-UP VISIT: CPT | Performed by: SURGERY

## 2024-09-17 PROCEDURE — 80048 BASIC METABOLIC PNL TOTAL CA: CPT | Performed by: STUDENT IN AN ORGANIZED HEALTH CARE EDUCATION/TRAINING PROGRAM

## 2024-09-17 PROCEDURE — 2500000002 HC RX 250 W HCPCS SELF ADMINISTERED DRUGS (ALT 637 FOR MEDICARE OP, ALT 636 FOR OP/ED): Performed by: STUDENT IN AN ORGANIZED HEALTH CARE EDUCATION/TRAINING PROGRAM

## 2024-09-17 PROCEDURE — 36415 COLL VENOUS BLD VENIPUNCTURE: CPT | Performed by: STUDENT IN AN ORGANIZED HEALTH CARE EDUCATION/TRAINING PROGRAM

## 2024-09-17 PROCEDURE — 99233 SBSQ HOSP IP/OBS HIGH 50: CPT | Performed by: INTERNAL MEDICINE

## 2024-09-17 RX ORDER — HEPARIN SODIUM 5000 [USP'U]/ML
5000 INJECTION, SOLUTION INTRAVENOUS; SUBCUTANEOUS EVERY 8 HOURS
Status: DISCONTINUED | OUTPATIENT
Start: 2024-09-17 | End: 2024-09-19 | Stop reason: HOSPADM

## 2024-09-17 ASSESSMENT — COGNITIVE AND FUNCTIONAL STATUS - GENERAL
DAILY ACTIVITIY SCORE: 23
WALKING IN HOSPITAL ROOM: A LITTLE
MOBILITY SCORE: 22
CLIMB 3 TO 5 STEPS WITH RAILING: A LITTLE
CLIMB 3 TO 5 STEPS WITH RAILING: A LITTLE
MOBILITY SCORE: 22
DAILY ACTIVITIY SCORE: 24
TOILETING: A LITTLE
WALKING IN HOSPITAL ROOM: A LITTLE

## 2024-09-17 ASSESSMENT — PAIN SCALES - GENERAL
PAINLEVEL_OUTOF10: 6
PAINLEVEL_OUTOF10: 3
PAINLEVEL_OUTOF10: 3
PAINLEVEL_OUTOF10: 1

## 2024-09-17 ASSESSMENT — PAIN - FUNCTIONAL ASSESSMENT
PAIN_FUNCTIONAL_ASSESSMENT: 0-10
PAIN_FUNCTIONAL_ASSESSMENT: 0-10

## 2024-09-17 ASSESSMENT — PAIN DESCRIPTION - LOCATION: LOCATION: ABDOMEN

## 2024-09-17 NOTE — CARE PLAN
Problem: Pain - Adult  Goal: Verbalizes/displays adequate comfort level or baseline comfort level  9/17/2024 0515 by Galen Smith RN  Outcome: Progressing     Problem: Safety - Adult  Goal: Free from fall injury  9/17/2024 0515 by Galen Smith RN  Outcome: Progressing     Problem: Chronic Conditions and Co-morbidities  Goal: Patient's chronic conditions and co-morbidity symptoms are monitored and maintained or improved  9/17/2024 0515 by Galen Smith RN  Outcome: Progressing    The clinical goals for the shift include comfort & rest

## 2024-09-17 NOTE — PROGRESS NOTES
09/17/24 1419   Discharge Planning   Living Arrangements Alone   Assistance Needed Independent, patient does drive   Type of Residence Private residence  (one story home)   Number of Stairs to Enter Residence 3   Home or Post Acute Services None   Expected Discharge Disposition Home     Met with patient at bedside, introduced self and role on care transitions team. Patient lives at home. Address, insurance and contact information verified with patient. Patient preference is to return home at discharge. Patient has declined any home going needs. Patient provided with [employer] business card to contact for any discharge planning needs.   PCP: Dr. Matthew Small, last visit was March/April. Patient requesting for list of PCP in [place] area. Provided patient with list of PCP in that area.

## 2024-09-17 NOTE — PROGRESS NOTES
Krish Batista is a 69 y.o. male on day 5 of admission presenting with NSTEMI (non-ST elevated myocardial infarction) (Multi).    Subjective   Patient reports he is doing okay today.  When having patient get up to the bathroom, he complains of lightheadedness and dizziness with some nausea, but does not have those symptoms at rest.  Patient's H&H this morning was 6.9/22.8, with a unit of PRBCs already ordered for this morning.  Patient did get 1 unit of PRBCs yesterday and his repeated H&H at 1734 was 8.0/26.2.  WBCs are holding stable at 13.3.  Does continue to endorse generalized lower abdominal pain/tenderness that waxes and wanes.  Patient is tolerating FLD but did not want much for dinner last night therefore we will keep him on FLD for breakfast and lunch with progression of his diet for dinner if able.  Patient continues to have loose bowel movements and is passing gas.  Patient had issue yesterday with feeling like he had urinary retention so a Villela catheter was placed.  The Villela was removed at 6 AM this morning.  Patient instructed to notify nursing if he is in trouble or unable to urinate.      Objective   Physical Exam  Constitutional:       General: He is not in acute distress.     Appearance: Normal appearance. He is not ill-appearing.   HENT:      Head: Normocephalic.      Mouth/Throat:      Mouth: Mucous membranes are moist.   Eyes:      Extraocular Movements: Extraocular movements intact.      Pupils: Pupils are equal, round, and reactive to light.   Cardiovascular:      Rate and Rhythm: Normal rate and regular rhythm.      Pulses: Normal pulses.      Heart sounds: Normal heart sounds.   Pulmonary:      Effort: Pulmonary effort is normal. No respiratory distress.      Breath sounds: Normal breath sounds.   Abdominal:      General: Bowel sounds are normal. There is slight distension.      Palpations: Abdomen is soft.      Tenderness: There is generalized abdominal tenderness.      Comments:  "Dressings dry and intact to lap sites with gauze and Medipore tape  Skin:     General: Skin is warm and dry.   Neurological:      General: No focal deficit present.      Mental Status: He is alert and oriented to person, place, and time.   Psychiatric:         Mood and Affect: Mood normal.         Behavior: Behavior normal.        Last Recorded Vitals  Blood pressure 133/60, pulse 80, temperature 36.9 °C (98.4 °F), temperature source Oral, resp. rate 16, height 1.837 m (6' 0.32\"), weight 86.6 kg (190 lb 14.7 oz), SpO2 96%.  Intake/Output last 3 Shifts:  I/O last 3 completed shifts:  In: 2613.7 (30.2 mL/kg) [I.V.:1963.7 (22.7 mL/kg); Blood:650]  Out: 925 (10.7 mL/kg) [Urine:925 (0.3 mL/kg/hr)]  Weight: 86.6 kg     Relevant Results  Results for orders placed or performed during the hospital encounter of 09/12/24 (from the past 24 hour(s))   Type and screen   Result Value Ref Range    ABO TYPE AB     Rh TYPE POS     ANTIBODY SCREEN NEG    Lavender Top   Result Value Ref Range    Extra Tube Hold for add-ons.    Prepare RBC: 1 Units   Result Value Ref Range    PRODUCT CODE T4120P42     Unit Number [id number]     Unit ABO B     Unit RH POS     XM INTEP COMP     Dispense Status TR     Blood Expiration Date 9/17/2024 11:59:00 PM EDT     PRODUCT BLOOD TYPE 7300     UNIT VOLUME 350    Hemoglobin and hematocrit, blood   Result Value Ref Range    Hemoglobin 8.0 (L) 13.5 - 17.5 g/dL    Hematocrit 26.2 (L) 41.0 - 52.0 %   CBC   Result Value Ref Range    WBC 13.3 (H) 4.4 - 11.3 x10*3/uL    nRBC 0.0 0.0 - 0.0 /100 WBCs    RBC 2.81 (L) 4.50 - 5.90 x10*6/uL    Hemoglobin 6.9 (L) 13.5 - 17.5 g/dL    Hematocrit 22.8 (L) 41.0 - 52.0 %    MCV 81 80 - 100 fL    MCH 24.6 (L) 26.0 - 34.0 pg    MCHC 30.3 (L) 32.0 - 36.0 g/dL    RDW 22.5 (H) 11.5 - 14.5 %    Platelets 251 150 - 450 x10*3/uL   Basic Metabolic Panel   Result Value Ref Range    Glucose 102 (H) 74 - 99 mg/dL    Sodium 137 136 - 145 mmol/L    Potassium 4.1 3.5 - 5.3 mmol/L    " Chloride 106 98 - 107 mmol/L    Bicarbonate 25 21 - 32 mmol/L    Anion Gap 10 10 - 20 mmol/L    Urea Nitrogen 18 6 - 23 mg/dL    Creatinine 1.23 0.50 - 1.30 mg/dL    eGFR 63 >60 mL/min/1.73m*2    Calcium 8.1 (L) 8.6 - 10.3 mg/dL   Magnesium   Result Value Ref Range    Magnesium 1.78 1.60 - 2.40 mg/dL   Prepare RBC: 1 Units   Result Value Ref Range    PRODUCT CODE R0142G00     Unit Number [id number]     Unit ABO B     Unit RH POS     XM INTEP COMP     Dispense Status IS     Blood Expiration Date 9/17/2024 11:59:00 PM EDT     PRODUCT BLOOD TYPE 7300     UNIT VOLUME 350          Assessment/Plan   Krish Batista  is a 70 yo male who is diagnosed with colon CA and has PMH of iron deficiency anemia was transferred from [facility] on 9/12 due to chest pain/NSTEMI where he was found to be in A Fib RVR and was given rate control medications which converted him back to NSR. General surgery was consulted on the patient due to his established relationship with Dr. Clark in the treatment of his known adenocarcinoma of the colon. Before the patient can be treated with anticoagulation for his cardiology concerns, he will have a segmental colectomy done for the treatment of his colon adenocarcinoma. The procedure is planned for the morning of 9/15. He had an episode of A Fib overnight and was placed back on to an Amiodarone drip.    Procedures  9/15: Laparoscopic segmental colectomy with mobilization of splenic flexure    Impressions  -Adenocarcinoma of colon with history of bleeding: S/P laparoscopic colectomy  -A Fib RVR    Recommendations  -Continue with FLD for today, consider advancement tomorrow  -Patient getting another unit of PRBCs this morning  -Continue IV fluids due to anemia and low urine output  -Continue multimodal pain management   -I/S  -OOB to chair/castro when asymptomatic  -Trend CBC  -Holding IV anticoagulation until patient's hemoglobin is stable    >pt may have subcutaneous Heparin q8h  -Dressings may be  removed tomorrow    Dispo: anticipate another 1-2 days of hospitalization     Patient seen and will be discussed with attending surgeon, Dr. Clark.    Cindy Montana, LEYLA-CNP

## 2024-09-17 NOTE — PROGRESS NOTES
Krish Batista is a 69 y.o. male on day 5 of admission presenting with NSTEMI (non-ST elevated myocardial infarction) (Multi).      Subjective   Up in bed.  Getting PRBC transfusion.  No complaints.        Objective     Last Recorded Vitals  /60   Pulse 80   Temp 36.9 °C (98.4 °F) (Oral)   Resp 16   Wt 86.6 kg (190 lb 14.7 oz)   SpO2 96%   Intake/Output last 3 Shifts:    Intake/Output Summary (Last 24 hours) at 9/17/2024 1102  Last data filed at 9/17/2024 0655  Gross per 24 hour   Intake 2613.67 ml   Output 925 ml   Net 1688.67 ml       Admission Weight  Weight: 88.7 kg (195 lb 10.5 oz) (09/12/24 1100)    Daily Weight  09/12/24 : 86.6 kg (190 lb 14.7 oz)    Image Results  ECG 12 lead  Atrial flutter with 2:1 AV conduction  Cannot rule out Anterior infarct , age undetermined  ST & T wave abnormality, consider inferolateral ischemia  Abnormal ECG  When compared with ECG of 12-SEP-2024 10:01, (unconfirmed)  Atrial flutter has replaced Sinus rhythm  Vent. rate has increased BY  42 BPM  T wave inversion more evident in Inferior leads  T wave inversion now evident in Anterolateral leads  QT has shortened  ECG 12 lead  Atrial fibrillation with rapid ventricular response with premature ventricular or aberrantly conducted complexes  Posterior infarct , age undetermined  Abnormal ECG  No previous ECGs available      Physical Exam  Vitals reviewed.   Constitutional:       Appearance: Normal appearance.   HENT:      Head: Normocephalic and atraumatic.      Mouth/Throat:      Mouth: Mucous membranes are moist.   Eyes:      Conjunctiva/sclera: Conjunctivae normal.   Cardiovascular:      Rate and Rhythm: Normal rate.      Pulses: Normal pulses.   Pulmonary:      Effort: Pulmonary effort is normal.      Breath sounds: Normal breath sounds. No wheezing.   Abdominal:      General: Abdomen is flat. There is no distension.      Palpations: Abdomen is soft.      Tenderness: There is no guarding.   Musculoskeletal:          General: No swelling. Normal range of motion.   Skin:     General: Skin is warm and dry.   Neurological:      General: No focal deficit present.      Mental Status: He is alert. Mental status is at baseline.   Psychiatric:         Mood and Affect: Mood normal.         Behavior: Behavior normal.         Relevant Results               Assessment/Plan   This patient currently has cardiac telemetry ordered; if you would like to modify or discontinue the telemetry order, click here to go to the orders activity to modify/discontinue the order.              Assessment & Plan  Adenocarcinoma, colon (Multi)    Colon CA (recent dx) s/p laparoscopic segmental colectomy, POD #1  Acute blood loss anemia  NSTEMI  PAF/flutter with RVR  Colon CA (recent dx)      HTN, HLD  Iron deficiency anemia  DVT ppx  Full code     Plan:  Recent colectomy, and with suspected post op anemia requiring PRBC today.  Given Afib, will continue to consider anticoagulation, but not today.  Trend CBC in am or earlier if BP drop.  Holding DVT prophy today.  Consider trial with heparin drip and monitor H&H prior to dc to eval safety for DOAC.  - Today in NSR, has been transitioned to PO amiodarone, optimize Mg  - s/p [facility], no intervention needed, medical management, continue ASA, statin, Bblocker.  - s/p 3 doses of IV iron    Matthew García MD

## 2024-09-17 NOTE — CARE PLAN
The patient's goals for the shift include      The clinical goals for the shift include patient will maintain rest and comfort and be pain free    Over the shift, the patient will maintain comfort and rest and refrain from further complications

## 2024-09-18 ENCOUNTER — APPOINTMENT (OUTPATIENT)
Dept: HEMATOLOGY/ONCOLOGY | Facility: CLINIC | Age: 69
End: 2024-09-18
Payer: MEDICARE

## 2024-09-18 LAB
ERYTHROCYTE [DISTWIDTH] IN BLOOD BY AUTOMATED COUNT: 22.4 % (ref 11.5–14.5)
HCT VFR BLD AUTO: 26.1 % (ref 41–52)
HCT VFR BLD AUTO: 28.1 % (ref 41–52)
HGB BLD-MCNC: 8.1 G/DL (ref 13.5–17.5)
HGB BLD-MCNC: 8.4 G/DL (ref 13.5–17.5)
HOLD SPECIMEN: NORMAL
MCH RBC QN AUTO: 26.1 PG (ref 26–34)
MCHC RBC AUTO-ENTMCNC: 31 G/DL (ref 32–36)
MCV RBC AUTO: 84 FL (ref 80–100)
NRBC BLD-RTO: 0 /100 WBCS (ref 0–0)
PLATELET # BLD AUTO: 213 X10*3/UL (ref 150–450)
RBC # BLD AUTO: 3.1 X10*6/UL (ref 4.5–5.9)
WBC # BLD AUTO: 8.8 X10*3/UL (ref 4.4–11.3)

## 2024-09-18 PROCEDURE — 85014 HEMATOCRIT: CPT | Performed by: INTERNAL MEDICINE

## 2024-09-18 PROCEDURE — 2500000001 HC RX 250 WO HCPCS SELF ADMINISTERED DRUGS (ALT 637 FOR MEDICARE OP): Performed by: NURSE PRACTITIONER

## 2024-09-18 PROCEDURE — 99024 POSTOP FOLLOW-UP VISIT: CPT | Performed by: SURGERY

## 2024-09-18 PROCEDURE — 2060000001 HC INTERMEDIATE ICU ROOM DAILY

## 2024-09-18 PROCEDURE — 97161 PT EVAL LOW COMPLEX 20 MIN: CPT | Mod: GP

## 2024-09-18 PROCEDURE — 2500000002 HC RX 250 W HCPCS SELF ADMINISTERED DRUGS (ALT 637 FOR MEDICARE OP, ALT 636 FOR OP/ED): Performed by: STUDENT IN AN ORGANIZED HEALTH CARE EDUCATION/TRAINING PROGRAM

## 2024-09-18 PROCEDURE — 99233 SBSQ HOSP IP/OBS HIGH 50: CPT | Performed by: STUDENT IN AN ORGANIZED HEALTH CARE EDUCATION/TRAINING PROGRAM

## 2024-09-18 PROCEDURE — 2500000004 HC RX 250 GENERAL PHARMACY W/ HCPCS (ALT 636 FOR OP/ED): Performed by: NURSE PRACTITIONER

## 2024-09-18 PROCEDURE — 36415 COLL VENOUS BLD VENIPUNCTURE: CPT | Performed by: INTERNAL MEDICINE

## 2024-09-18 PROCEDURE — 2500000005 HC RX 250 GENERAL PHARMACY W/O HCPCS: Performed by: NURSE PRACTITIONER

## 2024-09-18 PROCEDURE — 85027 COMPLETE CBC AUTOMATED: CPT | Performed by: NURSE PRACTITIONER

## 2024-09-18 PROCEDURE — 2500000001 HC RX 250 WO HCPCS SELF ADMINISTERED DRUGS (ALT 637 FOR MEDICARE OP)

## 2024-09-18 PROCEDURE — 97165 OT EVAL LOW COMPLEX 30 MIN: CPT | Mod: GO

## 2024-09-18 PROCEDURE — 99233 SBSQ HOSP IP/OBS HIGH 50: CPT | Performed by: INTERNAL MEDICINE

## 2024-09-18 PROCEDURE — 36415 COLL VENOUS BLD VENIPUNCTURE: CPT | Performed by: NURSE PRACTITIONER

## 2024-09-18 PROCEDURE — 99232 SBSQ HOSP IP/OBS MODERATE 35: CPT

## 2024-09-18 RX ORDER — HYDROMORPHONE HYDROCHLORIDE 0.2 MG/ML
0.2 INJECTION INTRAMUSCULAR; INTRAVENOUS; SUBCUTANEOUS
Status: DISCONTINUED | OUTPATIENT
Start: 2024-09-18 | End: 2024-09-19 | Stop reason: HOSPADM

## 2024-09-18 RX ORDER — OXYCODONE HYDROCHLORIDE 5 MG/1
5 TABLET ORAL EVERY 4 HOURS PRN
Status: DISCONTINUED | OUTPATIENT
Start: 2024-09-18 | End: 2024-09-19 | Stop reason: HOSPADM

## 2024-09-18 ASSESSMENT — PAIN - FUNCTIONAL ASSESSMENT
PAIN_FUNCTIONAL_ASSESSMENT: 0-10
PAIN_FUNCTIONAL_ASSESSMENT: 0-10

## 2024-09-18 ASSESSMENT — COGNITIVE AND FUNCTIONAL STATUS - GENERAL
DAILY ACTIVITIY SCORE: 24
MOBILITY SCORE: 24

## 2024-09-18 ASSESSMENT — PAIN SCALES - GENERAL
PAINLEVEL_OUTOF10: 0 - NO PAIN
PAINLEVEL_OUTOF10: 7
PAINLEVEL_OUTOF10: 0 - NO PAIN

## 2024-09-18 NOTE — PROGRESS NOTES
Krish Batista is a 69 y.o. male on day 6 of admission presenting with NSTEMI (non-ST elevated myocardial infarction) (Multi).      Subjective   Up in bed.  No complaints.  Did well with PRBC transfusion yesterday.       Objective     Last Recorded Vitals  /65   Pulse 65   Temp 36.8 °C (98.2 °F)   Resp 18   Wt 86.6 kg (190 lb 14.7 oz)   SpO2 100%   Intake/Output last 3 Shifts:    Intake/Output Summary (Last 24 hours) at 9/18/2024 1602  Last data filed at 9/18/2024 0321  Gross per 24 hour   Intake --   Output 825 ml   Net -825 ml       Admission Weight  Weight: 88.7 kg (195 lb 10.5 oz) (09/12/24 1100)    Daily Weight  09/12/24 : 86.6 kg (190 lb 14.7 oz)    Image Results  ECG 12 lead  Atrial flutter Nonspecific ST and T wave abnormality  Abnormal ECG    Confirmed by Beny Alvarez ([id number]) on 9/17/2024 4:31:28 PM  ECG 12 lead  Atrial fibrillation with rapid ventricular response with premature ventricular or aberrantly conducted complexes  Posterior infarct , age undetermined  Nonspecific ST and T wave abnormality  Abnormal ECG  No previous ECGs available  Confirmed by Beny Alvarez ([id number]) on 9/17/2024 4:24:58 PM  ECG 12 lead  Normal sinus rhythm  Normal ECG    Confirmed by Beny Alvarez ([id number]) on 9/17/2024 3:51:38 PM      Physical Exam  Vitals reviewed.   Constitutional:       Appearance: Normal appearance.   HENT:      Head: Normocephalic and atraumatic.      Mouth/Throat:      Mouth: Mucous membranes are moist.   Eyes:      Conjunctiva/sclera: Conjunctivae normal.   Cardiovascular:      Rate and Rhythm: Normal rate.      Pulses: Normal pulses.   Pulmonary:      Effort: Pulmonary effort is normal.      Breath sounds: Normal breath sounds. No wheezing.   Abdominal:      General: Abdomen is flat. There is no distension.      Palpations: Abdomen is soft.      Tenderness: There is no guarding.   Musculoskeletal:         General: No swelling. Normal range of motion.   Skin:     General: Skin is warm  and dry.   Neurological:      General: No focal deficit present.      Mental Status: He is alert. Mental status is at baseline.   Psychiatric:         Mood and Affect: Mood normal.         Behavior: Behavior normal.         Relevant Results               Assessment/Plan   This patient currently has cardiac telemetry ordered; if you would like to modify or discontinue the telemetry order, click here to go to the orders activity to modify/discontinue the order.              Assessment & Plan  Adenocarcinoma, colon (Multi)    Colon CA (recent dx) s/p laparoscopic segmental colectomy, POD #1  Acute blood loss anemia  NSTEMI  PAF/flutter with RVR  Colon CA (recent dx)      HTN, HLD  Iron deficiency anemia  DVT ppx  Full code     Plan:  Recent colectomy, and with suspected post op anemia, improving after PRBC yesterday.  Given Afib, will continue to consider anticoagulation, but not today.  Trend CBC in am or earlier if BP drop.  OK for DVT prophy today  Surgery cleared for anticoagulation  Expect will start oral anticoagulation tomorrow if H&H is stable.  - Today in NSR, has been transitioned to PO amiodarone, optimize Mg  - s/p [facility], no intervention needed, medical management, continue ASA, statin, Bblocker.  - s/p 3 doses of IV iron    Matthew García MD

## 2024-09-18 NOTE — SIGNIFICANT EVENT
Patient notified nurse around 5pm that he noticed his scrotal area had turned purple. Nurse notified general surgery staff who evaluated the patients scrotum. He was having pain midline and left side of groin/scrotal area. He noticed a pressure while walking but it is more manageable while laying down. Has not attempted to urinate since he noticed new symptom but was not having difficulties earlier today. See media tab for image.    After consulting with Dr. Clark, it is likely due to blood from his surgery that tracked down into the scrotal area. Patient will be monitored to assess his H&H and ensure he is hemodynamically stable. Will likely resolve over the next few weeks.

## 2024-09-18 NOTE — PROGRESS NOTES
Subjective Data:  Heart rate remains good.  No chest pain or shortness of breath.  No acute issues overnight.        Objective Data:  Last Recorded Vitals:  Vitals:    09/18/24 0028 09/18/24 0321 09/18/24 0800 09/18/24 1200   BP:   118/59 142/65   BP Location:   Left arm    Patient Position:   Sitting    Pulse: 68  74 65   Resp: 17 17 18 18   Temp:   36.8 °C (98.2 °F) 36.8 °C (98.2 °F)   TempSrc:   Temporal    SpO2: 96%  100% 100%   Weight:       Height:           Last Labs:  CBC - 9/18/2024: 12:07 PM  8.8 8.4 213    28.1      CMP - 9/17/2024:  5:22 AM  8.1 6.1 36 --- 0.6   3.7 3.8 11 60      PTT - 9/15/2024:  6:34 AM  1.2   13.6 44     TROPHS   Date/Time Value Ref Range Status   09/12/2024 10:37 AM 6,106 0 - 20 ng/L Final   08/01/2024 02:49 PM 12 0 - 20 ng/L Final     HGBA1C   Date/Time Value Ref Range Status   08/02/2024 05:14 AM 5.4 see below % Final     LDLCALC   Date/Time Value Ref Range Status   09/12/2024 10:37  <=99 mg/dL Final     Comment:                                 Near   Borderline      AGE      Desirable  Optimal    High     High     Very High     0-19 Y     0 - 109     ---    110-129   >/= 130     ----    20-24 Y     0 - 119     ---    120-159   >/= 160     ----      >24 Y     0 -  99   100-129  130-159   160-189     >/=190     03/28/2024 11:11  <=99 mg/dL Final     Comment:                                 Near   Borderline      AGE      Desirable  Optimal    High     High     Very High     0-19 Y     0 - 109     ---    110-129   >/= 130     ----    20-24 Y     0 - 119     ---    120-159   >/= 160     ----      >24 Y     0 -  99   100-129  130-159   160-189     >/=190       VLDL   Date/Time Value Ref Range Status   09/12/2024 10:37 AM 9 0 - 40 mg/dL Final   03/28/2024 11:11 AM 15 0 - 40 mg/dL Final      Last I/O:  I/O last 3 completed shifts:  In: 2212.9 (25.6 mL/kg) [I.V.:1748.3 (20.2 mL/kg); Blood:464.6]  Out: 1750 (20.2 mL/kg) [Urine:1750 (0.6 mL/kg/hr)]  Weight: 86.6 kg     Past  Cardiology Tests (Last 3 Years):  EKG:  ECG 12 lead 09/12/2024 (Preliminary)      ECG 12 lead 08/26/2024      ECG 12 lead 08/01/2024      ECG 12 lead 08/01/2024    Echo:  Transthoracic Echo (TTE) Complete 09/12/2024      Transthoracic Echo (TTE) Complete 08/02/2024    Ejection Fractions:  EF   Date/Time Value Ref Range Status   09/12/2024 11:41 AM 53 %    08/02/2024 10:35 AM 61 %      Cath:  Cardiac Catheterization Procedure 09/12/2024    Stress Test:  No results found for this or any previous visit from the past 1095 days.    Cardiac Imaging:  No results found for this or any previous visit from the past 1095 days.      Inpatient Medications:  Scheduled medications   Medication Dose Route Frequency    acetaminophen  975 mg oral TID    amiodarone  200 mg oral BID    aspirin  81 mg oral Daily    atorvastatin  80 mg oral Nightly    gabapentin  300 mg oral TID    heparin  5,000 Units subcutaneous q8h    metoprolol succinate XL  50 mg oral Daily    oxygen   inhalation Continuous - Inhalation    pantoprazole  40 mg oral Daily     PRN medications   Medication    HYDROmorphone    nitroglycerin    ondansetron    oxyCODONE    oxygen    promethazine     Continuous Medications   Medication Dose Last Rate       Physical Exam:  Physical Exam:  General: Awake, alert/oriented x3, well developed, no acute distress  Head: Atraumatic/Normocephalic  Eyes: Normal external exam, EOMI, PERRLA  ENT: Oropharynx normal, moist mucous membranes  Cardiovascular: regular rate and rhythm, no edema of extremities.  Good right radial access site healing.  Pulmonary: CTAB, no respiratory distress. No wheezes, rales, or ronchi  Abdomen: +BS, soft, non-tender, nondistended, no guarding or rebound  MSK: No joint swelling, normal movements of all extremities. Range of motion- normal.  Extremities: no edema, no cyanosis  Neuro: Alert/oriented x3, no focal motor or sensory deficits  Psychiatric: Judgment intact. Appropriate mood and behavior         Assessment/Plan  Non-ST elevation MI  Atrial fibrillation  Colon cancer  Severe anemia  Hypertension  Hyperlipidemia     Considering the patient's significant rise in troponin, I discussed with him the option of proceeding with left heart catheterization and coronary angiography which he agreed.  The procedure was done for him this afternoon that showed moderate coronary artery disease appropriate for medical therapy at this point.  After recovery from current admission, patient should be able to undergo colon Resection surgery as planned by surgical team.  Will continue with baby aspirin, high intensity statin and beta-blocker and treat his non-ST elevation MI medically.  Echocardiogram showed preserved ejection fraction with mild wall motion abnormality.     Considering patient has atrial fibrillation and chads vascular score of at least 3, he will require to be on long-term anticoagulation.  However, with active colon cancer this would be challenging.  Perhaps it might be best if he can have his surgery sooner so we can start anticoagulation afterwards. Will further discuss with primary team and surgery options.          Discussed with patient and family.  Primary team was updated.      September 13, 2024  Patient is chest pain-free.  Remains in sinus rhythm now.  Continue suggest baby aspirin and statin and beta-blocker as they are.  Will need to initiate anticoagulation as soon as surgery is done considering his elevated CHADS2 vascular score.    Also, I do have concern that there is some possibility that his non-ST elevation MI could be a result of a small distal embolization in coronary arteries possibly in the setting of atrial fibrillation without anticoagulation and hence it is very important for the patient to undergo surgical evaluation and treatment to be followed by initiation of anticoagulation when the risk of bleeding is lower to decrease the risk of a stroke or possibly an acute MI.  I discussed  this in extent with Dr. Paulino.  I have been trying to reach to Dr. Clark as well but no success yet.  Will keep trying.    September 14, 2024  Patient is back in atrial fibrillation with RVR.  I agree with IV amiodarone.  Unfortunately we cannot anticoagulate him due to the presence of colon cancer and recent severe bleeding.  I was able to further discuss the case with Dr. Clark yesterday.  Especially with now him going back to atrial fibrillation RVR, I believe he will benefit from having his surgery done sooner than later so we can initiate anticoagulation when it is safe afterwards.  Will continue with current medications.  Discussed with Dr. Paulino.    September 15, 2024  Okay to switch from IV amiodarone to p.o. amiodarone 20 mg twice daily.  Will continue with beta-blocker.  Going for OR today.  Will initiate anticoagulation with DOAC when it is safe from surgical standpoint.  Follow-up with him tomorrow.      September 16, 2024  Heart rate is well-controlled now.  Hemoglobin slightly low around 7.  Discussed with surgery and Dr. Clark is okay with starting anticoagulation.  I agree to start with IV heparin for now to reassure hemoglobin stays stable.  Will continue to monitor.    September 18, 2024  Heart rate remains to be good.  Hemoglobin is better today.  Will initiate anticoagulation when surgery team is okay with that maybe tomorrow if CBC is stable.  Will need follow-up in cardiology office in couple of weeks after discharge.      code Status:  Full Code     Bob Rosario MD, PhD, [facility], [facility]  Interventional Cardiology, [facility] [place]  Associate Professor of Medicine, [facility]   Office: [phone]

## 2024-09-18 NOTE — PROGRESS NOTES
Physical Therapy    Physical Therapy Evaluation    Patient Name: Krish Batista  MRN: [id number]  Today's Date: 9/18/2024   Time Calculation  Start Time: 0838  Stop Time: 0846  Time Calculation (min): 8 min  834/834-A    Assessment/Plan   PT Assessment  Barriers to Discharge: w/o notable barriers to d/c home w/ support of niece  End of Session Communication: Bedside nurse  Assessment Comment: no further skilled therapy indicated, patient guarded but independent w/ mobility  End of Session Patient Position: Up in chair, Alarm off, not on at start of session (call light in reach)  IP OR SWING BED PT PLAN  Inpatient or Swing Bed: Inpatient  PT Plan  PT Plan: PT Eval only  PT Eval Only Reason: Only single session needed  PT Discharge Recommendations: No further acute PT  PT Recommended Transfer Status: Independent  PT - OK to Discharge: Yes (when cleared by medical team)    Subjective     Current Problem:    Patient Active Problem List   Diagnosis    Malaise and fatigue    Otitis media    Typical atrial flutter (Multi)    Paroxysmal atrial fibrillation (Multi)    Microcytic anemia    Adenocarcinoma, colon (Multi)    NSTEMI (non-ST elevated myocardial infarction) (Multi)       General Visit Information:  General  Reason for Referral: PT eval & treat/impaired mobility DX: nstemi, afib rvr, colon ca; 9/12/24 palpitations, sob, chest pain admitted from [facility]; 9/12/24 cardiac cath: mod CAD appropriate for medical thearpy; 9/15/24 colectomy w/ mobilization for spenic flexure; postop anemia/transfused  Referred By: Cindy Montana CNP  Caregiver Feedback: Per conference w/ RN patient stable to participate in therapy  Co-Treatment: OT  Co-Treatment Reason: to maximize pt safety & mobility  Patient Position Received: Up in chair, Alarm off, not on at start of session  General Comment: Pleasant & cooperative, receptive to mobility& instructions    Home Living:  Home Living  Home Living Comments: at baseline lives [id number]  to enter one floor set up; plans to stay w/ niece at d/c 1 step to enter w/ rail,pt will have 1st fl set up; notes home being accessible but is unsure of specifics of DME    Prior Level of Function:  Prior Function Per Pt/Caregiver Report  Prior Function Comments: independent mobility w/o use of device, denies h/o falls; independent adl & iadl, +drive    Precautions:  Precautions  Precautions Comment: fall, telemetry, corrective lenses  Pain:  Pain Assessment  Pain Assessment:  (abdominal pain w/ movement 2-3/10 positioned for comfort after eval has pillow for abdominal support)    Cognition:  Cognition  Orientation Level: Oriented X4    General Assessments:    Sensation  Light Touch: No apparent deficits   Functional Assessments:   Transfers  Transfer:  (independent sit<>stand)  Ambulation/Gait Training  Ambulation/Gait Training Performed:  (independent w/o device 150ft, slow gaurded gait w/ mildly decreased endurance, able to self maintain balance w/ changes in direction & maneuvering environmental obstacles)     Extremity/Trunk Assessments:        RLE   RLE :  (arom & strength grossly wfl as assessed during funcitonal mobility activity)  LLE   LLE :  (arom & strength grossly wfl as assessed during funcitonal mobility activity)    Outcome Measures:     [facility] Basic Mobility  Turning from your back to your side while in a flat bed without using bedrails: None  Moving from lying on your back to sitting on the side of a flat bed without using bedrails: None  Moving to and from bed to chair (including a wheelchair): None  Standing up from a chair using your arms (e.g. wheelchair or bedside chair): None  To walk in hospital room: None  Climbing 3-5 steps with railing: None  Basic Mobility - Total Score: 24        Education Documentation  Mobility Training, taught by Rebeca Ascencio, PT at 9/18/2024  9:50 AM.  Learner: Patient  Readiness: Acceptance  Method: Explanation  Response: Verbalizes Understanding  Comment:  safety, activity progression, self monitoring physical signs of mobility tolerance for safety

## 2024-09-18 NOTE — CARE PLAN
The patient's goals for the shift include ambulate as tolerated.     The clinical goals for the shift include monitor H&H.    Problem: Pain - Adult  Goal: Verbalizes/displays adequate comfort level or baseline comfort level  Outcome: Progressing     Problem: Safety - Adult  Goal: Free from fall injury  Outcome: Progressing     Problem: Discharge Planning  Goal: Discharge to home or other facility with appropriate resources  Outcome: Progressing     Problem: Chronic Conditions and Co-morbidities  Goal: Patient's chronic conditions and co-morbidity symptoms are monitored and maintained or improved  Outcome: Progressing     Problem: ACS/CP/NSTEMI/STEMI  Goal: Chest pain managed (free from pain or at acceptable level)  Outcome: Progressing     Problem: ACS/CP/NSTEMI/STEMI  Goal: Lab values return to normal range  Outcome: Progressing

## 2024-09-18 NOTE — CARE PLAN
Problem: Pain - Adult  Goal: Verbalizes/displays adequate comfort level or baseline comfort level  Outcome: Progressing     Problem: Safety - Adult  Goal: Free from fall injury  Outcome: Progressing     Problem: Chronic Conditions and Co-morbidities  Goal: Patient's chronic conditions and co-morbidity symptoms are monitored and maintained or improved  Outcome: Progressing     The clinical goals for the shift include comfort & rest

## 2024-09-18 NOTE — PROGRESS NOTES
"Krish Batista is a 69 y.o. male on day 6 of admission presenting with NSTEMI (non-ST elevated myocardial infarction) (Multi).    Subjective   Patient reports he is doing well today. He is having both gas and BMs which he describes as loose. He was tolerating his clear liquid diet well and was advanced to a fiber restricted diet for breakfast which he tolerated well. Denies N/V or concerns with urination. States he is generally achy but the pain in his abdomen is well controlled. He has received 2 units of PRBCs; H&H is 8.1/26.1 today. He had incremented after receiving a unit yesterday but then dropped from 9.0 to 8.1. No obvious signs of bleedings. WBC 8.8.      Objective   Physical Exam  Vitals reviewed.   Constitutional:       Appearance: Normal appearance.   HENT:      Head: Normocephalic.      Mouth/Throat:      Mouth: Mucous membranes are moist.   Eyes:      Extraocular Movements: Extraocular movements intact.   Cardiovascular:      Rate and Rhythm: Normal rate and regular rhythm.   Pulmonary:      Effort: Pulmonary effort is normal. No respiratory distress.      Breath sounds: Normal breath sounds.   Abdominal:      General: Abdomen is flat. Bowel sounds are normal.      Palpations: Abdomen is soft.      Tenderness: There is generalized abdominal tenderness.      Comments: Dressings removed which showed well healing surgical incisions midline, an in L upper/lower quadrants and R upper quadrant. Staples still in place.   Skin:     General: Skin is warm and dry.   Neurological:      General: No focal deficit present.      Mental Status: He is alert and oriented to person, place, and time.   Psychiatric:         Mood and Affect: Mood normal.         Behavior: Behavior normal.       Last Recorded Vitals  Blood pressure 118/59, pulse 74, temperature 36.8 °C (98.2 °F), temperature source Temporal, resp. rate 18, height 1.837 m (6' 0.32\"), weight 86.6 kg (190 lb 14.7 oz), SpO2 96%.  Intake/Output last 3 " Shifts:  I/O last 3 completed shifts:  In: 2212.9 (25.6 mL/kg) [I.V.:1748.3 (20.2 mL/kg); Blood:464.6]  Out: 1750 (20.2 mL/kg) [Urine:1750 (0.6 mL/kg/hr)]  Weight: 86.6 kg     Relevant Results  Results for orders placed or performed during the hospital encounter of 09/12/24 (from the past 24 hour(s))   Hemoglobin and hematocrit, blood   Result Value Ref Range    Hemoglobin 9.0 (L) 13.5 - 17.5 g/dL    Hematocrit 30.5 (L) 41.0 - 52.0 %   CBC   Result Value Ref Range    WBC 8.8 4.4 - 11.3 x10*3/uL    nRBC 0.0 0.0 - 0.0 /100 WBCs    RBC 3.10 (L) 4.50 - 5.90 x10*6/uL    Hemoglobin 8.1 (L) 13.5 - 17.5 g/dL    Hematocrit 26.1 (L) 41.0 - 52.0 %    MCV 84 80 - 100 fL    MCH 26.1 26.0 - 34.0 pg    MCHC 31.0 (L) 32.0 - 36.0 g/dL    RDW 22.4 (H) 11.5 - 14.5 %    Platelets 213 150 - 450 x10*3/uL   PST Top   Result Value Ref Range    Extra Tube Hold for add-ons.          Assessment/Plan   Krish Batista  is a 68 yo male who is diagnosed with colon CA and has PMH of iron deficiency anemia was transferred from [facility] on 9/12 due to chest pain/NSTEMI where he was found to be in A Fib RVR and was given rate control medications which converted him back to NSR. General surgery was consulted on the patient due to his established relationship with Dr. Clark in the treatment of his known adenocarcinoma of the colon. Before the patient can be treated with anticoagulation for his cardiology concerns, he will have a segmental colectomy done for the treatment of his colon adenocarcinoma. The procedure is planned for the morning of 9/15. He had an episode of A Fib overnight and was placed back on to an Amiodarone drip. Procedure was performed the morning of 9/15 and was uncomplicated. The patient did need to receive 2 units of PRBCs due to a hemoglobin of 7.1 and then 6.9.    Procedures  9/15: Laparoscopic segmental colectomy with mobilization of splenic flexure    Impressions  -Adenocarcinoma of colon with history of bleeding: S/P  laparoscopic colectomy  -A Fib RVR  -Acute on chronic anemia s/p lap segmental colectomy s/p 1 unit of PRBC 9/16  and 1 unit PRBC 9/17    Recommendations  -Continue with fiber restricted diet at tolerated   -Continue multimodal pain management   -Incentive spirometry  -OOB to chair/castro when asymptomatic  -Trend CBC  -Holding IV anticoagulation until patient's hemoglobin is stable    >pt may have subcutaneous Heparin q8h  -Dressings removed today    DVT prophylaxis: subQ heparin, SCDs, ambulate as tolerated  Dispo: From a general surgery perspective, the patient is ready for discharge    Patient seen and will be discussed with attending surgeon, Dr. Clark.    Denise Monroe PA-C

## 2024-09-18 NOTE — PROGRESS NOTES
Occupational Therapy    Evaluation    Patient Name: Krish Batista  MRN: [id number]  Department: [facility] 8  Room: [address] [place] [address]  Today's Date: 9/18/2024  Time Calculation  Start Time: 0838  Stop Time: 0846  Time Calculation (min): 8 min        Assessment:  End of Session Communication: Bedside nurse  End of Session Patient Position: Up in chair, Alarm off, not on at start of session (call light in reach)     Plan:  No Skilled OT: At baseline function  OT Frequency: OT eval only  OT - OK to Discharge: Yes (once medically appropriate to next level of care)       Subjective   Current Problem:  1. NSTEMI (non-ST elevated myocardial infarction) (Multi)  Transthoracic Echo (TTE) Complete    Transthoracic Echo (TTE) Complete    Case Request Cath Lab: Left Heart Cath, With LV    Case Request Cath Lab: Left Heart Cath, With LV    Cardiac Catheterization Procedure    Cardiac Catheterization Procedure      2. Adenocarcinoma, colon (Multi)  Surgical Pathology Exam    Surgical Pathology Exam        General:  General  Reason for Referral: OT eval and tx; ADLs. 9/12: NSTEMI, AFIB RVR, Colon CA. 9/12 cardiac cath: mod CAD appropriate for medical therapy. thoracic surgery- questionable if needs mediastinal lymph node biopsy. 9/15: colectomy with mobilization of splenic flexure; post op anemia, transfused.  Referred By: Raquel  Past Medical History Relevant to Rehab: 68 yo M with PMHx of recently diagnosed colon CA, iron deficiency anemia, recent admission for PAF/flutter RVR presents as a transfer from [facility] with chest pain/NSTEMI. Pt initially presented to the ED earlier in the morning for palpitations and sob. He was found to be in Afib RVR, given medications for rate control, and ultimately converted to NSR. His troponins during the morning ED visit for Afib RVR were 22, 20, and then 17. He was ultimately discharged home from the ED yesterday afternoon and then later at approximately midnight started having 10/10 midsternal chest  pressure with radiation to his L arm and mild sob which made him present again at the ED. A CT PE was negative for PE. Troponins did uptrend from 163 to 392. He was given ASA load, started on heparin drip, and given 2 SL nitroglycerin with resolution of pain. He was transferred to [facility] for further care. He was just recently here for anemia and Afib RVR 8/1 to 8/3. During that admission he was given IV iron, transfused, started on Bblocker, and ultimately converted to NSR prior to discharge. He was not started no anticoagulation due to his anemia and plans for EGD/colonoscopy (had recent positive Cologuard). He did end up undergoing endoscopy which did confirm colon CA.  Co-Treatment: PT  Co-Treatment Reason: for safety and to maximize therapeutic performance  Patient Position Received: Up in chair, Alarm off, not on at start of session  General Comment: patient pleasant and cooperative to participate  Precautions:  Precautions Comment: fall, telemetry, corrective lenses        Pain:  Pain Assessment  Pain Assessment:  (reports 2-3/10 abdominal pain with movement, repositioned EOS, has pillow for abdominal support)    Objective   Cognition:  Overall Cognitive Status: Within Functional Limits           Home Living:  Type of Home:  (at baseline, lives alone in a 1 story home with 3 DEL with HR; independent in ADLs/IADLs PLOF without AD use. drives. denies falls. reports at d/c will be going to [facility], 2 story home, with 1 DEL with HR; reports is accessible/1st floor set up)       ADL:  ADL Comments: anticipate MOD I for ADLs based on performance with transfers and mobility this date       Bed Mobility/Transfers: Transfers  Transfer:  (completed STS at MOD I without AD)      Functional Mobility:  Functional Mobility  Functional Mobility Performed:  (engaged in simple mobility at MOD I without AD, slow and steady pace noted)     Sensation:  Light Touch: No apparent deficits  Strength:  Strength Comments: KAREN  ROM/MMT WFL for patient age      Outcome Measures:[facility] Daily Activity  Putting on and taking off regular lower body clothing: None  Bathing (including washing, rinsing, drying): None  Putting on and taking off regular upper body clothing: None  Toileting, which includes using toilet, bedpan or urinal: None  Taking care of personal grooming such as brushing teeth: None  Eating Meals: None  Daily Activity - Total Score: 24        Education Documentation  Body Mechanics, taught by Chasity Rowell OT at 9/18/2024  9:57 AM.  Learner: Patient  Readiness: Acceptance  Method: Explanation  Response: Verbalizes Understanding    ADL Training, taught by Chasity Rowell OT at 9/18/2024  9:57 AM.  Learner: Patient  Readiness: Acceptance  Method: Explanation  Response: Verbalizes Understanding    Education Comments  No comments found.

## 2024-09-19 ENCOUNTER — PHARMACY VISIT (OUTPATIENT)
Dept: PHARMACY | Facility: CLINIC | Age: 69
End: 2024-09-19
Payer: COMMERCIAL

## 2024-09-19 VITALS
HEIGHT: 72 IN | DIASTOLIC BLOOD PRESSURE: 66 MMHG | OXYGEN SATURATION: 98 % | WEIGHT: 190.92 LBS | TEMPERATURE: 97.5 F | RESPIRATION RATE: 18 BRPM | HEART RATE: 68 BPM | SYSTOLIC BLOOD PRESSURE: 138 MMHG | BODY MASS INDEX: 25.86 KG/M2

## 2024-09-19 PROBLEM — I21.4 NSTEMI (NON-ST ELEVATED MYOCARDIAL INFARCTION) (MULTI): Status: RESOLVED | Noted: 2024-09-12 | Resolved: 2024-09-19

## 2024-09-19 LAB
ERYTHROCYTE [DISTWIDTH] IN BLOOD BY AUTOMATED COUNT: 23.2 % (ref 11.5–14.5)
HCT VFR BLD AUTO: 25.3 % (ref 41–52)
HGB BLD-MCNC: 7.7 G/DL (ref 13.5–17.5)
HOLD SPECIMEN: NORMAL
MCH RBC QN AUTO: 25.8 PG (ref 26–34)
MCHC RBC AUTO-ENTMCNC: 30.4 G/DL (ref 32–36)
MCV RBC AUTO: 85 FL (ref 80–100)
NRBC BLD-RTO: 0 /100 WBCS (ref 0–0)
PLATELET # BLD AUTO: 221 X10*3/UL (ref 150–450)
RBC # BLD AUTO: 2.99 X10*6/UL (ref 4.5–5.9)
WBC # BLD AUTO: 7.1 X10*3/UL (ref 4.4–11.3)

## 2024-09-19 PROCEDURE — 2500000001 HC RX 250 WO HCPCS SELF ADMINISTERED DRUGS (ALT 637 FOR MEDICARE OP): Performed by: NURSE PRACTITIONER

## 2024-09-19 PROCEDURE — 2500000002 HC RX 250 W HCPCS SELF ADMINISTERED DRUGS (ALT 637 FOR MEDICARE OP, ALT 636 FOR OP/ED): Performed by: STUDENT IN AN ORGANIZED HEALTH CARE EDUCATION/TRAINING PROGRAM

## 2024-09-19 PROCEDURE — 36415 COLL VENOUS BLD VENIPUNCTURE: CPT | Performed by: INTERNAL MEDICINE

## 2024-09-19 PROCEDURE — 85027 COMPLETE CBC AUTOMATED: CPT | Performed by: INTERNAL MEDICINE

## 2024-09-19 PROCEDURE — 99024 POSTOP FOLLOW-UP VISIT: CPT | Performed by: SURGERY

## 2024-09-19 PROCEDURE — 99239 HOSP IP/OBS DSCHRG MGMT >30: CPT | Performed by: INTERNAL MEDICINE

## 2024-09-19 PROCEDURE — RXMED WILLOW AMBULATORY MEDICATION CHARGE

## 2024-09-19 PROCEDURE — 2500000004 HC RX 250 GENERAL PHARMACY W/ HCPCS (ALT 636 FOR OP/ED): Performed by: NURSE PRACTITIONER

## 2024-09-19 PROCEDURE — 2500000005 HC RX 250 GENERAL PHARMACY W/O HCPCS: Performed by: NURSE PRACTITIONER

## 2024-09-19 RX ORDER — METOPROLOL SUCCINATE 50 MG/1
50 TABLET, EXTENDED RELEASE ORAL DAILY
Qty: 30 TABLET | Refills: 0 | Status: SHIPPED | OUTPATIENT
Start: 2024-09-20 | End: 2024-10-20

## 2024-09-19 RX ORDER — GABAPENTIN 300 MG/1
300 CAPSULE ORAL 3 TIMES DAILY
Qty: 90 CAPSULE | Refills: 0 | Status: SHIPPED | OUTPATIENT
Start: 2024-09-19 | End: 2024-10-19

## 2024-09-19 RX ORDER — NAPROXEN SODIUM 220 MG/1
81 TABLET, FILM COATED ORAL DAILY
Qty: 30 TABLET | Refills: 0 | Status: SHIPPED | OUTPATIENT
Start: 2024-09-20 | End: 2024-09-29 | Stop reason: HOSPADM

## 2024-09-19 RX ORDER — ATORVASTATIN CALCIUM 80 MG/1
80 TABLET, FILM COATED ORAL NIGHTLY
Qty: 30 TABLET | Refills: 0 | Status: SHIPPED | OUTPATIENT
Start: 2024-09-19 | End: 2024-10-19

## 2024-09-19 RX ORDER — AMIODARONE HYDROCHLORIDE 200 MG/1
200 TABLET ORAL 2 TIMES DAILY
Qty: 60 TABLET | Refills: 0 | Status: SHIPPED | OUTPATIENT
Start: 2024-09-19 | End: 2024-10-19

## 2024-09-19 ASSESSMENT — PAIN SCALES - GENERAL: PAINLEVEL_OUTOF10: 6

## 2024-09-19 NOTE — CARE PLAN
The patient's goals for the shift include  less pain in scrotum.     The clinical goals for the shift include less discomfort and discoloration of scrotum.      Problem: Pain - Adult  Goal: Verbalizes/displays adequate comfort level or baseline comfort level  Outcome: Progressing     Problem: Safety - Adult  Goal: Free from fall injury  Outcome: Progressing     Problem: Discharge Planning  Goal: Discharge to home or other facility with appropriate resources  Outcome: Progressing     Problem: Chronic Conditions and Co-morbidities  Goal: Patient's chronic conditions and co-morbidity symptoms are monitored and maintained or improved  Outcome: Progressing     Problem: ACS/CP/NSTEMI/STEMI  Goal: Verbalize understanding of procedures/devices  Outcome: Progressing     Problem: Cardiac catheterization  Goal: Free from dysrhythmias  Outcome: Progressing     Problem: Cardiac catheterization  Goal: Free from pain  Outcome: Progressing

## 2024-09-19 NOTE — DISCHARGE SUMMARY
Discharge Diagnosis  NSTEMI (non-ST elevated myocardial infarction) (Multi)    Issues Requiring Follow-Up  Follow up with PCP in about a week.  See cardiology for new onset Afib.  Follow up with surgery as scheduled.     Discharge Meds     Medication List      START taking these medications     amiodarone 200 mg tablet; Commonly known as: Pacerone; Take 1 tablet   (200 mg) by mouth 2 times a day.   apixaban 5 mg tablet; Commonly known as: Eliquis; Take 1 tablet (5 mg)   by mouth 2 times a day.   aspirin 81 mg chewable tablet; Chew 1 tablet (81 mg) once daily.; Start   taking on: September 20, 2024   atorvastatin 80 mg tablet; Commonly known as: Lipitor; Take 1 tablet (80   mg) by mouth once daily at bedtime.   gabapentin 300 mg capsule; Commonly known as: Neurontin; Take 1 capsule   (300 mg) by mouth 3 times a day.   metoprolol succinate XL 50 mg 24 hr tablet; Commonly known as:   Toprol-XL; Take 1 tablet (50 mg) by mouth once daily. Do not crush or   chew.; Start taking on: September 20, 2024     CONTINUE taking these medications     metoprolol tartrate 50 mg tablet; Commonly known as: Lopressor; Take 1   tablet by mouth 2 times a day. Do not start before September 2, 2024.   pantoprazole 40 mg EC tablet; Commonly known as: ProtoNix; Take 1 tablet   (40 mg) by mouth once daily. Do not crush, chew, or split.     STOP taking these medications     neomycin 500 mg tablet; Commonly known as: Mycifradin   polyethylene glycol 236-22.74-6.74 -5.86 gram solution; Commonly known   as: GaviLyte-G       Test Results Pending At Discharge  Pending Labs       Order Current Status    Surgical Pathology Exam In process            Hospital Course     70 yo M with PMHx of recently diagnosed colon CA, iron deficiency anemia, recent admission for PAF/flutter RVR. Patient admitted with chest pain and had cath without any intervention needed. Surgery evaluated patient and underwent Laparoscopic segmental colectomy with mobilization of  splenic flexure on 9/15. Post op anemia monitored and patient was stable and started on anticoagulation for Afib.  He will need close follow up with PCP, cardiology, surgery, oncology.   More than 35 minutes spent on discharge planning.     Pertinent Physical Exam At Time of Discharge  Physical Exam  Vitals reviewed.   Constitutional:       Appearance: Normal appearance.   HENT:      Head: Normocephalic and atraumatic.      Mouth/Throat:      Mouth: Mucous membranes are moist.   Eyes:      Conjunctiva/sclera: Conjunctivae normal.   Cardiovascular:      Rate and Rhythm: Normal rate.      Pulses: Normal pulses.   Pulmonary:      Effort: Pulmonary effort is normal.      Breath sounds: Normal breath sounds. No wheezing.   Abdominal:      General: Abdomen is flat. There is no distension.      Palpations: Abdomen is soft.      Tenderness: There is no guarding.   Musculoskeletal:         General: No swelling. Normal range of motion.   Skin:     General: Skin is warm and dry.   Neurological:      General: No focal deficit present.      Mental Status: He is alert. Mental status is at baseline.   Psychiatric:         Mood and Affect: Mood normal.         Behavior: Behavior normal.         Outpatient Follow-Up  Future Appointments   Date Time Provider Department Center   9/25/2024 12:45 PM Shai Clark MD [id number] [place]   10/8/2024 10:30 AM Luis Daniel Archibald MD PhD [id number] [place]   2/18/2025  3:00 PM Luis Daniel Archibald MD PhD [id number] [place]         Matthew García MD

## 2024-09-20 ENCOUNTER — TELEPHONE (OUTPATIENT)
Dept: SCHEDULING | Age: 69
End: 2024-09-20
Payer: MEDICARE

## 2024-09-20 ENCOUNTER — PATIENT OUTREACH (OUTPATIENT)
Dept: PRIMARY CARE | Facility: CLINIC | Age: 69
End: 2024-09-20
Payer: MEDICARE

## 2024-09-20 ENCOUNTER — APPOINTMENT (OUTPATIENT)
Dept: HEMATOLOGY/ONCOLOGY | Facility: CLINIC | Age: 69
End: 2024-09-20
Payer: MEDICARE

## 2024-09-20 NOTE — TELEPHONE ENCOUNTER
Patient called in to make sure his FUV with Dr. Lawson was cancelled for today. He stated he was just discharged. I confirmed his FUV was cancelled and had not yet been rescheduled. He stated he would like to reschedule after his FUV with the surgeon. I told him I would update the clinical team that he was discharged and ask for a new FUV date/time. He understood and agreed.

## 2024-09-20 NOTE — PROGRESS NOTES
Discharge facility: [facility]   Discharge diagnosis: NSTEMI (non-ST elevated myocardial infarction) ,   s/p  Laparoscopic segmental colectomy with mobilization of splenic flexure on 9/15 , Post op anemia, Afib   Issues Requiring Follow-Up  Follow up with PCP in about a week.  See cardiology for new onset Afib.  Follow up with surgery as scheduled.    Admission date: 9/12/24  Discharge date: 9/19/24    PCP Appointment Date: Declines to schedule at this time due to other appts, message to office   Specialist Appointment Date: 9/25/24 gen surgeon, 10/8/24 cardiology  Hospital Encounter and Summary: Linked    See Discharge assessment below for further details    Medications  Medications reviewed with patient/caregiver?: Yes (9/20/2024  9:27 AM)  Is the patient having any side effects they believe may be caused by any medication additions or changes?: No (9/20/2024  9:27 AM)  Does the patient have all medications ordered at discharge?: Yes (9/20/2024  9:27 AM)  Care Management Interventions: Provided patient education (9/20/2024  9:27 AM)  Prescription Comments: Prescriptions received from [facility] pharmacy for amiodarone 200 mg tablet       apixaban 5 mg tablet     aspirin 81 mg chewable tablet    atorvastatin 80 mg tablet      gabapentin 300 mg capsule     metoprolol succinate XL 50 mg 24 hr tablet (9/20/2024  9:27 AM)  Is the patient taking all medications as directed (includes completed medication regime)?: Yes (9/20/2024  9:27 AM)  Care Management Interventions: Provided patient education (9/20/2024  9:27 AM)  Medication Comments: Instructed on new medications of metoprolol succinate XL 50 mg 24 hr tablet  Commonly known as: Toprol-XL     Take 1 tablet (50 mg) by mouth once daily. Do  not crush or chew, gabapentin 300 mg capsule  Commonly known as: Neurontin  Take 1 capsule (300 mg) by mouth 3 times a day,atorvastatin 80 mg tablet  Commonly known as: Lipitor  Take 1 tablet (80 mg) by mouth once daily  at  bedtime.aspirin 81 mg chewable tablet  Start taking on: September 20, 2024  Chew 1 tablet (81 mg) once daily,apixaban 5 mg tablet  Commonly known as: Eliquis  Take 1 tablet (5 mg) by mouth 2 times a day,amiodarone 200 mg tablet  Commonly known as: Pacerone  Take 1 tablet (200 mg) by mouth 2 times a day (9/20/2024  9:27 AM)  Follow Up Tasks: -- (n/a) (9/20/2024  9:27 AM)    Appointments  Does the patient have a primary care provider?: Yes (9/20/2024  9:27 AM)  Care Management Interventions: Educated patient on importance of making appointment (message to office, patient declined to schedule at this time) (9/20/2024  9:27 AM)  Has the patient kept scheduled appointments due by today?: Yes (9/20/2024  9:27 AM)  Care Management Interventions: Advised patient to keep appointment; Educated on importance of keeping appointment (9/20/2024  9:27 AM)  Follow Up Tasks: -- (n/a) (9/20/2024  9:27 AM)    Self Management  What is the home health agency?: n/a- [facility] (9/20/2024  9:27 AM)  Has home health visited the patient within 72 hours of discharge?: Not applicable (9/20/2024  9:27 AM)  Home Health Interventions: -- (n/a) (9/20/2024  9:27 AM)  What Durable Medical Equipment (DME) was ordered?: n/a (9/20/2024  9:27 AM)  Has all Durable Medical Equipment (DME) been delivered?: -- (n/a) (9/20/2024  9:27 AM)  DME Interventions: -- (n/a) (9/20/2024  9:27 AM)  Care Management Interventions: Notified PCP/provider (9/20/2024  9:27 AM)  Follow Up Tasks: -- (n/a) (9/20/2024  9:27 AM)    Patient Teaching  Does the patient have access to their discharge instructions?: Yes (9/20/2024  9:27 AM)  Care Management Interventions: Reviewed instructions with patient (9/20/2024  9:27 AM)  What is the patient's perception of their health status since discharge?: Improving (9/20/2024  9:27 AM)  Is the patient/caregiver able to teach back the hierarchy of who to call/visit for symptoms/problems? PCP, Specialist, Home Health nurse, Urgent  [facility], ED, 911: Yes (9/20/2024  9:27 AM)  Patient/Caregiver Education Comments: Instructed on hospital discharge instructions. Instructed on new and changed medications. Instructed if increased shortness of breath or chest pains to call 911. Provided my contact information and encouraged to call with any questions (9/20/2024  9:27 AM)    Wrap Up  Is the patient/caregiver familiar with Advance Care Planning?: Yes (9/20/2024  9:27 AM)  Would the patient like more information on Advance Care Planning?: No (9/20/2024  9:27 AM)  Wrap Up Additional Comments: Patient reports he is doing alright. Currently staying with a family memeber as he recooperates. Reports pain is becoming less and edema is subsiding. He reports he is moving bowels and urinating with no issues. Declined scheduling follow up appt at this time due to having other appts he needs to go to and wanting to feel a little better first. Denies any questions or concerns at this time. (9/20/2024  9:27 AM)

## 2024-09-23 ENCOUNTER — TELEPHONE (OUTPATIENT)
Dept: SURGERY | Facility: CLINIC | Age: 69
End: 2024-09-23
Payer: MEDICARE

## 2024-09-23 ENCOUNTER — PATIENT OUTREACH (OUTPATIENT)
Dept: HEMATOLOGY/ONCOLOGY | Facility: CLINIC | Age: 69
End: 2024-09-23
Payer: MEDICARE

## 2024-09-23 ENCOUNTER — HOSPITAL ENCOUNTER (INPATIENT)
Facility: HOSPITAL | Age: 69
LOS: 6 days | Discharge: HOME HEALTH | End: 2024-09-29
Attending: EMERGENCY MEDICINE | Admitting: INTERNAL MEDICINE
Payer: MEDICARE

## 2024-09-23 ENCOUNTER — APPOINTMENT (OUTPATIENT)
Dept: CARDIOLOGY | Facility: HOSPITAL | Age: 69
DRG: 393 | End: 2024-09-23
Payer: MEDICARE

## 2024-09-23 ENCOUNTER — LAB (OUTPATIENT)
Dept: LAB | Facility: LAB | Age: 69
End: 2024-09-23
Payer: MEDICARE

## 2024-09-23 ENCOUNTER — APPOINTMENT (OUTPATIENT)
Dept: RADIOLOGY | Facility: HOSPITAL | Age: 69
DRG: 393 | End: 2024-09-23
Payer: MEDICARE

## 2024-09-23 ENCOUNTER — PATIENT OUTREACH (OUTPATIENT)
Dept: HOME HEALTH SERVICES | Age: 69
End: 2024-09-23
Payer: MEDICARE

## 2024-09-23 DIAGNOSIS — K62.5 BRBPR (BRIGHT RED BLOOD PER RECTUM): ICD-10-CM

## 2024-09-23 DIAGNOSIS — C18.9 COLON ADENOCARCINOMA (MULTI): Primary | ICD-10-CM

## 2024-09-23 DIAGNOSIS — C18.9 ADENOCARCINOMA, COLON (MULTI): ICD-10-CM

## 2024-09-23 DIAGNOSIS — C18.9 COLON ADENOCARCINOMA (MULTI): ICD-10-CM

## 2024-09-23 DIAGNOSIS — K66.8 PNEUMOPERITONEUM: Primary | ICD-10-CM

## 2024-09-23 DIAGNOSIS — D50.9 MICROCYTIC ANEMIA: ICD-10-CM

## 2024-09-23 LAB
ABO GROUP (TYPE) IN BLOOD: NORMAL
ACANTHOCYTES BLD QL SMEAR: NORMAL
ALBUMIN SERPL BCP-MCNC: 3.4 G/DL (ref 3.4–5)
ALP SERPL-CCNC: 57 U/L (ref 33–136)
ALT SERPL W P-5'-P-CCNC: 51 U/L (ref 10–52)
ANION GAP SERPL CALC-SCNC: 14 MMOL/L (ref 10–20)
ANTIBODY SCREEN: NORMAL
AST SERPL W P-5'-P-CCNC: 77 U/L (ref 9–39)
BASE EXCESS BLDA CALC-SCNC: -4.9 MMOL/L (ref -2–3)
BASOPHILS # BLD AUTO: 0.01 X10*3/UL (ref 0–0.1)
BASOPHILS NFR BLD AUTO: 0.1 %
BILIRUB SERPL-MCNC: 1.7 MG/DL (ref 0–1.2)
BLOOD EXPIRATION DATE: NORMAL
BNP SERPL-MCNC: 176 PG/ML (ref 0–99)
BODY TEMPERATURE: 37 DEGREES CELSIUS
BUN SERPL-MCNC: 26 MG/DL (ref 6–23)
BURR CELLS BLD QL SMEAR: NORMAL
CALCIUM SERPL-MCNC: 7.9 MG/DL (ref 8.6–10.3)
CARDIAC TROPONIN I PNL SERPL HS: 17 NG/L (ref 0–20)
CARDIAC TROPONIN I PNL SERPL HS: 18 NG/L (ref 0–20)
CHLORIDE SERPL-SCNC: 105 MMOL/L (ref 98–107)
CO2 SERPL-SCNC: 21 MMOL/L (ref 21–32)
CREAT SERPL-MCNC: 1.21 MG/DL (ref 0.5–1.3)
DISPENSE STATUS: NORMAL
EGFRCR SERPLBLD CKD-EPI 2021: 64 ML/MIN/1.73M*2
EOSINOPHIL # BLD AUTO: 0.44 X10*3/UL (ref 0–0.7)
EOSINOPHIL NFR BLD AUTO: 5.3 %
ERYTHROCYTE [DISTWIDTH] IN BLOOD BY AUTOMATED COUNT: 26.2 % (ref 11.5–14.5)
GLUCOSE SERPL-MCNC: 138 MG/DL (ref 74–99)
HCO3 BLDA-SCNC: 18.2 MMOL/L (ref 22–26)
HCT VFR BLD AUTO: 26 % (ref 41–52)
HGB BLD-MCNC: 7.7 G/DL (ref 13.5–17.5)
HYPOCHROMIA BLD QL SMEAR: NORMAL
IMM GRANULOCYTES # BLD AUTO: 0.05 X10*3/UL (ref 0–0.7)
IMM GRANULOCYTES NFR BLD AUTO: 0.6 % (ref 0–0.9)
INHALED O2 CONCENTRATION: 21 %
INR PPP: 2 (ref 0.9–1.1)
LACTATE SERPL-SCNC: 1.5 MMOL/L (ref 0.4–2)
LACTATE SERPL-SCNC: 3.6 MMOL/L (ref 0.4–2)
LYMPHOCYTES # BLD AUTO: 0.82 X10*3/UL (ref 1.2–4.8)
LYMPHOCYTES NFR BLD AUTO: 9.9 %
MCH RBC QN AUTO: 26.7 PG (ref 26–34)
MCHC RBC AUTO-ENTMCNC: 29.6 G/DL (ref 32–36)
MCV RBC AUTO: 90 FL (ref 80–100)
MONOCYTES # BLD AUTO: 0.52 X10*3/UL (ref 0.1–1)
MONOCYTES NFR BLD AUTO: 6.3 %
NEUTROPHILS # BLD AUTO: 6.48 X10*3/UL (ref 1.2–7.7)
NEUTROPHILS NFR BLD AUTO: 77.8 %
NRBC BLD-RTO: 0.2 /100 WBCS (ref 0–0)
OVALOCYTES BLD QL SMEAR: NORMAL
OXYHGB MFR BLDA: 96.2 % (ref 94–98)
PCO2 BLDA: 28 MM HG (ref 38–42)
PH BLDA: 7.42 PH (ref 7.38–7.42)
PLATELET # BLD AUTO: 334 X10*3/UL (ref 150–450)
PLATELET CLUMP BLD QL SMEAR: PRESENT
PO2 BLDA: 119 MM HG (ref 85–95)
POLYCHROMASIA BLD QL SMEAR: NORMAL
POTASSIUM SERPL-SCNC: 4.3 MMOL/L (ref 3.5–5.3)
PRODUCT BLOOD TYPE: 7300
PRODUCT BLOOD TYPE: 8400
PRODUCT BLOOD TYPE: 8400
PRODUCT CODE: NORMAL
PROT SERPL-MCNC: 5.8 G/DL (ref 6.4–8.2)
PROTHROMBIN TIME: 22.6 SECONDS (ref 9.8–12.8)
RBC # BLD AUTO: 2.88 X10*6/UL (ref 4.5–5.9)
RBC MORPH BLD: NORMAL
RH FACTOR (ANTIGEN D): NORMAL
SAO2 % BLDA: 100 % (ref 94–100)
SCHISTOCYTES BLD QL SMEAR: NORMAL
SODIUM SERPL-SCNC: 136 MMOL/L (ref 136–145)
SPECIMEN DRAWN FROM PATIENT: ABNORMAL
UNIT ABO: NORMAL
UNIT NUMBER: NORMAL
UNIT RH: NORMAL
UNIT VOLUME: 206
UNIT VOLUME: 302
UNIT VOLUME: 350
WBC # BLD AUTO: 8.3 X10*3/UL (ref 4.4–11.3)
XM INTEP: NORMAL

## 2024-09-23 PROCEDURE — 99285 EMERGENCY DEPT VISIT HI MDM: CPT | Mod: 25

## 2024-09-23 PROCEDURE — 2500000004 HC RX 250 GENERAL PHARMACY W/ HCPCS (ALT 636 FOR OP/ED): Performed by: INTERNAL MEDICINE

## 2024-09-23 PROCEDURE — 83880 ASSAY OF NATRIURETIC PEPTIDE: CPT

## 2024-09-23 PROCEDURE — 96361 HYDRATE IV INFUSION ADD-ON: CPT

## 2024-09-23 PROCEDURE — 74177 CT ABD & PELVIS W/CONTRAST: CPT

## 2024-09-23 PROCEDURE — 36430 TRANSFUSION BLD/BLD COMPNT: CPT

## 2024-09-23 PROCEDURE — 82728 ASSAY OF FERRITIN: CPT

## 2024-09-23 PROCEDURE — 86920 COMPATIBILITY TEST SPIN: CPT

## 2024-09-23 PROCEDURE — 99291 CRITICAL CARE FIRST HOUR: CPT | Performed by: INTERNAL MEDICINE

## 2024-09-23 PROCEDURE — 2500000005 HC RX 250 GENERAL PHARMACY W/O HCPCS: Performed by: EMERGENCY MEDICINE

## 2024-09-23 PROCEDURE — 87040 BLOOD CULTURE FOR BACTERIA: CPT | Mod: PARLAB | Performed by: EMERGENCY MEDICINE

## 2024-09-23 PROCEDURE — 99223 1ST HOSP IP/OBS HIGH 75: CPT | Performed by: SURGERY

## 2024-09-23 PROCEDURE — 96375 TX/PRO/DX INJ NEW DRUG ADDON: CPT

## 2024-09-23 PROCEDURE — 84484 ASSAY OF TROPONIN QUANT: CPT

## 2024-09-23 PROCEDURE — 85025 COMPLETE CBC W/AUTO DIFF WBC: CPT

## 2024-09-23 PROCEDURE — 96374 THER/PROPH/DIAG INJ IV PUSH: CPT

## 2024-09-23 PROCEDURE — 85610 PROTHROMBIN TIME: CPT

## 2024-09-23 PROCEDURE — 93005 ELECTROCARDIOGRAM TRACING: CPT

## 2024-09-23 PROCEDURE — 2500000001 HC RX 250 WO HCPCS SELF ADMINISTERED DRUGS (ALT 637 FOR MEDICARE OP): Performed by: INTERNAL MEDICINE

## 2024-09-23 PROCEDURE — 6360000002 HC RX 636 FACTOR: Mod: JZ | Performed by: EMERGENCY MEDICINE

## 2024-09-23 PROCEDURE — 2500000004 HC RX 250 GENERAL PHARMACY W/ HCPCS (ALT 636 FOR OP/ED)

## 2024-09-23 PROCEDURE — 36415 COLL VENOUS BLD VENIPUNCTURE: CPT

## 2024-09-23 PROCEDURE — 36600 WITHDRAWAL OF ARTERIAL BLOOD: CPT

## 2024-09-23 PROCEDURE — P9016 RBC LEUKOCYTES REDUCED: HCPCS

## 2024-09-23 PROCEDURE — 80048 BASIC METABOLIC PNL TOTAL CA: CPT

## 2024-09-23 PROCEDURE — 85027 COMPLETE CBC AUTOMATED: CPT

## 2024-09-23 PROCEDURE — 71046 X-RAY EXAM CHEST 2 VIEWS: CPT

## 2024-09-23 PROCEDURE — 2500000004 HC RX 250 GENERAL PHARMACY W/ HCPCS (ALT 636 FOR OP/ED): Performed by: EMERGENCY MEDICINE

## 2024-09-23 PROCEDURE — 74177 CT ABD & PELVIS W/CONTRAST: CPT | Performed by: SURGERY

## 2024-09-23 PROCEDURE — 80053 COMPREHEN METABOLIC PANEL: CPT

## 2024-09-23 PROCEDURE — 83605 ASSAY OF LACTIC ACID: CPT

## 2024-09-23 PROCEDURE — 83540 ASSAY OF IRON: CPT

## 2024-09-23 PROCEDURE — 99292 CRITICAL CARE ADDL 30 MIN: CPT | Performed by: EMERGENCY MEDICINE

## 2024-09-23 PROCEDURE — 82805 BLOOD GASES W/O2 SATURATION: CPT | Performed by: INTERNAL MEDICINE

## 2024-09-23 PROCEDURE — 83550 IRON BINDING TEST: CPT

## 2024-09-23 PROCEDURE — 71046 X-RAY EXAM CHEST 2 VIEWS: CPT | Performed by: RADIOLOGY

## 2024-09-23 PROCEDURE — 86901 BLOOD TYPING SEROLOGIC RH(D): CPT

## 2024-09-23 PROCEDURE — P9017 PLASMA 1 DONOR FRZ W/IN 8 HR: HCPCS

## 2024-09-23 PROCEDURE — 2550000001 HC RX 255 CONTRASTS: Performed by: EMERGENCY MEDICINE

## 2024-09-23 PROCEDURE — 2020000001 HC ICU ROOM DAILY

## 2024-09-23 RX ORDER — SODIUM CHLORIDE, SODIUM LACTATE, POTASSIUM CHLORIDE, CALCIUM CHLORIDE 600; 310; 30; 20 MG/100ML; MG/100ML; MG/100ML; MG/100ML
50 INJECTION, SOLUTION INTRAVENOUS CONTINUOUS
Status: DISCONTINUED | OUTPATIENT
Start: 2024-09-23 | End: 2024-09-26

## 2024-09-23 RX ORDER — ATORVASTATIN CALCIUM 80 MG/1
80 TABLET, FILM COATED ORAL NIGHTLY
Status: DISCONTINUED | OUTPATIENT
Start: 2024-09-23 | End: 2024-09-29 | Stop reason: HOSPADM

## 2024-09-23 RX ORDER — ONDANSETRON HYDROCHLORIDE 2 MG/ML
4 INJECTION, SOLUTION INTRAVENOUS ONCE
Status: COMPLETED | OUTPATIENT
Start: 2024-09-23 | End: 2024-09-23

## 2024-09-23 RX ORDER — NAPROXEN SODIUM 220 MG/1
81 TABLET, FILM COATED ORAL DAILY
Status: DISCONTINUED | OUTPATIENT
Start: 2024-09-24 | End: 2024-09-29 | Stop reason: HOSPADM

## 2024-09-23 RX ORDER — GABAPENTIN 300 MG/1
300 CAPSULE ORAL 3 TIMES DAILY
Status: DISCONTINUED | OUTPATIENT
Start: 2024-09-23 | End: 2024-09-28

## 2024-09-23 RX ORDER — PANTOPRAZOLE SODIUM 40 MG/10ML
40 INJECTION, POWDER, LYOPHILIZED, FOR SOLUTION INTRAVENOUS DAILY
Status: DISCONTINUED | OUTPATIENT
Start: 2024-09-23 | End: 2024-09-28

## 2024-09-23 RX ORDER — AMIODARONE HYDROCHLORIDE 200 MG/1
200 TABLET ORAL 2 TIMES DAILY
Status: DISCONTINUED | OUTPATIENT
Start: 2024-09-23 | End: 2024-09-29 | Stop reason: HOSPADM

## 2024-09-23 RX ORDER — PANTOPRAZOLE SODIUM 40 MG/1
40 TABLET, DELAYED RELEASE ORAL DAILY
Status: DISCONTINUED | OUTPATIENT
Start: 2024-09-24 | End: 2024-09-24

## 2024-09-23 RX ORDER — METOPROLOL TARTRATE 50 MG/1
50 TABLET ORAL 2 TIMES DAILY
Status: DISCONTINUED | OUTPATIENT
Start: 2024-09-23 | End: 2024-09-23 | Stop reason: SDUPTHER

## 2024-09-23 RX ORDER — CEFEPIME 1 G/50ML
2 INJECTION, SOLUTION INTRAVENOUS EVERY 8 HOURS
Status: DISCONTINUED | OUTPATIENT
Start: 2024-09-23 | End: 2024-09-26

## 2024-09-23 RX ORDER — METOPROLOL SUCCINATE 50 MG/1
50 TABLET, EXTENDED RELEASE ORAL DAILY
Status: DISCONTINUED | OUTPATIENT
Start: 2024-09-24 | End: 2024-09-29 | Stop reason: HOSPADM

## 2024-09-23 RX ORDER — ONDANSETRON HYDROCHLORIDE 2 MG/ML
INJECTION, SOLUTION INTRAVENOUS
Status: COMPLETED
Start: 2024-09-23 | End: 2024-09-23

## 2024-09-23 SDOH — SOCIAL STABILITY: SOCIAL INSECURITY: DO YOU FEEL UNSAFE GOING BACK TO THE PLACE WHERE YOU ARE LIVING?: NO

## 2024-09-23 SDOH — SOCIAL STABILITY: SOCIAL INSECURITY: WERE YOU ABLE TO COMPLETE ALL THE BEHAVIORAL HEALTH SCREENINGS?: YES

## 2024-09-23 SDOH — SOCIAL STABILITY: SOCIAL INSECURITY: DOES ANYONE TRY TO KEEP YOU FROM HAVING/CONTACTING OTHER FRIENDS OR DOING THINGS OUTSIDE YOUR HOME?: NO

## 2024-09-23 SDOH — SOCIAL STABILITY: SOCIAL INSECURITY: ARE THERE ANY APPARENT SIGNS OF INJURIES/BEHAVIORS THAT COULD BE RELATED TO ABUSE/NEGLECT?: NO

## 2024-09-23 SDOH — SOCIAL STABILITY: SOCIAL INSECURITY: DO YOU FEEL ANYONE HAS EXPLOITED OR TAKEN ADVANTAGE OF YOU FINANCIALLY OR OF YOUR PERSONAL PROPERTY?: NO

## 2024-09-23 SDOH — SOCIAL STABILITY: SOCIAL INSECURITY: HAS ANYONE EVER THREATENED TO HURT YOUR FAMILY OR YOUR PETS?: NO

## 2024-09-23 SDOH — SOCIAL STABILITY: SOCIAL INSECURITY: ABUSE: ADULT

## 2024-09-23 SDOH — SOCIAL STABILITY: SOCIAL INSECURITY: HAVE YOU HAD ANY THOUGHTS OF HARMING ANYONE ELSE?: NO

## 2024-09-23 SDOH — SOCIAL STABILITY: SOCIAL INSECURITY: HAVE YOU HAD THOUGHTS OF HARMING ANYONE ELSE?: NO

## 2024-09-23 SDOH — SOCIAL STABILITY: SOCIAL INSECURITY: ARE YOU OR HAVE YOU BEEN THREATENED OR ABUSED PHYSICALLY, EMOTIONALLY, OR SEXUALLY BY ANYONE?: NO

## 2024-09-23 ASSESSMENT — COGNITIVE AND FUNCTIONAL STATUS - GENERAL
PERSONAL GROOMING: A LOT
MOVING FROM LYING ON BACK TO SITTING ON SIDE OF FLAT BED WITH BEDRAILS: A LITTLE
STANDING UP FROM CHAIR USING ARMS: A LOT
PATIENT BASELINE BEDBOUND: NO
DAILY ACTIVITIY SCORE: 12
EATING MEALS: A LOT
HELP NEEDED FOR BATHING: A LOT
CLIMB 3 TO 5 STEPS WITH RAILING: A LOT
TURNING FROM BACK TO SIDE WHILE IN FLAT BAD: A LITTLE
DRESSING REGULAR LOWER BODY CLOTHING: A LOT
WALKING IN HOSPITAL ROOM: A LOT
MOBILITY SCORE: 14
DRESSING REGULAR UPPER BODY CLOTHING: A LOT
MOVING TO AND FROM BED TO CHAIR: A LOT
TOILETING: A LOT

## 2024-09-23 ASSESSMENT — ACTIVITIES OF DAILY LIVING (ADL)
WALKS IN HOME: NEEDS ASSISTANCE
TOILETING: NEEDS ASSISTANCE
BATHING: NEEDS ASSISTANCE
GROOMING: INDEPENDENT
LACK_OF_TRANSPORTATION: NO
JUDGMENT_ADEQUATE_SAFELY_COMPLETE_DAILY_ACTIVITIES: YES
HEARING - RIGHT EAR: FUNCTIONAL
HEARING - LEFT EAR: FUNCTIONAL
DRESSING YOURSELF: INDEPENDENT
ADEQUATE_TO_COMPLETE_ADL: YES
FEEDING YOURSELF: INDEPENDENT
PATIENT'S MEMORY ADEQUATE TO SAFELY COMPLETE DAILY ACTIVITIES?: YES

## 2024-09-23 ASSESSMENT — LIFESTYLE VARIABLES
AUDIT-C TOTAL SCORE: 1
AUDIT-C TOTAL SCORE: 1
HOW OFTEN DO YOU HAVE 6 OR MORE DRINKS ON ONE OCCASION: NEVER
HOW MANY STANDARD DRINKS CONTAINING ALCOHOL DO YOU HAVE ON A TYPICAL DAY: 1 OR 2
SKIP TO QUESTIONS 9-10: 1
HOW OFTEN DO YOU HAVE A DRINK CONTAINING ALCOHOL: MONTHLY OR LESS
PRESCIPTION_ABUSE_PAST_12_MONTHS: NO
SUBSTANCE_ABUSE_PAST_12_MONTHS: NO

## 2024-09-23 ASSESSMENT — PAIN SCALES - GENERAL: PAINLEVEL_OUTOF10: 5 - MODERATE PAIN

## 2024-09-23 ASSESSMENT — PAIN - FUNCTIONAL ASSESSMENT: PAIN_FUNCTIONAL_ASSESSMENT: 0-10

## 2024-09-23 ASSESSMENT — PAIN DESCRIPTION - DESCRIPTORS: DESCRIPTORS: ACHING;TENDER

## 2024-09-23 NOTE — TELEPHONE ENCOUNTER
Call placed to patient to add to Dr. Lawson's scheduled since being discharged from [facility] due to MI, new onset Afib, Laparoscopic Colectomy with Dr. Clark.    Pt currently at [facility] lab obtaining bloodwork per Dr. Clark.  Pt has a post op visit with Dr. Clark Wed 9/25/24.      Pt states he is staying with his niece in [facility] since being discharged from hospital.    Offered pt a FUV with Dr. Lawson this week on Thursday 9/26/24 and pt states he feels he needs to gain more strength.  Pt accepted appt with Dr. Lawson on Thursday 10/3/24 @ 12:00.  Scheduling orders placed.  Pt aware of appt with Dr. Lawson is at [facility] as he is transferring care from [facility].      Pt asking for appt information to be emailed to: [email or web address]  message forwarded to schedulers.

## 2024-09-23 NOTE — PROGRESS NOTES
See telephone note created regarding adding pt onto Dr. Lawson's schedule after recent hospitalization.

## 2024-09-23 NOTE — TELEPHONE ENCOUNTER
Spoke with patient regarding blood in toilet during bowel movements since 9/22/2024 and feeling more fatigue. Per Dr. Clark lab work has been placed and pt will go to [facility] today for those and office visit with Amber 9/25/2024.

## 2024-09-23 NOTE — ED PROVIDER NOTES
HPI   Chief Complaint   Patient presents with    Shortness of Breath       HPI  Patient is a 69-year-old past medical history of recently diagnosed colon cancer, iron deficiency anemia, recent cath and sigmoidectomy presenting with weakness.  Patient had myocardial infarction 2 weeks ago.  Patient was taken to Cath Lab and on Sunday had a sigmoidectomy done.  Since then patient said he has felt lightheaded.  Patient started noticing blood in his bowel the past 4 days.  He described as bright red blood.  Patient denies any fever, nausea, and vomiting.  Patient has noticed increased shortness of breath with exertion.  Patient also feels very weak and tired.  Patient denies any chest pain and said his symptoms does not feel like when he had his myocardial infarction.      Patient History   Past Medical History:   Diagnosis Date    Personal history of other diseases of the circulatory system     History of Raynaud's syndrome     Past Surgical History:   Procedure Laterality Date    CARDIAC CATHETERIZATION N/A 9/12/2024    Procedure: Left Heart Cath, With LV;  Surgeon: Bob Rosario MD PhD;  Location: [facility] Cardiac Cath Lab;  Service: Cardiovascular;  Laterality: N/A;  Radial    OTHER SURGICAL HISTORY  07/13/2021    Sinus surgery    OTHER SURGICAL HISTORY  07/13/2021    Testicular surgery    OTHER SURGICAL HISTORY  07/13/2021    Tonsillectomy     No family history on file.  Social History     Tobacco Use    Smoking status: Never    Smokeless tobacco: Never   Substance Use Topics    Alcohol use: Yes     Comment: occasional    Drug use: Never       Physical Exam   ED Triage Vitals [09/23/24 1627]   Temperature Heart Rate Respirations BP   36.7 °C (98.1 °F) 87 20 (!) 69/37      Pulse Ox Temp src Heart Rate Source Patient Position   100 % -- -- --      BP Location FiO2 (%)     -- --       Physical Exam  Constitutional:       Appearance: He is well-developed.   HENT:      Head: Normocephalic and atraumatic.    Cardiovascular:      Rate and Rhythm: Normal rate and regular rhythm.   Pulmonary:      Effort: Pulmonary effort is normal.      Breath sounds: Normal breath sounds.   Musculoskeletal:         General: Normal range of motion.      Cervical back: Normal range of motion.   Skin:     General: Skin is dry.      Findings: Ecchymosis and erythema present.      Comments: Erythema and ecchymosis at the surgical site on the abdomen, tenderness around surgical site, no rebound tenderness, positive guarding, no signs of infection at the surgical site, no clots, no drainage. No cellulitis   Neurological:      General: No focal deficit present.      Mental Status: He is alert and oriented to person, place, and time.           ED Course & MDM   ED Course as of 09/23/24 1900   Mon Sep 23, 2024   1713 Patient seen with resident Dr. Farley -this is a 69-year-old male just admitted last week for NSTEMI (no intervention on cath) followed by colectomy for known colon cancer.  He is taking eliquis for afib but notes ongoing rectal bleeding (bright red blood mixed with some liquid>solid stool).  Today, pt felt like he was going to pass out.  BP 69/37 in triage, improved to 90s systolic once lying on ED stretcher. [EK]   1715 EKG interpreted by me normal sinus rhythm, normal axis, questionable anterior ST depressions, no ST elevations, mild inferior T wave inversions, prolonged QTc 519 [EK]   1821 CXR interpreted by me without pneumonia or pneumothorax but suspected pneumoperitoeum - free air underneath b/l hemidiaphragm.  Spoke to radiology who said that this amount of free air seems more than expected 1wk out from laparoscopic surgery.  Called CT to expedite pt's CT.  He remains awake, alert, borderline hypotensive and abd mildly distended but soft and minimally tender. [EK]   1823 Labs notable for Hgb 7.7, which is stable from 4d ago, but given his cardiac hx, he likely should be Hgb >8.  Given his hypotension, will transfuse 1u  PRBC.  Pt consented and blood ordered. [EK]      ED Course User Index  [EK] Elyse H Klerman, MD         Diagnoses as of 09/23/24 1900   Pneumoperitoneum                 No data recorded     [place] Coma Scale Score: 15 (09/23/24 1627 : Ethan Hobbs, EMT)                           Medical Decision Making  Patient is a 69-year-old past medical history of recently diagnosed colon cancer, iron deficiency anemia, recent cath and sigmoidectomy presenting with weakness.  The differential diagnoses considered for this patient with anemia, pneumoperitoneum, myocardial infarction and electrolyte imbalance.  At bedside patient was hypotensive at 90/50.  Patient was given 1 bolus of normal saline.  Patient blood pressure improved to 100/64.  On physical exam, patient had reproducible tenderness of the surgical site on the abdomen.  There was a bruising on the abdomen.  No signs of cellulitis.  No signs of infection.  No pus.  Because patient recently had a catheterization, myocardial infarction was one of the biggest concern.  Patient EKG showed normal sinus rhythm with no NSTEMI/STEMI, no long QT, no T wave inversion and ventricular rate of 77.  Patient initial troponin was 18.  There was a concern for abdominal pathology due to patient having bright red stool.  Patient is also on Eliquis. Patient CBC showed hemoglobin of 7.7 which was similar to 4 days ago.  Patient INR was within normal range at 2.  Patient abdominal x-ray showed possible pneumoperitoneum.  CT abdomen pelvis with contrast did not show pneumoperitoneum but did show active bleeding. The fact that patient is actively bleeding and is on Eliquis was a concern so Dr. Clark who performed patient's surgery was consulted.  Dr. Damico wanted patient admitted to the medicine team so that the Eliquis can be reversed.  He does not think patient need to be in the OR right now.  Patient was given FFP and blood in the ED and then admitted to the stepdown unit for further  workup.        Procedure  Procedures     Michael Farley MD  Resident  09/23/24 1932

## 2024-09-23 NOTE — PROGRESS NOTES
Pharmacy Medication History Review    Krish Batista is a 69 y.o. male admitted for No Principal Problem: There is no principal problem currently on the Problem List. Please update the Problem List and refresh.. Pharmacy reviewed the patient's [id number] medications and allergies for accuracy.    The list below reflectives the updated PTA list. Please review each medication in order reconciliation for additional clarification and justification.  Prior to Admission medications    Medication Sig Start Date End Date Authorizing Provider   amiodarone (Pacerone) 200 mg tablet Take 1 tablet (200 mg) by mouth 2 times a day. 9/19/24 10/19/24 Matthew García MD   apixaban (Eliquis) 5 mg tablet Take 1 tablet (5 mg) by mouth 2 times a day. 9/19/24 10/19/24 Matthew García MD   aspirin 81 mg chewable tablet Chew 1 tablet (81 mg) once daily. 9/20/24 10/20/24 Matthew García MD   atorvastatin (Lipitor) 80 mg tablet Take 1 tablet (80 mg) by mouth once daily at bedtime. 9/19/24 10/19/24 Matthew García MD   gabapentin (Neurontin) 300 mg capsule Take 1 capsule (300 mg) by mouth 3 times a day. 9/19/24 10/19/24 Matthew García MD   metoprolol succinate XL (Toprol-XL) 50 mg 24 hr tablet Take 1 tablet (50 mg) by mouth once daily. Do not crush or chew. 9/20/24 10/20/24 Matthew García MD   metoprolol tartrate (Lopressor) 50 mg tablet Take 1 tablet by mouth 2 times a day. Do not start before September 2, 2024. 9/2/24 9/2/25 Luis Daniel Archibald MD PhD   pantoprazole (ProtoNix) 40 mg EC tablet Take 1 tablet (40 mg) by mouth once daily. Do not crush, chew, or split. 8/3/24 10/2/24 Jason Paulino,         The list below reflectives the updated allergy list. Please review each documented allergy for additional clarification and justification.  Allergies  Reviewed by Ethan Hobbs, EMT on 9/23/2024        Severity Reactions Comments    Amoxicillin Low Chills             Below are additional concerns with  the patient's PTA list.      Ivelisse Ann

## 2024-09-23 NOTE — PROGRESS NOTES
PHARMACIST INTERVENTION - BLOOD FACTOR STEWARDSHIP    Patient Name: Krish Batista  MRN: [id number]  Location: [place] [address]    Patient Weight (kg):   Wt Readings from Last 1 Encounters:   09/23/24 89.4 kg (197 lb)          Krish Batista is a 69 y.o. male presenting to the ED with GI bleed/post operative bleed This patient is taking apixaban outpatient.    Visit Vitals  BP 88/50   Pulse 81   Temp 36.7 °C (98.1 °F) (Temporal)   Resp 20        Initial Hgb: 7.7  Initial INR: 2.0    Most recent dose of apixaban was taken 9/23/24 at 0700 ( 15 hours ago) .     Patient met life threatening bleed criteria on the basis of: hemodynamic instability  Kcentra 2,102 units given.     Discussion and shared decision making occurred with ED physicians and surgery. Patient ordered 1 unit PRBC and 2 units of FFP. CT showing potential active bleed with the potential need for surgical intervention. At this time, will continue with volume resuscitation as well as reversal of apixaban; all providers in agreement.     Sulma Deng, PharmD

## 2024-09-24 ENCOUNTER — APPOINTMENT (OUTPATIENT)
Dept: GASTROENTEROLOGY | Facility: HOSPITAL | Age: 69
DRG: 393 | End: 2024-09-24
Payer: MEDICARE

## 2024-09-24 ENCOUNTER — APPOINTMENT (OUTPATIENT)
Dept: CARDIOLOGY | Facility: HOSPITAL | Age: 69
DRG: 393 | End: 2024-09-24
Payer: MEDICARE

## 2024-09-24 ENCOUNTER — ANESTHESIA (OUTPATIENT)
Dept: GASTROENTEROLOGY | Facility: HOSPITAL | Age: 69
End: 2024-09-24
Payer: MEDICARE

## 2024-09-24 ENCOUNTER — ANESTHESIA EVENT (OUTPATIENT)
Dept: GASTROENTEROLOGY | Facility: HOSPITAL | Age: 69
End: 2024-09-24
Payer: MEDICARE

## 2024-09-24 LAB
ACANTHOCYTES BLD QL SMEAR: NORMAL
ALBUMIN SERPL BCP-MCNC: 3 G/DL (ref 3.4–5)
ALP SERPL-CCNC: 43 U/L (ref 33–136)
ALT SERPL W P-5'-P-CCNC: 29 U/L (ref 10–52)
ANION GAP SERPL CALC-SCNC: 14 MMOL/L (ref 10–20)
ANION GAP SERPL CALC-SCNC: 16 MMOL/L (ref 10–20)
APPEARANCE UR: CLEAR
APTT PPP: 29 SECONDS (ref 27–38)
AST SERPL W P-5'-P-CCNC: 28 U/L (ref 9–39)
BASOPHILS # BLD AUTO: 0.01 X10*3/UL (ref 0–0.1)
BASOPHILS NFR BLD AUTO: 0.1 %
BILIRUB DIRECT SERPL-MCNC: 0.5 MG/DL (ref 0–0.3)
BILIRUB SERPL-MCNC: 2.3 MG/DL (ref 0–1.2)
BILIRUB UR STRIP.AUTO-MCNC: NEGATIVE MG/DL
BLOOD EXPIRATION DATE: NORMAL
BLOOD EXPIRATION DATE: NORMAL
BUN SERPL-MCNC: 24 MG/DL (ref 6–23)
BUN SERPL-MCNC: 30 MG/DL (ref 6–23)
CALCIUM SERPL-MCNC: 7.3 MG/DL (ref 8.6–10.3)
CALCIUM SERPL-MCNC: 8.2 MG/DL (ref 8.6–10.6)
CHLORIDE SERPL-SCNC: 105 MMOL/L (ref 98–107)
CHLORIDE SERPL-SCNC: 106 MMOL/L (ref 98–107)
CO2 SERPL-SCNC: 21 MMOL/L (ref 21–32)
CO2 SERPL-SCNC: 22 MMOL/L (ref 21–32)
COLOR UR: YELLOW
CREAT SERPL-MCNC: 1.09 MG/DL (ref 0.5–1.3)
CREAT SERPL-MCNC: 1.61 MG/DL (ref 0.5–1.3)
DISPENSE STATUS: NORMAL
DISPENSE STATUS: NORMAL
EGFRCR SERPLBLD CKD-EPI 2021: 46 ML/MIN/1.73M*2
EGFRCR SERPLBLD CKD-EPI 2021: 73 ML/MIN/1.73M*2
EOSINOPHIL # BLD AUTO: 0.01 X10*3/UL (ref 0–0.7)
EOSINOPHIL NFR BLD AUTO: 0.1 %
ERYTHROCYTE [DISTWIDTH] IN BLOOD BY AUTOMATED COUNT: 18.5 % (ref 11.5–14.5)
ERYTHROCYTE [DISTWIDTH] IN BLOOD BY AUTOMATED COUNT: 19.9 % (ref 11.5–14.5)
ERYTHROCYTE [DISTWIDTH] IN BLOOD BY AUTOMATED COUNT: 20.5 % (ref 11.5–14.5)
ERYTHROCYTE [DISTWIDTH] IN BLOOD BY AUTOMATED COUNT: 23.4 % (ref 11.5–14.5)
ERYTHROCYTE [DISTWIDTH] IN BLOOD BY AUTOMATED COUNT: 26.2 % (ref 11.5–14.5)
FERRITIN SERPL-MCNC: 498 NG/ML (ref 20–300)
FIBRINOGEN PPP-MCNC: 337 MG/DL (ref 200–400)
GIANT PLATELETS BLD QL SMEAR: NORMAL
GLUCOSE BLD MANUAL STRIP-MCNC: 134 MG/DL (ref 74–99)
GLUCOSE BLD MANUAL STRIP-MCNC: 138 MG/DL (ref 74–99)
GLUCOSE SERPL-MCNC: 126 MG/DL (ref 74–99)
GLUCOSE SERPL-MCNC: 155 MG/DL (ref 74–99)
GLUCOSE UR STRIP.AUTO-MCNC: NORMAL MG/DL
HCT VFR BLD AUTO: 18.5 % (ref 41–52)
HCT VFR BLD AUTO: 18.5 % (ref 41–52)
HCT VFR BLD AUTO: 24.9 % (ref 41–52)
HCT VFR BLD AUTO: 25.1 % (ref 41–52)
HCT VFR BLD AUTO: 25.9 % (ref 41–52)
HCT VFR BLD AUTO: 26.2 % (ref 41–52)
HGB BLD-MCNC: 5.7 G/DL (ref 13.5–17.5)
HGB BLD-MCNC: 5.7 G/DL (ref 13.5–17.5)
HGB BLD-MCNC: 7.7 G/DL (ref 13.5–17.5)
HGB BLD-MCNC: 8 G/DL (ref 13.5–17.5)
HGB BLD-MCNC: 8.2 G/DL (ref 13.5–17.5)
HGB BLD-MCNC: 8.6 G/DL (ref 13.5–17.5)
HOLD SPECIMEN: NORMAL
HYPOCHROMIA BLD QL SMEAR: NORMAL
IMM GRANULOCYTES # BLD AUTO: 0.11 X10*3/UL (ref 0–0.7)
IMM GRANULOCYTES NFR BLD AUTO: 1 % (ref 0–0.9)
INR PPP: 1.5 (ref 0.9–1.1)
INR PPP: 1.5 (ref 0.9–1.1)
IRON SATN MFR SERPL: 27 % (ref 25–45)
IRON SERPL-MCNC: 63 UG/DL (ref 35–150)
KETONES UR STRIP.AUTO-MCNC: ABNORMAL MG/DL
LACTATE SERPL-SCNC: 1.9 MMOL/L (ref 0.4–2)
LEUKOCYTE ESTERASE UR QL STRIP.AUTO: NEGATIVE
LYMPHOCYTES # BLD AUTO: 0.57 X10*3/UL (ref 1.2–4.8)
LYMPHOCYTES NFR BLD AUTO: 5.2 %
MAGNESIUM SERPL-MCNC: 1.56 MG/DL (ref 1.6–2.4)
MCH RBC QN AUTO: 26.5 PG (ref 26–34)
MCH RBC QN AUTO: 27.3 PG (ref 26–34)
MCH RBC QN AUTO: 28.5 PG (ref 26–34)
MCH RBC QN AUTO: 28.7 PG (ref 26–34)
MCH RBC QN AUTO: 28.7 PG (ref 26–34)
MCHC RBC AUTO-ENTMCNC: 29.4 G/DL (ref 32–36)
MCHC RBC AUTO-ENTMCNC: 30.8 G/DL (ref 32–36)
MCHC RBC AUTO-ENTMCNC: 32.1 G/DL (ref 32–36)
MCHC RBC AUTO-ENTMCNC: 32.7 G/DL (ref 32–36)
MCHC RBC AUTO-ENTMCNC: 33.2 G/DL (ref 32–36)
MCV RBC AUTO: 86 FL (ref 80–100)
MCV RBC AUTO: 87 FL (ref 80–100)
MCV RBC AUTO: 89 FL (ref 80–100)
MCV RBC AUTO: 89 FL (ref 80–100)
MCV RBC AUTO: 90 FL (ref 80–100)
MONOCYTES # BLD AUTO: 0.56 X10*3/UL (ref 0.1–1)
MONOCYTES NFR BLD AUTO: 5.1 %
MUCOUS THREADS #/AREA URNS AUTO: ABNORMAL /LPF
NEUTROPHILS # BLD AUTO: 9.64 X10*3/UL (ref 1.2–7.7)
NEUTROPHILS NFR BLD AUTO: 88.5 %
NITRITE UR QL STRIP.AUTO: NEGATIVE
NRBC BLD-RTO: 0 /100 WBCS (ref 0–0)
NRBC BLD-RTO: 0 /100 WBCS (ref 0–0)
NRBC BLD-RTO: 0.2 /100 WBCS (ref 0–0)
NRBC BLD-RTO: 0.2 /100 WBCS (ref 0–0)
NRBC BLD-RTO: 0.3 /100 WBCS (ref 0–0)
OVALOCYTES BLD QL SMEAR: NORMAL
PH UR STRIP.AUTO: 5.5 [PH]
PHOSPHATE SERPL-MCNC: 5.3 MG/DL (ref 2.5–4.9)
PLATELET # BLD AUTO: 313 X10*3/UL (ref 150–450)
PLATELET # BLD AUTO: 316 X10*3/UL (ref 150–450)
PLATELET # BLD AUTO: 336 X10*3/UL (ref 150–450)
PLATELET # BLD AUTO: 377 X10*3/UL (ref 150–450)
PLATELET # BLD AUTO: 450 X10*3/UL (ref 150–450)
POLYCHROMASIA BLD QL SMEAR: NORMAL
POTASSIUM SERPL-SCNC: 4 MMOL/L (ref 3.5–5.3)
POTASSIUM SERPL-SCNC: 4.9 MMOL/L (ref 3.5–5.3)
PRODUCT BLOOD TYPE: 7300
PRODUCT BLOOD TYPE: NORMAL
PRODUCT CODE: NORMAL
PRODUCT CODE: NORMAL
PROT SERPL-MCNC: 4.8 G/DL (ref 6.4–8.2)
PROT UR STRIP.AUTO-MCNC: ABNORMAL MG/DL
PROTHROMBIN TIME: 17.4 SECONDS (ref 9.8–12.8)
PROTHROMBIN TIME: 17.4 SECONDS (ref 9.8–12.8)
RBC # BLD AUTO: 2.09 X10*6/UL (ref 4.5–5.9)
RBC # BLD AUTO: 2.79 X10*6/UL (ref 4.5–5.9)
RBC # BLD AUTO: 2.88 X10*6/UL (ref 4.5–5.9)
RBC # BLD AUTO: 2.91 X10*6/UL (ref 4.5–5.9)
RBC # BLD AUTO: 3 X10*6/UL (ref 4.5–5.9)
RBC # UR STRIP.AUTO: NEGATIVE /UL
RBC #/AREA URNS AUTO: ABNORMAL /HPF
RBC MORPH BLD: NORMAL
SCHISTOCYTES BLD QL SMEAR: NORMAL
SODIUM SERPL-SCNC: 136 MMOL/L (ref 136–145)
SODIUM SERPL-SCNC: 139 MMOL/L (ref 136–145)
SP GR UR STRIP.AUTO: 1.02
TIBC SERPL-MCNC: 234 UG/DL (ref 240–445)
UIBC SERPL-MCNC: 171 UG/DL (ref 110–370)
UNIT ABO: NORMAL
UNIT ABO: NORMAL
UNIT NUMBER: NORMAL
UNIT NUMBER: NORMAL
UNIT RH: NORMAL
UNIT RH: NORMAL
UNIT VOLUME: 350
UNIT VOLUME: 400
UROBILINOGEN UR STRIP.AUTO-MCNC: NORMAL MG/DL
WBC # BLD AUTO: 10.9 X10*3/UL (ref 4.4–11.3)
WBC # BLD AUTO: 12.2 X10*3/UL (ref 4.4–11.3)
WBC # BLD AUTO: 18.4 X10*3/UL (ref 4.4–11.3)
WBC # BLD AUTO: 18.7 X10*3/UL (ref 4.4–11.3)
WBC # BLD AUTO: 7.4 X10*3/UL (ref 4.4–11.3)
WBC #/AREA URNS AUTO: ABNORMAL /HPF
XM INTEP: NORMAL
XM INTEP: NORMAL

## 2024-09-24 PROCEDURE — 2500000004 HC RX 250 GENERAL PHARMACY W/ HCPCS (ALT 636 FOR OP/ED): Mod: JZ | Performed by: INTERNAL MEDICINE

## 2024-09-24 PROCEDURE — 85027 COMPLETE CBC AUTOMATED: CPT

## 2024-09-24 PROCEDURE — 85014 HEMATOCRIT: CPT | Performed by: STUDENT IN AN ORGANIZED HEALTH CARE EDUCATION/TRAINING PROGRAM

## 2024-09-24 PROCEDURE — 85027 COMPLETE CBC AUTOMATED: CPT | Performed by: INTERNAL MEDICINE

## 2024-09-24 PROCEDURE — 2500000004 HC RX 250 GENERAL PHARMACY W/ HCPCS (ALT 636 FOR OP/ED): Performed by: INTERNAL MEDICINE

## 2024-09-24 PROCEDURE — 99233 SBSQ HOSP IP/OBS HIGH 50: CPT

## 2024-09-24 PROCEDURE — 99233 SBSQ HOSP IP/OBS HIGH 50: CPT | Performed by: SURGERY

## 2024-09-24 PROCEDURE — 85610 PROTHROMBIN TIME: CPT

## 2024-09-24 PROCEDURE — 2500000004 HC RX 250 GENERAL PHARMACY W/ HCPCS (ALT 636 FOR OP/ED)

## 2024-09-24 PROCEDURE — 84100 ASSAY OF PHOSPHORUS: CPT | Performed by: INTERNAL MEDICINE

## 2024-09-24 PROCEDURE — 2020000001 HC ICU ROOM DAILY

## 2024-09-24 PROCEDURE — 85025 COMPLETE CBC W/AUTO DIFF WBC: CPT | Performed by: STUDENT IN AN ORGANIZED HEALTH CARE EDUCATION/TRAINING PROGRAM

## 2024-09-24 PROCEDURE — 82947 ASSAY GLUCOSE BLOOD QUANT: CPT

## 2024-09-24 PROCEDURE — 36430 TRANSFUSION BLD/BLD COMPNT: CPT

## 2024-09-24 PROCEDURE — 85610 PROTHROMBIN TIME: CPT | Performed by: SURGERY

## 2024-09-24 PROCEDURE — 36415 COLL VENOUS BLD VENIPUNCTURE: CPT | Performed by: INTERNAL MEDICINE

## 2024-09-24 PROCEDURE — P9016 RBC LEUKOCYTES REDUCED: HCPCS

## 2024-09-24 PROCEDURE — 81003 URINALYSIS AUTO W/O SCOPE: CPT | Performed by: EMERGENCY MEDICINE

## 2024-09-24 PROCEDURE — 83735 ASSAY OF MAGNESIUM: CPT

## 2024-09-24 PROCEDURE — 93005 ELECTROCARDIOGRAM TRACING: CPT

## 2024-09-24 PROCEDURE — 85384 FIBRINOGEN ACTIVITY: CPT

## 2024-09-24 PROCEDURE — 83605 ASSAY OF LACTIC ACID: CPT | Performed by: INTERNAL MEDICINE

## 2024-09-24 PROCEDURE — 2500000005 HC RX 250 GENERAL PHARMACY W/O HCPCS: Performed by: INTERNAL MEDICINE

## 2024-09-24 PROCEDURE — 82248 BILIRUBIN DIRECT: CPT | Performed by: INTERNAL MEDICINE

## 2024-09-24 PROCEDURE — 99223 1ST HOSP IP/OBS HIGH 75: CPT | Performed by: INTERNAL MEDICINE

## 2024-09-24 PROCEDURE — 2500000004 HC RX 250 GENERAL PHARMACY W/ HCPCS (ALT 636 FOR OP/ED): Mod: JZ | Performed by: STUDENT IN AN ORGANIZED HEALTH CARE EDUCATION/TRAINING PROGRAM

## 2024-09-24 PROCEDURE — 99291 CRITICAL CARE FIRST HOUR: CPT

## 2024-09-24 PROCEDURE — 2500000005 HC RX 250 GENERAL PHARMACY W/O HCPCS

## 2024-09-24 RX ORDER — MORPHINE SULFATE 2 MG/ML
2 INJECTION, SOLUTION INTRAMUSCULAR; INTRAVENOUS
Status: DISCONTINUED | OUTPATIENT
Start: 2024-09-24 | End: 2024-09-24

## 2024-09-24 RX ORDER — DEXTROSE 50 % IN WATER (D50W) INTRAVENOUS SYRINGE
12.5
Status: DISCONTINUED | OUTPATIENT
Start: 2024-09-24 | End: 2024-09-29 | Stop reason: HOSPADM

## 2024-09-24 RX ORDER — METRONIDAZOLE 500 MG/100ML
500 INJECTION, SOLUTION INTRAVENOUS EVERY 8 HOURS
Status: DISCONTINUED | OUTPATIENT
Start: 2024-09-24 | End: 2024-09-26

## 2024-09-24 RX ORDER — DEXTROSE 50 % IN WATER (D50W) INTRAVENOUS SYRINGE
25
Status: DISCONTINUED | OUTPATIENT
Start: 2024-09-24 | End: 2024-09-29 | Stop reason: HOSPADM

## 2024-09-24 RX ORDER — ONDANSETRON HYDROCHLORIDE 2 MG/ML
4 INJECTION, SOLUTION INTRAVENOUS EVERY 6 HOURS PRN
Status: DISCONTINUED | OUTPATIENT
Start: 2024-09-24 | End: 2024-09-29 | Stop reason: HOSPADM

## 2024-09-24 RX ORDER — LIDOCAINE HYDROCHLORIDE 20 MG/ML
1 JELLY TOPICAL ONCE AS NEEDED
Status: COMPLETED | OUTPATIENT
Start: 2024-09-24 | End: 2024-09-24

## 2024-09-24 RX ORDER — MAGNESIUM SULFATE HEPTAHYDRATE 40 MG/ML
4 INJECTION, SOLUTION INTRAVENOUS ONCE
Status: COMPLETED | OUTPATIENT
Start: 2024-09-24 | End: 2024-09-24

## 2024-09-24 ASSESSMENT — COGNITIVE AND FUNCTIONAL STATUS - GENERAL
DAILY ACTIVITIY SCORE: 12
TOILETING: A LOT
STANDING UP FROM CHAIR USING ARMS: A LOT
MOVING TO AND FROM BED TO CHAIR: A LOT
CLIMB 3 TO 5 STEPS WITH RAILING: A LOT
WALKING IN HOSPITAL ROOM: A LOT
DRESSING REGULAR LOWER BODY CLOTHING: A LOT
EATING MEALS: A LOT
MOVING FROM LYING ON BACK TO SITTING ON SIDE OF FLAT BED WITH BEDRAILS: A LITTLE
HELP NEEDED FOR BATHING: A LOT
MOBILITY SCORE: 13
DRESSING REGULAR UPPER BODY CLOTHING: A LOT
PERSONAL GROOMING: A LOT
TURNING FROM BACK TO SIDE WHILE IN FLAT BAD: A LOT

## 2024-09-24 ASSESSMENT — PAIN DESCRIPTION - DESCRIPTORS
DESCRIPTORS: ACHING

## 2024-09-24 ASSESSMENT — PAIN - FUNCTIONAL ASSESSMENT
PAIN_FUNCTIONAL_ASSESSMENT: 0-10

## 2024-09-24 ASSESSMENT — PAIN SCALES - GENERAL
PAINLEVEL_OUTOF10: 5 - MODERATE PAIN
PAINLEVEL_OUTOF10: 2
PAINLEVEL_OUTOF10: 7
PAINLEVEL_OUTOF10: 3
PAINLEVEL_OUTOF10: 1
PAINLEVEL_OUTOF10: 2

## 2024-09-24 ASSESSMENT — PAIN DESCRIPTION - ORIENTATION: ORIENTATION: MID;RIGHT

## 2024-09-24 ASSESSMENT — PAIN DESCRIPTION - LOCATION: LOCATION: ABDOMEN

## 2024-09-24 NOTE — PROGRESS NOTES
General Surgery Attending Note    My Acute Care Surgery KATHY (Advanced Practice Provider) evaluated this patient today.  Please see that documentation for details.    I saw the patient with the KATHY today, and personally evaluated and examined the patient.  My findings are consistent with those of the KATHY.        Impression:      Hospital day #2  Postoperative day #9    Overall, since last evening, the patient has clinically stabilized.  Initially after admission in the ICU, the patient did remain hypotensive in the 90s systolic.  He had 2 large bowel movements overnight, which were primarily bright red blood.  He did become a bit more hypotensive thereafter, and did drop his H&H to 5.7 and 18.5.  He thereafter received 2 additional units of packed red blood cells, for a total of 3.  This is in addition to the 2 of FFP and Kcentra.  Thereafter, the patient's H&H did increment appropriately and has remained stable at 8.2 and 25.1.  Repeat INR is 1.5 from 2.0 on admission.  White blood cell count has slowly increased to 18.4.    Overall, the patient feels better.  He has less pain.  He has passed minimal flatus but no additional blood or bowel movements.    On examination, the patient's blood pressure has spontaneously improved with a systolic now in the 110's.  He remains without tachycardia.  His mild tachypnea is improved.  He has no fever.  His abdominal exam only reveals minimal distention with a few scattered bowel sounds and is soft.  He has no peritoneal signs.  He does have a diffuse ecchymosis across the abdominal wall extending to the bilateral flanks.    I did review the admission CT imaging again with Dr. Hans Roth from radiology.  There is a left upper quadrant hematoma likely from postoperative bleeding along the mesenterectomy.  There was perhaps a very small volume of intravenous contrast pooling.  There is some minimal pneumoperitoneum mainly in locules.  The anastomosis does appear to be intact.   There is free fluid otherwise.    Overall impression is intra-abdominal and intraluminal hemorrhage status post laparoscopic segmental colectomy with patient on DOAC (Eliquis) for paroxysmal atrial fibrillation.  Clinically improved status post Kcentra, FFP and PRBCs.      The patient does have a volume of pneumoperitoneum on CT imaging, and anastomotic leak cannot be totally ruled out.  However, the patient has no fever or leukocytosis or evidence on exam of peritonitis.  There is no evidence at this point on CAT scan imaging of the disruption of the anastomosis, although this cannot be totally ruled out.  Patient does have a leukocytosis, but this may be reactive.    Patient also has a degree of NERI.      Plan:      I have discussed this case in detail with Dr. Patton from critical care and Dr. Meléndez from gastroenterology, and appreciate their input  Will continue full supportive care as there is no clear indication at this point for surgical intervention, although this cannot be ruled out for the future  IV fluids  Bowel rest  Continue to hold anticoagulation  Continue cefepime and Flagyl  Close monitoring  Serial H&H  Will avoid nephrotoxins and try to avoid future IV contrast secondary to the NERI  If the patient remains stable, we will proceed with repeat CT scan of the abdomen pelvis with delayed oral contrast and on table oral contrast tomorrow

## 2024-09-24 NOTE — PROGRESS NOTES
"Krish Batista is a 69 y.o. male on day 1 of admission presenting with Pneumoperitoneum.    Subjective   Pt reports pain has improved from initial presentation, but he did receive 0.4 of dilaudid about 2 hours ago. He is nauseous and has vomited clear/yellow emesis multiple times since ~0430. BP had been hovering around low 90s-100 systolic overnight, but is now in the upper 80s during assessment. He had multiple episodes of bright red blood per rectum overnight. He is quite ill appearing.        Objective     Physical Exam  Constitutional:       General: He is not in acute distress.     Appearance: He is ill-appearing. He is not toxic-appearing.   HENT:      Mouth/Throat:      Mouth: Mucous membranes are moist.   Eyes:      General: No scleral icterus.  Cardiovascular:      Rate and Rhythm: Normal rate and regular rhythm.      Heart sounds: Normal heart sounds.      Comments: Radial pulses slightly weak but palpable  Pulmonary:      Effort: Pulmonary effort is normal. No respiratory distress.      Breath sounds: Normal breath sounds.   Abdominal:      General: Bowel sounds are decreased. There is no distension.      Palpations: Abdomen is soft.      Tenderness: There is abdominal tenderness (mild, diffuse). There is no guarding.      Comments: Extensive ecchymosis over most of the abdomen and bilateral flanks, tracking to mid-back, L>R. Firmness at areas of staples consistent with ecchymosis/hematoma.    Skin:     General: Skin is warm and dry.      Coloration: Skin is pale and sallow. Skin is not jaundiced.   Neurological:      General: No focal deficit present.      Mental Status: He is alert and oriented to person, place, and time.   Psychiatric:         Mood and Affect: Mood normal.         Behavior: Behavior normal.         Last Recorded Vitals  Blood pressure 88/53, pulse 86, temperature 36.1 °C (97 °F), temperature source Temporal, resp. rate 22, height 1.88 m (6' 2\"), weight 90.7 kg (200 lb), SpO2 " 100%.  Intake/Output last 3 Shifts:  I/O last 3 completed shifts:  In: 3130.5 (34.5 mL/kg) [I.V.:567.5 (6.3 mL/kg); Blood:1363; IV Piggyback:1200]  Out: 0 (0 mL/kg)   Weight: 90.7 kg     Relevant Results  Results for orders placed or performed during the hospital encounter of 09/23/24 (from the past 24 hour(s))   CBC and Auto Differential   Result Value Ref Range    WBC 8.3 4.4 - 11.3 x10*3/uL    nRBC 0.2 (H) 0.0 - 0.0 /100 WBCs    RBC 2.88 (L) 4.50 - 5.90 x10*6/uL    Hemoglobin 7.7 (L) 13.5 - 17.5 g/dL    Hematocrit 26.0 (L) 41.0 - 52.0 %    MCV 90 80 - 100 fL    MCH 26.7 26.0 - 34.0 pg    MCHC 29.6 (L) 32.0 - 36.0 g/dL    RDW 26.2 (H) 11.5 - 14.5 %    Platelets 334 150 - 450 x10*3/uL    Neutrophils % 77.8 40.0 - 80.0 %    Immature Granulocytes %, Automated 0.6 0.0 - 0.9 %    Lymphocytes % 9.9 13.0 - 44.0 %    Monocytes % 6.3 2.0 - 10.0 %    Eosinophils % 5.3 0.0 - 6.0 %    Basophils % 0.1 0.0 - 2.0 %    Neutrophils Absolute 6.48 1.20 - 7.70 x10*3/uL    Immature Granulocytes Absolute, Automated 0.05 0.00 - 0.70 x10*3/uL    Lymphocytes Absolute 0.82 (L) 1.20 - 4.80 x10*3/uL    Monocytes Absolute 0.52 0.10 - 1.00 x10*3/uL    Eosinophils Absolute 0.44 0.00 - 0.70 x10*3/uL    Basophils Absolute 0.01 0.00 - 0.10 x10*3/uL   Comprehensive metabolic panel   Result Value Ref Range    Glucose 138 (H) 74 - 99 mg/dL    Sodium 136 136 - 145 mmol/L    Potassium 4.3 3.5 - 5.3 mmol/L    Chloride 105 98 - 107 mmol/L    Bicarbonate 21 21 - 32 mmol/L    Anion Gap 14 10 - 20 mmol/L    Urea Nitrogen 26 (H) 6 - 23 mg/dL    Creatinine 1.21 0.50 - 1.30 mg/dL    eGFR 64 >60 mL/min/1.73m*2    Calcium 7.9 (L) 8.6 - 10.3 mg/dL    Albumin 3.4 3.4 - 5.0 g/dL    Alkaline Phosphatase 57 33 - 136 U/L    Total Protein 5.8 (L) 6.4 - 8.2 g/dL    AST 77 (H) 9 - 39 U/L    Bilirubin, Total 1.7 (H) 0.0 - 1.2 mg/dL    ALT 51 10 - 52 U/L   Lactate   Result Value Ref Range    Lactate 3.6 (H) 0.4 - 2.0 mmol/L   Type and Screen   Result Value Ref Range    ABO  TYPE AB     Rh TYPE POS     ANTIBODY SCREEN NEG    B-type natriuretic peptide   Result Value Ref Range     (H) 0 - 99 pg/mL   Troponin I, High Sensitivity, Initial   Result Value Ref Range    Troponin I, High Sensitivity 18 0 - 20 ng/L   Morphology   Result Value Ref Range    RBC Morphology See Below     Polychromasia Mild     Hypochromia Mild     RBC Fragments Few     Ovalocytes Few     Christopher Cells Few     Acanthocytes Few     Clumped Platelets Present    Protime-INR   Result Value Ref Range    Protime 22.6 (H) 9.8 - 12.8 seconds    INR 2.0 (H) 0.9 - 1.1   Prepare RBC: 1 Units   Result Value Ref Range    PRODUCT CODE S4771X61     Unit Number [id number]     Unit ABO B     Unit RH POS     XM INTEP COMP     Dispense Status TR     Blood Expiration Date 9/26/2024 11:59:00 PM EDT     PRODUCT BLOOD TYPE 7300     UNIT VOLUME 350    Prepare Plasma: 1 Units   Result Value Ref Range    PRODUCT CODE K7127H17     Unit Number [id number]     Unit ABO AB     Unit RH POS     Dispense Status TR     Blood Expiration Date 9/28/2024  7:24:00 PM EDT     PRODUCT BLOOD TYPE 8400     UNIT VOLUME 206    Troponin, High Sensitivity, 1 Hour   Result Value Ref Range    Troponin I, High Sensitivity 17 0 - 20 ng/L   Lactate   Result Value Ref Range    Lactate 1.5 0.4 - 2.0 mmol/L   Blood Culture    Specimen: Peripheral Venipuncture; Blood culture   Result Value Ref Range    Blood Culture Loaded on Instrument - Culture in progress    Blood Culture    Specimen: Peripheral Venipuncture; Blood culture   Result Value Ref Range    Blood Culture Loaded on Instrument - Culture in progress    Prepare RBC: 1 Units   Result Value Ref Range    PRODUCT CODE O8263Z90     Unit Number [id number]     Unit ABO O     Unit RH POS     XM INTEP COMP     Dispense Status TR     Blood Expiration Date 10/8/2024 11:59:00 PM EDT     PRODUCT BLOOD TYPE      UNIT VOLUME 400    Prepare Plasma: 1 Units   Result Value Ref Range    PRODUCT CODE T3214P88      Unit Number [id number]     Unit ABO AB     Unit RH POS     Dispense Status TR     Blood Expiration Date 9/28/2024 10:46:00 PM EDT     PRODUCT BLOOD TYPE 8400     UNIT VOLUME 302    Blood Gas Arterial   Result Value Ref Range    POCT pH, Arterial 7.42 7.38 - 7.42 pH    POCT pCO2, Arterial 28 (L) 38 - 42 mm Hg    POCT pO2, Arterial 119 (H) 85 - 95 mm Hg    POCT SO2, Arterial 100 94 - 100 %    POCT Oxy Hemoglobin, Arterial 96.2 94.0 - 98.0 %    POCT Base Excess, Arterial -4.9 (L) -2.0 - 3.0 mmol/L    POCT HCO3 Calculated, Arterial 18.2 (L) 22.0 - 26.0 mmol/L    Patient Temperature 37.0 degrees Celsius    FiO2 21 %    Site of Arterial Puncture Radial Right    Hemoglobin and hematocrit, blood   Result Value Ref Range    Hemoglobin 5.7 (LL) 13.5 - 17.5 g/dL    Hematocrit 18.5 (L) 41.0 - 52.0 %   Lactate   Result Value Ref Range    Lactate 1.9 0.4 - 2.0 mmol/L   CBC and Auto Differential   Result Value Ref Range    WBC 10.9 4.4 - 11.3 x10*3/uL    nRBC 0.0 0.0 - 0.0 /100 WBCs    RBC 2.09 (L) 4.50 - 5.90 x10*6/uL    Hemoglobin 5.7 (LL) 13.5 - 17.5 g/dL    Hematocrit 18.5 (L) 41.0 - 52.0 %    MCV 89 80 - 100 fL    MCH 27.3 26.0 - 34.0 pg    MCHC 30.8 (L) 32.0 - 36.0 g/dL    RDW 23.4 (H) 11.5 - 14.5 %    Platelets 316 150 - 450 x10*3/uL    Neutrophils % 88.5 40.0 - 80.0 %    Immature Granulocytes %, Automated 1.0 (H) 0.0 - 0.9 %    Lymphocytes % 5.2 13.0 - 44.0 %    Monocytes % 5.1 2.0 - 10.0 %    Eosinophils % 0.1 0.0 - 6.0 %    Basophils % 0.1 0.0 - 2.0 %    Neutrophils Absolute 9.64 (H) 1.20 - 7.70 x10*3/uL    Immature Granulocytes Absolute, Automated 0.11 0.00 - 0.70 x10*3/uL    Lymphocytes Absolute 0.57 (L) 1.20 - 4.80 x10*3/uL    Monocytes Absolute 0.56 0.10 - 1.00 x10*3/uL    Eosinophils Absolute 0.01 0.00 - 0.70 x10*3/uL    Basophils Absolute 0.01 0.00 - 0.10 x10*3/uL   Morphology   Result Value Ref Range    RBC Morphology See Below     Polychromasia Mild     Hypochromia Mild     RBC Fragments Few     Ovalocytes  Few     Acanthocytes Few     Giant Platelets Few    Prepare RBC: 2 Units   Result Value Ref Range    PRODUCT CODE A9402Z86     Unit Number [id number]     Unit ABO B     Unit RH POS     XM INTEP COMP     Dispense Status TR     Blood Expiration Date 9/26/2024 11:59:00 PM EDT     PRODUCT BLOOD TYPE 7300     UNIT VOLUME 350    Hepatic Function Panel   Result Value Ref Range    Albumin 3.0 (L) 3.4 - 5.0 g/dL    Bilirubin, Total 2.3 (H) 0.0 - 1.2 mg/dL    Bilirubin, Direct 0.5 (H) 0.0 - 0.3 mg/dL    Alkaline Phosphatase 43 33 - 136 U/L    ALT 29 10 - 52 U/L    AST 28 9 - 39 U/L    Total Protein 4.8 (L) 6.4 - 8.2 g/dL   Phosphorus   Result Value Ref Range    Phosphorus 5.3 (H) 2.5 - 4.9 mg/dL   Basic Metabolic Panel   Result Value Ref Range    Glucose 155 (H) 74 - 99 mg/dL    Sodium 136 136 - 145 mmol/L    Potassium 4.9 3.5 - 5.3 mmol/L    Chloride 106 98 - 107 mmol/L    Bicarbonate 21 21 - 32 mmol/L    Anion Gap 14 10 - 20 mmol/L    Urea Nitrogen 30 (H) 6 - 23 mg/dL    Creatinine 1.61 (H) 0.50 - 1.30 mg/dL    eGFR 46 (L) >60 mL/min/1.73m*2    Calcium 7.3 (L) 8.6 - 10.3 mg/dL   CBC   Result Value Ref Range    WBC 12.2 (H) 4.4 - 11.3 x10*3/uL    nRBC 0.2 (H) 0.0 - 0.0 /100 WBCs    RBC 3.00 (L) 4.50 - 5.90 x10*6/uL    Hemoglobin 8.6 (L) 13.5 - 17.5 g/dL    Hematocrit 25.9 (L) 41.0 - 52.0 %    MCV 86 80 - 100 fL    MCH 28.7 26.0 - 34.0 pg    MCHC 33.2 32.0 - 36.0 g/dL    RDW 18.5 (H) 11.5 - 14.5 %    Platelets 313 150 - 450 x10*3/uL           Assessment/Plan   Krish Batista  is a 68 yo male with PMH significant for iron deficiency anemia, colon cancer w/ LGIB s/p segmental colectomy (9/15 with Dr. Clark), and Afib with RVR who presented to [facility] ED on 9/23 for rectal bleeding and weakness. Pt reports 4 days of rectal bleeding prior to admission.     Pt was recently admitted for NSTEMI and Afib w/ RVR and underwent segmental colectomy for colon cancer. Post-op course complicated by acute blood loss anemia requiring 2u  pRBCs. He was noted to have significant ecchymosis of his scrotum which likely represented old surgical blood tracking down and pooling in the scrotum. Hgb stabilized and he was started on eliquis BID on the day of discharge.       Initial Hgb was similar to Hgb at time of discharge, but he was hypotensive and lightheaded, so he was given 1u pRBCs, and 2u FFP +Kcentra for eliquis reversal in the ED. Hgb dropped to 5.6 after IVF resuscitation, but BP improved. He was given another 2u pRBCs in the ICU, but BP is again down trending and pt is now nauseous with a few episodes of emesis.     Procedures/Significant events:  9/15: Laparoscopic segmental colectomy with mobilization of splenic flexure  9/16 - 1u pRBC  9/17 - 1u pRBC  9/23 - 1up RBC, 2uFFP, Kcentra  9/24 - 1u pRBC    Assessment and Plan:    #S/p segmental colectomy  #Post-op bleeding on AC  #Hemorrhagic shock  - Rectal bleeding coincided with initiation of AC, but pt had some evidence of intra-abdominal post-op bleeding prior to this  - Concern for anastomotic bleed vs intraabdominal bleed  - Transfuse for Hgb <7 or hemodynamic instability  - NPO  - mIVF  - Trend CBC Q6  - Check PT/INR  - No DVT chemoprophylaxis, SCDs only  - May need repeat surgical vs IR intervention if bleeding does not stabilize  - Continue to monitor closely    #Hyperbilirubinemia  - D/t extensive abdominal bleeding  - No concern for hepatobiliary cause   - Continue to monitor    #H/o Afib w/ RVR  - Currently in NSR  - Hold AC  - May need prolonged time off AC to ensure no repeat bleed  - Agree with cardiology consult    Dispo: Continue care in ICU    General surgery will follow closely. Please inform surgery KATHY promptly of any changes in patient's condition.    Patient seen and discussed with attending surgeon, Dr. Amber Bennett PA-C

## 2024-09-24 NOTE — SIGNIFICANT EVENT
Critical Care Progress Note      Patient seen and evaluated on transferred from ER.  Steady but significant abdominal pain.  Repeat hemoglobin dropped to 5.7.  2 units of packed red blood cells ordered.  Will continue to monitor.  Vitals remained stable      Bassam Erickson MD

## 2024-09-24 NOTE — PROGRESS NOTES
09/24/24 1517   Discharge Planning   Living Arrangements Spouse/significant other   Support Systems Family members   Assistance Needed Independent   Type of Residence Private residence   Do you have animals or pets at home? No   Home or Post Acute Services None   Expected Discharge Disposition Home   Does the patient need discharge transport arranged? No   Patient Choice   Patient / Family choosing to utilize agency / facility established prior to hospitalization No     Care transitions assessment completed via bedside with patient and family. TCC introduced self and explained role. Demographics verified. Patient from home alone, but mekhi be staying with Mariama at discharge.  Independent PTA. Denies use of assistive devices. Denies SW needs at this time. Patient anticipates no skilled needs at discharge.  Patient's family to transport home at time of discharge. TCC to continue to follow for discharge planning needs.      PCP: Dr. Matthew Small Last seen: 7/2024  Pharmacy: [facility]   Insurance: [employer] Medicare   Dispo: Home with family, no skilled needs identified at this time.     JACK DUPONT, RN TCC

## 2024-09-24 NOTE — CARE PLAN
The patient's goals for the shift include to feel better with no nausea and to talk and move with less pain.      The clinical goals for the shift include pt to remain hemodynamically stable, have no bowel movements with blood loss.      Problem: Nutrition  Goal: Oral intake greater than 50%  Outcome: Not Progressing  Goal: Oral intake greater 75%  Outcome: Not Progressing  Goal: Consume prescribed supplement  Outcome: Not Progressing  Goal: Adequate PO fluid intake  Outcome: Not Progressing  Goal: Nutrition support goals are met within 48 hrs  Outcome: Not Progressing  Goal: Nutrition support is meeting 75% of nutrient needs  Outcome: Not Progressing     Problem: Fall/Injury  Goal: Use assistive devices by end of the shift  Outcome: Not Progressing     Problem: Pain  Goal: Walks with improved pain control throughout the shift  Outcome: Not Progressing  Goal: Participates in PT with improved pain control throughout the shift  Outcome: Not Progressing

## 2024-09-24 NOTE — SIGNIFICANT EVENT
Hx afib, NSTEMI thought to be due to embolic phenomena from afib, and recently diagnosed colon adeno with repeated GIB who is now s/p hemicolectomy 9/15 presenting with intra-abdominal bleed with hemorrhagic shock (not requiring pressors) after doac initiation for his afib post op.     #Intra-abdominal hemorrhage post op s/p K-centra, FFP and PRBCs  #Hemorrhagic shock - improved after resuscitation  -Follow up surgery recs  -q6h CBC  -Transfuse for Hgb <7, plt <50k, fibrinogen <100 and INR >2  -Cefepime/flagyl for intra-abdominal coverage  -Hold AC  -Strict I/Os  -Bowel rest, hold PO meds  -Dilaudid PRN for pain, avoiding morphine given worsening renal failure    #Afib on DOAC with bleed s/p K-centra  -Holding beta blocker and DOAC  -Cardiology c/s     #NERI due to bleed - hypovolemic/hypotensive  -Villela for strict I/Os  -Reuscitation with blood products  -Trend RFP  -Avoid nephrotoxins    #Leukocytosis - likely reactive from bleed vs intra-abdominal infection  -Cefepime/flagyl  -Follow up surgery recs regarding surgical intervention    Lines/tubes: PIVs  Prophylaxis: SCDs, PPI  Drips: none  Fluids: mIVF  Oxygen: RA  Nutrition: Strict NPO  Restraints: none  Code status: DNR/DNI  Dispo: ICU

## 2024-09-24 NOTE — H&P
[facility] Pulmonary and Critical Care Medicine   History and Physical        Subjective   Patient is a 69 y.o. male admitted on 9/23/2024  4:49 PM with chief complaint of weakness.     HPI:  69-year-old male past medical history of colon cancer, iron deficiency anemia recent sigmoid colectomy presents with weakness.  Patient was admitted with chest pain 2 weeks ago.  Underwent heart cath without any intervention needed.  Patient also had laparoscopic segmental colectomy with mobilization of splenic flexure on September 15.  Patient has history of A-fib and is on Eliquis    He has felt lightheaded since surgery.  He said bloody bowel movements for the past 4 days.  Described bright red blood.  Noticed more shortness of breath with exertion.    Patient was worked up in the ED with both laboratory studies and imaging studies.   CT scan of the abdomen shows possible bleeding at the surgery site.  Pooling of contrast.  Evidence of suture line dehiscence seen on CT scan.    Hemoglobin 7.7 in the emergency department    ED therapeutics consisted of FFP, 1 unit of packed red blood cells, and Kcentra given.    Surgery consulted.    Past Medical History:  Past Medical History:   Diagnosis Date    Personal history of other diseases of the circulatory system     History of Raynaud's syndrome       Past Surgical History:  Past Surgical History:   Procedure Laterality Date    CARDIAC CATHETERIZATION N/A 9/12/2024    Procedure: Left Heart Cath, With LV;  Surgeon: Bob Rosario MD PhD;  Location: [facility] Cardiac Cath Lab;  Service: Cardiovascular;  Laterality: N/A;  Radial    OTHER SURGICAL HISTORY  07/13/2021    Sinus surgery    OTHER SURGICAL HISTORY  07/13/2021    Testicular surgery    OTHER SURGICAL HISTORY  07/13/2021    Tonsillectomy        Family History:  No family history on file.     Social History:   reports that he has never smoked. He has never used smokeless tobacco. He reports current alcohol use. He  reports that he does not use drugs.    Scheduled Medications:   cefepime, 2 g, intravenous, q8h  pantoprazole, 40 mg, intravenous, Daily         Continuous Medications:         PRN Medications:       Review of systems:   Review of Systems   All other systems reviewed and are negative.     A 10+ point ROS was completed and otherwise negative except as noted above and per HPI.      Objective   Vitals:  Most Recent:  Vitals:    09/23/24 2145   BP: 88/50   Pulse: 81   Resp: 20   Temp:    SpO2: 100%       24hr Min/Max:  Temp  Min: 36.7 °C (98.1 °F)  Max: 36.7 °C (98.1 °F)  Pulse  Min: 69  Max: 87  BP  Min: 64/37  Max: 100/49  Resp  Min: 14  Max: 21  SpO2  Min: 96 %  Max: 100 %    LDA:          Vent settings:       Hemodynamic parameters for last 24 hours:         Intake/Output Summary (Last 24 hours) at 9/23/2024 2218  Last data filed at 9/23/2024 2145  Gross per 24 hour   Intake 1600 ml   Output --   Net 1600 ml         Physical exam:    Physical Exam  Vitals reviewed.   Constitutional:       Appearance: Normal appearance.   HENT:      Head: Normocephalic and atraumatic.      Mouth/Throat:      Mouth: Mucous membranes are moist.   Eyes:      Extraocular Movements: Extraocular movements intact.      Pupils: Pupils are equal, round, and reactive to light.   Cardiovascular:      Rate and Rhythm: Normal rate and regular rhythm.   Pulmonary:      Effort: Pulmonary effort is normal.      Breath sounds: Normal breath sounds and air entry.   Abdominal:      General: Abdomen is flat. Bowel sounds are normal.      Palpations: Abdomen is soft.      Tenderness: There is abdominal tenderness.   Musculoskeletal:         General: Normal range of motion.      Cervical back: Normal range of motion and neck supple.   Skin:     General: Skin is warm and dry.   Neurological:      General: No focal deficit present.      Mental Status: He is alert and oriented to person, place, and time. Mental status is at baseline.           Lab/Radiology/Diagnostic Review:  Results for orders placed or performed during the hospital encounter of 09/23/24 (from the past 24 hour(s))   CBC and Auto Differential   Result Value Ref Range    WBC 8.3 4.4 - 11.3 x10*3/uL    nRBC 0.2 (H) 0.0 - 0.0 /100 WBCs    RBC 2.88 (L) 4.50 - 5.90 x10*6/uL    Hemoglobin 7.7 (L) 13.5 - 17.5 g/dL    Hematocrit 26.0 (L) 41.0 - 52.0 %    MCV 90 80 - 100 fL    MCH 26.7 26.0 - 34.0 pg    MCHC 29.6 (L) 32.0 - 36.0 g/dL    RDW 26.2 (H) 11.5 - 14.5 %    Platelets 334 150 - 450 x10*3/uL    Neutrophils % 77.8 40.0 - 80.0 %    Immature Granulocytes %, Automated 0.6 0.0 - 0.9 %    Lymphocytes % 9.9 13.0 - 44.0 %    Monocytes % 6.3 2.0 - 10.0 %    Eosinophils % 5.3 0.0 - 6.0 %    Basophils % 0.1 0.0 - 2.0 %    Neutrophils Absolute 6.48 1.20 - 7.70 x10*3/uL    Immature Granulocytes Absolute, Automated 0.05 0.00 - 0.70 x10*3/uL    Lymphocytes Absolute 0.82 (L) 1.20 - 4.80 x10*3/uL    Monocytes Absolute 0.52 0.10 - 1.00 x10*3/uL    Eosinophils Absolute 0.44 0.00 - 0.70 x10*3/uL    Basophils Absolute 0.01 0.00 - 0.10 x10*3/uL   Comprehensive metabolic panel   Result Value Ref Range    Glucose 138 (H) 74 - 99 mg/dL    Sodium 136 136 - 145 mmol/L    Potassium 4.3 3.5 - 5.3 mmol/L    Chloride 105 98 - 107 mmol/L    Bicarbonate 21 21 - 32 mmol/L    Anion Gap 14 10 - 20 mmol/L    Urea Nitrogen 26 (H) 6 - 23 mg/dL    Creatinine 1.21 0.50 - 1.30 mg/dL    eGFR 64 >60 mL/min/1.73m*2    Calcium 7.9 (L) 8.6 - 10.3 mg/dL    Albumin 3.4 3.4 - 5.0 g/dL    Alkaline Phosphatase 57 33 - 136 U/L    Total Protein 5.8 (L) 6.4 - 8.2 g/dL    AST 77 (H) 9 - 39 U/L    Bilirubin, Total 1.7 (H) 0.0 - 1.2 mg/dL    ALT 51 10 - 52 U/L   Lactate   Result Value Ref Range    Lactate 3.6 (H) 0.4 - 2.0 mmol/L   Type and Screen   Result Value Ref Range    ABO TYPE AB     Rh TYPE POS     ANTIBODY SCREEN NEG    B-type natriuretic peptide   Result Value Ref Range     (H) 0 - 99 pg/mL   Troponin I, High Sensitivity,  Initial   Result Value Ref Range    Troponin I, High Sensitivity 18 0 - 20 ng/L   Morphology   Result Value Ref Range    RBC Morphology See Below     Polychromasia Mild     Hypochromia Mild     RBC Fragments Few     Ovalocytes Few     [place] Cells Few     Acanthocytes Few     Clumped Platelets Present    Protime-INR   Result Value Ref Range    Protime 22.6 (H) 9.8 - 12.8 seconds    INR 2.0 (H) 0.9 - 1.1   Prepare RBC: 1 Units   Result Value Ref Range    PRODUCT CODE I0433V40     Unit Number [id number]     Unit ABO B     Unit RH POS     XM INTEP COMP     Dispense Status TR     Blood Expiration Date 9/26/2024 11:59:00 PM EDT     PRODUCT BLOOD TYPE 7300     UNIT VOLUME 350    Prepare Plasma: 1 Units   Result Value Ref Range    PRODUCT CODE T2172J45     Unit Number [id number]     Unit ABO AB     Unit RH POS     Dispense Status XM     Blood Expiration Date 9/28/2024  7:24:00 PM EDT     PRODUCT BLOOD TYPE 8400     UNIT VOLUME 206    Troponin, High Sensitivity, 1 Hour   Result Value Ref Range    Troponin I, High Sensitivity 17 0 - 20 ng/L   Lactate   Result Value Ref Range    Lactate 1.5 0.4 - 2.0 mmol/L   Prepare RBC: 1 Units   Result Value Ref Range    PRODUCT CODE H7643P47     Unit Number [id number]     Unit ABO O     Unit RH POS     XM INTEP COMP     Dispense Status XM     Blood Expiration Date 10/8/2024 11:59:00 PM EDT     PRODUCT BLOOD TYPE      UNIT VOLUME 400        All other labs and Imaging have been personally reviewed.         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 He does have evidence of bleeding on full dose Eliquis. He has blood per rectum, evidence of intra-abdominal blood and IV contrast pooling, and diffuse ecchymosis to the abdominal wall.      Neuro:      Cardiovascular  Atrial fibrillation  History of A-fib with RVR  History of non-ST ovation MI  Hypertension    Stable  currently.  Hold beta-blocker due to hypotension  Monitor Hgb       Pulmonary:  Stable  Oxygen by canula    GI:  Surgery following/   See CT report  Possible anastamosis bleed    Renal:   Monitor Cr. Urine output  IV fluids ordered    Endocrine:      Heme/Onc:  Acute Blood loss anemia on eliquis    Kcentra / FFP / I unit PRBCs given  Check CBC and Coags frequently    ID:  Peritonitis ?  Continue Zoysn    Skin/MSK:  No acute issues      I have personally spent 55 minutes of critical care time, exclusive of time spent on any procedures, in evaluation and management of this critically ill patient’s condition mentioned above in the assessment and plan.     Bassam Erickson MD  Pulmonary & Critical Care Attending   [id number]    Please excuse any typographical or unwanted errors within this documentation as voice recognition software was used to dictate this note.

## 2024-09-24 NOTE — PROGRESS NOTES
"Critical Care Progress Note    Patient Name: Krish Batista   YOB: 1955    Subjective:  Patient endorses peristenet abdominal pain. He denies any additional bowel movements since coming down to the unit. He mentions that he has had urinary urgency but has had trouble urinating.     Objective:    BP 88/53   Pulse 86   Temp 36.1 °C (97 °F) (Temporal)   Resp 22   Ht 1.88 m (6' 2\")   Wt 90.7 kg (200 lb)   SpO2 100%   BMI 25.68 kg/m²     Physical Exam:  GEN: diaphoretic, ill-appearing  HEENT: PERRL, EOMI, MMM OP clear, TMs clear bilaterally  NECK: supple, no cervical LAD, no carotid bruits  CV: S1, S2, regular, no murmur  PULM: CTAB  ABD: diffusely mildly tender to palpation, decreased bowel sounds, extensive ecchymosis over most of the abdomen and bilateral flanks, tracking to mid back.  EXT: no LE edema  NEURO: no gross focal deficits  PSYCH: appropriate affect     Assessment/Plan:  Krish Batista is a 70yo man with PMH of recently diagnosed colon cancer, PIERRE, pAF, CAD (recent hospital admission for NSTEMI) who underwent laparoscopic segmental colectomy on 9/15 with course complicated by Afib with RVR. Patient had been initiated on Eliquis at time of discharge 9/19. Patient admitted to ICU for concern for hemorrhagic shock on 9/23.     Neuro:  - Patient orientated to person place and time  - Monitor for ICU delirium  - Patient requires no sedation or analgesia  - Maintain sleep hygiene  - Will discontinue PRN morphine given NERI, continue with PRN dilaudid    Cardiovascular  #Risk for hemorrhagic shock in setting of intra-abdominal hemorrhage  #pAfib  #Hx of NSTEMI  #HTN  Echo 9/12 EF 50-55%  - Hold beta-blocker and antihypertensives due to hypotension  - Monitor Hgb    Pulmonary:  - No acute issues, patient on RA    GI:  #Intrabdominal hemorrhage  #Possible Anastomotic Bleed  #Concern for retroperitoneal bleed  - Surgery following, will consult GI as well for concurrent intervention  - Strict " NPO  - Continue with broad-spectrum anaerobic coverage with Cefepime and Flagyl    Renal:   #NERI, likely pre-renal  - Monitor urine output  - Continue IVF  - Will place escobar for accurate I/O    Endocrine:  - POCT glucose Q4 given NPO, hypoglycemia protocol    Heme/Onc:  #Acute Blood Loss Anemia on Eliquis  #Leukocytosis, likely reactive  9/23: Given Kcentra/1 unit FFP/1 unit pRBC  9/24: Given 2 units pRBC  - Maintain fibrinogen > 150, plt > 50, Hgb > 7; CBC Q6    ID:  #Concern for peritonitis  - Continue Cefepime and flagyl    Skin/MSK:  #Abdominal Ecchymoses  #Superficial hematomas  - Continue to monitor     ICU CHECK LIST:   Antimicrobials: Cefepime and Flagyl (day 2 abx)  Oxygen: RA  Feeding: Strict NPO  Fluids: LR 75 ml/hr  Analgesia: PRN dilaudid  Sedation: None  Thromboprophylaxis: Held in setting of hemorrhage   Ulcer prophylaxis: PPI  Glycemic control: None  Bowel care: PRN  Indwelling catheters: Escobar  Lines: PIV x2    Code Status: DNR/DNI       Byron Vang MD  Critical Care

## 2024-09-24 NOTE — CONSULTS
Reason For Consul :  Rectal bleeding    History Of Present Illness  Krish Batista is a 69 y.o. male who is presenting with rectal bleeding.  This patient was found to be anemic and underwent colonoscopy on 9/15/2024 at which time no obstructing carcinoma was noted that 40 cm..  He underwent cholectomy at which time it was noticed that the carcinoma was in the splenic flexure which had to be mobilized.  Following the surgery he had a minimal rectal bleeding requiring 1 unit of blood transfusion and it subsided itself.  However he developed atrial fibrillation and had to be started on Eliquis.  The patient was seen and examined his niece was by the bedside.  Both the knees and the patient claimed that he was having a small amounts of bleeding every day at home.  However he noticed more bleeding yesterday and was told to go and have a hemoglobin level drawn.  At that time he almost collapsed and was taken to the ER he was found to a hemoglobin of 7.7 g which is almost the same as it was when he was discharged.  Subsequently his hemoglobin went down to almost 5.9 during the night and he altogether has had 3 units of blood to fresh frozen plasma was and Kcentra.  His last dose of Eliquis is probably 24 hours ago which was yesterday morning.  He has his last moment was around 4:30 AM today and no further bowel movement.  Patient has been treated with 3 units of packed cells, 2 FFP and K Centra's hemoglobin has now gone up to 7.7 g       Past Medical History  He has a past medical history of Personal history of other diseases of the circulatory system.    Surgical History  He has a past surgical history that includes Other surgical history (07/13/2021); Other surgical history (07/13/2021); Other surgical history (07/13/2021); and Cardiac catheterization (N/A, 9/12/2024).     Social History  He reports that he has never smoked. He has never used smokeless tobacco. He reports current alcohol use. He reports that he does  "not use drugs.    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 He is normal weight.   HENT:      Head: Normocephalic.      Nose: Nose normal.      Mouth/Throat:      Mouth: Mucous membranes are moist.   Eyes:      Pupils: Pupils are equal, round, and reactive to light.   Cardiovascular:      Rate and Rhythm: Regular rhythm. Tachycardia present.      Heart sounds: No murmur heard.     No friction rub.      Comments: Patient is not in atrial fibrillation at the present time but the monitor  Pulmonary:      Effort: Pulmonary effort is normal.      Breath sounds: Normal breath sounds.   Abdominal:      General: Abdomen is flat. Bowel sounds are normal.      Palpations: Abdomen is soft.   Musculoskeletal:      Cervical back: Normal range of motion and neck supple.   Neurological:      Mental Status: He is alert.     Patient has extensive ecchymosis involving the anterior abdominal wall the sacroiliac area and both the sides of the pelvis initially she is present in the anterior abdominal wall     Last Recorded Vitals  Blood pressure 97/60, pulse 84, temperature 36.2 °C (97.2 °F), temperature source Temporal, resp. rate 18, height 1.88 m (6' 2\"), weight 90.7 kg (200 lb), SpO2 100%.    Relevant Results  Results for orders placed or performed during the hospital encounter of 09/23/24 (from the past 96 hour(s))   CBC and Auto Differential   Result Value Ref Range    WBC 8.3 4.4 - 11.3 x10*3/uL    nRBC 0.2 (H) 0.0 - 0.0 /100 WBCs    RBC 2.88 (L) 4.50 - 5.90 x10*6/uL    Hemoglobin 7.7 (L) 13.5 - 17.5 g/dL    Hematocrit 26.0 (L) 41.0 - 52.0 %    MCV 90 80 - 100 fL    MCH 26.7 26.0 - 34.0 pg    MCHC 29.6 (L) 32.0 - 36.0 g/dL    " RDW 26.2 (H) 11.5 - 14.5 %    Platelets 334 150 - 450 x10*3/uL    Neutrophils % 77.8 40.0 - 80.0 %    Immature Granulocytes %, Automated 0.6 0.0 - 0.9 %    Lymphocytes % 9.9 13.0 - 44.0 %    Monocytes % 6.3 2.0 - 10.0 %    Eosinophils % 5.3 0.0 - 6.0 %    Basophils % 0.1 0.0 - 2.0 %    Neutrophils Absolute 6.48 1.20 - 7.70 x10*3/uL    Immature Granulocytes Absolute, Automated 0.05 0.00 - 0.70 x10*3/uL    Lymphocytes Absolute 0.82 (L) 1.20 - 4.80 x10*3/uL    Monocytes Absolute 0.52 0.10 - 1.00 x10*3/uL    Eosinophils Absolute 0.44 0.00 - 0.70 x10*3/uL    Basophils Absolute 0.01 0.00 - 0.10 x10*3/uL   Comprehensive metabolic panel   Result Value Ref Range    Glucose 138 (H) 74 - 99 mg/dL    Sodium 136 136 - 145 mmol/L    Potassium 4.3 3.5 - 5.3 mmol/L    Chloride 105 98 - 107 mmol/L    Bicarbonate 21 21 - 32 mmol/L    Anion Gap 14 10 - 20 mmol/L    Urea Nitrogen 26 (H) 6 - 23 mg/dL    Creatinine 1.21 0.50 - 1.30 mg/dL    eGFR 64 >60 mL/min/1.73m*2    Calcium 7.9 (L) 8.6 - 10.3 mg/dL    Albumin 3.4 3.4 - 5.0 g/dL    Alkaline Phosphatase 57 33 - 136 U/L    Total Protein 5.8 (L) 6.4 - 8.2 g/dL    AST 77 (H) 9 - 39 U/L    Bilirubin, Total 1.7 (H) 0.0 - 1.2 mg/dL    ALT 51 10 - 52 U/L   Lactate   Result Value Ref Range    Lactate 3.6 (H) 0.4 - 2.0 mmol/L   Type and Screen   Result Value Ref Range    ABO TYPE AB     Rh TYPE POS     ANTIBODY SCREEN NEG    B-type natriuretic peptide   Result Value Ref Range     (H) 0 - 99 pg/mL   Troponin I, High Sensitivity, Initial   Result Value Ref Range    Troponin I, High Sensitivity 18 0 - 20 ng/L   Morphology   Result Value Ref Range    RBC Morphology See Below     Polychromasia Mild     Hypochromia Mild     RBC Fragments Few     Ovalocytes Few     Wojciech Cells Few     Acanthocytes Few     Clumped Platelets Present    ECG 12 Lead   Result Value Ref Range    Ventricular Rate 77 BPM    Atrial Rate 77 BPM    LA Interval 122 ms    QRS Duration 84 ms    QT Interval 494 ms    QTC  Calculation(Bazett) 559 ms    P Axis 52 degrees    R Axis 48 degrees    T Axis 17 degrees    QRS Count 13 beats    Q Onset 220 ms    P Onset 159 ms    P Offset 207 ms    T Offset 467 ms    QTC Fredericia 536 ms   Protime-INR   Result Value Ref Range    Protime 22.6 (H) 9.8 - 12.8 seconds    INR 2.0 (H) 0.9 - 1.1   Prepare RBC: 1 Units   Result Value Ref Range    PRODUCT CODE K6028C69     Unit Number [id number]     Unit ABO B     Unit RH POS     XM INTEP COMP     Dispense Status TR     Blood Expiration Date 9/26/2024 11:59:00 PM EDT     PRODUCT BLOOD TYPE 7300     UNIT VOLUME 350    Prepare Plasma: 1 Units   Result Value Ref Range    PRODUCT CODE J2075D48     Unit Number [id number]     Unit ABO AB     Unit RH POS     Dispense Status TR     Blood Expiration Date 9/28/2024  7:24:00 PM EDT     PRODUCT BLOOD TYPE 8400     UNIT VOLUME 206    Troponin, High Sensitivity, 1 Hour   Result Value Ref Range    Troponin I, High Sensitivity 17 0 - 20 ng/L   Lactate   Result Value Ref Range    Lactate 1.5 0.4 - 2.0 mmol/L   Blood Culture    Specimen: Peripheral Venipuncture; Blood culture   Result Value Ref Range    Blood Culture Loaded on Instrument - Culture in progress    Blood Culture    Specimen: Peripheral Venipuncture; Blood culture   Result Value Ref Range    Blood Culture Loaded on Instrument - Culture in progress    Prepare RBC: 1 Units   Result Value Ref Range    PRODUCT CODE R6654A24     Unit Number [id number]     Unit ABO O     Unit RH POS     XM INTEP COMP     Dispense Status TR     Blood Expiration Date 10/8/2024 11:59:00 PM EDT     PRODUCT BLOOD TYPE      UNIT VOLUME 400    Prepare Plasma: 1 Units   Result Value Ref Range    PRODUCT CODE D7250B87     Unit Number [id number]     Unit ABO AB     Unit RH POS     Dispense Status TR     Blood Expiration Date 9/28/2024 10:46:00 PM EDT     PRODUCT BLOOD TYPE 8400     UNIT VOLUME 302    Blood Gas Arterial   Result Value Ref Range    POCT pH, Arterial 7.42  7.38 - 7.42 pH    POCT pCO2, Arterial 28 (L) 38 - 42 mm Hg    POCT pO2, Arterial 119 (H) 85 - 95 mm Hg    POCT SO2, Arterial 100 94 - 100 %    POCT Oxy Hemoglobin, Arterial 96.2 94.0 - 98.0 %    POCT Base Excess, Arterial -4.9 (L) -2.0 - 3.0 mmol/L    POCT HCO3 Calculated, Arterial 18.2 (L) 22.0 - 26.0 mmol/L    Patient Temperature 37.0 degrees Celsius    FiO2 21 %    Site of Arterial Puncture Radial Right    Hemoglobin and hematocrit, blood   Result Value Ref Range    Hemoglobin 5.7 (LL) 13.5 - 17.5 g/dL    Hematocrit 18.5 (L) 41.0 - 52.0 %   Lactate   Result Value Ref Range    Lactate 1.9 0.4 - 2.0 mmol/L   CBC and Auto Differential   Result Value Ref Range    WBC 10.9 4.4 - 11.3 x10*3/uL    nRBC 0.0 0.0 - 0.0 /100 WBCs    RBC 2.09 (L) 4.50 - 5.90 x10*6/uL    Hemoglobin 5.7 (LL) 13.5 - 17.5 g/dL    Hematocrit 18.5 (L) 41.0 - 52.0 %    MCV 89 80 - 100 fL    MCH 27.3 26.0 - 34.0 pg    MCHC 30.8 (L) 32.0 - 36.0 g/dL    RDW 23.4 (H) 11.5 - 14.5 %    Platelets 316 150 - 450 x10*3/uL    Neutrophils % 88.5 40.0 - 80.0 %    Immature Granulocytes %, Automated 1.0 (H) 0.0 - 0.9 %    Lymphocytes % 5.2 13.0 - 44.0 %    Monocytes % 5.1 2.0 - 10.0 %    Eosinophils % 0.1 0.0 - 6.0 %    Basophils % 0.1 0.0 - 2.0 %    Neutrophils Absolute 9.64 (H) 1.20 - 7.70 x10*3/uL    Immature Granulocytes Absolute, Automated 0.11 0.00 - 0.70 x10*3/uL    Lymphocytes Absolute 0.57 (L) 1.20 - 4.80 x10*3/uL    Monocytes Absolute 0.56 0.10 - 1.00 x10*3/uL    Eosinophils Absolute 0.01 0.00 - 0.70 x10*3/uL    Basophils Absolute 0.01 0.00 - 0.10 x10*3/uL   Morphology   Result Value Ref Range    RBC Morphology See Below     Polychromasia Mild     Hypochromia Mild     RBC Fragments Few     Ovalocytes Few     Acanthocytes Few     Giant Platelets Few    Prepare RBC: 2 Units   Result Value Ref Range    PRODUCT CODE G9245A03     Unit Number [id number]     Unit ABO B     Unit RH POS     XM INTEP COMP     Dispense Status TR     Blood Expiration Date  9/26/2024 11:59:00 PM EDT     PRODUCT BLOOD TYPE 7300     UNIT VOLUME 350    Hepatic Function Panel   Result Value Ref Range    Albumin 3.0 (L) 3.4 - 5.0 g/dL    Bilirubin, Total 2.3 (H) 0.0 - 1.2 mg/dL    Bilirubin, Direct 0.5 (H) 0.0 - 0.3 mg/dL    Alkaline Phosphatase 43 33 - 136 U/L    ALT 29 10 - 52 U/L    AST 28 9 - 39 U/L    Total Protein 4.8 (L) 6.4 - 8.2 g/dL   Phosphorus   Result Value Ref Range    Phosphorus 5.3 (H) 2.5 - 4.9 mg/dL   Basic Metabolic Panel   Result Value Ref Range    Glucose 155 (H) 74 - 99 mg/dL    Sodium 136 136 - 145 mmol/L    Potassium 4.9 3.5 - 5.3 mmol/L    Chloride 106 98 - 107 mmol/L    Bicarbonate 21 21 - 32 mmol/L    Anion Gap 14 10 - 20 mmol/L    Urea Nitrogen 30 (H) 6 - 23 mg/dL    Creatinine 1.61 (H) 0.50 - 1.30 mg/dL    eGFR 46 (L) >60 mL/min/1.73m*2    Calcium 7.3 (L) 8.6 - 10.3 mg/dL   CBC   Result Value Ref Range    WBC 12.2 (H) 4.4 - 11.3 x10*3/uL    nRBC 0.2 (H) 0.0 - 0.0 /100 WBCs    RBC 3.00 (L) 4.50 - 5.90 x10*6/uL    Hemoglobin 8.6 (L) 13.5 - 17.5 g/dL    Hematocrit 25.9 (L) 41.0 - 52.0 %    MCV 86 80 - 100 fL    MCH 28.7 26.0 - 34.0 pg    MCHC 33.2 32.0 - 36.0 g/dL    RDW 18.5 (H) 11.5 - 14.5 %    Platelets 313 150 - 450 x10*3/uL   Magnesium   Result Value Ref Range    Magnesium 1.56 (L) 1.60 - 2.40 mg/dL   Protime-INR   Result Value Ref Range    Protime 17.4 (H) 9.8 - 12.8 seconds    INR 1.5 (H) 0.9 - 1.1   Coagulation Screen   Result Value Ref Range    Protime 17.4 (H) 9.8 - 12.8 seconds    INR 1.5 (H) 0.9 - 1.1    aPTT 29 27 - 38 seconds   SST TOP   Result Value Ref Range    Extra Tube Hold for add-ons.    Electrocardiogram, 12-lead PRN ACS symptoms   Result Value Ref Range    Ventricular Rate 87 BPM    Atrial Rate 87 BPM    OK Interval 126 ms    QRS Duration 82 ms    QT Interval 431 ms    QTC Calculation(Bazett) 519 ms    P Axis 67 degrees    R Axis 57 degrees    T Axis 1 degrees    QRS Count 14 beats    Q Onset 249 ms    T Offset 465 ms    QTC Fredericia 487 ms    Urinalysis with Reflex Culture and Microscopic   Result Value Ref Range    Color, Urine Yellow Light-Yellow, Yellow, Dark-Yellow    Appearance, Urine Clear Clear    Specific Gravity, Urine 1.025 1.005 - 1.035    pH, Urine 5.5 5.0, 5.5, 6.0, 6.5, 7.0, 7.5, 8.0    Protein, Urine 30 (1+) (A) NEGATIVE, 10 (TRACE), 20 (TRACE) mg/dL    Glucose, Urine Normal Normal mg/dL    Blood, Urine NEGATIVE NEGATIVE    Ketones, Urine TRACE (A) NEGATIVE mg/dL    Bilirubin, Urine NEGATIVE NEGATIVE    Urobilinogen, Urine Normal Normal mg/dL    Nitrite, Urine NEGATIVE NEGATIVE    Leukocyte Esterase, Urine NEGATIVE NEGATIVE   Urinalysis Microscopic   Result Value Ref Range    WBC, Urine 1-5 1-5, NONE /HPF    RBC, Urine 11-20 (A) NONE, 1-2, 3-5 /HPF    Mucus, Urine FEW Reference range not established. /LPF   CBC   Result Value Ref Range    WBC 18.4 (H) 4.4 - 11.3 x10*3/uL    nRBC 0.2 (H) 0.0 - 0.0 /100 WBCs    RBC 2.88 (L) 4.50 - 5.90 x10*6/uL    Hemoglobin 8.2 (L) 13.5 - 17.5 g/dL    Hematocrit 25.1 (L) 41.0 - 52.0 %    MCV 87 80 - 100 fL    MCH 28.5 26.0 - 34.0 pg    MCHC 32.7 32.0 - 36.0 g/dL    RDW 19.9 (H) 11.5 - 14.5 %    Platelets 336 150 - 450 x10*3/uL   Lavender Top   Result Value Ref Range    Extra Tube Hold for add-ons.    Fibrinogen   Result Value Ref Range    Fibrinogen 337 200 - 400 mg/dL   POCT GLUCOSE   Result Value Ref Range    POCT Glucose 138 (H) 74 - 99 mg/dL           Assessment/Plan     Historically it appears he has been bleeding slowly in the rectum after discharge once the Eliquis has been started.  This is probably major contributing factor factor which causes secondary hemorrhage.  There is no way such extensive ecchymosis involving the anterior abdominal wall sacroiliac area and both sides could be any by any imagination be the result of the surgery and would indicate a bleeding diathesis which obviously is secondary to the Eliquis.  It has been over 24 hours since her last dose of Eliquis and normally 1 would  remain in little bit longer given the patient's elevated creatinine.  Fortunately he has been prescribed Kcentra which certainly should help  Under normal circumstances if the patient develops postoperative bleeding after colonic resection especially using staples 1 would normally attempt flexible sigmoidoscopy to see whether want to control the bleeding endoscopically.  However in this particular case the bleeding seems to be more related to anticoagulation and hence we should hold off any sigmoidoscopic exam since the risk of dehiscence and perforation will be much higher given the need for air insufflation.  The case was discussed with Dr. Damico  This note was created using Dragon dictation technology and an interaction of omission and commission may be present in spite of proofreading    I spent 90 minutes in the professional and overall care of this patient.      Pavel Meléndez MD

## 2024-09-24 NOTE — SIGNIFICANT EVENT
This is a 69-year-old male, with history of recently diagnosed colon cancer, PIERRE, and paroxysmal A-fib/flutter, who was recently admitted earlier this month for NSTEMI.  He underwent cardiac cath on 9/12 which revealed moderate CAD that was deemed appropriate for medical therapy.  He has a known history of paroxysmal A-fib with [id number] 2+.  He hadn't been able to tolerate OAC with active colon cancer and symptomatic PIERRE requiring transfusions.  In regards to his colon cancer, segmental colectomy had been discussed outpatient with need for staging and cardiac clearance.  Surgical evaluation was requested during most recent admission given concern for NSTEMI resulting from small distal embolization in coronaries in setting of afib without OAC.  Shortly thereafter, he underwent laparoscopic segmental colectomy on 9/15.  Hospital course was complicated by A-fib with RVR requiring amiodarone drip.  He had postoperative acute blood loss anemia requiring blood transfusion.  He was transitioned to oral amiodarone.  He was initiated on Eliquis at time of discharge on 9/19.    He recalls having small amounts of BRBPR on 9/19.  This progressed to multiple episodes of BRBPR on 9/21.  He reports feeling generally weak with near syncope.  He has had nausea without vomiting.  He complains of lower abdominal pain where he has developed extensive bruising.  He has had 3 episodes of hematochezia today already.  Last episode was at approximately 2 PM.    BP on arrival 69/37 s/p 1 L IVF.  CT abdomen/pelvis concerning for active bleeding with suture line dehiscence.  Hgb 7.7, which is stable from last Hgb obtained on 9/19.  BP had improved to 100/49 following 1 L IVF but quickly dropped to as low as 64/37 after completion.  He has 1 unit PRBC running.  2 units of FFP have been ordered.  Case was discussed extensively between on-call intensivist and general surgery.  Presently, medical management is recommended.  He is getting  KCentra along with blood products as mentioned.  Repeat H&H pending.  There were initial plans to admit patient to SDU; however, given concern for developing hemorrhagic shock, patient will instead be admitted to ICU for closer monitoring.

## 2024-09-24 NOTE — CARE PLAN
The patient's goals for the shift include to feel better    The clinical goals for the shift include pt to remain hemodynamically stable    Over the shift, the patient did not make progress toward the following goals. Barriers to progression include npo status and bed rest for hypotension. Recommendations to address these barriers include check BG levels, monitor BP and provide pharmacologic interventions as needed, turn patient every 2 hours to prevent skin breakdown, and apply bed alarm.    Problem: Fall/Injury  Goal: Use assistive devices by end of the shift  Outcome: Not Progressing  Goal: Pace activities to prevent fatigue by end of the shift  Outcome: Not Progressing     Problem: Pain  Goal: Walks with improved pain control throughout the shift  Outcome: Not Progressing  Goal: Performs ADL's with improved pain control throughout shift  Outcome: Not Progressing  Goal: Participates in PT with improved pain control throughout the shift  Outcome: Not Progressing

## 2024-09-25 ENCOUNTER — APPOINTMENT (OUTPATIENT)
Dept: RADIOLOGY | Facility: HOSPITAL | Age: 69
DRG: 393 | End: 2024-09-25
Payer: MEDICARE

## 2024-09-25 ENCOUNTER — APPOINTMENT (OUTPATIENT)
Dept: HEMATOLOGY/ONCOLOGY | Facility: CLINIC | Age: 69
End: 2024-09-25
Payer: MEDICARE

## 2024-09-25 ENCOUNTER — APPOINTMENT (OUTPATIENT)
Dept: SURGERY | Facility: CLINIC | Age: 69
End: 2024-09-25
Payer: MEDICARE

## 2024-09-25 LAB
ALBUMIN SERPL BCP-MCNC: 3.3 G/DL (ref 3.4–5)
ALP SERPL-CCNC: 46 U/L (ref 33–136)
ALT SERPL W P-5'-P-CCNC: 22 U/L (ref 10–52)
ANION GAP SERPL CALC-SCNC: 16 MMOL/L (ref 10–20)
AST SERPL W P-5'-P-CCNC: 18 U/L (ref 9–39)
ATRIAL RATE: 77 BPM
ATRIAL RATE: 87 BPM
BILIRUB SERPL-MCNC: 1.3 MG/DL (ref 0–1.2)
BLOOD EXPIRATION DATE: NORMAL
BUN SERPL-MCNC: 44 MG/DL (ref 6–23)
CA-I BLD-SCNC: 0.99 MMOL/L (ref 1.1–1.33)
CALCIUM SERPL-MCNC: 8 MG/DL (ref 8.6–10.3)
CHLORIDE SERPL-SCNC: 103 MMOL/L (ref 98–107)
CO2 SERPL-SCNC: 21 MMOL/L (ref 21–32)
CREAT SERPL-MCNC: 3.37 MG/DL (ref 0.5–1.3)
DISPENSE STATUS: NORMAL
EGFRCR SERPLBLD CKD-EPI 2021: 19 ML/MIN/1.73M*2
ERYTHROCYTE [DISTWIDTH] IN BLOOD BY AUTOMATED COUNT: 20 % (ref 11.5–14.5)
ERYTHROCYTE [DISTWIDTH] IN BLOOD BY AUTOMATED COUNT: 20.7 % (ref 11.5–14.5)
ERYTHROCYTE [DISTWIDTH] IN BLOOD BY AUTOMATED COUNT: 20.8 % (ref 11.5–14.5)
ERYTHROCYTE [DISTWIDTH] IN BLOOD BY AUTOMATED COUNT: 21 % (ref 11.5–14.5)
FIBRINOGEN PPP-MCNC: 352 MG/DL (ref 200–400)
GLUCOSE BLD MANUAL STRIP-MCNC: 101 MG/DL (ref 74–99)
GLUCOSE BLD MANUAL STRIP-MCNC: 101 MG/DL (ref 74–99)
GLUCOSE BLD MANUAL STRIP-MCNC: 110 MG/DL (ref 74–99)
GLUCOSE BLD MANUAL STRIP-MCNC: 116 MG/DL (ref 74–99)
GLUCOSE BLD MANUAL STRIP-MCNC: 117 MG/DL (ref 74–99)
GLUCOSE BLD MANUAL STRIP-MCNC: 117 MG/DL (ref 74–99)
GLUCOSE SERPL-MCNC: 127 MG/DL (ref 74–99)
HCT VFR BLD AUTO: 21.5 % (ref 41–52)
HCT VFR BLD AUTO: 24 % (ref 41–52)
HCT VFR BLD AUTO: 25.4 % (ref 41–52)
HCT VFR BLD AUTO: 26 % (ref 41–52)
HGB BLD-MCNC: 6.9 G/DL (ref 13.5–17.5)
HGB BLD-MCNC: 7.5 G/DL (ref 13.5–17.5)
HGB BLD-MCNC: 7.9 G/DL (ref 13.5–17.5)
HGB BLD-MCNC: 8 G/DL (ref 13.5–17.5)
HOLD SPECIMEN: NORMAL
INR PPP: 1.4 (ref 0.9–1.1)
MAGNESIUM SERPL-MCNC: 2.51 MG/DL (ref 1.6–2.4)
MCH RBC QN AUTO: 28.4 PG (ref 26–34)
MCH RBC QN AUTO: 28.5 PG (ref 26–34)
MCH RBC QN AUTO: 28.9 PG (ref 26–34)
MCH RBC QN AUTO: 28.9 PG (ref 26–34)
MCHC RBC AUTO-ENTMCNC: 30.4 G/DL (ref 32–36)
MCHC RBC AUTO-ENTMCNC: 31.3 G/DL (ref 32–36)
MCHC RBC AUTO-ENTMCNC: 31.5 G/DL (ref 32–36)
MCHC RBC AUTO-ENTMCNC: 32.1 G/DL (ref 32–36)
MCV RBC AUTO: 90 FL (ref 80–100)
MCV RBC AUTO: 90 FL (ref 80–100)
MCV RBC AUTO: 91 FL (ref 80–100)
MCV RBC AUTO: 95 FL (ref 80–100)
NRBC BLD-RTO: 0.3 /100 WBCS (ref 0–0)
NRBC BLD-RTO: 0.4 /100 WBCS (ref 0–0)
NRBC BLD-RTO: 0.5 /100 WBCS (ref 0–0)
NRBC BLD-RTO: 0.6 /100 WBCS (ref 0–0)
P AXIS: 52 DEGREES
P AXIS: 67 DEGREES
P OFFSET: 207 MS
P ONSET: 159 MS
PLATELET # BLD AUTO: 321 X10*3/UL (ref 150–450)
PLATELET # BLD AUTO: 345 X10*3/UL (ref 150–450)
PLATELET # BLD AUTO: 352 X10*3/UL (ref 150–450)
PLATELET # BLD AUTO: 356 X10*3/UL (ref 150–450)
POTASSIUM SERPL-SCNC: 5.1 MMOL/L (ref 3.5–5.3)
PR INTERVAL: 122 MS
PR INTERVAL: 126 MS
PRODUCT BLOOD TYPE: 6200
PRODUCT CODE: NORMAL
PROT SERPL-MCNC: 5.6 G/DL (ref 6.4–8.2)
PROTHROMBIN TIME: 15.9 SECONDS (ref 9.8–12.8)
Q ONSET: 220 MS
Q ONSET: 249 MS
QRS COUNT: 13 BEATS
QRS COUNT: 14 BEATS
QRS DURATION: 82 MS
QRS DURATION: 84 MS
QT INTERVAL: 431 MS
QT INTERVAL: 494 MS
QTC CALCULATION(BAZETT): 519 MS
QTC CALCULATION(BAZETT): 559 MS
QTC FREDERICIA: 487 MS
QTC FREDERICIA: 536 MS
R AXIS: 48 DEGREES
R AXIS: 57 DEGREES
RBC # BLD AUTO: 2.39 X10*6/UL (ref 4.5–5.9)
RBC # BLD AUTO: 2.64 X10*6/UL (ref 4.5–5.9)
RBC # BLD AUTO: 2.73 X10*6/UL (ref 4.5–5.9)
RBC # BLD AUTO: 2.81 X10*6/UL (ref 4.5–5.9)
SODIUM SERPL-SCNC: 135 MMOL/L (ref 136–145)
T AXIS: 1 DEGREES
T AXIS: 17 DEGREES
T OFFSET: 465 MS
T OFFSET: 467 MS
UNIT ABO: NORMAL
UNIT NUMBER: NORMAL
UNIT RH: NORMAL
UNIT VOLUME: 350
VENTRICULAR RATE: 77 BPM
VENTRICULAR RATE: 87 BPM
WBC # BLD AUTO: 18.4 X10*3/UL (ref 4.4–11.3)
WBC # BLD AUTO: 19.4 X10*3/UL (ref 4.4–11.3)
WBC # BLD AUTO: 19.9 X10*3/UL (ref 4.4–11.3)
WBC # BLD AUTO: 20.5 X10*3/UL (ref 4.4–11.3)
XM INTEP: NORMAL

## 2024-09-25 PROCEDURE — 99291 CRITICAL CARE FIRST HOUR: CPT

## 2024-09-25 PROCEDURE — 99233 SBSQ HOSP IP/OBS HIGH 50: CPT | Performed by: INTERNAL MEDICINE

## 2024-09-25 PROCEDURE — 85384 FIBRINOGEN ACTIVITY: CPT

## 2024-09-25 PROCEDURE — 2550000001 HC RX 255 CONTRASTS

## 2024-09-25 PROCEDURE — 99291 CRITICAL CARE FIRST HOUR: CPT | Performed by: STUDENT IN AN ORGANIZED HEALTH CARE EDUCATION/TRAINING PROGRAM

## 2024-09-25 PROCEDURE — 2500000004 HC RX 250 GENERAL PHARMACY W/ HCPCS (ALT 636 FOR OP/ED)

## 2024-09-25 PROCEDURE — 99232 SBSQ HOSP IP/OBS MODERATE 35: CPT

## 2024-09-25 PROCEDURE — 99024 POSTOP FOLLOW-UP VISIT: CPT | Performed by: SURGERY

## 2024-09-25 PROCEDURE — 82330 ASSAY OF CALCIUM: CPT

## 2024-09-25 PROCEDURE — 74176 CT ABD & PELVIS W/O CONTRAST: CPT

## 2024-09-25 PROCEDURE — 82947 ASSAY GLUCOSE BLOOD QUANT: CPT

## 2024-09-25 PROCEDURE — 74176 CT ABD & PELVIS W/O CONTRAST: CPT | Performed by: STUDENT IN AN ORGANIZED HEALTH CARE EDUCATION/TRAINING PROGRAM

## 2024-09-25 PROCEDURE — A9698 NON-RAD CONTRAST MATERIALNOC: HCPCS

## 2024-09-25 PROCEDURE — 36430 TRANSFUSION BLD/BLD COMPNT: CPT

## 2024-09-25 PROCEDURE — 83735 ASSAY OF MAGNESIUM: CPT

## 2024-09-25 PROCEDURE — 85027 COMPLETE CBC AUTOMATED: CPT

## 2024-09-25 PROCEDURE — 2020000001 HC ICU ROOM DAILY

## 2024-09-25 PROCEDURE — 2500000004 HC RX 250 GENERAL PHARMACY W/ HCPCS (ALT 636 FOR OP/ED): Performed by: STUDENT IN AN ORGANIZED HEALTH CARE EDUCATION/TRAINING PROGRAM

## 2024-09-25 PROCEDURE — 85610 PROTHROMBIN TIME: CPT

## 2024-09-25 PROCEDURE — 2500000004 HC RX 250 GENERAL PHARMACY W/ HCPCS (ALT 636 FOR OP/ED): Mod: JZ

## 2024-09-25 PROCEDURE — 2500000004 HC RX 250 GENERAL PHARMACY W/ HCPCS (ALT 636 FOR OP/ED): Mod: JZ | Performed by: INTERNAL MEDICINE

## 2024-09-25 PROCEDURE — P9016 RBC LEUKOCYTES REDUCED: HCPCS

## 2024-09-25 PROCEDURE — 2500000004 HC RX 250 GENERAL PHARMACY W/ HCPCS (ALT 636 FOR OP/ED): Mod: JZ | Performed by: STUDENT IN AN ORGANIZED HEALTH CARE EDUCATION/TRAINING PROGRAM

## 2024-09-25 PROCEDURE — 36415 COLL VENOUS BLD VENIPUNCTURE: CPT

## 2024-09-25 PROCEDURE — 80053 COMPREHEN METABOLIC PANEL: CPT

## 2024-09-25 RX ORDER — FUROSEMIDE 10 MG/ML
40 INJECTION INTRAMUSCULAR; INTRAVENOUS ONCE
Status: COMPLETED | OUTPATIENT
Start: 2024-09-25 | End: 2024-09-25

## 2024-09-25 RX ORDER — FUROSEMIDE 10 MG/ML
INJECTION INTRAMUSCULAR; INTRAVENOUS
Status: COMPLETED
Start: 2024-09-25 | End: 2024-09-25

## 2024-09-25 RX ORDER — CALCIUM GLUCONATE 20 MG/ML
2 INJECTION, SOLUTION INTRAVENOUS ONCE
Status: COMPLETED | OUTPATIENT
Start: 2024-09-25 | End: 2024-09-25

## 2024-09-25 RX ORDER — MAGNESIUM SULFATE HEPTAHYDRATE 40 MG/ML
2 INJECTION, SOLUTION INTRAVENOUS ONCE
Status: DISCONTINUED | OUTPATIENT
Start: 2024-09-25 | End: 2024-09-25

## 2024-09-25 SDOH — ECONOMIC STABILITY: INCOME INSECURITY: HOW HARD IS IT FOR YOU TO PAY FOR THE VERY BASICS LIKE FOOD, HOUSING, MEDICAL CARE, AND HEATING?: NOT HARD AT ALL

## 2024-09-25 SDOH — ECONOMIC STABILITY: INCOME INSECURITY: IN THE LAST 12 MONTHS, WAS THERE A TIME WHEN YOU WERE NOT ABLE TO PAY THE MORTGAGE OR RENT ON TIME?: NO

## 2024-09-25 SDOH — ECONOMIC STABILITY: FOOD INSECURITY: WITHIN THE PAST 12 MONTHS, YOU WORRIED THAT YOUR FOOD WOULD RUN OUT BEFORE YOU GOT MONEY TO BUY MORE.: NEVER TRUE

## 2024-09-25 SDOH — ECONOMIC STABILITY: HOUSING INSECURITY: AT ANY TIME IN THE PAST 12 MONTHS, WERE YOU HOMELESS OR LIVING IN A SHELTER (INCLUDING NOW)?: NO

## 2024-09-25 SDOH — ECONOMIC STABILITY: FOOD INSECURITY: WITHIN THE PAST 12 MONTHS, THE FOOD YOU BOUGHT JUST DIDN'T LAST AND YOU DIDN'T HAVE MONEY TO GET MORE.: NEVER TRUE

## 2024-09-25 SDOH — SOCIAL STABILITY: SOCIAL INSECURITY: WITHIN THE LAST YEAR, HAVE YOU BEEN AFRAID OF YOUR PARTNER OR EX-PARTNER?: PATIENT DECLINED

## 2024-09-25 SDOH — ECONOMIC STABILITY: INCOME INSECURITY: IN THE PAST 12 MONTHS, HAS THE ELECTRIC, GAS, OIL, OR WATER COMPANY THREATENED TO SHUT OFF SERVICE IN YOUR HOME?: NO

## 2024-09-25 SDOH — SOCIAL STABILITY: SOCIAL INSECURITY
WITHIN THE LAST YEAR, HAVE TO BEEN RAPED OR FORCED TO HAVE ANY KIND OF SEXUAL ACTIVITY BY YOUR PARTNER OR EX-PARTNER?: PATIENT DECLINED

## 2024-09-25 SDOH — ECONOMIC STABILITY: HOUSING INSECURITY: IN THE PAST 12 MONTHS, HOW MANY TIMES HAVE YOU MOVED WHERE YOU WERE LIVING?: 1

## 2024-09-25 SDOH — SOCIAL STABILITY: SOCIAL INSECURITY
WITHIN THE LAST YEAR, HAVE YOU BEEN KICKED, HIT, SLAPPED, OR OTHERWISE PHYSICALLY HURT BY YOUR PARTNER OR EX-PARTNER?: PATIENT DECLINED

## 2024-09-25 SDOH — SOCIAL STABILITY: SOCIAL INSECURITY
WITHIN THE LAST YEAR, HAVE YOU BEEN HUMILIATED OR EMOTIONALLY ABUSED IN OTHER WAYS BY YOUR PARTNER OR EX-PARTNER?: PATIENT DECLINED

## 2024-09-25 SDOH — ECONOMIC STABILITY: TRANSPORTATION INSECURITY
IN THE PAST 12 MONTHS, HAS LACK OF TRANSPORTATION KEPT YOU FROM MEETINGS, WORK, OR FROM GETTING THINGS NEEDED FOR DAILY LIVING?: NO

## 2024-09-25 SDOH — ECONOMIC STABILITY: TRANSPORTATION INSECURITY
IN THE PAST 12 MONTHS, HAS THE LACK OF TRANSPORTATION KEPT YOU FROM MEDICAL APPOINTMENTS OR FROM GETTING MEDICATIONS?: NO

## 2024-09-25 ASSESSMENT — PAIN - FUNCTIONAL ASSESSMENT
PAIN_FUNCTIONAL_ASSESSMENT: 0-10

## 2024-09-25 ASSESSMENT — PAIN SCALES - GENERAL
PAINLEVEL_OUTOF10: 1
PAINLEVEL_OUTOF10: 0 - NO PAIN
PAINLEVEL_OUTOF10: 1
PAINLEVEL_OUTOF10: 0 - NO PAIN

## 2024-09-25 NOTE — PROGRESS NOTES
"Krish Batista is a 69 y.o. male on day 2 of admission presenting with Pneumoperitoneum.    Subjective   Patient denies any abdominal pain he feels very sleepy use n.p.o.       Objective     Physical Exam he is alert and oriented x 3 skin shows the ecchymosis in the anterior abdominal wall posteriorly as well as on both side of the pelvis he does not complain of any particular pain anywhere denies any chest pain the patient is in not in atrial fibrillation and is in normal rhythm    Last Recorded Vitals  Blood pressure 122/59, pulse 84, temperature 36.7 °C (98.1 °F), temperature source Temporal, resp. rate 17, height 1.88 m (6' 2\"), weight 92 kg (202 lb 13.2 oz), SpO2 100%.  Intake/Output last 3 Shifts:  I/O last 3 completed shifts:  In: 6136.3 (66.7 mL/kg) [I.V.:2123.3 (23.1 mL/kg); Blood:1713; IV Piggyback:2300]  Out: 570 (6.2 mL/kg) [Urine:570 (0.2 mL/kg/hr)]  Weight: 92 kg     Relevant Results  Results for orders placed or performed during the hospital encounter of 09/23/24 (from the past 24 hour(s))   POCT GLUCOSE   Result Value Ref Range    POCT Glucose 134 (H) 74 - 99 mg/dL   POCT GLUCOSE   Result Value Ref Range    POCT Glucose 117 (H) 74 - 99 mg/dL   CBC   Result Value Ref Range    WBC 19.9 (H) 4.4 - 11.3 x10*3/uL    nRBC 0.3 (H) 0.0 - 0.0 /100 WBCs    RBC 2.39 (L) 4.50 - 5.90 x10*6/uL    Hemoglobin 6.9 (L) 13.5 - 17.5 g/dL    Hematocrit 21.5 (L) 41.0 - 52.0 %    MCV 90 80 - 100 fL    MCH 28.9 26.0 - 34.0 pg    MCHC 32.1 32.0 - 36.0 g/dL    RDW 21.0 (H) 11.5 - 14.5 %    Platelets 352 150 - 450 x10*3/uL   Prepare RBC: 1 Units   Result Value Ref Range    PRODUCT CODE X2595F74     Unit Number [id number]     Unit ABO A     Unit RH POS     XM INTEP COMP     Dispense Status TR     Blood Expiration Date 10/18/2024 11:59:00 PM EDT     PRODUCT BLOOD TYPE 6200     UNIT VOLUME 350    POCT GLUCOSE   Result Value Ref Range    POCT Glucose 116 (H) 74 - 99 mg/dL   CBC   Result Value Ref Range    WBC 18.4 (H) 4.4 - " 11.3 x10*3/uL    nRBC 0.4 (H) 0.0 - 0.0 /100 WBCs    RBC 2.81 (L) 4.50 - 5.90 x10*6/uL    Hemoglobin 8.0 (L) 13.5 - 17.5 g/dL    Hematocrit 25.4 (L) 41.0 - 52.0 %    MCV 90 80 - 100 fL    MCH 28.5 26.0 - 34.0 pg    MCHC 31.5 (L) 32.0 - 36.0 g/dL    RDW 20.0 (H) 11.5 - 14.5 %    Platelets 356 150 - 450 x10*3/uL   Comprehensive Metabolic Panel   Result Value Ref Range    Glucose 127 (H) 74 - 99 mg/dL    Sodium 135 (L) 136 - 145 mmol/L    Potassium 5.1 3.5 - 5.3 mmol/L    Chloride 103 98 - 107 mmol/L    Bicarbonate 21 21 - 32 mmol/L    Anion Gap 16 10 - 20 mmol/L    Urea Nitrogen 44 (H) 6 - 23 mg/dL    Creatinine 3.37 (H) 0.50 - 1.30 mg/dL    eGFR 19 (L) >60 mL/min/1.73m*2    Calcium 8.0 (L) 8.6 - 10.3 mg/dL    Albumin 3.3 (L) 3.4 - 5.0 g/dL    Alkaline Phosphatase 46 33 - 136 U/L    Total Protein 5.6 (L) 6.4 - 8.2 g/dL    AST 18 9 - 39 U/L    Bilirubin, Total 1.3 (H) 0.0 - 1.2 mg/dL    ALT 22 10 - 52 U/L   Protime-INR   Result Value Ref Range    Protime 15.9 (H) 9.8 - 12.8 seconds    INR 1.4 (H) 0.9 - 1.1   Fibrinogen   Result Value Ref Range    Fibrinogen 352 200 - 400 mg/dL   Magnesium   Result Value Ref Range    Magnesium 2.51 (H) 1.60 - 2.40 mg/dL   POCT GLUCOSE   Result Value Ref Range    POCT Glucose 110 (H) 74 - 99 mg/dL   Calcium, Ionized   Result Value Ref Range    POCT Calcium, Ionized 0.99 (L) 1.1 - 1.33 mmol/L   CBC   Result Value Ref Range    WBC 20.5 (H) 4.4 - 11.3 x10*3/uL    nRBC 0.5 (H) 0.0 - 0.0 /100 WBCs    RBC 2.73 (L) 4.50 - 5.90 x10*6/uL    Hemoglobin 7.9 (L) 13.5 - 17.5 g/dL    Hematocrit 26.0 (L) 41.0 - 52.0 %    MCV 95 80 - 100 fL    MCH 28.9 26.0 - 34.0 pg    MCHC 30.4 (L) 32.0 - 36.0 g/dL    RDW 20.8 (H) 11.5 - 14.5 %    Platelets 321 150 - 450 x10*3/uL   POCT GLUCOSE   Result Value Ref Range    POCT Glucose 101 (H) 74 - 99 mg/dL   POCT GLUCOSE   Result Value Ref Range    POCT Glucose 101 (H) 74 - 99 mg/dL        CT abdomen and pelvis w oral contrast only    Result Date:  9/25/2024  Interpreted By:  Hans Roth, STUDY: CT ABDOMEN AND PELVIS W ORAL CONTRAST ONLY; CT ABDOMEN PELVIS WO IV CONTRAST;  9/25/2024 12:43 pm; 9/25/2024 2:02 pm   INDICATION: Signs/Symptoms:R/o anastamotic leak, POD9 segmental colectomy; Signs/Symptoms:delayed PO contrast study; Dr. Roth aware.   COMPARISON:   CT scan dated 09/23/2024.   ACCESSION NUMBER(S): [id number]; [id number]   ORDERING CLINICIAN: ASHLEY SIERRA   TECHNIQUE: CT of the abdomen and pelvis was performed. Contiguous axial images were obtained at 3 mm slice thickness through the abdomen and pelvis. Coronal and sagittal reconstructions at 3 mm slice thickness were performed.  No intravenous contrast was administered; positive oral contrast was given.   FINDINGS: Please note that the evaluation of vessels, lymph nodes and organs is limited without intravenous contrast.   LOWER CHEST: Small volume bilateral pleural effusion with surrounding atelectasis.   ABDOMEN:   LIVER: Normal size. Normal contour. Stable subcapsular segment 8 benign-looking hypodensity measuring 1.3 cm..   BILE DUCTS: No intrahepatic biliary dilatation.   GALLBLADDER: Unremarkable   PANCREAS: No duct dilatation   SPLEEN: No splenomegaly.   ADRENAL GLANDS: No nodules   KIDNEYS AND URETERS: Normal size. Persistent nephrogram from prior contrast study indicated for of underlying kidney impairment.   PELVIS:   BLADDER: Under filled with Villela catheter in place. Tiny anti dependent gas locules likely related to instrumentation.   REPRODUCTIVE ORGANS: Mild prostatomegaly.   BOWEL: Prior laparoscopic segmental colectomy with mobilization of the splenic flexure and midabdomen colo colonic anastomosis. No definite extravasation of enteric contrast at the site of anastomosis to suggest leak. No bowel dilatation.   Large volume abdominopelvic hemoperitoneum. Gastrosplenic hematoma measuring 8.9 x 5.7 cm and another left hemiabdomen mesenteric hematoma  measuring 5.9 x 4.3 cm. Small volume upper abdomen pneumoperitoneum which improved from prior. Hyperdense foci noted within the left paracolic gutter in the delayed phases concerning for active bleed   VESSELS: Multifocal vascular calcifications.   PERITONEUM/RETROPERITONEUM/LYMPH NODES: No enlarged lymph nodes   ABDOMINAL WALL: Anterior abdominal wall laparotomy incision with a scattered right anterior abdominal wall gas locules likely related to recent surgery. Anasarca.   BONES: Multilevel degenerative spine changes and facet joint disease.       1 hour delayed images was obtained to evaluate site of anastomosis for leak. 1. Large volume abdominopelvic hemoperitoneum. Gastrosplenic hematoma measuring 8.9 x 5.7 cm and another left hemiabdomen mesenteric hematoma measuring 5.9 x 4.3 cm. Hyperdense foci noted within the left paracolic gutter in the delayed phases suggestive of active bleed . Findings have worsened from 09/23/2024 CT scan. 2. Upper abdomen pneumoperitoneum have slightly improved from 09/23/2020 CT scan. 3. Status post laparoscopic segmental colectomy with mobilization of the splenic flexure and midabdomen colo colonic anastomosis. No definite extravasation of the diluted enteric contrast at the level of the anastomosis could be visualized to suggest leak. No bowel dilatation. 4. Persistent nephrogram indicative of underlying acute kidney injury. 5. Small volume bilateral pleural effusion with surrounding atelectasis, slightly worsened from 09/23/2024 CT scan.   Hans Roth discussed the significance and urgency of this critical finding by telephone with Shai Clark instead of ordering ASHLEY ROBERTS; ALAN SIERRA on 9/25/2024 at 2:06 pm. (**-RCF-**) Findings:  See findings.     MACRO: None   Signed by: Hans Roth 9/25/2024 2:09 PM Dictation workstation:   [id number]    CT abdomen pelvis wo IV contrast    Result Date: 9/25/2024  Interpreted By:  Hans Roth, STUDY: CT ABDOMEN AND  PELVIS W ORAL CONTRAST ONLY; CT ABDOMEN PELVIS WO IV CONTRAST;  9/25/2024 12:43 pm; 9/25/2024 2:02 pm   INDICATION: Signs/Symptoms:R/o anastamotic leak, POD9 segmental colectomy; Signs/Symptoms:delayed PO contrast study; Dr. Roth aware.   COMPARISON:   CT scan dated 09/23/2024.   ACCESSION NUMBER(S): [id number]; [id number]   ORDERING CLINICIAN: ASHLEY SIERRA   TECHNIQUE: CT of the abdomen and pelvis was performed. Contiguous axial images were obtained at 3 mm slice thickness through the abdomen and pelvis. Coronal and sagittal reconstructions at 3 mm slice thickness were performed.  No intravenous contrast was administered; positive oral contrast was given.   FINDINGS: Please note that the evaluation of vessels, lymph nodes and organs is limited without intravenous contrast.   LOWER CHEST: Small volume bilateral pleural effusion with surrounding atelectasis.   ABDOMEN:   LIVER: Normal size. Normal contour. Stable subcapsular segment 8 benign-looking hypodensity measuring 1.3 cm..   BILE DUCTS: No intrahepatic biliary dilatation.   GALLBLADDER: Unremarkable   PANCREAS: No duct dilatation   SPLEEN: No splenomegaly.   ADRENAL GLANDS: No nodules   KIDNEYS AND URETERS: Normal size. Persistent nephrogram from prior contrast study indicated for of underlying kidney impairment.   PELVIS:   BLADDER: Under filled with Villela catheter in place. Tiny anti dependent gas locules likely related to instrumentation.   REPRODUCTIVE ORGANS: Mild prostatomegaly.   BOWEL: Prior laparoscopic segmental colectomy with mobilization of the splenic flexure and midabdomen colo colonic anastomosis. No definite extravasation of enteric contrast at the site of anastomosis to suggest leak. No bowel dilatation.   Large volume abdominopelvic hemoperitoneum. Gastrosplenic hematoma measuring 8.9 x 5.7 cm and another left hemiabdomen mesenteric hematoma measuring 5.9 x 4.3 cm. Small volume upper abdomen pneumoperitoneum  which improved from prior. Hyperdense foci noted within the left paracolic gutter in the delayed phases concerning for active bleed   VESSELS: Multifocal vascular calcifications.   PERITONEUM/RETROPERITONEUM/LYMPH NODES: No enlarged lymph nodes   ABDOMINAL WALL: Anterior abdominal wall laparotomy incision with a scattered right anterior abdominal wall gas locules likely related to recent surgery. Anasarca.   BONES: Multilevel degenerative spine changes and facet joint disease.       1 hour delayed images was obtained to evaluate site of anastomosis for leak. 1. Large volume abdominopelvic hemoperitoneum. Gastrosplenic hematoma measuring 8.9 x 5.7 cm and another left hemiabdomen mesenteric hematoma measuring 5.9 x 4.3 cm. Hyperdense foci noted within the left paracolic gutter in the delayed phases suggestive of active bleed . Findings have worsened from 09/23/2024 CT scan. 2. Upper abdomen pneumoperitoneum have slightly improved from 09/23/2020 CT scan. 3. Status post laparoscopic segmental colectomy with mobilization of the splenic flexure and midabdomen colo colonic anastomosis. No definite extravasation of the diluted enteric contrast at the level of the anastomosis could be visualized to suggest leak. No bowel dilatation. 4. Persistent nephrogram indicative of underlying acute kidney injury. 5. Small volume bilateral pleural effusion with surrounding atelectasis, slightly worsened from 09/23/2024 CT scan.   Hans Roth discussed the significance and urgency of this critical finding by telephone with Shai Clark instead of ordering ASHLEY ROBERTS; ALAN SIERRA on 9/25/2024 at 2:06 pm. (**-RCF-**) Findings:  See findings.     MACRO: None   Signed by: Hans Rtoh 9/25/2024 2:09 PM Dictation workstation:   [id number]    Electrocardiogram, 12-lead PRN ACS symptoms    Result Date: 9/24/2024  Sinus rhythm Abnormal R-wave progression, early transition Borderline T abnormalities, inferior leads Prolonged QT  interval    ECG 12 Lead    Result Date: 9/24/2024  Normal sinus rhythm Prolonged QT Abnormal ECG When compared with ECG of 23-SEP-2024 17:39, (unconfirmed) No significant change was found    CT abdomen pelvis w IV contrast    Result Date: 9/23/2024  Interpreted By:  Ishmael Grant, STUDY: CT ABDOMEN PELVIS W IV CONTRAST; ;  9/23/2024 6:37 pm   INDICATION: Signs/Symptoms:abdomen pain. Recent hospital admission for cardiac catheterization and sigmoidectomy. Weakness.     COMPARISON: CT abdomen and pelvis of 08/01/2024.   ACCESSION NUMBER(S): [id number]   ORDERING CLINICIAN: ELYSE KLERMAN   TECHNIQUE: Axial CT images of the abdomen and pelvis with coronal and sagittal reconstructed images performed after intravenous administration of 75 cc Omnipaque 350.   FINDINGS: Artifact is present from patient's arms at his sides. LOWER CHEST: Small right pleural effusion with adjacent mild atelectasis. Normal heart size. BONES: No acute osseous abnormality. ABDOMINAL WALL: Fat stranding and air in the ventral abdominal wall is presumed to relate to recent surgery. A midline laparotomy incision has did staples in place and there are also skin staples in the lower quadrants of the ventral abdominal wall. No well-defined fluid collection or unexpected foreign body.   ABDOMEN:   LIVER: Small cyst again incidentally noted. BILE DUCTS: Normal caliber. GALLBLADDER: No calcified gallstones. No wall thickening. PANCREAS: Within normal limits. SPLEEN: Within normal limits. ADRENALS: Within normal limits. KIDNEYS and URETERS: Redemonstrated left renal sinus cysts. Heterogeneous attenuation of renal parenchyma is favored to be technical/artifactual in etiology although heterogeneous enhancement related to acute pyelonephritis is not excluded. Please correlate clinically and consider correlation with urinalysis as indicated. No hydronephrosis.     VESSELS: Mild partially calcified atherosclerosis of the abdominal aorta and branch vessels.  RETROPERITONEUM: No pathologically enlarged retroperitoneal lymph nodes.   PELVIS:   REPRODUCTIVE ORGANS: Prostate is not enlarged. BLADDER: Perivesical fat stranding is suspicious for acute cystitis.   BOWEL: There is new edematous marked wall thickening of the inferior and posterior wall of the distal radius of the gastric body, in the midst of which is an amorphous multi loculated volume of abnormal enhancement/contrast pooling (series 601, images 35-58), indicative of active bleeding. There are adjacent small foci of air. These findings are centered about a suture line in this region suggesting hemorrhage related to suture line dehiscence. Surgical evaluation recommended. Questionable mucosal hyperemia in small and large bowel suggests possible enterocolitis. Patient is noted to be interval partial colectomy. No definite abnormal bowel wall thickening. No bowel dilation. Appendix is not identified with certainty but no convincing pericecal inflammatory changes. PERITONEUM: Pneumoperitoneum is suspected of the relate to recent surgery although this could also relate to suspected gastric suture line dehiscence. Small to moderate volume of ascites is new relative to the prior exam. No intraperitoneal loculated collection.       There is new edematous marked wall thickening of the inferior and posterior wall of the distal radius of the gastric body, in the midst of which is an amorphous multi loculated volume of abnormal enhancement/contrast pooling (series 601, images 35-58), indicative of active bleeding. There are adjacent small foci of air. These findings are centered about a suture line in this region suggesting hemorrhage related to suture line dehiscence. Surgical evaluation recommended.   Questionable mucosal hyperemia in small and large bowel suggests possible enterocolitis. Patient is noted to be interval partial colectomy. No definite abnormal bowel wall thickening. No bowel dilation.   Fat stranding and  air in the ventral abdominal wall is presumed to relate to recent surgery. A midline laparotomy incision has did staples in place and there are also skin staples in the lower quadrants of the ventral abdominal wall. No well-defined fluid collection or unexpected foreign body.   Redemonstrated left renal sinus cysts. No hydronephrosis. Heterogeneous attenuation of renal parenchyma is favored to be technical/artifactual in etiology although heterogeneous enhancement related to acute pyelonephritis is not excluded. Please correlate clinically and consider correlation with urinalysis as indicated.   Additional findings as discussed above.   MACRO: None   Signed by: Ishmael Grant 9/23/2024 6:56 PM Dictation workstation:   [id number]    XR chest 2 views    Result Date: 9/23/2024  Interpreted By:  Benson Parekh, STUDY: XR CHEST 2 VIEWS;  9/23/2024 5:16 pm   INDICATION: Signs/Symptoms:hx of recent cath with dizziness.   COMPARISON: Chest radiograph 08/01/2024   ACCESSION NUMBER(S): [id number]   ORDERING CLINICIAN: ELYSE KLERMAN   FINDINGS:     CARDIOMEDIASTINAL SILHOUETTE: Cardiomediastinal silhouette is stable in size and configuration.   LUNGS: No consolidation, pneumothorax, or effusion.   ABDOMEN: There is appearance of diffuse pneumoperitoneum.   BONES: No acute osseous changes. Old-appearing right-sided rib deformities.   Remaining findings appear stable since prior comparison radiograph.       1.  Pneumoperitoneum. In discussion with the emergency room physician, patient has a history of colectomy 1 week prior, however the amount of pneumoperitoneum present on the current exam is felt to be greater than what would be expected in a patient 1 week out from laparoscopic colectomy. Correlation with CT abdomen pelvis with IV and water-soluble enteric contrast is advised for further assessment.   MACRO: Benson Parekh discussed the significance and urgency of this critical finding by telephone with  ELYSE KLERMAN on  9/23/2024 at 6:14 pm. (**-RCF-**) Findings:  See findings.     Signed by: Benson Parekh 9/23/2024 6:14 PM Dictation workstation:   [id number]    ECG 12 lead    Result Date: 9/17/2024  Atrial flutter Nonspecific ST and T wave abnormality Abnormal ECG Confirmed by Beny Alvarez ([id number]) on 9/17/2024 4:31:28 PM    ECG 12 lead    Result Date: 9/17/2024  Atrial fibrillation with rapid ventricular response with premature ventricular or aberrantly conducted complexes Posterior infarct , age undetermined Nonspecific ST and T wave abnormality Abnormal ECG No previous ECGs available Confirmed by Beny Alvarez ([id number]) on 9/17/2024 4:24:58 PM    ECG 12 lead    Result Date: 9/17/2024  Normal sinus rhythm Normal ECG Confirmed by Beny Alvarez ([id number]) on 9/17/2024 3:51:38 PM    Cardiac Catheterization Procedure    Result Date: 9/12/2024   [facility], Cath Lab, [address] [place] [address] Cardiovascular Catheterization Report Patient Name:      MONIQUE MAN     Performing Physician:  [id number] Rosario MD Study Date:        9/12/2024           Verifying Physician:   Burt Rosario MD MRN/PID:           [id number]            Cardiologist/Co-Scrub: Accession#:        [id number]        Ordering Provider:     [id number] VU Date of Birth/Age: 1955 / 69 years Cardiologist: Gender:            M                   Fellow: Encounter#:        [id number]          Surgeon:  Study: Left Heart Cath  Indications: MONIQUE MAN is a 69 year old male who presents with hypertension and a chest pain assessment of typical angina. NSTE - ACS.  Appropriate Use Criteria: Non ST elevation myocardial infarction with high risk score; AUC score = 9.  Coronary Angiography: The coronary circulation is right  dominant.  Coronary Angiography Comments: Short left main without significant stenosis Luminal irregularities in LAD Lower branch of the OM has an ostial 60% stenosis. Otherwise no significant disease in left circumflex system Proximal to mid RCA diffuse 20 to 30% stenosis. Ostial RPL V has a 60% stenosis. Right dominant coronary system LVEDP 16 mmHg with preserved ejection fraction and LV gram with mild inferior wall hypokinesis.   Hemo Personnel: +-----------------------+---------+ Name                   Duty      +-----------------------+---------+ [facility] 1 +-----------------------+---------+  Hemodynamic Pressures:  +----+-------------------+---------+------------+-------------+------+---------+ Site     Date Time       Phase    Systolic    Diastolic    ED  Mean mmHg                          Name       mmHg        mmHg      mmHg           +----+-------------------+---------+------------+-------------+------+---------+   LV  9/12/2024 1:34:46  O2 REST         103            8    17                               PM                                                  +----+-------------------+---------+------------+-------------+------+---------+   LV  9/12/2024 1:34:52  O2 REST         103            8    15                               PM                                                  +----+-------------------+---------+------------+-------------+------+---------+  LVp  9/12/2024 1:34:56  O2 REST         105            2    16                               PM                                                  +----+-------------------+---------+------------+-------------+------+---------+  AOp  9/12/2024 1:35:01  O2 REST         105           60             79                      PM                                                   +----+-------------------+---------+------------+-------------+------+---------+   AO  9/12/2024 1:35:08  O2 REST         110           63             83                      PM                                                  +----+-------------------+---------+------------+-------------+------+---------+  Complications: No in-lab complications observed.  Cardiac Cath Post Procedure Notes: Post Procedure Diagnosis: Nonobstructive coronary artery disease as above. Blood Loss:               Estimated blood loss during the procedure was 5 mls. Specimens Removed:        Number of specimen(s) removed: none.  Recommendations: Maximize medical therapy. Agressive risk factor modification efforts. Follow-up with cardiology clinic. Lipid lowering agent or Statin therapy. Medical management of coronary artery disease. Aspirin therapy. ____________________________________________________________________________________ CONCLUSIONS:  1. Short left main without significant stenosis         Luminal irregularities in LAD         Lower branch of the OM has an ostial 60% stenosis. Otherwise no significant disease in left circumflex system         Proximal to mid RCA diffuse 20 to 30% stenosis. Ostial RPL V has a 60% stenosis.         Right dominant coronary system         LVEDP 16 mmHg with preserved ejection fraction and LV gram with mild inferior wall hypokinesis.     ICD 10 Codes: Non ST elevation (NSTEMI) myocardial infarction-I21.4  CPT Codes: Left Heart Cath (visualization of coronaries) and LV-27229; Moderate Sedation Services 1st additional 15 minutes patient >5 years-[id number]; Moderate Sedation Services 2nd additional 15 minutes patient >5 years-[id number]  [id number] Bob Rosario MD Performing Physician Electronically signed by [id number] Bob Rosario MD on 9/12/2024 at 2:35:15 PM  ** Final **     Transthoracic Echo (TTE) Complete    Result Date: 9/12/2024   [facility], [address] [facility]  [address]           Tel [phone] and Fax [phone] TRANSTHORACIC ECHOCARDIOGRAM REPORT  Patient Name:      MONIQUE MAN      Reading Physician:    [id number] Rosario MD Study Date:        9/12/2024            Ordering Provider:    [id number] POWELL S IRA MRN/PID:           [id number]             Fellow: Accession#:        [id number]         Nurse:                Chika Jean-Baptiste RN Date of Birth/Age: 1955 / 69 years  Sonographer:          Sanchez Sim ACS,                                                               RDCS, FASE Gender:            M                    Additional Staff: Height:            182.88 cm            Admit Date:           9/11/2024 Weight:            87.54 kg             Admission Status:     Inpatient -                                                               Routine BSA / BMI:         2.10 m2 / 26.18      Encounter#:           [id number]                    kg/m2 Blood Pressure:    186/84 mmHg          Department Location:  [facility] Study Type:    TRANSTHORACIC ECHO (TTE) COMPLETE Diagnosis/ICD: Non ST elevation (NSTEMI) myocardial infarction-I21.4 Indication:    Chest Pain CPT Code:      Echo Limited-28987; Color Doppler-91174; Doppler Limited-82803 Patient History: Pertinent History: Chest Pain, Dyspnea, A-Fib and Palpitations. Study Detail: The following Echo studies were performed: 2D, M-Mode, Doppler and               color flow. Technically challenging study due to body habitus.               Definity used as a contrast agent for endocardial border               definition. Total contrast used for this procedure was 2 mL via IV               push.  Critical Event Critical Event: Test was completed as per department protocol. Critical Finding: New WMA involving mid-septum. Time Test was Completed: 11:39:05 AM Notified: Dr. Archibald. Attending notification time: 11:39:16 AM  PHYSICIAN INTERPRETATION:  Left Ventricle: Left ventricular ejection fraction is low normal, by visual estimate at 50-55%. Left venticular wall motion is abnormal. The left ventricular cavity size is normal. Spectral Doppler shows an impaired relaxation pattern of left ventricular diastolic filling. Mild mid and apical anterior wall hypokinesis. Preserved EF. Left Atrium: The left atrium is normal in size. Right Ventricle: The right ventricle is normal in size. There is normal right ventricular global systolic function. Right Atrium: The right atrium is normal in size. Aortic Valve: The aortic valve is trileaflet. There is no evidence of aortic valve regurgitation. Mitral Valve: The mitral valve is normal in structure. There is trace mitral valve regurgitation. Tricuspid Valve: The tricuspid valve is structurally normal. There is trace tricuspid regurgitation. Pulmonic Valve: The pulmonic valve is structurally normal. There is trace pulmonic valve regurgitation. Pericardium: There is no pericardial effusion noted. Aorta: The aortic root is normal.  CONCLUSIONS:  1. Left ventricular ejection fraction is low normal, by visual estimate at 50-55%.  2. Abnormal left venticular wall motion.  3. Spectral Doppler shows an impaired relaxation pattern of left ventricular diastolic filling.  4. Mild mid and apical anterior wall hypokinesis. Preserved EF.  5. There is normal right ventricular global systolic function. QUANTITATIVE DATA SUMMARY:  2D MEASUREMENTS:          Normal Ranges: Ao Root d:       2.70 cm  (2.0-3.7cm) LAs:             3.60 cm  (2.7-4.0cm) IVSd:            0.80 cm  (0.6-1.1cm) LVPWd:           0.60 cm  (0.6-1.1cm) LVIDd:           5.30 cm  (3.9-5.9cm) LVIDs:           3.70 cm LV Mass Index:   61 g/m2 LVEDV Index:     34 ml/m2 LV % FS          30.2 %  M-MODE MEASUREMENTS:         Normal Ranges: Ao Root:             3.50 cm (2.0-3.7cm) LAs:                 3.90 cm (2.7-4.0cm)  LV SYSTOLIC FUNCTION BY 2D PLANIMETRY (MOD):                       Normal Ranges: EF-A4C View:    59 % (>=55%) EF-A2C View:    52 % EF-Biplane:     55 % EF-Visual:      53 % LV EF Reported: 53 %  TRICUSPID VALVE/RVSP:         Normal Ranges: IVC Diam:             2.10 cm  [id number] Bob Rosario MD Electronically signed on 9/12/2024 at 12:22:14 PM  ** Final **     CT angio chest w and wo IV contrast    Result Date: 9/12/2024  * * *Final Report* * * DATE OF EXAM: Sep 12 2024  2:51AM   LDC   0540  -  CT CHEST W IVCON PE  / ACCESSION #  [id number] PROCEDURE REASON: Pulmonary embolism (PE) suspected, high prob      * * * * Physician Interpretation * * * *  EXAMINATION:  CHEST CT WITH CONTRAST (PULMONARY EMBOLISM PROTOCOL) CLINICAL HISTORY: Chest pain, pulmonary embolism suspected.  History of LEFT colon cancer. Technique:  Spiral CT acquisition of the chest from the thoracic inlet to the upper abdomen following IV contrast.  Axial 1 and 3 mm thick slices plus coronal and sagittal reformatted images. MQ:  CTCP_5 Contrast:  100 mL IV CT Radiation dose: Integrated Dose-length product (DLP) for this visit =   [id number] mGy*cm CT Dose Reduction Employed: Automated exposure control(AEC) and iterative recon Comparison: Report of chest CT from 08/26/2024 RESULT: Limitations:  None. Evaluation for thromboembolic disease:      - Right heart chambers:  No thromboembolic disease.      - Main pulmonary arteries:  No thromboembolic disease.      - Lobar pulmonary arteries:  No thromboembolic disease.      - Segmental pulmonary arteries:  No thromboembolic disease.      - Subsegmental pulmonary arteries:  No thromboembolic disease.      - Additional pulmonary artery findings:  The main pulmonary artery is normal in caliber. Lines, tubes, and devices:  None. Lung parenchyma and airways: Mild dependent atelectasis, greater on the RIGHT.  No consolidation.  There are a few 2 to 3 mm pulmonary nodules.   No suspicious pulmonary nodule. The central airways are patent. Pleural space:  No pleural  effusion.  No pleural thickening. Lower neck, lymph nodes, and mediastinum:  The imaged thyroid gland is normal.  No lymphadenopathy in the supraclavicular, axillary, mediastinal, or hilar regions. Heart, pericardium, and thoracic vessels:  The thoracic aorta is normal in caliber. The cardiac chambers are normal in size.  Atherosclerosis, including suggestion of coronary artery atherosclerotic calcifications, although the study is not optimized for coronary assessment. No pericardial effusion or thickening. Bones and soft tissues:  No destructive bone lesion. Chest wall is unremarkable. Upper abdomen:  No acute abnormality in the imaged upper abdomen. Localizer images: No additional findings.    IMPRESSION: 1.  No CT evidence of pulmonary thromboembolism or acute pulmonary process. 2.  Mild dependent atelectasis, greater on the RIGHT. 3.  There are a few 2 to 3 mm pulmonary nodules, previously described, recommend follow-up as previously recommended. : [facility]   Transcribe Date/Time: Sep 12 2024  4:23A Dictated by : ROHIT STARKS MD This examination was interpreted and the report reviewed and electronically signed by: ROHIT STARKS MD on Sep 12 2024  4:33AM  EST    XR chest 1 view    Result Date: 9/12/2024  * * *Final Report* * * DATE OF EXAM: Sep 12 2024  2:51AM   LDX   5290  -  XR CHEST 1V FRONTAL   / ACCESSION #  [id number] PROCEDURE REASON: Chest pain      * * * * Physician Interpretation * * * *  EXAMINATION:  CHEST RADIOGRAPH (SINGLE VIEW AP OR PA) CLINICAL HISTORY: Chest pain MQ:  XC1_5 Comparison:  Chest radiograph 9/11/2024. RESULT: Lines, tubes, and devices:  None. Lungs and pleura:  No consolidation. No pneumothorax. No pleural effusion. Mild patchy opacities in lung bases. Cardiomediastinal silhouette:  Normal cardiomediastinal silhouette. Other:  No acute osseous abnormality. Old right fifth and eighth rib fractures.    IMPRESSION: Mild patchy basilar opacities, presumably atelectasis.  : [facility]   Transcribe Date/Time: Sep 12 2024  4:01A Dictated by : JIMMY SAAVEDRA MD This examination was interpreted and the report reviewed and electronically signed by: JIMMY SAAVEDRA MD on Sep 12 2024  4:02AM  EST    XR chest 1 view    Result Date: 9/11/2024  * * *Final Report* * * DATE OF EXAM: Sep 11 2024  9:21AM   LDX   5290  -  XR CHEST 1V FRONTAL   / ACCESSION #  [id number] PROCEDURE REASON: Shortness of breath      * * * * Physician Interpretation * * * *  EXAMINATION:  CHEST RADIOGRAPH (SINGLE VIEW AP OR PA) CLINICAL HISTORY: Shortness of breath, Dizziness MQ:  XC1_5 Comparison:  None RESULT: Lines, tubes, and devices:  None. Lungs and pleura:  No consolidation. No pneumothorax. No pleural effusion. Cardiomediastinal silhouette:  Normal cardiomediastinal silhouette. Other:  Old rib fractures    IMPRESSION: No acute radiographic abnormality. : [facility]   Transcribe Date/Time: Sep 11 2024  9:51A Dictated by : ALAN GUADARRAMA MD This examination was interpreted and the report reviewed and electronically signed by: ALAN GUADARRAMA MD on Sep 11 2024  9:55AM  EST    US thoracentesis    Result Date: 9/3/2024  Interpreted By:  Sarbjit Darden, STUDY: US THORACENTESIS; 9/3/63207:27 pm   INDICATION: Signs/Symptoms:New bilateral pleural effusions right greater than left in the setting of biopsy-proven adenocarcinoma of the left colon; rule out malignant pleural effusions   COMPARISON: None.   ACCESSION NUMBER(S): [id number]   ORDERING CLINICIAN: KARLO MARSHALL   TECHNIQUE: INTERVENTIONALIST: Sarbjit Darden CNP   CONSENT: The patient/patient's POA/next of kin was informed of the nature of the proposed procedure. The purposes, alternatives, risks, and benefits were explained and discussed. All questions were answered and consent was obtained.   SEDATION: None   MEDICATION/CONTRAST: Lidocaine 1%.   TIME OUT: A time out was performed immediately prior to procedure start with the  interventional team, correctly identifying the patient name, date of birth, MRN, procedure, anatomy (including marking of site and side), patient position, procedure consent form, relevant laboratory and imaging test results, antibiotic administration, safety precautions, and procedure-specific equipment needs.   FINDINGS: The patient was placed in the seated position.   The patient's right pleural space was scanned using the ultrasound probe. The area of fluid most amenable to drainage was marked.   The patient's skin was sterilized using chlorhexidine and draped in sterile manner. The skin was anesthestized with 1% lidocaine. A 5F valved centesis catheter was inserted into the right pleural space where previously marked. A total of 575 mL of yellow pleural fluid was removed. The catheter was then removed and gauze and a tegaderm were placed over the insertion site. Sterile technique used throughout entirety of procedure.   Specimens were obtained, labeled and sent to lab with requisitions ordered by primary team.   The patient tolerated the procedure well and there were no immediate complications.       Uneventful thoracentesis, as detailed above. right Pleural space, 575 mL.   Performed and dictated at [facility].   Signed by: Sarbjit Darden 9/3/2024 2:27 PM Dictation workstation:   [id number]    NM PET CT colorectal initial diagnosis    Result Date: 8/29/2024  Interpreted By:  Mikel Roman and Bera Kaustav STUDY: NM PET CT COLORECTAL INITIAL DIAGNOSIS;  8/29/2024 2:18 pm   INDICATION: Signs/Symptoms:Biopsy-proven adenocarcinoma left colon; mediastinal lymphadenopathy and bilateral pleural effusions noted on CT imaging; rule out metastatic disease.   COMPARISON: CT chest with contrast on 08/26/2024 CT abdomen pelvis with IV contrast on 08/01/2024   ACCESSION NUMBER(S): [id number]   ORDERING CLINICIAN: KARLO MARSHALL   TECHNIQUE: DIVISION OF NUCLEAR MEDICINE POSITRON  EMISSION TOMOGRAPHY (PET-CT)   The patient received an intravenous dose of 12.3 mCi of Fluorine-18 fluorodeoxyglucose (FDG).  Positron emission tomographic (PET) images from mid thigh to skull base were then acquired after a one hour delay. Also acquired was a contemporaneous low dose non-contrast CT scan performed for attenuation correction of PET images and anatomic localization.  The PET and CT images were digitally fused for display.  All images were acquired on a combined PET-CT scanner unit. Some areas of FDG accumulation may be described in standardized uptake value (SUV) units.   CODING: Initial Treatment Strategy (PI)   CALIBRATION: Dose Injection-to-Scan Interval (mins): 48 min Mediastinal bloodpool SUV (normal 1.5-2.5): 2.9 Blood glucose: 116 mg/dL   FINDINGS: HEAD AND NECK: No evidence of focal FDG avid lesion in the partially visualized brain parenchyma, noting that evaluation is limited because of the expected physiologic diffuse FDG uptake in the brain. No focal FDG avid  soft tissue lesion is seen in the neck. No FDG avid  cervical lymphadenopathy is present. No paranasal sinus diease. Thyroid gland is unremarkable.   CHEST: No focal FDG avid  lesion is seen in the lung parenchyma. Moderate right and small left pleural effusion with superimposed atelectasis. Few 2-3 mm nodules do not show increased FDG avidity, although likely below resolution of PET. Mild FDG activity within a subcarinal lymph node with SUV max of 3.3, bilateral hilar lymph nodes with SUV max of 3.5 on the right.   ABDOMEN AND PELVIS: Intense focus of hypermetabolic activity involving the descending colon with SUV max of 10.5, likely represents patient's biopsy-proven adenocarcinoma. No evidence of FDG avid  lymphadenopathy. Bilateral adrenal glands are unremarkable. Physiologic radiotracer uptake is present in the liver and spleen with excretion into the bowel loops and the genitourinary tract.   MUSCULOSKELETAL: No focal FDG avid   lesion is seen in the axial or appendicular to suggest osseous metastasis.       1. Intense focus of hypermetabolic activity corresponding to masslike thickening within the left descending colon, likely corresponding to patient's biopsy-proven adenocarcinoma of the colon. 2. Moderate right and small left pleural effusion with superimposed atelectasis, without hypermetabolic activity. No definite hypermetabolic lung nodules, with 2-3 mm nodules within the lungs below resolution of PET. Recommend short-term CT chest for follow-up. 3. Mild hypermetabolic mediastinal and hilar lymph nodes are likely reactive. This can also be followed up with a short-term CT chest. 4. No other evidence of hypermetabolic disease throughout the body.     I personally reviewed the image(s) / study and agree with the findings and interpretation as stated. This study was interpreted at [facility].   Signed by: Mikel Roman 8/29/2024 2:52 PM Dictation workstation:   [id number]    * Cannot find OR log *  Last relevant procedure: Colonoscopy on 8/15/24            This patient has a urinary catheter   Reason for the urinary catheter remaining today?                Assessment/Plan   Assessment & Plan  Pneumoperitoneum    Patient has had no further rectal bleeding since admission.  His hemoglobin fell slightly to 6.9 during the night and 1 unit of blood was transfused.  CT scan has been done which is raised the question whether there is perhaps an increase in the hematoma in the left upper quadrant.  Fortunately no enteric contrast is going outside the colon to show there is any leak.  The pneumoperitoneum is also improved there is continued IV contrast indicating decreased excretion and the possibility that the Eliquis may not completely be cleared up.  The patient still appears stable at the present time and there is no indication of doing any endoscopic procedure at this time since he has had no bleeding in  the last 24 hours       I spent 30minutes in the professional and overall care of this patient.      Pavel Meléndez MD

## 2024-09-25 NOTE — PROGRESS NOTES
"Critical Care Progress Note    Patient Name: Krish Batista   YOB: 1955    Subjective:  Patient endorse improvement in abdominal pain. Minimal urine output last night but endorses some improvement this morning. Denies any fevers, chills, nausea or vomiting. Denies any bowel movements, but endorses flatus.      Objective:    /71   Pulse 77   Temp 36.4 °C (97.5 °F) (Temporal)   Resp 13   Ht 1.88 m (6' 2\")   Wt 92 kg (202 lb 13.2 oz)   SpO2 100%   BMI 26.04 kg/m²     Physical Exam:  GEN: diaphoretic, ill-appearing  HEENT: PERRL, EOMI, MMM OP clear, TMs clear bilaterally  NECK: supple, no cervical LAD, no carotid bruits  CV: S1, S2, regular, no murmur  PULM: CTAB  ABD: diffusely mildly tender to palpation, decreased bowel sounds, extensive ecchymosis over most of the abdomen and bilateral flanks, tracking to mid back.  EXT: no LE edema  NEURO: no gross focal deficits  PSYCH: appropriate affect     Assessment/Plan:  Krish Batista is a 70yo man with PMH of recently diagnosed colon cancer, PIERRE, pAF, CAD (recent hospital admission for NSTEMI) who underwent laparoscopic segmental colectomy on 9/15 with course complicated by Afib with RVR. Patient had been initiated on Eliquis at time of discharge 9/19. Patient admitted to ICU for concern for hemorrhagic shock on 9/23.     Neuro:  - Patient orientated to person place and time  - Monitor for ICU delirium  - Patient requires no sedation or analgesia  - Maintain sleep hygiene  - Continue with PRN dilaudid    Cardiovascular  #Risk for hemorrhagic shock in setting of intra-abdominal hemorrhage  #pAfib  #Hx of NSTEMI  #HTN  Echo 9/12 EF 50-55%  - Hold beta-blocker and antihypertensives due to hypotension  - Monitor Hgb  - Cardiology following for recommendations on anticoagulation for Afib given patient could not tolerate DOAC    Pulmonary:  - No acute issues, patient on RA    GI:  #Intrabdominal hemorrhage  #Possible Anastomotic Bleed  #Concern for " retroperitoneal bleed  - Surgery following, will plan on CT with oral contrast today to assess for anastomotic bleed   - Strict NPO  - Continue with broad-spectrum anaerobic coverage with Cefepime and Flagyl    Renal:   #NERI, likely pre-renal vs ATN  - Monitor urine output  - Continue IVF  - Will place escobar for accurate I/O  - Patient was given fluid bolus and lasix 9/24 evening with some improvement in urination.    Endocrine:  - POCT glucose Q4 given NPO, hypoglycemia protocol    Heme/Onc:  #Acute Blood Loss Anemia on Eliquis  #Leukocytosis, likely reactive  9/23: Given Kcentra/1 unit FFP/1 unit pRBC  9/24: Given 2 units pRBC  9/25: Given 1 units pRBC  - Maintain fibrinogen > 150, plt > 50, Hgb > 7; CBC Q6    ID:  #Concern for peritonitis  - Continue Cefepime and flagyl    Skin/MSK:  #Abdominal Ecchymoses  #Superficial hematomas  - Continue to monitor     ICU CHECK LIST:   Antimicrobials: Cefepime and Flagyl (day 2 abx)  Oxygen: RA  Feeding: Strict NPO  Fluids:  ml/hr  Analgesia: PRN dilaudid  Sedation: None  Thromboprophylaxis: Held in setting of hemorrhage   Ulcer prophylaxis: PPI  Glycemic control: None  Bowel care: PRN  Indwelling catheters: Escobar  Lines: PIV x2    Code Status: DNR/DNI       Byron Vang MD  Critical Care

## 2024-09-25 NOTE — CARE PLAN
The patient's goals for the shift include to feel better    The clinical goals for the shift include hemodynamic stability      Problem: Nutrition  Goal: Less than 5 days NPO/clear liquids  Outcome: Progressing  Goal: Oral intake greater than 50%  Outcome: Progressing  Goal: Oral intake greater 75%  Outcome: Progressing  Goal: Consume prescribed supplement  Outcome: Progressing  Goal: Adequate PO fluid intake  Outcome: Progressing  Goal: Nutrition support goals are met within 48 hrs  Outcome: Progressing  Goal: Nutrition support is meeting 75% of nutrient needs  Outcome: Progressing  Goal: Tube feed tolerance  Outcome: Progressing  Goal: BG  mg/dL  Outcome: Progressing  Goal: Lab values WNL  Outcome: Progressing  Goal: Electrolytes WNL  Outcome: Progressing  Goal: Promote healing  Outcome: Progressing  Goal: Maintain stable weight  Outcome: Progressing  Goal: Reduce weight from edema/fluid  Outcome: Progressing     Problem: Fall/Injury  Goal: Use assistive devices by end of the shift  Outcome: Progressing  Goal: Pace activities to prevent fatigue by end of the shift  Outcome: Progressing  Goal: Not fall by end of shift  Outcome: Progressing  Goal: Be free from injury by end of the shift  Outcome: Progressing     Problem: Pain - Adult  Goal: Verbalizes/displays adequate comfort level or baseline comfort level  Outcome: Progressing     Problem: Safety - Adult  Goal: Free from fall injury  Outcome: Progressing     Problem: Discharge Planning  Goal: Discharge to home or other facility with appropriate resources  Outcome: Progressing     Problem: Chronic Conditions and Co-morbidities  Goal: Patient's chronic conditions and co-morbidity symptoms are monitored and maintained or improved  Outcome: Progressing     Problem: Pain  Goal: Takes deep breaths with improved pain control throughout the shift  Outcome: Progressing  Goal: Turns in bed with improved pain control throughout the shift  Outcome: Progressing  Goal:  Walks with improved pain control throughout the shift  Outcome: Progressing  Goal: Performs ADL's with improved pain control throughout shift  Outcome: Progressing  Goal: Participates in PT with improved pain control throughout the shift  Outcome: Progressing     Problem: Skin  Goal: Participates in plan/prevention/treatment measures  Outcome: Progressing  Flowsheets (Taken 9/25/2024 1049)  Participates in plan/prevention/treatment measures:   Discuss with provider PT/OT consult   Increase activity/out of bed for meals   Elevate heels  Goal: Prevent/manage excess moisture  Outcome: Progressing  Flowsheets (Taken 9/25/2024 1049)  Prevent/manage excess moisture:   Cleanse incontinence/protect with barrier cream   Moisturize dry skin   Follow provider orders for dressing changes   Monitor for/manage infection if present  Goal: Prevent/minimize sheer/friction injuries  Outcome: Progressing  Flowsheets (Taken 9/25/2024 1049)  Prevent/minimize sheer/friction injuries:   Complete micro-shifts as needed if patient unable. Adjust patient position to relieve pressure points, not a full turn   HOB 30 degrees or less   Increase activity/out of bed for meals   Turn/reposition every 2 hours/use positioning/transfer devices   Use pull sheet   Utilize specialty bed per algorithm  Goal: Promote/optimize nutrition  Outcome: Progressing  Flowsheets (Taken 9/25/2024 1049)  Promote/optimize nutrition:   Offer water/supplements/favorite foods   Consume > 50% meals/supplements   Monitor/record intake including meals  Goal: Promote skin healing  Outcome: Progressing  Flowsheets (Taken 9/25/2024 1049)  Promote skin healing:   Assess skin/pad under line(s)/device(s)   Ensure correct size (line/device) and apply per  instructions   Protective dressings over bony prominences   Rotate device position/do not position patient on device   Turn/reposition every 2 hours/use positioning/transfer devices

## 2024-09-25 NOTE — PROGRESS NOTES
"Krish Batista is a 69 y.o. male on day 2 of admission presenting with Pneumoperitoneum.    Subjective   Patient stating he feels better this morning. He has not had a BM since yesterday so he is not sure if he is still having blood per rectum. Denies any pain, nausea, vomiting, fever or chills.        Objective     Physical Exam  Constitutional:       General: He is not in acute distress.     Appearance: He is ill-appearing. He is not toxic-appearing.   HENT:      Mouth/Throat:      Mouth: Mucous membranes are moist.   Eyes:      General: No scleral icterus.  Cardiovascular:      Rate and Rhythm: Normal rate and regular rhythm.      Heart sounds: Normal heart sounds.   Pulmonary:      Effort: Pulmonary effort is normal. No respiratory distress.      Breath sounds: Normal breath sounds.   Abdominal:      General: Bowel sounds are decreased. There is no distension.      Palpations: Abdomen is soft.      Tenderness: There is abdominal tenderness (mild, diffuse). There is no guarding.      Comments: Extensive ecchymosis over most of the abdomen and bilateral flanks, tracking to mid-back, L>R. Firmness at areas of staples consistent with ecchymosis/hematoma.    Genitourinary:     Comments: Dark purple scrotal ecchymosis present on b/l testicles. No penile edema or ecchymosis noted  Skin:     General: Skin is warm and dry.      Coloration: Skin is not jaundiced.   Neurological:      General: No focal deficit present.      Mental Status: He is alert and oriented to person, place, and time.   Psychiatric:         Mood and Affect: Mood normal.         Behavior: Behavior normal.         Last Recorded Vitals  Blood pressure 165/71, pulse 77, temperature 36.4 °C (97.5 °F), temperature source Temporal, resp. rate 13, height 1.88 m (6' 2\"), weight 92 kg (202 lb 13.2 oz), SpO2 100%.  Intake/Output last 3 Shifts:  I/O last 3 completed shifts:  In: 6136.3 (66.7 mL/kg) [I.V.:2123.3 (23.1 mL/kg); Blood:1713; IV Piggyback:2300]  Out: " 570 (6.2 mL/kg) [Urine:570 (0.2 mL/kg/hr)]  Weight: 92 kg     Relevant Results  Results for orders placed or performed during the hospital encounter of 09/23/24 (from the past 24 hour(s))   Urinalysis with Reflex Culture and Microscopic   Result Value Ref Range    Color, Urine Yellow Light-Yellow, Yellow, Dark-Yellow    Appearance, Urine Clear Clear    Specific Gravity, Urine 1.025 1.005 - 1.035    pH, Urine 5.5 5.0, 5.5, 6.0, 6.5, 7.0, 7.5, 8.0    Protein, Urine 30 (1+) (A) NEGATIVE, 10 (TRACE), 20 (TRACE) mg/dL    Glucose, Urine Normal Normal mg/dL    Blood, Urine NEGATIVE NEGATIVE    Ketones, Urine TRACE (A) NEGATIVE mg/dL    Bilirubin, Urine NEGATIVE NEGATIVE    Urobilinogen, Urine Normal Normal mg/dL    Nitrite, Urine NEGATIVE NEGATIVE    Leukocyte Esterase, Urine NEGATIVE NEGATIVE   Extra Urine Gray Tube   Result Value Ref Range    Extra Tube Hold for add-ons.    Urinalysis Microscopic   Result Value Ref Range    WBC, Urine 1-5 1-5, NONE /HPF    RBC, Urine 11-20 (A) NONE, 1-2, 3-5 /HPF    Mucus, Urine FEW Reference range not established. /LPF   CBC   Result Value Ref Range    WBC 18.4 (H) 4.4 - 11.3 x10*3/uL    nRBC 0.2 (H) 0.0 - 0.0 /100 WBCs    RBC 2.88 (L) 4.50 - 5.90 x10*6/uL    Hemoglobin 8.2 (L) 13.5 - 17.5 g/dL    Hematocrit 25.1 (L) 41.0 - 52.0 %    MCV 87 80 - 100 fL    MCH 28.5 26.0 - 34.0 pg    MCHC 32.7 32.0 - 36.0 g/dL    RDW 19.9 (H) 11.5 - 14.5 %    Platelets 336 150 - 450 x10*3/uL   Lavender Top   Result Value Ref Range    Extra Tube Hold for add-ons.    Fibrinogen   Result Value Ref Range    Fibrinogen 337 200 - 400 mg/dL   CBC   Result Value Ref Range    WBC 18.7 (H) 4.4 - 11.3 x10*3/uL    nRBC 0.3 (H) 0.0 - 0.0 /100 WBCs    RBC 2.79 (L) 4.50 - 5.90 x10*6/uL    Hemoglobin 8.0 (L) 13.5 - 17.5 g/dL    Hematocrit 24.9 (L) 41.0 - 52.0 %    MCV 89 80 - 100 fL    MCH 28.7 26.0 - 34.0 pg    MCHC 32.1 32.0 - 36.0 g/dL    RDW 20.5 (H) 11.5 - 14.5 %    Platelets 377 150 - 450 x10*3/uL   POCT GLUCOSE    Result Value Ref Range    POCT Glucose 138 (H) 74 - 99 mg/dL   POCT GLUCOSE   Result Value Ref Range    POCT Glucose 134 (H) 74 - 99 mg/dL   POCT GLUCOSE   Result Value Ref Range    POCT Glucose 117 (H) 74 - 99 mg/dL   CBC   Result Value Ref Range    WBC 19.9 (H) 4.4 - 11.3 x10*3/uL    nRBC 0.3 (H) 0.0 - 0.0 /100 WBCs    RBC 2.39 (L) 4.50 - 5.90 x10*6/uL    Hemoglobin 6.9 (L) 13.5 - 17.5 g/dL    Hematocrit 21.5 (L) 41.0 - 52.0 %    MCV 90 80 - 100 fL    MCH 28.9 26.0 - 34.0 pg    MCHC 32.1 32.0 - 36.0 g/dL    RDW 21.0 (H) 11.5 - 14.5 %    Platelets 352 150 - 450 x10*3/uL   Prepare RBC: 1 Units   Result Value Ref Range    PRODUCT CODE F8995D92     Unit Number [id number]     Unit ABO A     Unit RH POS     XM INTEP COMP     Dispense Status TR     Blood Expiration Date 10/18/2024 11:59:00 PM EDT     PRODUCT BLOOD TYPE 6200     UNIT VOLUME 350    POCT GLUCOSE   Result Value Ref Range    POCT Glucose 116 (H) 74 - 99 mg/dL   CBC   Result Value Ref Range    WBC 18.4 (H) 4.4 - 11.3 x10*3/uL    nRBC 0.4 (H) 0.0 - 0.0 /100 WBCs    RBC 2.81 (L) 4.50 - 5.90 x10*6/uL    Hemoglobin 8.0 (L) 13.5 - 17.5 g/dL    Hematocrit 25.4 (L) 41.0 - 52.0 %    MCV 90 80 - 100 fL    MCH 28.5 26.0 - 34.0 pg    MCHC 31.5 (L) 32.0 - 36.0 g/dL    RDW 20.0 (H) 11.5 - 14.5 %    Platelets 356 150 - 450 x10*3/uL   Comprehensive Metabolic Panel   Result Value Ref Range    Glucose 127 (H) 74 - 99 mg/dL    Sodium 135 (L) 136 - 145 mmol/L    Potassium 5.1 3.5 - 5.3 mmol/L    Chloride 103 98 - 107 mmol/L    Bicarbonate 21 21 - 32 mmol/L    Anion Gap 16 10 - 20 mmol/L    Urea Nitrogen 44 (H) 6 - 23 mg/dL    Creatinine 3.37 (H) 0.50 - 1.30 mg/dL    eGFR 19 (L) >60 mL/min/1.73m*2    Calcium 8.0 (L) 8.6 - 10.3 mg/dL    Albumin 3.3 (L) 3.4 - 5.0 g/dL    Alkaline Phosphatase 46 33 - 136 U/L    Total Protein 5.6 (L) 6.4 - 8.2 g/dL    AST 18 9 - 39 U/L    Bilirubin, Total 1.3 (H) 0.0 - 1.2 mg/dL    ALT 22 10 - 52 U/L   Protime-INR   Result Value Ref Range     Protime 15.9 (H) 9.8 - 12.8 seconds    INR 1.4 (H) 0.9 - 1.1   Fibrinogen   Result Value Ref Range    Fibrinogen 352 200 - 400 mg/dL   Magnesium   Result Value Ref Range    Magnesium 2.51 (H) 1.60 - 2.40 mg/dL   POCT GLUCOSE   Result Value Ref Range    POCT Glucose 110 (H) 74 - 99 mg/dL   Calcium, Ionized   Result Value Ref Range    POCT Calcium, Ionized 0.99 (L) 1.1 - 1.33 mmol/L           Assessment/Plan   Krish Batista  is a 70 yo male with PMH significant for iron deficiency anemia, colon cancer w/ LGIB s/p segmental colectomy (9/15 with Dr. Clark), and Afib with RVR who presented to [facility] ED on 9/23 for rectal bleeding and weakness. Pt reports 4 days of rectal bleeding prior to admission.     Pt was recently admitted for NSTEMI and Afib w/ RVR and underwent segmental colectomy for colon cancer. Post-op course complicated by acute blood loss anemia requiring 2u pRBCs. He was noted to have significant ecchymosis of his scrotum which likely represented old surgical blood tracking down and pooling in the scrotum. Hgb stabilized and he was started on eliquis BID on the day of discharge.       Initial Hgb was similar to Hgb at time of discharge, but he was hypotensive and lightheaded, so he was given 1u pRBCs, and 2u FFP +Kcentra for eliquis reversal in the ED. Hgb dropped to 5.6 after IVF resuscitation, but BP improved. He was given another 2u pRBCs in the ICU, but BP is again down trending and pt is now nauseous with a few episodes of emesis.     Procedures/Significant events:  9/15: Laparoscopic segmental colectomy with mobilization of splenic flexure  9/16 - 1u pRBC  9/17 - 1u pRBC  9/23 - 1u pRBC, 2uFFP, Kcentra  9/24 - 1u pRBC  9/25 - 1 u pRBC    Assessment and Plan:    #S/p segmental colectomy  #Post-op bleeding on AC  #Hemorrhagic shock  - CT with PO contrast ordered  - Rectal bleeding coincided with initiation of AC, but pt had some evidence of intra-abdominal post-op bleeding prior to this  - Concern  for anastomotic bleed vs intraabdominal bleed  - Transfuse for Hgb <7 or hemodynamic instability  - NPO  - mIVF  - Trend CBC Q6  - No DVT chemoprophylaxis, SCDs only  - May need repeat surgical vs IR intervention if bleeding does not stabilize  - Continue to monitor closely    #Hyperbilirubinemia, improving  - D/t extensive abdominal bleeding  - No concern for hepatobiliary cause   - Continue to monitor    #H/o Afib w/ RVR  - Currently in NSR  - Hold AC  - May need prolonged time off AC to ensure no repeat bleed  - Agree with cardiology consult    Dispo: Continue care in ICU    General surgery will follow closely. Please inform surgery KATHY promptly of any changes in patient's condition.    Patient discussed with attending surgeon, Dr. Clark    Addendum [id number]:   CT scan performed; ultimately did not show anastomotic leak. Patient informed no need for surgical intervention at this time.  Patient did note that he recently had a bowel movement that was normal in color and nonbloody.  Diet will be advanced to CLD.  Patient should stay in ICU overnight and continue to trend hemoglobins every 6 hours.    Brielle Carias PA-C

## 2024-09-25 NOTE — SIGNIFICANT EVENT
ICU Attending Summary:    Mr. Batista is a 70yo male with history of afib, NSTEMI thought to be due to embolic phenomena from afib, and recently diagnosed colon adeno with repeated GIB who is now s/p hemicolectomy 9/15 presenting with intra-abdominal bleed with hemorrhagic shock (not requiring pressors) after doac initiation for his afib post op.      #Intra-abdominal hemorrhage post op s/p K-centra, FFP and PRBCs  #Hemorrhagic shock - improved after resuscitation  -Follow up surgery recs, plan for CT with Po contrast today  -q6h CBC  -Transfuse for Hgb <7, plt <50k, fibrinogen <100 and INR >2  -Cefepime/flagyl for intra-abdominal coverage  -Hold AC  -Strict I/Os  -Bowel rest, hold PO meds  -Dilaudid PRN for pain, avoiding morphine given NERI     #Afib on DOAC with bleed s/p K-centra  -Holding beta blocker and DOAC  -Cardiology c/s      #NERI due to bleed - hypovolemic/hypotensive ATN likely  #Hypocalcemia due to blood products  -Villela for strict I/Os  -Reuscitation with blood products/fluids  -Trend RFP  -Replete calcium  -Avoid nephrotoxins     #Leukocytosis - likely reactive from bleed vs intra-abdominal infection  -Cefepime/flagyl  -Follow up surgery recs      Lines/tubes: PIVs  Prophylaxis: SCDs, PPI  Drips: none  Fluids: mIVF  Oxygen: RA  Nutrition: Strict NPO  Restraints: none  Code status: DNR/DNI  Dispo: ICU

## 2024-09-25 NOTE — CONSULTS
GI consult previously  completed  Please see original GI consult note for assessment and recommendsations

## 2024-09-25 NOTE — PROGRESS NOTES
[facility] Pulmonary and Critical Care Medicine   Brief Progress Note      Patient with decreased urine output overnight.  Less than 20 mL an hour. Bolus of IV fluids given earlier in the evening without significant change.  Repeat CBC at midnight showed hemoglobin 6.9.  1 unit of packed red blood cells ordered and a dose of Lasix given.  Repeat CBC posttransfusion is pending.  Urine output has improved.      Bassam Erickson MD  Pulmonary & Critical Care Attending   [phone]    Please excuse any typographical or unwanted errors within this documentation as voice recognition software was used to dictate this note.

## 2024-09-25 NOTE — CONSULTS
Inpatient consult to Cardiology  Consult performed by: Bob Rosario MD PhD  Consult ordered by: Bassam Erickosn MD  Reason for consult: Atrial fibrillation and GI bleeding        History Of Present Illness:    Krish Lopez is a 70yo male with history of afib, NSTEMI thought to be due to embolic phenomena from afib, and recently diagnosed colon adeno with repeated GIB who is now s/p hemicolectomy 9/15 presenting with intra-abdominal bleed with hemorrhagic shock (not requiring pressors) after doac initiation for his afib post op.      12 point review of systems including (Constitutional, Eyes, ENMT, Respiratory, Cardiac, Gastrointestinal, Neurological, Psychiatric, and Hematologic) was performed and is otherwise negative.    Past medical history:  As above.    Medications were reviewed.    Allergies were reviewed.    Social history:  Patient denies smoking, alcohol abuse, or illicit drug use.    Family history: Was reviewed and not contributory to the current presentation of the patient.         Last Recorded Vitals:  Vitals:    09/25/24 0400 09/25/24 0500 09/25/24 0600 09/25/24 0800   BP: 132/64 159/71 165/71    Pulse: 81 82 77    Resp: 26 16 13    Temp: 36.8 °C (98.2 °F)   36.4 °C (97.5 °F)   TempSrc: Temporal   Temporal   SpO2: 99% 96% 100%    Weight:   92 kg (202 lb 13.2 oz)    Height:           Last Labs:  CBC - 9/25/2024:  6:13 AM  18.4 8.0 356    25.4      CMP - 9/25/2024:  6:13 AM  8.0 5.6 18 --- 1.3   5.3 3.3 22 46      PTT - 9/24/2024:  7:31 AM  1.4   15.9 29     Troponin I, High Sensitivity   Date/Time Value Ref Range Status   09/23/2024 08:00 PM 17 0 - 20 ng/L Final   09/23/2024 05:06 PM 18 0 - 20 ng/L Final   09/12/2024 10:37 AM 6,106 (HH) 0 - 20 ng/L Final     BNP   Date/Time Value Ref Range Status   09/23/2024 05:06  (H) 0 - 99 pg/mL Final     Hemoglobin A1C   Date/Time Value Ref Range Status   08/02/2024 05:14 AM 5.4 see below % Final     LDL Calculated   Date/Time Value Ref Range  Status   09/12/2024 10:37  (H) <=99 mg/dL Final     Comment:                                 Near   Borderline      AGE      Desirable  Optimal    High     High     Very High     0-19 Y     0 - 109     ---    110-129   >/= 130     ----    20-24 Y     0 - 119     ---    120-159   >/= 160     ----      >24 Y     0 -  99   100-129  130-159   160-189     >/=190     03/28/2024 11:11  (H) <=99 mg/dL Final     Comment:                                 Near   Borderline      AGE      Desirable  Optimal    High     High     Very High     0-19 Y     0 - 109     ---    110-129   >/= 130     ----    20-24 Y     0 - 119     ---    120-159   >/= 160     ----      >24 Y     0 -  99   100-129  130-159   160-189     >/=190       VLDL   Date/Time Value Ref Range Status   09/12/2024 10:37 AM 9 0 - 40 mg/dL Final   03/28/2024 11:11 AM 15 0 - 40 mg/dL Final      Last I/O:  I/O last 3 completed shifts:  In: 6136.3 (66.7 mL/kg) [I.V.:2123.3 (23.1 mL/kg); Blood:1713; IV Piggyback:2300]  Out: 570 (6.2 mL/kg) [Urine:570 (0.2 mL/kg/hr)]  Weight: 92 kg     Past Cardiology Tests (Last 3 Years):  EKG:  Electrocardiogram, 12-lead PRN ACS symptoms 09/24/2024 (Preliminary)      ECG 12 Lead 09/23/2024 (Preliminary)      ECG 12 lead 09/14/2024      ECG 12 lead 09/14/2024      ECG 12 lead 09/12/2024      ECG 12 lead 08/26/2024      ECG 12 lead 08/01/2024      ECG 12 lead 08/01/2024    Echo:  Transthoracic Echo (TTE) Complete 09/12/2024      Transthoracic Echo (TTE) Complete 08/02/2024    Ejection Fractions:  EF   Date/Time Value Ref Range Status   09/12/2024 11:41 AM 53 %    08/02/2024 10:35 AM 61 %      Cath:  Cardiac Catheterization Procedure 09/12/2024    Stress Test:  No results found for this or any previous visit from the past 1095 days.    Cardiac Imaging:  No results found for this or any previous visit from the past 1095 days.      Past Medical History:  He has a past medical history of Personal history of other diseases of the  circulatory system.    Past Surgical History:  He has a past surgical history that includes Other surgical history (07/13/2021); Other surgical history (07/13/2021); Other surgical history (07/13/2021); and Cardiac catheterization (N/A, 9/12/2024).      Social History:  He reports that he has never smoked. He has never used smokeless tobacco. He reports current alcohol use. He reports that he does not use drugs.    Family History:  No family history on file.     Allergies:  Amoxicillin    Inpatient Medications:  Scheduled medications   Medication Dose Route Frequency    [Held by provider] amiodarone  200 mg oral BID    [Held by provider] apixaban  5 mg oral BID    [Held by provider] aspirin  81 mg oral Daily    [Held by provider] atorvastatin  80 mg oral Nightly    calcium gluconate  2 g intravenous Once    cefepime  2 g intravenous q8h    [Held by provider] gabapentin  300 mg oral TID    iohexol  500 mL oral Once in imaging    [Held by provider] metoprolol succinate XL  50 mg oral Daily    metroNIDAZOLE  500 mg intravenous q8h    oxygen   inhalation Continuous - Inhalation    pantoprazole  40 mg intravenous Daily     PRN medications   Medication    dextrose    dextrose    glucagon    glucagon    HYDROmorphone    ondansetron     Continuous Medications   Medication Dose Last Rate    lactated Ringer's  50 mL/hr 125 mL/hr (09/25/24 0400)     Outpatient Medications:  Current Outpatient Medications   Medication Instructions    amiodarone (PACERONE) 200 mg, oral, 2 times daily    aspirin 81 mg, oral, Daily    atorvastatin (LIPITOR) 80 mg, oral, Nightly    Eliquis 5 mg, oral, 2 times daily    gabapentin (NEURONTIN) 300 mg, oral, 3 times daily    metoprolol succinate XL (TOPROL-XL) 50 mg, oral, Daily, Do not crush or chew.    metoprolol tartrate (LOPRESSOR) 50 mg, oral, 2 times daily    pantoprazole (PROTONIX) 40 mg, oral, Daily, Do not crush, chew, or split.       Physical Exam:  Constitutional:       Appearance: Normal  appearance.   HENT:      Head: Normocephalic and atraumatic.      Mouth/Throat:      Mouth: Mucous membranes are moist.   Eyes:      Extraocular Movements: Extraocular movements intact.      Pupils: Pupils are equal, round, and reactive to light.   Cardiovascular:      Rate and Rhythm: Normal rate and regular rhythm.   Pulmonary:      Effort: Pulmonary effort is normal.      Breath sounds: Normal breath sounds and air entry.   Abdominal:      General: Abdomen is flat. Bowel sounds are normal.      Palpations: Abdomen is soft.      Tenderness: There is abdominal tenderness.   Musculoskeletal:         General: Normal range of motion.      Cervical back: Normal range of motion and neck supple.   Skin:     General: Skin is warm and dry.   Neurological:      General: No focal deficit present.      Mental Status: He is alert and oriented to person, place, and time. Mental status is at baseline.      Assessment/Plan   #Intra-abdominal hemorrhage post op s/p K-centra, FFP and PRBCs  #Hemorrhagic shock - improved after resuscitation  #Afib on DOAC with bleed s/p K-centra   #NERI due to bleed - hypovolemic/hypotensive ATN likely  #Hypocalcemia due to blood products  #Leukocytosis - likely reactive from bleed vs intra-abdominal infection      Patient was admitted recently with non-ST elevation MI and known colon cancer.  Eventually underwent laparoscopic partial colon resection and was cleared for anticoagulation by surgery team.  Considering his atrial fibrillation and non-ST elevation MI episode that could be embolic in origin, he was placed on Eliquis.  Unfortunately he now presented with hemorrhagic shock transfusion.  Currently in sinus rhythm and denies having chest pain or shortness of breath.  Severe NERI hemorrhagic shock.    He has been placed on n.p.o. by surgery team medications.    Obviously at this point he cannot provide him any anticoagulation evaluation and stabilization.    I am concerned about him going back  to atrial fibrillation with RVR medications including amiodarone and beta-blocker.  Therefore, we will initiate IV amiodarone sinus rhythm.    His blood pressure is elevated, we can consider IV labetalol for now until he is cleared to receive his oral medications.    Will continue to support him closely in ICU and follow closely with surgery and ICU teams.    It appears that he is planned to receive his CT scan to see if exploratory surgical intervention will be needed.    I will follow-up with him tomorrow.      ICU Attending Note    This critically ill patient continues to be at-risk for clinically significant deterioration / failure due to the above mentioned dysfunctional, unstable organ systems.  I have personally identified and managed all complex critical care issues to prevent aforementioned clinical deterioration.  Critical care time is spent at bedside and/or the immediate area and has included, but is not limited to, the review of diagnostic tests, labs, radiographs, serial assessments of hemodynamics, respiratory status, ventilatory management, and family updates. More than 50% of the time was spent for coordination of care/family education.    Critical care time: 45 minutes        Bob Rosario MD, PhD, [facility], [facility]  Interventional Cardiology, [facility]  Associate Professor of Medicine, [facility]  Office: [phone]

## 2024-09-25 NOTE — PROGRESS NOTES
General Surgery Attending Note     My Acute Care Surgery KATHY (Advanced Practice Provider) evaluated this patient today.  Please see that documentation for details.     I saw the patient with the KATHY today, and personally evaluated and examined the patient.  My findings are consistent with those of the KATHY.          Impression:       Hospital day #3  Postoperative day #10     Patient feels better.  No abdominal pain.  Positive flatus.  No further bowel movements.  No further blood per rectum.    The patient has remained hemodynamically stable for 30 hours with no tachycardia, and blood pressure systolic in general greater than 120.  The patient's H&H did drift down to 6.9 and 21.5 last evening,  necessitating 1 unit of PRBC transfusion.  Since then, his H&H has remained stable at roughly 8 and 26.  The patient's NERI is worsening with a BUN of 44 and creatinine of 3.37.  Still with persistent leukocytosis and this is 20.5 most recently.    On exam, the patient looks better.  His lungs are clear.  His heart is sinus rhythm.  His abdomen is mildly distended but soft with scattered bowel sounds.  Resolving abdominal wall ecchymosis.    Follow-up CT scan of the abdomen pelvis without contrast was performed today with delayed images.  I personally reviewed the images, and reviewed the findings personally with the reading radiologist, Dr. Roth.  The findings are as follows:    IMPRESSION:  1 hour delayed images was obtained to evaluate site of anastomosis  for leak.  1. Large volume abdominopelvic hemoperitoneum. Gastrosplenic hematoma  measuring 8.9 x 5.7 cm and another left hemiabdomen mesenteric  hematoma measuring 5.9 x 4.3 cm. Hyperdense foci noted within the  left paracolic gutter in the delayed phases suggestive of active  bleed . Findings have worsened from 09/23/2024 CT scan.  2. Upper abdomen pneumoperitoneum have slightly improved from  09/23/2020 CT scan.  3. Status post laparoscopic segmental colectomy with  mobilization of  the splenic flexure and midabdomen colo colonic anastomosis. No  definite extravasation of the diluted enteric contrast at the level  of the anastomosis could be visualized to suggest leak. No bowel  dilatation.  4. Persistent nephrogram indicative of underlying acute kidney injury.  5. Small volume bilateral pleural effusion with surrounding  atelectasis, slightly worsened from 09/23/2024 CT scan.    Overall impression is intra-abdominal and intraluminal hemorrhage status post laparoscopic segmental colectomy with patient on DOAC (Eliquis) for paroxysmal atrial fibrillation.  Clinically improved status post Kcentra, FFP and PRBCs.       Follow-up CT imaging today revealed no evidence extravasation of intraluminal/oral contrast, particularly at the anastomosis.  There is no evidence of anastomotic leak radiographically.  The pneumoperitoneum has improved.  There is however worsening hemoperitoneum as described, with evidence suggestive of a possible continued extravasation or bleeding in the left upper quadrant/left gutter.     Patient also has NERI.        Plan:       Continue conservative therapy for now as the patient is clinically and hemodynamically stable  Low volume IV fluids  Clear liquid diet for now  Continue to hold anticoagulation  Continue cefepime and Flagyl  Close monitoring  Serial H&H  Will avoid nephrotoxins and try to avoid future IV contrast secondary to the NERI  Depending on the patient's clinical status, may consider laparoscopy

## 2024-09-25 NOTE — CARE PLAN
Problem: Nutrition  Goal: BG  mg/dL  Outcome: Progressing  Goal: Promote healing  Outcome: Progressing     Problem: Fall/Injury  Goal: Pace activities to prevent fatigue by end of the shift  Outcome: Progressing  Goal: Not fall by end of shift  Outcome: Progressing  Goal: Be free from injury by end of the shift  Outcome: Progressing     Problem: Pain - Adult  Goal: Verbalizes/displays adequate comfort level or baseline comfort level  Outcome: Progressing     Problem: Safety - Adult  Goal: Free from fall injury  Outcome: Progressing     Problem: Pain  Goal: Takes deep breaths with improved pain control throughout the shift  Outcome: Progressing  Goal: Turns in bed with improved pain control throughout the shift  Outcome: Progressing     Problem: Skin  Goal: Participates in plan/prevention/treatment measures  Outcome: Progressing  Flowsheets (Taken 9/25/2024 0437)  Participates in plan/prevention/treatment measures: Elevate heels  Goal: Prevent/manage excess moisture  Outcome: Progressing  Flowsheets (Taken 9/25/2024 0437)  Prevent/manage excess moisture: Monitor for/manage infection if present  Goal: Prevent/minimize sheer/friction injuries  Outcome: Progressing  Flowsheets (Taken 9/25/2024 0437)  Prevent/minimize sheer/friction injuries: HOB 30 degrees or less  Goal: Promote/optimize nutrition  Outcome: Progressing  Flowsheets (Taken 9/25/2024 0437)  Promote/optimize nutrition: Monitor/record intake including meals  Goal: Promote skin healing  Outcome: Progressing  Flowsheets (Taken 9/25/2024 0437)  Promote skin healing: Assess skin/pad under line(s)/device(s)      The clinical goals for the shift include Patient will remain hemodynamically stable overnight.  Hbg dropped overnight and patient required PRBCs.

## 2024-09-26 ENCOUNTER — DOCUMENTATION (OUTPATIENT)
Dept: HEMATOLOGY/ONCOLOGY | Facility: CLINIC | Age: 69
End: 2024-09-26
Payer: MEDICARE

## 2024-09-26 LAB
ALBUMIN SERPL BCP-MCNC: 2.9 G/DL (ref 3.4–5)
ALP SERPL-CCNC: 49 U/L (ref 33–136)
ALT SERPL W P-5'-P-CCNC: 17 U/L (ref 10–52)
ANION GAP SERPL CALC-SCNC: 12 MMOL/L (ref 10–20)
AST SERPL W P-5'-P-CCNC: 17 U/L (ref 9–39)
BILIRUB SERPL-MCNC: 0.9 MG/DL (ref 0–1.2)
BLOOD EXPIRATION DATE: NORMAL
BUN SERPL-MCNC: 44 MG/DL (ref 6–23)
CALCIUM SERPL-MCNC: 7.9 MG/DL (ref 8.6–10.3)
CHLORIDE SERPL-SCNC: 104 MMOL/L (ref 98–107)
CO2 SERPL-SCNC: 21 MMOL/L (ref 21–32)
CREAT SERPL-MCNC: 2.77 MG/DL (ref 0.5–1.3)
DISPENSE STATUS: NORMAL
EGFRCR SERPLBLD CKD-EPI 2021: 24 ML/MIN/1.73M*2
ERYTHROCYTE [DISTWIDTH] IN BLOOD BY AUTOMATED COUNT: 20.3 % (ref 11.5–14.5)
ERYTHROCYTE [DISTWIDTH] IN BLOOD BY AUTOMATED COUNT: 21 % (ref 11.5–14.5)
ERYTHROCYTE [DISTWIDTH] IN BLOOD BY AUTOMATED COUNT: 21.1 % (ref 11.5–14.5)
FIBRINOGEN PPP-MCNC: 332 MG/DL (ref 200–400)
GLUCOSE BLD MANUAL STRIP-MCNC: 101 MG/DL (ref 74–99)
GLUCOSE BLD MANUAL STRIP-MCNC: 112 MG/DL (ref 74–99)
GLUCOSE BLD MANUAL STRIP-MCNC: 117 MG/DL (ref 74–99)
GLUCOSE BLD MANUAL STRIP-MCNC: 118 MG/DL (ref 74–99)
GLUCOSE BLD MANUAL STRIP-MCNC: 121 MG/DL (ref 74–99)
GLUCOSE SERPL-MCNC: 102 MG/DL (ref 74–99)
HCT VFR BLD AUTO: 21.3 % (ref 41–52)
HCT VFR BLD AUTO: 22 % (ref 41–52)
HCT VFR BLD AUTO: 23.8 % (ref 41–52)
HGB BLD-MCNC: 6.8 G/DL (ref 13.5–17.5)
HGB BLD-MCNC: 7.1 G/DL (ref 13.5–17.5)
HGB BLD-MCNC: 7.7 G/DL (ref 13.5–17.5)
INR PPP: 1.4 (ref 0.9–1.1)
MAGNESIUM SERPL-MCNC: 2.35 MG/DL (ref 1.6–2.4)
MCH RBC QN AUTO: 28.9 PG (ref 26–34)
MCH RBC QN AUTO: 29.1 PG (ref 26–34)
MCH RBC QN AUTO: 29.6 PG (ref 26–34)
MCHC RBC AUTO-ENTMCNC: 31.9 G/DL (ref 32–36)
MCHC RBC AUTO-ENTMCNC: 32.3 G/DL (ref 32–36)
MCHC RBC AUTO-ENTMCNC: 32.4 G/DL (ref 32–36)
MCV RBC AUTO: 90 FL (ref 80–100)
MCV RBC AUTO: 91 FL (ref 80–100)
MCV RBC AUTO: 92 FL (ref 80–100)
NRBC BLD-RTO: 0.4 /100 WBCS (ref 0–0)
NRBC BLD-RTO: 0.7 /100 WBCS (ref 0–0)
NRBC BLD-RTO: 0.8 /100 WBCS (ref 0–0)
PLATELET # BLD AUTO: 304 X10*3/UL (ref 150–450)
PLATELET # BLD AUTO: 307 X10*3/UL (ref 150–450)
PLATELET # BLD AUTO: 338 X10*3/UL (ref 150–450)
POTASSIUM SERPL-SCNC: 3.9 MMOL/L (ref 3.5–5.3)
PRODUCT BLOOD TYPE: 6200
PRODUCT CODE: NORMAL
PROT SERPL-MCNC: 5.1 G/DL (ref 6.4–8.2)
PROTHROMBIN TIME: 15.8 SECONDS (ref 9.8–12.8)
RBC # BLD AUTO: 2.35 X10*6/UL (ref 4.5–5.9)
RBC # BLD AUTO: 2.44 X10*6/UL (ref 4.5–5.9)
RBC # BLD AUTO: 2.6 X10*6/UL (ref 4.5–5.9)
SODIUM SERPL-SCNC: 133 MMOL/L (ref 136–145)
UNIT ABO: NORMAL
UNIT NUMBER: NORMAL
UNIT RH: NORMAL
UNIT VOLUME: 350
WBC # BLD AUTO: 13 X10*3/UL (ref 4.4–11.3)
WBC # BLD AUTO: 13.3 X10*3/UL (ref 4.4–11.3)
WBC # BLD AUTO: 15.3 X10*3/UL (ref 4.4–11.3)
XM INTEP: NORMAL

## 2024-09-26 PROCEDURE — 82947 ASSAY GLUCOSE BLOOD QUANT: CPT

## 2024-09-26 PROCEDURE — 36430 TRANSFUSION BLD/BLD COMPNT: CPT

## 2024-09-26 PROCEDURE — 85027 COMPLETE CBC AUTOMATED: CPT

## 2024-09-26 PROCEDURE — 99233 SBSQ HOSP IP/OBS HIGH 50: CPT | Performed by: STUDENT IN AN ORGANIZED HEALTH CARE EDUCATION/TRAINING PROGRAM

## 2024-09-26 PROCEDURE — 99233 SBSQ HOSP IP/OBS HIGH 50: CPT | Performed by: SURGERY

## 2024-09-26 PROCEDURE — 85384 FIBRINOGEN ACTIVITY: CPT

## 2024-09-26 PROCEDURE — 97165 OT EVAL LOW COMPLEX 30 MIN: CPT | Mod: GO

## 2024-09-26 PROCEDURE — 85610 PROTHROMBIN TIME: CPT

## 2024-09-26 PROCEDURE — 2500000004 HC RX 250 GENERAL PHARMACY W/ HCPCS (ALT 636 FOR OP/ED): Mod: JZ

## 2024-09-26 PROCEDURE — 80053 COMPREHEN METABOLIC PANEL: CPT

## 2024-09-26 PROCEDURE — 97161 PT EVAL LOW COMPLEX 20 MIN: CPT | Mod: GP

## 2024-09-26 PROCEDURE — 99233 SBSQ HOSP IP/OBS HIGH 50: CPT

## 2024-09-26 PROCEDURE — 2020000001 HC ICU ROOM DAILY

## 2024-09-26 PROCEDURE — 99291 CRITICAL CARE FIRST HOUR: CPT

## 2024-09-26 PROCEDURE — 36415 COLL VENOUS BLD VENIPUNCTURE: CPT

## 2024-09-26 PROCEDURE — 83735 ASSAY OF MAGNESIUM: CPT

## 2024-09-26 PROCEDURE — 2500000004 HC RX 250 GENERAL PHARMACY W/ HCPCS (ALT 636 FOR OP/ED): Performed by: STUDENT IN AN ORGANIZED HEALTH CARE EDUCATION/TRAINING PROGRAM

## 2024-09-26 PROCEDURE — P9016 RBC LEUKOCYTES REDUCED: HCPCS

## 2024-09-26 PROCEDURE — 2500000004 HC RX 250 GENERAL PHARMACY W/ HCPCS (ALT 636 FOR OP/ED): Mod: JZ | Performed by: STUDENT IN AN ORGANIZED HEALTH CARE EDUCATION/TRAINING PROGRAM

## 2024-09-26 PROCEDURE — 99232 SBSQ HOSP IP/OBS MODERATE 35: CPT | Performed by: INTERNAL MEDICINE

## 2024-09-26 PROCEDURE — 2500000004 HC RX 250 GENERAL PHARMACY W/ HCPCS (ALT 636 FOR OP/ED): Performed by: INTERNAL MEDICINE

## 2024-09-26 RX ORDER — METRONIDAZOLE 500 MG/100ML
500 INJECTION, SOLUTION INTRAVENOUS EVERY 8 HOURS
Status: DISCONTINUED | OUTPATIENT
Start: 2024-09-26 | End: 2024-09-27

## 2024-09-26 ASSESSMENT — COGNITIVE AND FUNCTIONAL STATUS - GENERAL
CLIMB 3 TO 5 STEPS WITH RAILING: TOTAL
TURNING FROM BACK TO SIDE WHILE IN FLAT BAD: A LITTLE
STANDING UP FROM CHAIR USING ARMS: A LITTLE
MOVING TO AND FROM BED TO CHAIR: A LITTLE
MOBILITY SCORE: 16
HELP NEEDED FOR BATHING: A LITTLE
WALKING IN HOSPITAL ROOM: A LITTLE
MOVING FROM LYING ON BACK TO SITTING ON SIDE OF FLAT BED WITH BEDRAILS: A LITTLE
DRESSING REGULAR UPPER BODY CLOTHING: A LITTLE
DRESSING REGULAR LOWER BODY CLOTHING: A LITTLE
TOILETING: A LITTLE
DAILY ACTIVITIY SCORE: 20

## 2024-09-26 ASSESSMENT — PAIN SCALES - GENERAL
PAINLEVEL_OUTOF10: 0 - NO PAIN

## 2024-09-26 ASSESSMENT — PAIN - FUNCTIONAL ASSESSMENT
PAIN_FUNCTIONAL_ASSESSMENT: 0-10

## 2024-09-26 NOTE — PROGRESS NOTES
General Surgery Attending Note     My Acute Care Surgery KATHY (Advanced Practice Provider) evaluated this patient today.  Please see that documentation for details.     I saw the patient with the KATHY today, and personally evaluated and examined the patient.  My findings are consistent with those of the KATHY.          Impression:       Hospital day #4  Postoperative day #11     Patient had an uneventful night.  He has no abdominal pain.  He is tolerating clear liquid diet.  He had a loose nonbloody bowel movement in the middle of the night. No further blood per rectum.     The patient has remained hemodynamically stable for 48 hours with no tachycardia, and blood pressure systolic 130-165.  The patient's H&H did drift down to 6.8 and 21.3 this morning, from 7.5 and 24.0 last evening.  White blood cell count is down to 13.3.  The patient's NERI is proving with a BUN and creatinine down to 44, and 2.77 today.     On exam, the patient looks comfortable.  His lungs are clear.  His heart is sinus rhythm.  His abdomen is soft and nondistended nontender with positive bowel sounds.  Resolving abdominal wall ecchymosis.     I did review the CAT scan imaging from 9/25/2024 and discussed the case personally in detail last evening with Dr. Yeimi Barker, our interventional radiologist.  He is not worried about the hyperdense foci in the left gutter.  He thinks that this may be oral contrast in a loop of intestine in the midst of some hemoperitoneum.  If it is vascular in origin, it is very small and likely very low volume venous, and of little concern.  Of note, this was not present on the first CT yesterday, only on the follow-up delayed imaging.  He does not recommend angiographic or CTA investigation secondary to the low yield, and large IV contrast load with each, in light of the patient's NERI.     Overall impression is intra-abdominal and intraluminal hemorrhage status post laparoscopic segmental colectomy with patient on DOAC  (Eliquis) for paroxysmal atrial fibrillation.  Clinically improved status post Kcentra, FFP and PRBCs.  Patient is clinically and hemodynamically stable.     Patient also has NERI, which is improving.     Plan:       The above was explained to the patient in detail.  The options of expectant management and close observation, investigation with interventional radiology or CTA, or laparoscopy were all explained to the patient.  I explained the risk benefits and alternatives of each.  The risk of interventional radiology or CTA is worsening renal failure, and inability to diagnose or treat.  The risk of laparoscopy is failure to identify/locate the possible very low volume venous bleed disease, and possible worsening renal failure with general anesthesia.  As the patient is feeling well, and is very stable clinically, he would like to continue with expectant management and close observation, I agree with this management plan.    DC IV fluids  Advance to full liquid diet  Out of bed to chair  Continue to hold anticoagulation  Continue cefepime and Flagyl  Close monitoring  Transfuse 1 PRBC today  Serial H&H  Will avoid nephrotoxins and try to avoid future IV contrast secondary to the NERI  When H&H remains stable, will transfer from from the ICU

## 2024-09-26 NOTE — ED PROCEDURE NOTE
Procedure  Critical Care    Performed by: Elyse H Klerman, MD  Authorized by: Elyse H Klerman, MD    Critical care provider statement:     Critical care time (minutes):  80    Critical care time was exclusive of:  Separately billable procedures and treating other patients    Critical care was necessary to treat or prevent imminent or life-threatening deterioration of the following conditions:  Circulatory failure and shock    Critical care was time spent personally by me on the following activities:  Blood draw for specimens, development of treatment plan with patient or surrogate, discussions with consultants, evaluation of patient's response to treatment, examination of patient, obtaining history from patient or surrogate, ordering and performing treatments and interventions, ordering and review of laboratory studies, ordering and review of radiographic studies, re-evaluation of patient's condition and review of old charts    Care discussed with: admitting provider                 Elyse H Klerman, MD  09/26/24 6850

## 2024-09-26 NOTE — PROGRESS NOTES
Medication Adjustment    The following medication(s) was/were adjusted for Krish Batista per protocol/policy due to altered renal function.    Medication(s) adjusted:   Cefepime 2g IV q8h to 1g IV q12h d/t CrCl between 11-29 mL/min.    Matthew Shipley, [facility]

## 2024-09-26 NOTE — CARE PLAN
Problem: Nutrition  Goal: BG  mg/dL  Outcome: Progressing  Goal: Promote healing  Outcome: Progressing     Problem: Fall/Injury  Goal: Use assistive devices by end of the shift  Outcome: Progressing  Goal: Not fall by end of shift  Outcome: Progressing  Goal: Be free from injury by end of the shift  Outcome: Progressing     Problem: Pain - Adult  Goal: Verbalizes/displays adequate comfort level or baseline comfort level  Outcome: Progressing     Problem: Safety - Adult  Goal: Free from fall injury  Outcome: Progressing     Problem: Pain  Goal: Takes deep breaths with improved pain control throughout the shift  Outcome: Progressing  Goal: Turns in bed with improved pain control throughout the shift  Outcome: Progressing     Problem: Skin  Goal: Participates in plan/prevention/treatment measures  Outcome: Progressing  Flowsheets (Taken 9/26/2024 0419)  Participates in plan/prevention/treatment measures: Elevate heels  Goal: Prevent/manage excess moisture  Outcome: Progressing  Flowsheets (Taken 9/26/2024 0419)  Prevent/manage excess moisture: Monitor for/manage infection if present  Goal: Prevent/minimize sheer/friction injuries  Outcome: Progressing  Flowsheets (Taken 9/26/2024 0419)  Prevent/minimize sheer/friction injuries: HOB 30 degrees or less  Goal: Promote/optimize nutrition  Outcome: Progressing  Flowsheets (Taken 9/26/2024 0419)  Promote/optimize nutrition: Monitor/record intake including meals  Goal: Promote skin healing  Outcome: Progressing  Flowsheets (Taken 9/26/2024 0419)  Promote skin healing: Assess skin/pad under line(s)/device(s)      The clinical goals for the shift include Patient will remain hemodynamically stable overnight.  Patient remained hemodynamically stable overnight.

## 2024-09-26 NOTE — PROGRESS NOTES
Krish Batista is a 69 y.o. male on day 3 of admission presenting with Pneumoperitoneum.    Subjective   Patient resting comfortably in bed this morning.  Denies any abdominal pain at this time.  Has had 2 bowel movements overnight that are liquid, but denies seeing any blood.  Denies fevers, chills, nausea, vomiting, SOB, chest pain.  Is tolerating CLD well.    Hemoglobin slowly downtrending to 6.8 this morning.  1 unit of PRBC ordered.  After discussion between Dr. Clark and Dr. Barker, there is less of a concern for active bleeding.  Discussed with patient option to continue medical management at this time versus exploratory laparotomy.  Patient is agreeable to medical management.       Objective     Physical Exam  Constitutional:       General: He is not in acute distress.     Appearance: He is not toxic-appearing.   HENT:      Mouth/Throat:      Mouth: Mucous membranes are moist.   Eyes:      General: No scleral icterus.  Cardiovascular:      Rate and Rhythm: Normal rate and regular rhythm.      Heart sounds: Normal heart sounds.   Pulmonary:      Effort: Pulmonary effort is normal. No respiratory distress.      Breath sounds: Normal breath sounds.   Abdominal:      General: Bowel sounds are normal. There is no distension.      Palpations: Abdomen is soft.      Tenderness: There is no abdominal tenderness. There is no guarding.      Comments: Extensive ecchymosis over most of the abdomen and bilateral flanks, tracking to mid-back, L>R. Firmness at areas of staples consistent with ecchymosis/hematoma. Improving.    Genitourinary:     Comments: Dark purple scrotal ecchymosis present on b/l testicles. No penile edema or ecchymosis noted  Skin:     General: Skin is warm and dry.      Coloration: Skin is not jaundiced.   Neurological:      General: No focal deficit present.      Mental Status: He is alert and oriented to person, place, and time.   Psychiatric:         Mood and Affect: Mood normal.         Behavior:  "Behavior normal.         Last Recorded Vitals  Blood pressure 129/62, pulse 84, temperature 36.4 °C (97.5 °F), temperature source Temporal, resp. rate 26, height 1.88 m (6' 2\"), weight 94.4 kg (208 lb 3.2 oz), SpO2 100%.  Intake/Output last 3 Shifts:  I/O last 3 completed shifts:  In: 5033.7 (53.3 mL/kg) [P.O.:200; I.V.:2983.7 (31.6 mL/kg); Blood:350; IV Piggyback:1500]  Out: 1885 (20 mL/kg) [Urine:1885 (0.6 mL/kg/hr)]  Weight: 94.4 kg     Relevant Results  Results for orders placed or performed during the hospital encounter of 09/23/24 (from the past 24 hour(s))   CBC   Result Value Ref Range    WBC 20.5 (H) 4.4 - 11.3 x10*3/uL    nRBC 0.5 (H) 0.0 - 0.0 /100 WBCs    RBC 2.73 (L) 4.50 - 5.90 x10*6/uL    Hemoglobin 7.9 (L) 13.5 - 17.5 g/dL    Hematocrit 26.0 (L) 41.0 - 52.0 %    MCV 95 80 - 100 fL    MCH 28.9 26.0 - 34.0 pg    MCHC 30.4 (L) 32.0 - 36.0 g/dL    RDW 20.8 (H) 11.5 - 14.5 %    Platelets 321 150 - 450 x10*3/uL   POCT GLUCOSE   Result Value Ref Range    POCT Glucose 101 (H) 74 - 99 mg/dL   POCT GLUCOSE   Result Value Ref Range    POCT Glucose 101 (H) 74 - 99 mg/dL   CBC   Result Value Ref Range    WBC 19.4 (H) 4.4 - 11.3 x10*3/uL    nRBC 0.6 (H) 0.0 - 0.0 /100 WBCs    RBC 2.64 (L) 4.50 - 5.90 x10*6/uL    Hemoglobin 7.5 (L) 13.5 - 17.5 g/dL    Hematocrit 24.0 (L) 41.0 - 52.0 %    MCV 91 80 - 100 fL    MCH 28.4 26.0 - 34.0 pg    MCHC 31.3 (L) 32.0 - 36.0 g/dL    RDW 20.7 (H) 11.5 - 14.5 %    Platelets 345 150 - 450 x10*3/uL   SST TOP   Result Value Ref Range    Extra Tube Hold for add-ons.    POCT GLUCOSE   Result Value Ref Range    POCT Glucose 117 (H) 74 - 99 mg/dL   POCT GLUCOSE   Result Value Ref Range    POCT Glucose 112 (H) 74 - 99 mg/dL   CBC   Result Value Ref Range    WBC 15.3 (H) 4.4 - 11.3 x10*3/uL    nRBC 0.8 (H) 0.0 - 0.0 /100 WBCs    RBC 2.44 (L) 4.50 - 5.90 x10*6/uL    Hemoglobin 7.1 (L) 13.5 - 17.5 g/dL    Hematocrit 22.0 (L) 41.0 - 52.0 %    MCV 90 80 - 100 fL    MCH 29.1 26.0 - 34.0 pg    " MCHC 32.3 32.0 - 36.0 g/dL    RDW 20.3 (H) 11.5 - 14.5 %    Platelets 304 150 - 450 x10*3/uL   POCT GLUCOSE   Result Value Ref Range    POCT Glucose 101 (H) 74 - 99 mg/dL   CBC   Result Value Ref Range    WBC 13.3 (H) 4.4 - 11.3 x10*3/uL    nRBC 0.7 (H) 0.0 - 0.0 /100 WBCs    RBC 2.35 (L) 4.50 - 5.90 x10*6/uL    Hemoglobin 6.8 (L) 13.5 - 17.5 g/dL    Hematocrit 21.3 (L) 41.0 - 52.0 %    MCV 91 80 - 100 fL    MCH 28.9 26.0 - 34.0 pg    MCHC 31.9 (L) 32.0 - 36.0 g/dL    RDW 21.0 (H) 11.5 - 14.5 %    Platelets 307 150 - 450 x10*3/uL   Comprehensive Metabolic Panel   Result Value Ref Range    Glucose 102 (H) 74 - 99 mg/dL    Sodium 133 (L) 136 - 145 mmol/L    Potassium 3.9 3.5 - 5.3 mmol/L    Chloride 104 98 - 107 mmol/L    Bicarbonate 21 21 - 32 mmol/L    Anion Gap 12 10 - 20 mmol/L    Urea Nitrogen 44 (H) 6 - 23 mg/dL    Creatinine 2.77 (H) 0.50 - 1.30 mg/dL    eGFR 24 (L) >60 mL/min/1.73m*2    Calcium 7.9 (L) 8.6 - 10.3 mg/dL    Albumin 2.9 (L) 3.4 - 5.0 g/dL    Alkaline Phosphatase 49 33 - 136 U/L    Total Protein 5.1 (L) 6.4 - 8.2 g/dL    AST 17 9 - 39 U/L    Bilirubin, Total 0.9 0.0 - 1.2 mg/dL    ALT 17 10 - 52 U/L   Protime-INR   Result Value Ref Range    Protime 15.8 (H) 9.8 - 12.8 seconds    INR 1.4 (H) 0.9 - 1.1   Fibrinogen   Result Value Ref Range    Fibrinogen 332 200 - 400 mg/dL   Magnesium   Result Value Ref Range    Magnesium 2.35 1.60 - 2.40 mg/dL   Prepare RBC: 1 Units   Result Value Ref Range    PRODUCT CODE [id number]     Unit Number [id number]     Unit ABO A     Unit RH POS     XM INTEP COMP     Dispense Status IS     Blood Expiration Date 10/15/2024 11:59:00 PM EDT     PRODUCT BLOOD TYPE 6200     UNIT VOLUME 350            Assessment/Plan   Krish Batista  is a 70 yo male with PMH significant for iron deficiency anemia, colon cancer w/ LGIB s/p segmental colectomy (9/15 with Dr. Clark), and Afib with RVR who presented to [facility] ED on 9/23 for rectal bleeding and weakness. Pt reports 4 days of  rectal bleeding prior to admission.     Pt was recently admitted for NSTEMI and Afib w/ RVR and underwent segmental colectomy for colon cancer. Post-op course complicated by acute blood loss anemia requiring 2u pRBCs. He was noted to have significant ecchymosis of his scrotum which likely represented old surgical blood tracking down and pooling in the scrotum. Hgb stabilized and he was started on eliquis BID on the day of discharge.       Initial Hgb was similar to Hgb at time of discharge, but he was hypotensive and lightheaded, so he was given 1u pRBCs, and 2u FFP +Kcentra for eliquis reversal in the ED. Hgb dropped to 5.6 after IVF resuscitation, but BP improved. He was given another 2u pRBCs in the ICU, but BP is again down trending and pt is now nauseous with a few episodes of emesis.     Procedures/Significant events:  9/15: Laparoscopic segmental colectomy with mobilization of splenic flexure  9/16 - 1u pRBC  9/17 - 1u pRBC  9/23 - 1u pRBC, 2uFFP, Kcentra  9/24 - 1u pRBC  9/25 - 1 u pRBC  9/26 - 1 u pRBC    Assessment and Plan:    #S/p segmental colectomy  #Post-op bleeding on AC  #Hemorrhagic shock  - CT with no evidence of anastomotic leak, decreasing pneumoperitoneum, increasing hemoperitoneum.     > Potential small venous bleed, will continue to monitor at this time.  No immediate surgical intervention is indicated  - Transfuse for Hgb <7 or hemodynamic instability  - FLD, expect regular diet tomorrow if tolerates   - Discontinue fluids  - Trend CBC Q6  - No DVT chemoprophylaxis, SCDs only  - Continue to monitor closely    #Hyperbilirubinemia, improving  - D/t extensive abdominal bleeding  - No concern for hepatobiliary cause   - Continue to monitor    #H/o Afib w/ RVR  - Currently in NSR  - Hold AC  - May need prolonged time off AC to ensure no repeat bleed  - Cardiology on consult, appreciate rec's     > on IV amiodarone due to concern for A-fib with RVR     > Consider IV labetalol for HTN    Dispo:  Continue care in ICU until hemoglobin stabilizes     General surgery will follow closely. Please inform surgery KATHY promptly of any changes in patient's condition.    Patient discussed with attending surgeon, Dr. Amber Carias PA-C

## 2024-09-26 NOTE — PROGRESS NOTES
Subjective Data:  Remains in sinus rhythm.  No chest pain or shortness of breath.  No acute issues overnight.    Objective Data:  Last Recorded Vitals:  Vitals:    09/26/24 1315 09/26/24 1330 09/26/24 1345 09/26/24 1353   BP:  125/60     BP Location:       Patient Position:       Pulse: 77 81 79    Resp: 16 21 16    Temp:       TempSrc:       SpO2: 100% 98% 100% 100%   Weight:       Height:           Last Labs:  CBC - 9/26/2024:  1:41 PM  13.0 7.7 338    23.8      CMP - 9/26/2024:  6:06 AM  7.9 5.1 17 --- 0.9   5.3 2.9 17 49      PTT - 9/24/2024:  7:31 AM  1.4   15.8 29     TROPHS   Date/Time Value Ref Range Status   09/23/2024 08:00 PM 17 0 - 20 ng/L Final   09/23/2024 05:06 PM 18 0 - 20 ng/L Final   09/12/2024 10:37 AM 6,106 0 - 20 ng/L Final     BNP   Date/Time Value Ref Range Status   09/23/2024 05:06  0 - 99 pg/mL Final     HGBA1C   Date/Time Value Ref Range Status   08/02/2024 05:14 AM 5.4 see below % Final     LDLCALC   Date/Time Value Ref Range Status   09/12/2024 10:37  <=99 mg/dL Final     Comment:                                 Near   Borderline      AGE      Desirable  Optimal    High     High     Very High     0-19 Y     0 - 109     ---    110-129   >/= 130     ----    20-24 Y     0 - 119     ---    120-159   >/= 160     ----      >24 Y     0 -  99   100-129  130-159   160-189     >/=190     03/28/2024 11:11  <=99 mg/dL Final     Comment:                                 Near   Borderline      AGE      Desirable  Optimal    High     High     Very High     0-19 Y     0 - 109     ---    110-129   >/= 130     ----    20-24 Y     0 - 119     ---    120-159   >/= 160     ----      >24 Y     0 -  99   100-129  130-159   160-189     >/=190       VLDL   Date/Time Value Ref Range Status   09/12/2024 10:37 AM 9 0 - 40 mg/dL Final   03/28/2024 11:11 AM 15 0 - 40 mg/dL Final      Last I/O:  I/O last 3 completed shifts:  In: 5033.7 (53.3 mL/kg) [P.O.:200; I.V.:2983.7 (31.6 mL/kg); Blood:350; IV  Piggyback:1500]  Out: 1885 (20 mL/kg) [Urine:1885 (0.6 mL/kg/hr)]  Weight: 94.4 kg     Past Cardiology Tests (Last 3 Years):  EKG:  Electrocardiogram, 12-lead PRN ACS symptoms 09/24/2024      ECG 12 Lead 09/23/2024      ECG 12 lead 09/14/2024      ECG 12 lead 09/14/2024      ECG 12 lead 09/12/2024      ECG 12 lead 08/26/2024      ECG 12 lead 08/01/2024      ECG 12 lead 08/01/2024    Echo:  Transthoracic Echo (TTE) Complete 09/12/2024      Transthoracic Echo (TTE) Complete 08/02/2024    Ejection Fractions:  EF   Date/Time Value Ref Range Status   09/12/2024 11:41 AM 53 %    08/02/2024 10:35 AM 61 %      Cath:  Cardiac Catheterization Procedure 09/12/2024    Stress Test:  No results found for this or any previous visit from the past 1095 days.    Cardiac Imaging:  No results found for this or any previous visit from the past 1095 days.      Inpatient Medications:  Scheduled medications   Medication Dose Route Frequency    [Held by provider] amiodarone  200 mg oral BID    [Held by provider] apixaban  5 mg oral BID    [Held by provider] aspirin  81 mg oral Daily    [Held by provider] atorvastatin  80 mg oral Nightly    cefepime  1 g intravenous q12h    [Held by provider] gabapentin  300 mg oral TID    iohexol  500 mL oral Once in imaging    [Held by provider] metoprolol succinate XL  50 mg oral Daily    metroNIDAZOLE  500 mg intravenous q8h    oxygen   inhalation Continuous - Inhalation    pantoprazole  40 mg intravenous Daily     PRN medications   Medication    dextrose    dextrose    glucagon    glucagon    HYDROmorphone    ondansetron    sodium chloride     Continuous Medications   Medication Dose Last Rate    amiodarone  0.5 mg/min 0.5 mg/min (09/26/24 1438)       Physical Exam:  Constitutional:       Appearance: Normal appearance.   HENT:      Head: Normocephalic and atraumatic.      Mouth/Throat:      Mouth: Mucous membranes are moist.   Eyes:      Extraocular Movements: Extraocular movements intact.      Pupils:  Pupils are equal, round, and reactive to light.   Cardiovascular:      Rate and Rhythm: Normal rate and regular rhythm.   Pulmonary:      Effort: Pulmonary effort is normal.      Breath sounds: Normal breath sounds and air entry.   Abdominal:      General: Abdomen is flat. Bowel sounds are normal.      Palpations: Abdomen is soft.      Tenderness: There is abdominal tenderness.   Musculoskeletal:         General: Normal range of motion.      Cervical back: Normal range of motion and neck supple.   Skin:     General: Skin is warm and dry.   Neurological:      General: No focal deficit present.      Mental Status: He is alert and oriented to person, place, and time. Mental status is at baseline.         Assessment/Plan  #Intra-abdominal hemorrhage post op s/p K-centra, FFP and PRBCs  #Hemorrhagic shock - improved after resuscitation  #Afib on DOAC with bleed s/p K-centra   #NERI due to bleed - hypovolemic/hypotensive ATN likely  #Hypocalcemia due to blood products  #Leukocytosis - likely reactive from bleed vs intra-abdominal infection        Patient was admitted recently with non-ST elevation MI and known colon cancer.  Eventually underwent laparoscopic partial colon resection and was cleared for anticoagulation by surgery team.  Considering his atrial fibrillation and non-ST elevation MI episode that could be embolic in origin, he was placed on Eliquis.  Unfortunately he now presented with hemorrhagic shock transfusion.  Currently in sinus rhythm and denies having chest pain or shortness of breath.  Severe NERI hemorrhagic shock.     He has been placed on n.p.o. by surgery team medications.     Obviously at this point he cannot provide him any anticoagulation evaluation and stabilization.     I am concerned about him going back to atrial fibrillation with RVR medications including amiodarone and beta-blocker.  Therefore, we will initiate IV amiodarone sinus rhythm.     His blood pressure is elevated, we can consider  IV labetalol for now until he is cleared to receive his oral medications.     Will continue to support him closely in ICU and follow closely with surgery and ICU teams.     It appears that he is planned to receive his CT scan to see if exploratory surgical intervention will be needed.     I will follow-up with him tomorrow.     September 26, 2024  Remaining in sinus rhythm on IV amiodarone.  Surgical team has decided for conservative management for now and no immediate reoperation.  Will continue to hold any anticoagulation.  Will continue with IV amiodarone and when okay to take p.o. medications, will resume his amiodarone, and statin, metoprolol succinate.            Bob Rosario MD, PhD, [facility], [facility]  Interventional Cardiology, [facility]  Associate Professor of Medicine, [facility]  Office: [phone]

## 2024-09-26 NOTE — PROGRESS NOTES
Physical Therapy    Physical Therapy Evaluation    Patient Name: Krish Batista  MRN: [id number]  122/122-[id number]  Today's Date: 9/26/2024   Time Calculation  Start Time: 0952  Stop Time: 1005  Time Calculation (min): 13 min    Assessment/Plan   PT Assessment  PT Assessment Results: Decreased endurance, Decreased mobility  Rehab Prognosis: Good  End of Session Communication: Bedside nurse  End of Session Patient Position: Bed, 3 rail up, Alarm off, not on at start of session  IP OR SWING BED PT PLAN  Inpatient or Swing Bed: Inpatient  PT Plan  Treatment/Interventions: Bed mobility, Transfer training, Gait training, Stair training, Strengthening, Endurance training, Therapeutic activity, Therapeutic exercise  PT Plan: Ongoing PT  PT Frequency: 3 times per week  PT Discharge Recommendations: No PT needed after discharge  PT - OK to Discharge: Yes (When cleared by medical team)    Subjective     Current Problem:  Patient Active Problem List   Diagnosis    Malaise and fatigue    Otitis media    Typical atrial flutter (Multi)    Paroxysmal atrial fibrillation (Multi)    Microcytic anemia    Adenocarcinoma, colon (Multi)    Pneumoperitoneum       General Visit Information:  General  Reason for Referral: PT eval and treat; generalized weakness, recent admission for colectomy 9/15, heart cath on 9/12, xray abdomen: possible pneumoperitoneum, negative for GI bleed  Referred By: Dre  Past Medical History Relevant to Rehab: colon cancer, PIERRE, MI 2 weeks ago, raynaud's  Prior to Session Communication: Bedside nurse  Patient Position Received: Bed, 3 rail up, Alarm off, not on at start of session  General Comment: Pt resting in bed upon entering, agreeable to PT. Pt receiving blood, per RN no signs of bleeding, cleared for therapy    Home Living:  Home Living  Home Living Comments: Pt lives alone in a house with 3 DEL with rail, walk in shower and tub shower. Built in shower seat, elevated toilet. Laundry in basement. No DME. At  discharge pt will stay at sister's house with 1 DEL with 8-10 stairs up to his bedroom. First floor bathroom    Prior Level of Function:  Prior Function Per Pt/Caregiver Report  Prior Function Comments: IND ADLs, IND IADLs, IND ambulation without device. (+) driving    Precautions:  Precautions  Precautions Comment: PIV, receiving blood, hemoglobin 6.8    Vital Signs:  Vital Signs  Vitals Session:  (VSS throughout session)  Objective     Pain:  Pain Assessment  Pain Assessment: 0-10  0-10 (Numeric) Pain Score: 0 - No pain    Cognition:  Cognition  Overall Cognitive Status: Within Functional Limits    General Assessments:      Activity Tolerance  Endurance: Endurance does not limit participation in activity  Early Mobility/Exercise Safety Screen: Proceed with mobilization - No exclusion criteria met  Sensation  Light Touch: No apparent deficits  Strength  Strength Comments: BLE at least 3/5, resistance not applied due to low hemoglobin           Static Sitting Balance  Static Sitting-Level of Assistance: Independent  Dynamic Sitting Balance  Dynamic Sitting-Level of Assistance: Independent  Static Standing Balance  Static Standing-Level of Assistance: Close supervision  Dynamic Standing Balance  Dynamic Standing-Level of Assistance: Contact guard    Functional Assessments:     Bed Mobility  Bed Mobility:  (supine<>sit with SBA)  Transfers  Transfer:  (sit to stand with SBA without device)  Ambulation/Gait Training  Ambulation/Gait Training Performed:  (Pt ambulates 10ft in room without device with CGA. Pt reports feeling whoozey, Vitals stable but returned pt to bed at this time. No acute LOB noted)    Outcome Measures:  [facility] Basic Mobility  Turning from your back to your side while in a flat bed without using bedrails: A little  Moving from lying on your back to sitting on the side of a flat bed without using bedrails: A little  Moving to and from bed to chair (including a wheelchair): A little  Standing up from a  chair using your arms (e.g. wheelchair or bedside chair): A little  To walk in hospital room: A little  Climbing 3-5 steps with railing: Total  Basic Mobility - Total Score: 16           FSS-ICU  Ambulation: Walks <50 feet with any assistance x1 or walks any distance with assistance x2 people  Rolling: Complete independence  Sitting: Complete independence  Transfer Sit-to-Stand: Minimal assistance (performs 75% or more of task)  Transfer Supine-to-Sit: Minimal assistance (performs 75% or more of task)  Total Score: 23  ICU Mobility Screen  Early Mobility/Exercise Safety Screen: Proceed with mobilization - No exclusion criteria met  ICU Mobility Scale: Walking with assistance of 1 person  E = Exercise and Early Mobility  Early Mobility/Exercise Safety Screen: Proceed with mobilization - No exclusion criteria met  ICU Mobility Scale: Walking with assistance of 1 person          Goals:  Encounter Problems       Encounter Problems (Active)       PT Problem       PT Goal 1 STG - Pt will amb >100' using no device with IND   (Progressing)       Start:  09/26/24    Expected End:  10/10/24            PT Goal 2 STG -  Pt will navigate 4-8 stairs using rail with SBA  (Progressing)       Start:  09/26/24    Expected End:  10/10/24            PT Goal 3 STG - Pt will transfer STS with IND  (Progressing)       Start:  09/26/24    Expected End:  10/10/24               Pain - Adult            Education Documentation  Mobility Training, taught by Gregoria Valencia PT at 9/26/2024  2:29 PM.  Learner: Patient  Readiness: Acceptance  Method: Explanation  Response: Verbalizes Understanding    Education Comments  No comments found.

## 2024-09-26 NOTE — CARE PLAN
The clinical goals for the shift include to remian hemodynamically stable.  Pt remained hemodynamically stable and received 1 unit PRBC

## 2024-09-26 NOTE — PROGRESS NOTES
"Critical Care Progress Note    Patient Name: Krish Batista   YOB: 1955    Subjective:  Patient endorses improvement in abdominal pain. Had 2 bowel movements overnight and this morning with no visible blood. Denies any chest pain, fevers, or chills.      Objective:    /59   Pulse 69   Temp 36.8 °C (98.2 °F) (Temporal)   Resp 19   Ht 1.88 m (6' 2\")   Wt 94.4 kg (208 lb 3.2 oz)   SpO2 100%   BMI 26.73 kg/m²     Physical Exam:  GEN: diaphoretic, ill-appearing  HEENT: PERRL, EOMI, MMM OP clear, TMs clear bilaterally  NECK: supple, no cervical LAD, no carotid bruits  CV: S1, S2, regular, no murmur  PULM: CTAB  ABD: diffusely mildly tender to palpation, decreased bowel sounds, extensive ecchymosis over most of the abdomen and bilateral flanks, tracking to mid back.  EXT: no LE edema  NEURO: no gross focal deficits  PSYCH: appropriate affect     Assessment/Plan:  Krish Batista is a 68yo man with PMH of recently diagnosed colon cancer, PIERRE, pAF, CAD (recent hospital admission for NSTEMI) who underwent laparoscopic segmental colectomy on 9/15 with course complicated by Afib with RVR. Patient had been initiated on Eliquis at time of discharge 9/19. Patient admitted to ICU for concern for hemorrhagic shock on 9/23.     Neuro:  - Patient orientated to person place and time  - Monitor for ICU delirium  - Patient requires no sedation or analgesia  - Maintain sleep hygiene  - Continue with PRN dilaudid    Cardiovascular  #Risk for hemorrhagic shock in setting of intra-abdominal hemorrhage  #pAfib  #Hx of NSTEMI  #HTN  Echo 9/12 EF 50-55%  - Hold beta-blocker and antihypertensives due to hypotension  - Monitor Hgb  - Cardiology following, continue with amio gtt    Pulmonary:  - No acute issues, patient on RA    GI:  #Intrabdominal hemorrhage  #Possible Anastomotic Bleed  #Concern for retroperitoneal bleed  - Surgery following, likely small venous bleed. No surgical intervention at this time  - Will " advance diet to full liquids  - Continue with broad-spectrum anaerobic coverage with Cefepime and Flagyl    Renal:   #NERI, likely pre-renal vs ATN - improving  - Monitor urine output  - Discontinue IVF as patient has diet  - Will place escobar for accurate I/O  - Patient was given fluid bolus and lasix 9/24 evening with some improvement in urination.  - Will consider additional lasix tomorrow    Endocrine:  - POCT glucose Q4 given NPO, hypoglycemia protocol    Heme/Onc:  #Acute Blood Loss Anemia on Eliquis  #Leukocytosis, likely reactive  9/23: Given Kcentra/1 unit FFP/1 unit pRBC  9/24: Given 2 units pRBC  9/25: Given 1 units pRBC  9/26: Given 1 units pRBC  - Maintain fibrinogen > 150, plt > 50, Hgb > 7; CBC Q6    ID:  #Concern for peritonitis  - Continue Cefepime and flagyl    Skin/MSK:  #Abdominal Ecchymoses  #Superficial hematomas  - Continue to monitor     ICU CHECK LIST:   Antimicrobials: Cefepime and Flagyl (day 3 abx)  Oxygen: RA  Feeding: Full Liquids  Fluids: None  Analgesia: PRN dilaudid  Sedation: None  Thromboprophylaxis: Held in setting of hemorrhage   Ulcer prophylaxis: PPI  Glycemic control: None  Bowel care: PRN  Indwelling catheters: Escobar  Lines: PIV x2    Code Status: DNR/DNI       Byron Vang MD  Critical Care

## 2024-09-26 NOTE — PROGRESS NOTES
Occupational Therapy    Evaluation    Patient Name: Krish Batista  MRN: [id number]  Department: [facility] ICU  Room: [address] [place], [place] [address]A  Today's Date: 9/26/2024  Time Calculation  Start Time: 0953  Stop Time: 1005  Time Calculation (min): 12 min        Assessment:  End of Session Communication: Bedside nurse  End of Session Patient Position: Bed, 3 rail up, Alarm off, not on at start of session     Plan:  Treatment Interventions: ADL retraining, Functional transfer training, UE strengthening/ROM, Endurance training, Compensatory technique education  OT Frequency: 3 times per week  OT Discharge Recommendations: Low intensity level of continued care  OT - OK to Discharge: Yes (to next level of care when cleared by medical team)  Treatment Interventions: ADL retraining, Functional transfer training, UE strengthening/ROM, Endurance training, Compensatory technique education    Subjective   Current Problem:  1. Pneumoperitoneum        2. Adenocarcinoma, colon (Multi)  Colonoscopy Diagnostic    Colonoscopy Diagnostic      3. BRBPR (bright red blood per rectum)  Colonoscopy Diagnostic    Colonoscopy Diagnostic        General:  General  Reason for Referral: impaired adl; pt. admitted with generalized weakness, recent admission for colectomy 9/15/24 and heart cath 9/12/24, xray abd:  pneumoperitoneum possible, GI:  (-) bleed, (-) scope, ct a/p:  (-) anastomotic leak.  hgb 6.9-transfused today  Referred By: Dre  Past Medical History Relevant to Rehab: colon ca, [place], mi 2 weeks ago, Raynaud's  Prior to Session Communication: Bedside nurse  Patient Position Received: Bed, 3 rail up, Alarm off, not on at start of session  General Comment: pt. agreeable to therapy intervention  Precautions:  Precautions Comment: piv, blood transfusion presently, hgb 6.9, VSS    Vital Sign (Past 2hrs)        Date/Time Vitals Session Patient Position Pulse Resp SpO2 BP MAP (mmHg)    09/26/24 1345 --  --  79  16  100 %  --  --     09/26/24 1353 --  --  --  --   100 %  --  --     09/26/24 1400 --  --  76  14  100 %  122/60  86     09/26/24 1500 --  --  72  12  100 %  111/58  81     09/26/24 1515 --  --  72  12  100 %  --  --     09/26/24 1530 --  --  72  11  100 %  --  --                         Pain:  Pain Assessment  Pain Assessment:  (no pain complaints)    Objective   Cognition:  Overall Cognitive Status: Within Functional Limits           Home Living:  Home Living Comments: pt. lives alone, 1 floor home, 3 step entry with rail, stall shower with built in seat, tub/shower in other bathroom and additional stall shower in basement.  2 bathrooms have grab bars in the shower however will stay with niece after discharge and her home has bathroom 1st floor, 8-10 steps to 2nd floor bedroom.  niece works from home and is available to assist  Prior Function:  Prior Function Comments: pt. is independent for ambulation/adl, no use of device, drives and completes all iadl tasks independently  IADL History:     ADL:  ADL Comments: would estimate min assist x 1 for LB adl, set up for UB adl, some dizziness reported when sitting upright eob  Activity Tolerance:  Early Mobility/Exercise Safety Screen: Proceed with mobilization - No exclusion criteria met  Bed Mobility/Transfers: Bed Mobility  Bed Mobility:  (sba supine <> sit)    Transfers  Transfer:  (sit<> stand from eob:  sba)      Functional Mobility:  Functional Mobility  Functional Mobility Performed:  (pt. able to sidestep without device cga, minimal activity due to blood transfusion/hgb 6.9)  Sensation:  Sensation Comment: sensation intact  Strength:  Strength Comments: bue's at least 3/5 per observation    Outcome Measures:[facility] Daily Activity  Putting on and taking off regular lower body clothing: A little  Bathing (including washing, rinsing, drying): A little  Putting on and taking off regular upper body clothing: A little  Toileting, which includes using toilet, bedpan or urinal: A little  Taking care of personal grooming  such as brushing teeth: None  Eating Meals: None  Daily Activity - Total Score: 20         and Early Mobility/Exercise Safety Screen: Proceed with mobilization - No exclusion criteria met  ICU Mobility Scale: Walking with assistance of 1 person [8]    Education Documentation  ADL Training, taught by Cha Chavez OT at 9/26/2024  3:38 PM.  Learner: Patient  Readiness: Acceptance  Method: Explanation  Response: Verbalizes Understanding, Needs Reinforcement         Goals:  Encounter Problems       Encounter Problems (Active)       OT Goals       Increase functional mobility and  functional transfers to supervision for bed/chair/toilet with dme prn   (Progressing)       Start:  09/26/24    Expected End:  10/03/24            increase bue ther ex/activity x 7-10 minutes and increase standing tolerance x 3-5 minutes with supervision to promote greater activity tolerance for assist with adl.   (Progressing)       Start:  09/26/24    Expected End:  10/03/24            pt. to apply ec/ws techniques without cues to all mobility/transfer/adl to decrease fatigue/promote efficient use of energy toward completion of functional tasks.  (Progressing)       Start:  09/26/24    Expected End:  10/03/24

## 2024-09-26 NOTE — CONSULTS
"Nutrition Initial Assessment:   Nutrition Assessment    Reason for Assessment: Admission nursing screening    Patient is a 69 y.o. male presenting with weakness; possible bleeding at surgery site      Nutrition History:  Food and Nutrient History: Pt is only on clear liquids at this time.  He recently had a colectomy for colon cancer.  (9/15).  he was bleeding at surgery site and was admitted.  He said that any treatments for the cancer they found, other then surgery, have not been decided.  He has been losing weight and records indicate that.  Food Allergies/Intolerances:  None  GI Symptoms: None  Oral Problems: None       Anthropometrics:  Height: 188 cm (6' 2\")   Weight: 92 kg (202 lb 13.2 oz)   BMI (Calculated): 26.03  IBW/kg (Dietitian Calculated): 83 kg  Percent of IBW: 110 %       Weight History:   Wt Readings from Last 10 Encounters:   09/25/24 92 kg (202 lb 13.2 oz)   09/12/24 86.6 kg (190 lb 14.7 oz)   09/11/24 87.5 kg (193 lb)   09/04/24 89.7 kg (197 lb 12 oz)   09/03/24 88.5 kg (195 lb)   08/23/24 96.8 kg (213 lb 6.4 oz)   08/06/24 92.1 kg (203 lb)   08/01/24 91.1 kg (200 lb 13.4 oz)   07/11/24 91.2 kg (201 lb)   03/25/24 94.8 kg (209 lb)         Weight Change %:  Weight History / % Weight Change: Pt said that he has been 210 lbs for a long time.  He was down to 194 lbs and was 202 lbs on admit, (likely due to fluid)  Significant Weight Loss: Yes  Interpretation of Weight Loss: >5% in 1 month    Nutrition Focused Physical Exam Findings:    Subcutaneous Fat Loss:   Orbital Fat Pads:  (not a good time)  Muscle Wasting:     Edema:     Physical Findings:  Skin: Positive (recent colectomy for colon cancer.  9/15)    Nutrition Significant Labs:  BMP Trend:   Results from last 7 days   Lab Units 09/25/24  0613 09/24/24  0437 09/23/24  1706 09/23/24  1543   GLUCOSE mg/dL 127* 155* 138* 126*   CALCIUM mg/dL 8.0* 7.3* 7.9* 8.2*   SODIUM mmol/L 135* 136 136 139   POTASSIUM mmol/L 5.1 4.9 4.3 4.0   CO2 mmol/L 21 21 " 21 22   CHLORIDE mmol/L 103 106 105 105   BUN mg/dL 44* 30* 26* 24*   CREATININE mg/dL 3.37* 1.61* 1.21 1.09        Nutrition Specific Medications:  Eliquis; lipitor; toprol XL; protonix     I/O:   Last BM Date: 09/24/24; Stool Appearance: Loose, Bloody (09/24/24 0800)    Dietary Orders (From admission, onward)       Start     Ordered    09/25/24 1410  Adult diet Clear Liquid  Diet effective now        Question:  Diet type  Answer:  Clear Liquid    09/25/24 1409                     Estimated Needs:      Method for Estimating Needs: 2191-0965  30-35 kirk kg of IBW with post colectomy and possible active cancer     Method for Estimating Needs:    1-1.2 gm kg of IBW  due to abnormal renal panel.  ( Increase when renal panel improves, for healing needs and possible active cancer)     Method for Estimating Needs: 0981-1924   20-30 ml kg of iBW as medically indicated        Nutrition Diagnosis   Malnutrition Diagnosis  Patient has Malnutrition Diagnosis: Yes  Diagnosis Status: New  Malnutrition Diagnosis: Severe malnutrition related to acute disease or injury  As Evidenced by: <50% energy intake compared to estimated needs for > 5 days.  >5% wt loss X 1 month  Additional Assessment Information: Wt is down 13 lbs   6%.  Pt has a  colectomy for colon cancer on 9/15    Nutrition Diagnosis  Patient has Nutrition Diagnosis: Yes  Diagnosis Status (1): Ongoing  Nutrition Diagnosis 1: Increased nutrient needs  Related to (1): physiological causes  As Evidenced by (1): wound healing needs and possible active cancer  Additional Nutrition Diagnosis: Diagnosis 2  Diagnosis Status (2): New  Nutrition Diagnosis 2: Altered GI function  Related to (2): compromised exocrine function  As Evidenced by (2): abnormal renal panel       Nutrition Interventions/Recommendations         Nutrition Prescription:  Individualized Nutrition Prescription Provided for : Will follow clinical course for any nutrition intervention needs.  Pt agreed to  "ensure clear and prefers anything that is not so \"chalky\"        Nutrition Interventions:   Interventions: Meals and snacks, Medical food supplement  Goal: Diet advanced to solid food  Medical Food Supplement: Commercial beverage  Goal: 100% of supplement    Collaboration and Referral of Nutrition Care: Collaboration by nutrition professional with other providers    Nutrition Education:      Not at this time     Nutrition Monitoring and Evaluation   Food/Nutrient Related History Monitoring  Monitoring and Evaluation Plan: Energy intake, Fluid intake, Amount of food  Criteria: >75% of energy needs  Criteria: adequate fluid intake without fluid overload  Criteria: >75% of meals    Body Composition/Growth/Weight History  Monitoring and Evaluation Plan: Weight    Biochemical Data, Medical Tests and Procedures  Monitoring and Evaluation Plan: Electrolyte/renal panel, Glucose/endocrine profile  Criteria: improved BMP  Criteria: glucose within desired range                 "

## 2024-09-27 LAB
ABO GROUP (TYPE) IN BLOOD: NORMAL
ALBUMIN SERPL BCP-MCNC: 2.8 G/DL (ref 3.4–5)
ALP SERPL-CCNC: 49 U/L (ref 33–136)
ALT SERPL W P-5'-P-CCNC: 16 U/L (ref 10–52)
ANION GAP SERPL CALC-SCNC: 10 MMOL/L (ref 10–20)
ANTIBODY SCREEN: NORMAL
AST SERPL W P-5'-P-CCNC: 18 U/L (ref 9–39)
BILIRUB SERPL-MCNC: 1 MG/DL (ref 0–1.2)
BLOOD EXPIRATION DATE: NORMAL
BLOOD EXPIRATION DATE: NORMAL
BUN SERPL-MCNC: 34 MG/DL (ref 6–23)
CALCIUM SERPL-MCNC: 7.9 MG/DL (ref 8.6–10.3)
CHLORIDE SERPL-SCNC: 106 MMOL/L (ref 98–107)
CO2 SERPL-SCNC: 23 MMOL/L (ref 21–32)
CREAT SERPL-MCNC: 2.1 MG/DL (ref 0.5–1.3)
DISPENSE STATUS: NORMAL
DISPENSE STATUS: NORMAL
EGFRCR SERPLBLD CKD-EPI 2021: 33 ML/MIN/1.73M*2
ERYTHROCYTE [DISTWIDTH] IN BLOOD BY AUTOMATED COUNT: 21.2 % (ref 11.5–14.5)
ERYTHROCYTE [DISTWIDTH] IN BLOOD BY AUTOMATED COUNT: 21.4 % (ref 11.5–14.5)
FIBRINOGEN PPP-MCNC: 354 MG/DL (ref 200–400)
GLUCOSE BLD MANUAL STRIP-MCNC: 107 MG/DL (ref 74–99)
GLUCOSE BLD MANUAL STRIP-MCNC: 116 MG/DL (ref 74–99)
GLUCOSE BLD MANUAL STRIP-MCNC: 132 MG/DL (ref 74–99)
GLUCOSE SERPL-MCNC: 103 MG/DL (ref 74–99)
HCT VFR BLD AUTO: 22.3 % (ref 41–52)
HCT VFR BLD AUTO: 30.1 % (ref 41–52)
HGB BLD-MCNC: 6.9 G/DL (ref 13.5–17.5)
HGB BLD-MCNC: 9.6 G/DL (ref 13.5–17.5)
HOLD SPECIMEN: NORMAL
INR PPP: 1.4 (ref 0.9–1.1)
MAGNESIUM SERPL-MCNC: 2.26 MG/DL (ref 1.6–2.4)
MCH RBC QN AUTO: 29 PG (ref 26–34)
MCH RBC QN AUTO: 29.4 PG (ref 26–34)
MCHC RBC AUTO-ENTMCNC: 30.9 G/DL (ref 32–36)
MCHC RBC AUTO-ENTMCNC: 31.9 G/DL (ref 32–36)
MCV RBC AUTO: 92 FL (ref 80–100)
MCV RBC AUTO: 94 FL (ref 80–100)
NRBC BLD-RTO: 0.4 /100 WBCS (ref 0–0)
NRBC BLD-RTO: 0.5 /100 WBCS (ref 0–0)
PLATELET # BLD AUTO: 339 X10*3/UL (ref 150–450)
PLATELET # BLD AUTO: 404 X10*3/UL (ref 150–450)
POTASSIUM SERPL-SCNC: 4 MMOL/L (ref 3.5–5.3)
PRODUCT BLOOD TYPE: 6200
PRODUCT BLOOD TYPE: 8400
PRODUCT CODE: NORMAL
PRODUCT CODE: NORMAL
PROT SERPL-MCNC: 5 G/DL (ref 6.4–8.2)
PROTHROMBIN TIME: 15.5 SECONDS (ref 9.8–12.8)
RBC # BLD AUTO: 2.38 X10*6/UL (ref 4.5–5.9)
RBC # BLD AUTO: 3.26 X10*6/UL (ref 4.5–5.9)
RH FACTOR (ANTIGEN D): NORMAL
SODIUM SERPL-SCNC: 135 MMOL/L (ref 136–145)
UNIT ABO: NORMAL
UNIT ABO: NORMAL
UNIT NUMBER: NORMAL
UNIT NUMBER: NORMAL
UNIT RH: NORMAL
UNIT RH: NORMAL
UNIT VOLUME: 202
UNIT VOLUME: 350
WBC # BLD AUTO: 11.5 X10*3/UL (ref 4.4–11.3)
WBC # BLD AUTO: 9.2 X10*3/UL (ref 4.4–11.3)
XM INTEP: NORMAL

## 2024-09-27 PROCEDURE — 36430 TRANSFUSION BLD/BLD COMPNT: CPT

## 2024-09-27 PROCEDURE — 99232 SBSQ HOSP IP/OBS MODERATE 35: CPT | Performed by: SURGERY

## 2024-09-27 PROCEDURE — 2500000004 HC RX 250 GENERAL PHARMACY W/ HCPCS (ALT 636 FOR OP/ED): Performed by: STUDENT IN AN ORGANIZED HEALTH CARE EDUCATION/TRAINING PROGRAM

## 2024-09-27 PROCEDURE — 86923 COMPATIBILITY TEST ELECTRIC: CPT

## 2024-09-27 PROCEDURE — 83735 ASSAY OF MAGNESIUM: CPT

## 2024-09-27 PROCEDURE — 99291 CRITICAL CARE FIRST HOUR: CPT

## 2024-09-27 PROCEDURE — 2020000001 HC ICU ROOM DAILY

## 2024-09-27 PROCEDURE — 2500000002 HC RX 250 W HCPCS SELF ADMINISTERED DRUGS (ALT 637 FOR MEDICARE OP, ALT 636 FOR OP/ED)

## 2024-09-27 PROCEDURE — 82947 ASSAY GLUCOSE BLOOD QUANT: CPT

## 2024-09-27 PROCEDURE — 2500000004 HC RX 250 GENERAL PHARMACY W/ HCPCS (ALT 636 FOR OP/ED)

## 2024-09-27 PROCEDURE — P9016 RBC LEUKOCYTES REDUCED: HCPCS

## 2024-09-27 PROCEDURE — 99232 SBSQ HOSP IP/OBS MODERATE 35: CPT

## 2024-09-27 PROCEDURE — 99232 SBSQ HOSP IP/OBS MODERATE 35: CPT | Performed by: STUDENT IN AN ORGANIZED HEALTH CARE EDUCATION/TRAINING PROGRAM

## 2024-09-27 PROCEDURE — 85027 COMPLETE CBC AUTOMATED: CPT

## 2024-09-27 PROCEDURE — 86901 BLOOD TYPING SEROLOGIC RH(D): CPT

## 2024-09-27 PROCEDURE — 36415 COLL VENOUS BLD VENIPUNCTURE: CPT

## 2024-09-27 PROCEDURE — 85384 FIBRINOGEN ACTIVITY: CPT

## 2024-09-27 PROCEDURE — 2500000004 HC RX 250 GENERAL PHARMACY W/ HCPCS (ALT 636 FOR OP/ED): Performed by: ANESTHESIOLOGY

## 2024-09-27 PROCEDURE — 80053 COMPREHEN METABOLIC PANEL: CPT

## 2024-09-27 PROCEDURE — P9017 PLASMA 1 DONOR FRZ W/IN 8 HR: HCPCS

## 2024-09-27 PROCEDURE — 2500000001 HC RX 250 WO HCPCS SELF ADMINISTERED DRUGS (ALT 637 FOR MEDICARE OP)

## 2024-09-27 PROCEDURE — 2500000004 HC RX 250 GENERAL PHARMACY W/ HCPCS (ALT 636 FOR OP/ED): Performed by: INTERNAL MEDICINE

## 2024-09-27 PROCEDURE — 85610 PROTHROMBIN TIME: CPT

## 2024-09-27 RX ORDER — ACETAMINOPHEN 160 MG/5ML
650 SOLUTION ORAL EVERY 4 HOURS PRN
Status: DISCONTINUED | OUTPATIENT
Start: 2024-09-27 | End: 2024-09-29 | Stop reason: HOSPADM

## 2024-09-27 RX ORDER — FUROSEMIDE 10 MG/ML
40 INJECTION INTRAMUSCULAR; INTRAVENOUS ONCE
Status: COMPLETED | OUTPATIENT
Start: 2024-09-27 | End: 2024-09-27

## 2024-09-27 RX ORDER — ACETAMINOPHEN 650 MG/1
650 SUPPOSITORY RECTAL EVERY 4 HOURS PRN
Status: DISCONTINUED | OUTPATIENT
Start: 2024-09-27 | End: 2024-09-29 | Stop reason: HOSPADM

## 2024-09-27 RX ORDER — ACETAMINOPHEN 325 MG/1
650 TABLET ORAL EVERY 4 HOURS PRN
Status: DISCONTINUED | OUTPATIENT
Start: 2024-09-27 | End: 2024-09-29 | Stop reason: HOSPADM

## 2024-09-27 ASSESSMENT — PAIN DESCRIPTION - LOCATION: LOCATION: HEAD

## 2024-09-27 ASSESSMENT — PAIN SCALES - GENERAL
PAINLEVEL_OUTOF10: 2
PAINLEVEL_OUTOF10: 0 - NO PAIN
PAINLEVEL_OUTOF10: 0 - NO PAIN
PAINLEVEL_OUTOF10: 3
PAINLEVEL_OUTOF10: 0 - NO PAIN

## 2024-09-27 ASSESSMENT — PAIN - FUNCTIONAL ASSESSMENT
PAIN_FUNCTIONAL_ASSESSMENT: 0-10

## 2024-09-27 NOTE — CARE PLAN
The patient's goals for the shift include to feel better    The clinical goals for the shift include hemodynamic stability      Problem: Nutrition  Goal: Less than 5 days NPO/clear liquids  Outcome: Progressing  Goal: Oral intake greater 75%  Outcome: Progressing  Goal: Adequate PO fluid intake  Outcome: Progressing  Goal: Nutrition support goals are met within 48 hrs  Outcome: Progressing  Goal: Nutrition support is meeting 75% of nutrient needs  Outcome: Progressing  Goal: BG  mg/dL  Outcome: Progressing  Goal: Lab values WNL  Outcome: Progressing  Goal: Electrolytes WNL  Outcome: Progressing  Goal: Promote healing  Outcome: Progressing  Goal: Maintain stable weight  Outcome: Progressing  Goal: Reduce weight from edema/fluid  Outcome: Progressing     Problem: Fall/Injury  Goal: Use assistive devices by end of the shift  Outcome: Progressing  Goal: Pace activities to prevent fatigue by end of the shift  Outcome: Progressing  Goal: Not fall by end of shift  Outcome: Progressing  Goal: Be free from injury by end of the shift  Outcome: Progressing  Goal: Verbalize understanding of personal risk factors for fall in the hospital  Outcome: Progressing  Goal: Verbalize understanding of risk factor reduction measures to prevent injury from fall in the home  Outcome: Progressing     Problem: Pain - Adult  Goal: Verbalizes/displays adequate comfort level or baseline comfort level  Outcome: Progressing     Problem: Safety - Adult  Goal: Free from fall injury  Outcome: Progressing     Problem: Discharge Planning  Goal: Discharge to home or other facility with appropriate resources  Outcome: Progressing     Problem: Chronic Conditions and Co-morbidities  Goal: Patient's chronic conditions and co-morbidity symptoms are monitored and maintained or improved  Outcome: Progressing     Problem: Pain  Goal: Takes deep breaths with improved pain control throughout the shift  Outcome: Progressing  Goal: Turns in bed with improved  pain control throughout the shift  Outcome: Progressing  Goal: Walks with improved pain control throughout the shift  Outcome: Progressing  Goal: Performs ADL's with improved pain control throughout shift  Outcome: Progressing  Goal: Participates in PT with improved pain control throughout the shift  Outcome: Progressing     Problem: Skin  Goal: Participates in plan/prevention/treatment measures  Outcome: Progressing  Flowsheets (Taken 9/27/2024 0756)  Participates in plan/prevention/treatment measures:   Elevate heels   Increase activity/out of bed for meals  Goal: Prevent/manage excess moisture  Outcome: Progressing  Flowsheets (Taken 9/27/2024 0756)  Prevent/manage excess moisture:   Cleanse incontinence/protect with barrier cream   Monitor for/manage infection if present   Follow provider orders for dressing changes   Moisturize dry skin  Goal: Prevent/minimize sheer/friction injuries  Outcome: Progressing  Flowsheets (Taken 9/27/2024 0756)  Prevent/minimize sheer/friction injuries:   Complete micro-shifts as needed if patient unable. Adjust patient position to relieve pressure points, not a full turn   Increase activity/out of bed for meals   Use pull sheet   HOB 30 degrees or less   Turn/reposition every 2 hours/use positioning/transfer devices   Utilize specialty bed per algorithm  Goal: Promote/optimize nutrition  Outcome: Progressing  Flowsheets (Taken 9/27/2024 0756)  Promote/optimize nutrition:   Monitor/record intake including meals   Consume > 50% meals/supplements   Offer water/supplements/favorite foods  Goal: Promote skin healing  Outcome: Progressing  Flowsheets (Taken 9/27/2024 0756)  Promote skin healing:   Assess skin/pad under line(s)/device(s)   Protective dressings over bony prominences   Turn/reposition every 2 hours/use positioning/transfer devices   Ensure correct size (line/device) and apply per  instructions   Rotate device position/do not position patient on device

## 2024-09-27 NOTE — PROGRESS NOTES
LSW met with PT at bedside for follow up. LSW reviewed OT/PT notes. PT continues to zeinab social needs at this time.  Susana Macario, LSW, MSW

## 2024-09-27 NOTE — PROGRESS NOTES
"Critical Care Progress Note    Patient Name: Krish Batista   YOB: 1955    Subjective:  Patient reports improvement and resolution of abdominal pain, denies any fever, chills, nausea or vomiting.     Objective:    /63   Pulse 84   Temp 36.4 °C (97.5 °F) (Temporal)   Resp 16   Ht 1.88 m (6' 2\")   Wt 95 kg (209 lb 8 oz)   SpO2 93%   BMI 26.90 kg/m²     Physical Exam:  GEN: diaphoretic, ill-appearing  HEENT: PERRL, EOMI, MMM OP clear, TMs clear bilaterally  NECK: supple, no cervical LAD, no carotid bruits  CV: S1, S2, regular, no murmur  PULM: CTAB  ABD: nontender, ecchymoses improved  EXT: bilateral edema LE  NEURO: no gross focal deficits  PSYCH: appropriate affect     Assessment/Plan:  Krish Batista is a 68yo man with PMH of recently diagnosed colon cancer, PIERRE, pAF, CAD (recent hospital admission for NSTEMI) who underwent laparoscopic segmental colectomy on 9/15 with course complicated by Afib with RVR. Patient had been initiated on Eliquis at time of discharge 9/19. Patient admitted to ICU for concern for hemorrhagic shock on 9/23.     Neuro:  - Patient orientated to person place and time  - Monitor for ICU delirium  - Patient requires no sedation or analgesia  - Maintain sleep hygiene  - Continue with PRN dilaudid    Cardiovascular  #Risk for hemorrhagic shock in setting of intra-abdominal hemorrhage  #pAfib  #Hx of NSTEMI  #HTN  Echo 9/12 EF 50-55%  - Hold beta-blocker and antihypertensives due to hypotension  - Monitor Hgb  - Cardiology following, will transition to PO amio, restart atorvastatin    Pulmonary:  - No acute issues, patient on RA    GI:  #Intrabdominal hemorrhage  #Possible Anastomotic Bleed  #Concern for retroperitoneal bleed  - Surgery following, likely small venous bleed. No surgical intervention at this time  - Will advance diet to Regular  - Discontinue antibiotics    Renal:   #NERI, likely pre-renal vs ATN - improving  - Monitor urine output  - Will discontinue " escobar  - Patient was given fluid bolus and lasix 9/24 evening with some improvement in urination.    Endocrine:  - No acute issues, continue to monitor    Heme/Onc:  #Acute Blood Loss Anemia on Eliquis  #Leukocytosis, likely reactive  9/23: Given Kcentra/1 unit FFP/1 unit pRBC  9/24: Given 2 units pRBC  9/25: Given 1 units pRBC  9/26: Given 1 units pRBC  9/27: Given 1 unit pRBC  - Maintain fibrinogen > 150, plt > 50, Hgb > 7; CBC Q12    ID:  #Concern for peritonitis, resolved  - Will discontinue antibiotics today    Skin/MSK:  #Abdominal Ecchymoses  #Superficial hematomas  - Continue to monitor     ICU CHECK LIST:   Antimicrobials: None  Oxygen: RA  Feeding: Regular  Fluids: None  Analgesia: PRN dilaudid  Sedation: None  Thromboprophylaxis: Held in setting of hemorrhage   Ulcer prophylaxis: PPI  Glycemic control: None  Bowel care: PRN  Indwelling catheters: None  Lines: PIV x2  Dispo: will continue to monitor in ICU for today given drop in Hgb. Plan for transfer to stepdown tomorrow.    Code Status: DNR/DNI       Byron Vang MD  Critical Care

## 2024-09-27 NOTE — PROGRESS NOTES
"Krish Batista is a 69 y.o. male on day 3 of admission presenting with Pneumoperitoneum.    Subjective   No further bloody bowel movements are too small movements without any blood in it patient denies any abdominal pain.  Very sleepy       Objective     Physical Exam  Lamination is alert and oriented x 3 neck shows no jugular distention regular sinus rhythm lungs are clear abdomen shows extensive ecchymosis abdomen minimal tenderness left upper quadrant area bowel sounds are present  Last Recorded Vitals  Blood pressure 130/60, pulse 75, temperature 36.7 °C (98.1 °F), temperature source Temporal, resp. rate 16, height 1.88 m (6' 2\"), weight 94.4 kg (208 lb 3.2 oz), SpO2 100%.  Intake/Output last 3 Shifts:  I/O last 3 completed shifts:  In: 4539.2 (48.1 mL/kg) [P.O.:875; I.V.:2314.2 (24.5 mL/kg); Blood:300; IV Piggyback:1050]  Out: 2075 (22 mL/kg) [Urine:2075 (0.6 mL/kg/hr)]  Weight: 94.4 kg     Relevant Results      ECG 12 Lead    Result Date: 9/25/2024  Normal sinus rhythm Prolonged QT Abnormal ECG When compared with ECG of 23-SEP-2024 17:39, (unconfirmed) No significant change was found See ED provider note for full interpretation and clinical correlation Confirmed by Ema Merrill ([id number]) on 9/25/2024 8:02:12 PM    Electrocardiogram, 12-lead PRN ACS symptoms    Result Date: 9/25/2024  Sinus rhythm Abnormal R-wave progression, early transition Borderline T abnormalities, inferior leads Prolonged QT interval See ED provider note for full interpretation and clinical correlation Confirmed by Ema Merrill ([id number]) on 9/25/2024 8:00:16 PM    CT abdomen and pelvis w oral contrast only    Result Date: 9/25/2024  Interpreted By:  Hans Roth, STUDY: CT ABDOMEN AND PELVIS W ORAL CONTRAST ONLY; CT ABDOMEN PELVIS WO IV CONTRAST;  9/25/2024 12:43 pm; 9/25/2024 2:02 pm   INDICATION: Signs/Symptoms:R/o anastamotic leak, POD9 segmental colectomy; Signs/Symptoms:delayed PO contrast study; Dr. Roth aware.   " COMPARISON:   CT scan dated 09/23/2024.   ACCESSION NUMBER(S): [id number]; [id number]   ORDERING CLINICIAN: ASHLEY SIERRA   TECHNIQUE: CT of the abdomen and pelvis was performed. Contiguous axial images were obtained at 3 mm slice thickness through the abdomen and pelvis. Coronal and sagittal reconstructions at 3 mm slice thickness were performed.  No intravenous contrast was administered; positive oral contrast was given.   FINDINGS: Please note that the evaluation of vessels, lymph nodes and organs is limited without intravenous contrast.   LOWER CHEST: Small volume bilateral pleural effusion with surrounding atelectasis.   ABDOMEN:   LIVER: Normal size. Normal contour. Stable subcapsular segment 8 benign-looking hypodensity measuring 1.3 cm..   BILE DUCTS: No intrahepatic biliary dilatation.   GALLBLADDER: Unremarkable   PANCREAS: No duct dilatation   SPLEEN: No splenomegaly.   ADRENAL GLANDS: No nodules   KIDNEYS AND URETERS: Normal size. Persistent nephrogram from prior contrast study indicated for of underlying kidney impairment.   PELVIS:   BLADDER: Under filled with Villela catheter in place. Tiny anti dependent gas locules likely related to instrumentation.   REPRODUCTIVE ORGANS: Mild prostatomegaly.   BOWEL: Prior laparoscopic segmental colectomy with mobilization of the splenic flexure and midabdomen colo colonic anastomosis. No definite extravasation of enteric contrast at the site of anastomosis to suggest leak. No bowel dilatation.   Large volume abdominopelvic hemoperitoneum. Gastrosplenic hematoma measuring 8.9 x 5.7 cm and another left hemiabdomen mesenteric hematoma measuring 5.9 x 4.3 cm. Small volume upper abdomen pneumoperitoneum which improved from prior. Hyperdense foci noted within the left paracolic gutter in the delayed phases concerning for active bleed   VESSELS: Multifocal vascular calcifications.   PERITONEUM/RETROPERITONEUM/LYMPH NODES: No enlarged lymph  nodes   ABDOMINAL WALL: Anterior abdominal wall laparotomy incision with a scattered right anterior abdominal wall gas locules likely related to recent surgery. Anasarca.   BONES: Multilevel degenerative spine changes and facet joint disease.       1 hour delayed images was obtained to evaluate site of anastomosis for leak. 1. Large volume abdominopelvic hemoperitoneum. Gastrosplenic hematoma measuring 8.9 x 5.7 cm and another left hemiabdomen mesenteric hematoma measuring 5.9 x 4.3 cm. Hyperdense foci noted within the left paracolic gutter in the delayed phases suggestive of active bleed . Findings have worsened from 09/23/2024 CT scan. 2. Upper abdomen pneumoperitoneum have slightly improved from 09/23/2020 CT scan. 3. Status post laparoscopic segmental colectomy with mobilization of the splenic flexure and midabdomen colo colonic anastomosis. No definite extravasation of the diluted enteric contrast at the level of the anastomosis could be visualized to suggest leak. No bowel dilatation. 4. Persistent nephrogram indicative of underlying acute kidney injury. 5. Small volume bilateral pleural effusion with surrounding atelectasis, slightly worsened from 09/23/2024 CT scan.   Hans Roth discussed the significance and urgency of this critical finding by telephone with Shai Clark instead of ordering ASHLEY SIERRA on 9/25/2024 at 2:06 pm. (**-RCF-**) Findings:  See findings.     MACRO: None   Signed by: Hans Roth 9/25/2024 2:09 PM Dictation workstation:   [id number]    CT abdomen pelvis wo IV contrast    Result Date: 9/25/2024  Interpreted By:  Hans Roth, STUDY: CT ABDOMEN AND PELVIS W ORAL CONTRAST ONLY; CT ABDOMEN PELVIS WO IV CONTRAST;  9/25/2024 12:43 pm; 9/25/2024 2:02 pm   INDICATION: Signs/Symptoms:R/o anastamotic leak, POD9 segmental colectomy; Signs/Symptoms:delayed PO contrast study; Dr. Roth aware.   COMPARISON:   CT scan dated 09/23/2024.   ACCESSION NUMBER(S):  [id number]; [id number]   ORDERING CLINICIAN: ASHLEY SIERRA   TECHNIQUE: CT of the abdomen and pelvis was performed. Contiguous axial images were obtained at 3 mm slice thickness through the abdomen and pelvis. Coronal and sagittal reconstructions at 3 mm slice thickness were performed.  No intravenous contrast was administered; positive oral contrast was given.   FINDINGS: Please note that the evaluation of vessels, lymph nodes and organs is limited without intravenous contrast.   LOWER CHEST: Small volume bilateral pleural effusion with surrounding atelectasis.   ABDOMEN:   LIVER: Normal size. Normal contour. Stable subcapsular segment 8 benign-looking hypodensity measuring 1.3 cm..   BILE DUCTS: No intrahepatic biliary dilatation.   GALLBLADDER: Unremarkable   PANCREAS: No duct dilatation   SPLEEN: No splenomegaly.   ADRENAL GLANDS: No nodules   KIDNEYS AND URETERS: Normal size. Persistent nephrogram from prior contrast study indicated for of underlying kidney impairment.   PELVIS:   BLADDER: Under filled with Villela catheter in place. Tiny anti dependent gas locules likely related to instrumentation.   REPRODUCTIVE ORGANS: Mild prostatomegaly.   BOWEL: Prior laparoscopic segmental colectomy with mobilization of the splenic flexure and midabdomen colo colonic anastomosis. No definite extravasation of enteric contrast at the site of anastomosis to suggest leak. No bowel dilatation.   Large volume abdominopelvic hemoperitoneum. Gastrosplenic hematoma measuring 8.9 x 5.7 cm and another left hemiabdomen mesenteric hematoma measuring 5.9 x 4.3 cm. Small volume upper abdomen pneumoperitoneum which improved from prior. Hyperdense foci noted within the left paracolic gutter in the delayed phases concerning for active bleed   VESSELS: Multifocal vascular calcifications.   PERITONEUM/RETROPERITONEUM/LYMPH NODES: No enlarged lymph nodes   ABDOMINAL WALL: Anterior abdominal wall laparotomy  incision with a scattered right anterior abdominal wall gas locules likely related to recent surgery. Anasarca.   BONES: Multilevel degenerative spine changes and facet joint disease.       1 hour delayed images was obtained to evaluate site of anastomosis for leak. 1. Large volume abdominopelvic hemoperitoneum. Gastrosplenic hematoma measuring 8.9 x 5.7 cm and another left hemiabdomen mesenteric hematoma measuring 5.9 x 4.3 cm. Hyperdense foci noted within the left paracolic gutter in the delayed phases suggestive of active bleed . Findings have worsened from 09/23/2024 CT scan. 2. Upper abdomen pneumoperitoneum have slightly improved from 09/23/2020 CT scan. 3. Status post laparoscopic segmental colectomy with mobilization of the splenic flexure and midabdomen colo colonic anastomosis. No definite extravasation of the diluted enteric contrast at the level of the anastomosis could be visualized to suggest leak. No bowel dilatation. 4. Persistent nephrogram indicative of underlying acute kidney injury. 5. Small volume bilateral pleural effusion with surrounding atelectasis, slightly worsened from 09/23/2024 CT scan.   Hans Roth discussed the significance and urgency of this critical finding by telephone with Shai Clark instead of ordering ASHLEY SIERRA on 9/25/2024 at 2:06 pm. (**-RCF-**) Findings:  See findings.     MACRO: None   Signed by: Hans Roth 9/25/2024 2:09 PM Dictation workstation:   [id number]    CT abdomen pelvis w IV contrast    Result Date: 9/23/2024  Interpreted By:  Ishmael Grant, STUDY: CT ABDOMEN PELVIS W IV CONTRAST; ;  9/23/2024 6:37 pm   INDICATION: Signs/Symptoms:abdomen pain. Recent hospital admission for cardiac catheterization and sigmoidectomy. Weakness.     COMPARISON: CT abdomen and pelvis of 08/01/2024.   ACCESSION NUMBER(S): [id number]   ORDERING CLINICIAN: ELYSE KLERMAN   TECHNIQUE: Axial CT images of the abdomen and pelvis with coronal and  sagittal reconstructed images performed after intravenous administration of 75 cc Omnipaque 350.   FINDINGS: Artifact is present from patient's arms at his sides. LOWER CHEST: Small right pleural effusion with adjacent mild atelectasis. Normal heart size. BONES: No acute osseous abnormality. ABDOMINAL WALL: Fat stranding and air in the ventral abdominal wall is presumed to relate to recent surgery. A midline laparotomy incision has did staples in place and there are also skin staples in the lower quadrants of the ventral abdominal wall. No well-defined fluid collection or unexpected foreign body.   ABDOMEN:   LIVER: Small cyst again incidentally noted. BILE DUCTS: Normal caliber. GALLBLADDER: No calcified gallstones. No wall thickening. PANCREAS: Within normal limits. SPLEEN: Within normal limits. ADRENALS: Within normal limits. KIDNEYS and URETERS: Redemonstrated left renal sinus cysts. Heterogeneous attenuation of renal parenchyma is favored to be technical/artifactual in etiology although heterogeneous enhancement related to acute pyelonephritis is not excluded. Please correlate clinically and consider correlation with urinalysis as indicated. No hydronephrosis.     VESSELS: Mild partially calcified atherosclerosis of the abdominal aorta and branch vessels. RETROPERITONEUM: No pathologically enlarged retroperitoneal lymph nodes.   PELVIS:   REPRODUCTIVE ORGANS: Prostate is not enlarged. BLADDER: Perivesical fat stranding is suspicious for acute cystitis.   BOWEL: There is new edematous marked wall thickening of the inferior and posterior wall of the distal radius of the gastric body, in the midst of which is an amorphous multi loculated volume of abnormal enhancement/contrast pooling (series 601, images 35-58), indicative of active bleeding. There are adjacent small foci of air. These findings are centered about a suture line in this region suggesting hemorrhage related to suture line dehiscence. Surgical  evaluation recommended. Questionable mucosal hyperemia in small and large bowel suggests possible enterocolitis. Patient is noted to be interval partial colectomy. No definite abnormal bowel wall thickening. No bowel dilation. Appendix is not identified with certainty but no convincing pericecal inflammatory changes. PERITONEUM: Pneumoperitoneum is suspected of the relate to recent surgery although this could also relate to suspected gastric suture line dehiscence. Small to moderate volume of ascites is new relative to the prior exam. No intraperitoneal loculated collection.       There is new edematous marked wall thickening of the inferior and posterior wall of the distal radius of the gastric body, in the midst of which is an amorphous multi loculated volume of abnormal enhancement/contrast pooling (series 601, images 35-58), indicative of active bleeding. There are adjacent small foci of air. These findings are centered about a suture line in this region suggesting hemorrhage related to suture line dehiscence. Surgical evaluation recommended.   Questionable mucosal hyperemia in small and large bowel suggests possible enterocolitis. Patient is noted to be interval partial colectomy. No definite abnormal bowel wall thickening. No bowel dilation.   Fat stranding and air in the ventral abdominal wall is presumed to relate to recent surgery. A midline laparotomy incision has did staples in place and there are also skin staples in the lower quadrants of the ventral abdominal wall. No well-defined fluid collection or unexpected foreign body.   Redemonstrated left renal sinus cysts. No hydronephrosis. Heterogeneous attenuation of renal parenchyma is favored to be technical/artifactual in etiology although heterogeneous enhancement related to acute pyelonephritis is not excluded. Please correlate clinically and consider correlation with urinalysis as indicated.   Additional findings as discussed above.   MACRO: None    Signed by: Ishmael Grant 9/23/2024 6:56 PM Dictation workstation:   [id number]    XR chest 2 views    Result Date: 9/23/2024  Interpreted By:  Benson Parekh, STUDY: XR CHEST 2 VIEWS;  9/23/2024 5:16 pm   INDICATION: Signs/Symptoms:hx of recent cath with dizziness.   COMPARISON: Chest radiograph 08/01/2024   ACCESSION NUMBER(S): [id number]   ORDERING CLINICIAN: ELYSE KLERMAN   FINDINGS:     CARDIOMEDIASTINAL SILHOUETTE: Cardiomediastinal silhouette is stable in size and configuration.   LUNGS: No consolidation, pneumothorax, or effusion.   ABDOMEN: There is appearance of diffuse pneumoperitoneum.   BONES: No acute osseous changes. Old-appearing right-sided rib deformities.   Remaining findings appear stable since prior comparison radiograph.       1.  Pneumoperitoneum. In discussion with the emergency room physician, patient has a history of colectomy 1 week prior, however the amount of pneumoperitoneum present on the current exam is felt to be greater than what would be expected in a patient 1 week out from laparoscopic colectomy. Correlation with CT abdomen pelvis with IV and water-soluble enteric contrast is advised for further assessment.   MACRO: Benson Parekh discussed the significance and urgency of this critical finding by telephone with  ELYSE KLERMAN on 9/23/2024 at 6:14 pm. (**-RCF-**) Findings:  See findings.     Signed by: Benson Parekh 9/23/2024 6:14 PM Dictation workstation:   [id number]    ECG 12 lead    Result Date: 9/17/2024  Atrial flutter Nonspecific ST and T wave abnormality Abnormal ECG Confirmed by Beny Alvarez ([id number]) on 9/17/2024 4:31:28 PM    ECG 12 lead    Result Date: 9/17/2024  Atrial fibrillation with rapid ventricular response with premature ventricular or aberrantly conducted complexes Posterior infarct , age undetermined Nonspecific ST and T wave abnormality Abnormal ECG No previous ECGs available Confirmed by Beny Alvarez ([id number]) on 9/17/2024 4:24:58 PM    ECG 12  lead    Result Date: 9/17/2024  Normal sinus rhythm Normal ECG Confirmed by Beny Alvarez ([id number]) on 9/17/2024 3:51:38 PM    Cardiac Catheterization Procedure    Result Date: 9/12/2024   [facility], Cath Lab, [place] [place] [id number] Cardiovascular Catheterization Report Patient Name:      MONIQUE MAN     Performing Physician:  [id number] Rosario MD Study Date:        9/12/2024           Verifying Physician:   [id number] Rosario MD MRN/PID:           [id number]            Cardiologist/Co-Scrub: Accession#:        [id number]        Ordering Provider:     [id number] RADHA VU Date of Birth/Age: 1955 / 69 years Cardiologist: Gender:            M                   Fellow: Encounter#:        [id number]          Surgeon:  Study: Left Heart Cath  Indications: MONIQUE MAN is a 69 year old male who presents with hypertension and a chest pain assessment of typical angina. NSTE - ACS.  Appropriate Use Criteria: Non ST elevation myocardial infarction with high risk score; AUC score = 9.  Coronary Angiography: The coronary circulation is right dominant.  Coronary Angiography Comments: Short left main without significant stenosis Luminal irregularities in LAD Lower branch of the OM has an ostial 60% stenosis. Otherwise no significant disease in left circumflex system Proximal to mid RCA diffuse 20 to 30% stenosis. Ostial RPL V has a 60% stenosis. Right dominant coronary system LVEDP 16 mmHg with preserved ejection fraction and LV gram with mild inferior wall hypokinesis.   Hemo Personnel: +-----------------------+---------+ Name                   Duty      +-----------------------+---------+ Bob Rosario MD 1 +-----------------------+---------+  Hemodynamic Pressures:   +----+-------------------+---------+------------+-------------+------+---------+ Site     Date Time       Phase    Systolic    Diastolic    ED  Mean mmHg                          Name       mmHg        mmHg      mmHg           +----+-------------------+---------+------------+-------------+------+---------+   LV  9/12/2024 1:34:46  O2 REST         103            8    17                               PM                                                  +----+-------------------+---------+------------+-------------+------+---------+   LV  9/12/2024 1:34:52  O2 REST         103            8    15                               PM                                                  +----+-------------------+---------+------------+-------------+------+---------+  LVp  9/12/2024 1:34:56  O2 REST         105            2    16                               PM                                                  +----+-------------------+---------+------------+-------------+------+---------+  AOp  9/12/2024 1:35:01  O2 REST         105           60             79                      PM                                                  +----+-------------------+---------+------------+-------------+------+---------+   AO  9/12/2024 1:35:08  O2 REST         110           63             83                      PM                                                  +----+-------------------+---------+------------+-------------+------+---------+  Complications: No in-lab complications observed.  Cardiac Cath Post Procedure Notes: Post Procedure Diagnosis: Nonobstructive coronary artery disease as above. Blood Loss:               Estimated blood loss during the procedure was 5 mls. Specimens Removed:        Number of specimen(s) removed: none.  Recommendations: Maximize medical therapy. Agressive risk factor modification efforts. Follow-up with  cardiology clinic. Lipid lowering agent or Statin therapy. Medical management of coronary artery disease. Aspirin therapy. ____________________________________________________________________________________ CONCLUSIONS:  1. Short left main without significant stenosis         Luminal irregularities in LAD         Lower branch of the OM has an ostial 60% stenosis. Otherwise no significant disease in left circumflex system         Proximal to mid RCA diffuse 20 to 30% stenosis. Ostial RPL V has a 60% stenosis.         Right dominant coronary system         LVEDP 16 mmHg with preserved ejection fraction and LV gram with mild inferior wall hypokinesis.     ICD 10 Codes: Non ST elevation (NSTEMI) myocardial infarction-I21.4  CPT Codes: Left Heart Cath (visualization of coronaries) and LV-11514; Moderate Sedation Services 1st additional 15 minutes patient >5 years-38828; Moderate Sedation Services 2nd additional 15 minutes patient >5 years-75458  [id number] Bob Rosario MD Performing Physician Electronically signed by [id number] Bob Rosario MD on 9/12/2024 at 2:35:15 PM  ** Final **     Transthoracic Echo (TTE) Complete    Result Date: 9/12/2024   [facility], [address] [place] [address]           Tel [phone] and Fax [phone] TRANSTHORACIC ECHOCARDIOGRAM REPORT  Patient Name:      MONIQUE Harding Physician:    [id number] Rosario MD Study Date:        9/12/2024            Ordering Provider:    [id number] ROTH MRN/PID:           [id number]             Fellow: Accession#:        [id number]         Nurse:                Chika Jean-Baptiste RN Date of Birth/Age: 1955 / 69 years  Sonographer:          [facility],                                                               RDRUTHIE, KEEE Gender:            M                    Additional Staff: Height:            182.88 cm            Admit Date:            9/11/2024 Weight:            87.54 kg             Admission Status:     Inpatient -                                                               Routine BSA / BMI:         2.10 m2 / 26.18      Encounter#:           [id number]                    kg/m2 Blood Pressure:    186/84 mmHg          Department Location:  [facility] Study Type:    TRANSTHORACIC ECHO (TTE) COMPLETE Diagnosis/ICD: Non ST elevation (NSTEMI) myocardial infarction-I21.4 Indication:    Chest Pain CPT Code:      Echo Limited-41696; Color Doppler-50969; Doppler Limited-03961 Patient History: Pertinent History: Chest Pain, Dyspnea, A-Fib and Palpitations. Study Detail: The following Echo studies were performed: 2D, M-Mode, Doppler and               color flow. Technically challenging study due to body habitus.               Definity used as a contrast agent for endocardial border               definition. Total contrast used for this procedure was 2 mL via IV               push.  Critical Event Critical Event: Test was completed as per department protocol. Critical Finding: New WMA involving mid-septum. Time Test was Completed: 11:39:05 AM Notified: Dr. Archibald. Attending notification time: 11:39:16 AM  PHYSICIAN INTERPRETATION: Left Ventricle: Left ventricular ejection fraction is low normal, by visual estimate at 50-55%. Left venticular wall motion is abnormal. The left ventricular cavity size is normal. Spectral Doppler shows an impaired relaxation pattern of left ventricular diastolic filling. Mild mid and apical anterior wall hypokinesis. Preserved EF. Left Atrium: The left atrium is normal in size. Right Ventricle: The right ventricle is normal in size. There is normal right ventricular global systolic function. Right Atrium: The right atrium is normal in size. Aortic Valve: The aortic valve is trileaflet. There is no evidence of aortic valve regurgitation. Mitral Valve: The mitral valve is normal in structure. There is trace mitral valve  regurgitation. Tricuspid Valve: The tricuspid valve is structurally normal. There is trace tricuspid regurgitation. Pulmonic Valve: The pulmonic valve is structurally normal. There is trace pulmonic valve regurgitation. Pericardium: There is no pericardial effusion noted. Aorta: The aortic root is normal.  CONCLUSIONS:  1. Left ventricular ejection fraction is low normal, by visual estimate at 50-55%.  2. Abnormal left venticular wall motion.  3. Spectral Doppler shows an impaired relaxation pattern of left ventricular diastolic filling.  4. Mild mid and apical anterior wall hypokinesis. Preserved EF.  5. There is normal right ventricular global systolic function. QUANTITATIVE DATA SUMMARY:  2D MEASUREMENTS:          Normal Ranges: Ao Root d:       2.70 cm  (2.0-3.7cm) LAs:             3.60 cm  (2.7-4.0cm) IVSd:            0.80 cm  (0.6-1.1cm) LVPWd:           0.60 cm  (0.6-1.1cm) LVIDd:           5.30 cm  (3.9-5.9cm) LVIDs:           3.70 cm LV Mass Index:   61 g/m2 LVEDV Index:     34 ml/m2 LV % FS          30.2 %  M-MODE MEASUREMENTS:         Normal Ranges: Ao Root:             3.50 cm (2.0-3.7cm) LAs:                 3.90 cm (2.7-4.0cm)  LV SYSTOLIC FUNCTION BY 2D PLANIMETRY (MOD):                      Normal Ranges: EF-A4C View:    59 % (>=55%) EF-A2C View:    52 % EF-Biplane:     55 % EF-Visual:      53 % LV EF Reported: 53 %  TRICUSPID VALVE/RVSP:         Normal Ranges: IVC Diam:             2.10 cm  [id number] Bob Rosario MD Electronically signed on 9/12/2024 at 12:22:14 PM  ** Final **     CT angio chest w and wo IV contrast    Result Date: 9/12/2024  * * *Final Report* * * DATE OF EXAM: Sep 12 2024  2:51AM   [facility]   0540  -  CT CHEST W IVCON PE  / ACCESSION #  [id number] PROCEDURE REASON: Pulmonary embolism (PE) suspected, high prob      * * * * Physician Interpretation * * * *  EXAMINATION:  CHEST CT WITH CONTRAST (PULMONARY EMBOLISM PROTOCOL) CLINICAL HISTORY: Chest pain, pulmonary embolism suspected.   History of LEFT colon cancer. Technique:  Spiral CT acquisition of the chest from the thoracic inlet to the upper abdomen following IV contrast.  Axial 1 and 3 mm thick slices plus coronal and sagittal reformatted images. MQ:  CTCP_5 Contrast:  100 mL IV CT Radiation dose: Integrated Dose-length product (DLP) for this visit =   [id number] mGy*cm CT Dose Reduction Employed: Automated exposure control(AEC) and iterative recon Comparison: Report of chest CT from 08/26/2024 RESULT: Limitations:  None. Evaluation for thromboembolic disease:      - Right heart chambers:  No thromboembolic disease.      - Main pulmonary arteries:  No thromboembolic disease.      - Lobar pulmonary arteries:  No thromboembolic disease.      - Segmental pulmonary arteries:  No thromboembolic disease.      - Subsegmental pulmonary arteries:  No thromboembolic disease.      - Additional pulmonary artery findings:  The main pulmonary artery is normal in caliber. Lines, tubes, and devices:  None. Lung parenchyma and airways: Mild dependent atelectasis, greater on the RIGHT.  No consolidation.  There are a few 2 to 3 mm pulmonary nodules.   No suspicious pulmonary nodule. The central airways are patent. Pleural space:  No pleural effusion.  No pleural thickening. Lower neck, lymph nodes, and mediastinum:  The imaged thyroid gland is normal.  No lymphadenopathy in the supraclavicular, axillary, mediastinal, or hilar regions. Heart, pericardium, and thoracic vessels:  The thoracic aorta is normal in caliber. The cardiac chambers are normal in size.  Atherosclerosis, including suggestion of coronary artery atherosclerotic calcifications, although the study is not optimized for coronary assessment. No pericardial effusion or thickening. Bones and soft tissues:  No destructive bone lesion. Chest wall is unremarkable. Upper abdomen:  No acute abnormality in the imaged upper abdomen. Localizer images: No additional findings.    IMPRESSION: 1.  No CT  evidence of pulmonary thromboembolism or acute pulmonary process. 2.  Mild dependent atelectasis, greater on the RIGHT. 3.  There are a few 2 to 3 mm pulmonary nodules, previously described, recommend follow-up as previously recommended. : [facility]   Transcribe Date/Time: Sep 12 2024  4:23A Dictated by : ROHIT STARKS MD This examination was interpreted and the report reviewed and electronically signed by: ROHIT STARKS MD on Sep 12 2024  4:33AM  EST    XR chest 1 view    Result Date: 9/12/2024  * * *Final Report* * * DATE OF EXAM: Sep 12 2024  2:51AM   [facility]   5290  -  XR CHEST 1V FRONTAL   / ACCESSION #  [id number] PROCEDURE REASON: Chest pain      * * * * Physician Interpretation * * * *  EXAMINATION:  CHEST RADIOGRAPH (SINGLE VIEW AP OR PA) CLINICAL HISTORY: Chest pain MQ:  XC1_5 Comparison:  Chest radiograph 9/11/2024. RESULT: Lines, tubes, and devices:  None. Lungs and pleura:  No consolidation. No pneumothorax. No pleural effusion. Mild patchy opacities in lung bases. Cardiomediastinal silhouette:  Normal cardiomediastinal silhouette. Other:  No acute osseous abnormality. Old right fifth and eighth rib fractures.    IMPRESSION: Mild patchy basilar opacities, presumably atelectasis. : [employer]   Transcribe Date/Time: Sep 12 2024  4:01A Dictated by : JIMMY SAAVEDRA MD This examination was interpreted and the report reviewed and electronically signed by: JIMMY SAAVEDRA MD on Sep 12 2024  4:02AM  EST    XR chest 1 view    Result Date: 9/11/2024  * * *Final Report* * * DATE OF EXAM: Sep 11 2024  9:21AM   [facility]   5290  -  XR CHEST 1V FRONTAL   / ACCESSION #  [id number] PROCEDURE REASON: Shortness of breath      * * * * Physician Interpretation * * * *  EXAMINATION:  CHEST RADIOGRAPH (SINGLE VIEW AP OR PA) CLINICAL HISTORY: Shortness of breath, Dizziness MQ:  XC1_5 Comparison:  None RESULT: Lines, tubes, and devices:  None. Lungs and pleura:  No consolidation. No pneumothorax. No pleural  effusion. Cardiomediastinal silhouette:  Normal cardiomediastinal silhouette. Other:  Old rib fractures    IMPRESSION: No acute radiographic abnormality. : PADMA   Transcribe Date/Time: Sep 11 2024  9:51A Dictated by : ALAN GUADARRAMA MD This examination was interpreted and the report reviewed and electronically signed by: ALAN GUADARRAMA MD on Sep 11 2024  9:55AM  EST    US thoracentesis    Result Date: 9/3/2024  Interpreted By:  Sarbjit Darden, STUDY: US THORACENTESIS; 9/3/59076:27 pm   INDICATION: Signs/Symptoms:New bilateral pleural effusions right greater than left in the setting of biopsy-proven adenocarcinoma of the left colon; rule out malignant pleural effusions   COMPARISON: None.   ACCESSION NUMBER(S): [id number]   ORDERING CLINICIAN: KARLO MARSHALL   TECHNIQUE: INTERVENTIONALIST: Sarbjit Darden CNP   CONSENT: The patient/patient's POA/next of kin was informed of the nature of the proposed procedure. The purposes, alternatives, risks, and benefits were explained and discussed. All questions were answered and consent was obtained.   SEDATION: None   MEDICATION/CONTRAST: Lidocaine 1%.   TIME OUT: A time out was performed immediately prior to procedure start with the interventional team, correctly identifying the patient name, date of birth, MRN, procedure, anatomy (including marking of site and side), patient position, procedure consent form, relevant laboratory and imaging test results, antibiotic administration, safety precautions, and procedure-specific equipment needs.   FINDINGS: The patient was placed in the seated position.   The patient's right pleural space was scanned using the ultrasound probe. The area of fluid most amenable to drainage was marked.   The patient's skin was sterilized using chlorhexidine and draped in sterile manner. The skin was anesthestized with 1% lidocaine. A 5F valved centesis catheter was inserted into the right pleural space where previously marked. A  total of 575 mL of yellow pleural fluid was removed. The catheter was then removed and gauze and a tegaderm were placed over the insertion site. Sterile technique used throughout entirety of procedure.   Specimens were obtained, labeled and sent to lab with requisitions ordered by primary team.   The patient tolerated the procedure well and there were no immediate complications.       Uneventful thoracentesis, as detailed above. right Pleural space, 575 mL.   Performed and dictated at [facility].   Signed by: Sarbjit Darden 9/3/2024 2:27 PM Dictation workstation:   [id number]    NM PET CT colorectal initial diagnosis    Result Date: 8/29/2024  Interpreted By:  Mikel Roman and Bera Kaustav STUDY: NM PET CT COLORECTAL INITIAL DIAGNOSIS;  8/29/2024 2:18 pm   INDICATION: Signs/Symptoms:Biopsy-proven adenocarcinoma left colon; mediastinal lymphadenopathy and bilateral pleural effusions noted on CT imaging; rule out metastatic disease.   COMPARISON: CT chest with contrast on 08/26/2024 CT abdomen pelvis with IV contrast on 08/01/2024   ACCESSION NUMBER(S): [id number]   ORDERING CLINICIAN: KARLO MARSHALL   TECHNIQUE: DIVISION OF NUCLEAR MEDICINE POSITRON EMISSION TOMOGRAPHY (PET-CT)   The patient received an intravenous dose of 12.3 mCi of Fluorine-18 fluorodeoxyglucose (FDG).  Positron emission tomographic (PET) images from mid thigh to skull base were then acquired after a one hour delay. Also acquired was a contemporaneous low dose non-contrast CT scan performed for attenuation correction of PET images and anatomic localization.  The PET and CT images were digitally fused for display.  All images were acquired on a combined PET-CT scanner unit. Some areas of FDG accumulation may be described in standardized uptake value (SUV) units.   CODING: Initial Treatment Strategy (PI)   CALIBRATION: Dose Injection-to-Scan Interval (mins): 48 min Mediastinal bloodpool SUV (normal 1.5-2.5):  2.9 Blood glucose: 116 mg/dL   FINDINGS: HEAD AND NECK: No evidence of focal FDG avid lesion in the partially visualized brain parenchyma, noting that evaluation is limited because of the expected physiologic diffuse FDG uptake in the brain. No focal FDG avid  soft tissue lesion is seen in the neck. No FDG avid  cervical lymphadenopathy is present. No paranasal sinus diease. Thyroid gland is unremarkable.   CHEST: No focal FDG avid  lesion is seen in the lung parenchyma. Moderate right and small left pleural effusion with superimposed atelectasis. Few 2-3 mm nodules do not show increased FDG avidity, although likely below resolution of PET. Mild FDG activity within a subcarinal lymph node with SUV max of 3.3, bilateral hilar lymph nodes with SUV max of 3.5 on the right.   ABDOMEN AND PELVIS: Intense focus of hypermetabolic activity involving the descending colon with SUV max of 10.5, likely represents patient's biopsy-proven adenocarcinoma. No evidence of FDG avid  lymphadenopathy. Bilateral adrenal glands are unremarkable. Physiologic radiotracer uptake is present in the liver and spleen with excretion into the bowel loops and the genitourinary tract.   MUSCULOSKELETAL: No focal FDG avid  lesion is seen in the axial or appendicular to suggest osseous metastasis.       1. Intense focus of hypermetabolic activity corresponding to masslike thickening within the left descending colon, likely corresponding to patient's biopsy-proven adenocarcinoma of the colon. 2. Moderate right and small left pleural effusion with superimposed atelectasis, without hypermetabolic activity. No definite hypermetabolic lung nodules, with 2-3 mm nodules within the lungs below resolution of PET. Recommend short-term CT chest for follow-up. 3. Mild hypermetabolic mediastinal and hilar lymph nodes are likely reactive. This can also be followed up with a short-term CT chest. 4. No other evidence of hypermetabolic disease throughout the body.      I personally reviewed the image(s) / study and agree with the findings and interpretation as stated. This study was interpreted at [facility].   Signed by: Mikel Roman 8/29/2024 2:52 PM Dictation workstation:   [id number]    * Cannot find OR log *  Last relevant procedure: Colonoscopy on 8/15/24       Results for orders placed or performed during the hospital encounter of 09/23/24 (from the past 96 hour(s))   CBC and Auto Differential   Result Value Ref Range    WBC 8.3 4.4 - 11.3 x10*3/uL    nRBC 0.2 (H) 0.0 - 0.0 /100 WBCs    RBC 2.88 (L) 4.50 - 5.90 x10*6/uL    Hemoglobin 7.7 (L) 13.5 - 17.5 g/dL    Hematocrit 26.0 (L) 41.0 - 52.0 %    MCV 90 80 - 100 fL    MCH 26.7 26.0 - 34.0 pg    MCHC 29.6 (L) 32.0 - 36.0 g/dL    RDW 26.2 (H) 11.5 - 14.5 %    Platelets 334 150 - 450 x10*3/uL    Neutrophils % 77.8 40.0 - 80.0 %    Immature Granulocytes %, Automated 0.6 0.0 - 0.9 %    Lymphocytes % 9.9 13.0 - 44.0 %    Monocytes % 6.3 2.0 - 10.0 %    Eosinophils % 5.3 0.0 - 6.0 %    Basophils % 0.1 0.0 - 2.0 %    Neutrophils Absolute 6.48 1.20 - 7.70 x10*3/uL    Immature Granulocytes Absolute, Automated 0.05 0.00 - 0.70 x10*3/uL    Lymphocytes Absolute 0.82 (L) 1.20 - 4.80 x10*3/uL    Monocytes Absolute 0.52 0.10 - 1.00 x10*3/uL    Eosinophils Absolute 0.44 0.00 - 0.70 x10*3/uL    Basophils Absolute 0.01 0.00 - 0.10 x10*3/uL   Comprehensive metabolic panel   Result Value Ref Range    Glucose 138 (H) 74 - 99 mg/dL    Sodium 136 136 - 145 mmol/L    Potassium 4.3 3.5 - 5.3 mmol/L    Chloride 105 98 - 107 mmol/L    Bicarbonate 21 21 - 32 mmol/L    Anion Gap 14 10 - 20 mmol/L    Urea Nitrogen 26 (H) 6 - 23 mg/dL    Creatinine 1.21 0.50 - 1.30 mg/dL    eGFR 64 >60 mL/min/1.73m*2    Calcium 7.9 (L) 8.6 - 10.3 mg/dL    Albumin 3.4 3.4 - 5.0 g/dL    Alkaline Phosphatase 57 33 - 136 U/L    Total Protein 5.8 (L) 6.4 - 8.2 g/dL    AST 77 (H) 9 - 39 U/L    Bilirubin, Total 1.7 (H) 0.0 - 1.2 mg/dL     ALT 51 10 - 52 U/L   Lactate   Result Value Ref Range    Lactate 3.6 (H) 0.4 - 2.0 mmol/L   Type and Screen   Result Value Ref Range    ABO TYPE AB     Rh TYPE POS     ANTIBODY SCREEN NEG    B-type natriuretic peptide   Result Value Ref Range     (H) 0 - 99 pg/mL   Troponin I, High Sensitivity, Initial   Result Value Ref Range    Troponin I, High Sensitivity 18 0 - 20 ng/L   Morphology   Result Value Ref Range    RBC Morphology See Below     Polychromasia Mild     Hypochromia Mild     RBC Fragments Few     Ovalocytes Few     [place] Cells Few     Acanthocytes Few     Clumped Platelets Present    ECG 12 Lead   Result Value Ref Range    Ventricular Rate 77 BPM    Atrial Rate 77 BPM    AK Interval 122 ms    QRS Duration 84 ms    QT Interval 494 ms    QTC Calculation(Bazett) 559 ms    P Axis 52 degrees    R Axis 48 degrees    T Axis 17 degrees    QRS Count 13 beats    Q Onset 220 ms    P Onset 159 ms    P Offset 207 ms    T Offset 467 ms    QTC Fredericia 536 ms   Protime-INR   Result Value Ref Range    Protime 22.6 (H) 9.8 - 12.8 seconds    INR 2.0 (H) 0.9 - 1.1   Prepare RBC: 1 Units   Result Value Ref Range    PRODUCT CODE E0985L02     Unit Number [id number]     Unit ABO B     Unit RH POS     XM INTEP COMP     Dispense Status TR     Blood Expiration Date 9/26/2024 11:59:00 PM EDT     PRODUCT BLOOD TYPE 7300     UNIT VOLUME 350    Prepare Plasma: 1 Units   Result Value Ref Range    PRODUCT CODE F0246D84     Unit Number [id number]     Unit ABO AB     Unit RH POS     Dispense Status TR     Blood Expiration Date 9/28/2024  7:24:00 PM EDT     PRODUCT BLOOD TYPE 8400     UNIT VOLUME 206    Troponin, High Sensitivity, 1 Hour   Result Value Ref Range    Troponin I, High Sensitivity 17 0 - 20 ng/L   Lactate   Result Value Ref Range    Lactate 1.5 0.4 - 2.0 mmol/L   Blood Culture    Specimen: Peripheral Venipuncture; Blood culture   Result Value Ref Range    Blood Culture No growth at 2 days    Blood Culture     Specimen: Peripheral Venipuncture; Blood culture   Result Value Ref Range    Blood Culture No growth at 2 days    Prepare RBC: 1 Units   Result Value Ref Range    PRODUCT CODE I1257T38     Unit Number [id number]     Unit ABO O     Unit RH POS     XM INTEP COMP     Dispense Status TR     Blood Expiration Date 10/8/2024 11:59:00 PM EDT     PRODUCT BLOOD TYPE      UNIT VOLUME 400    Prepare Plasma: 1 Units   Result Value Ref Range    PRODUCT CODE V4127Q66     Unit Number [id number]     Unit ABO AB     Unit RH POS     Dispense Status TR     Blood Expiration Date 9/28/2024 10:46:00 PM EDT     PRODUCT BLOOD TYPE 8400     UNIT VOLUME 302    Blood Gas Arterial   Result Value Ref Range    POCT pH, Arterial 7.42 7.38 - 7.42 pH    POCT pCO2, Arterial 28 (L) 38 - 42 mm Hg    POCT pO2, Arterial 119 (H) 85 - 95 mm Hg    POCT SO2, Arterial 100 94 - 100 %    POCT Oxy Hemoglobin, Arterial 96.2 94.0 - 98.0 %    POCT Base Excess, Arterial -4.9 (L) -2.0 - 3.0 mmol/L    POCT HCO3 Calculated, Arterial 18.2 (L) 22.0 - 26.0 mmol/L    Patient Temperature 37.0 degrees Celsius    FiO2 21 %    Site of Arterial Puncture Radial Right    Hemoglobin and hematocrit, blood   Result Value Ref Range    Hemoglobin 5.7 (LL) 13.5 - 17.5 g/dL    Hematocrit 18.5 (L) 41.0 - 52.0 %   Lactate   Result Value Ref Range    Lactate 1.9 0.4 - 2.0 mmol/L   CBC and Auto Differential   Result Value Ref Range    WBC 10.9 4.4 - 11.3 x10*3/uL    nRBC 0.0 0.0 - 0.0 /100 WBCs    RBC 2.09 (L) 4.50 - 5.90 x10*6/uL    Hemoglobin 5.7 (LL) 13.5 - 17.5 g/dL    Hematocrit 18.5 (L) 41.0 - 52.0 %    MCV 89 80 - 100 fL    MCH 27.3 26.0 - 34.0 pg    MCHC 30.8 (L) 32.0 - 36.0 g/dL    RDW 23.4 (H) 11.5 - 14.5 %    Platelets 316 150 - 450 x10*3/uL    Neutrophils % 88.5 40.0 - 80.0 %    Immature Granulocytes %, Automated 1.0 (H) 0.0 - 0.9 %    Lymphocytes % 5.2 13.0 - 44.0 %    Monocytes % 5.1 2.0 - 10.0 %    Eosinophils % 0.1 0.0 - 6.0 %    Basophils % 0.1 0.0 - 2.0 %     Neutrophils Absolute 9.64 (H) 1.20 - 7.70 x10*3/uL    Immature Granulocytes Absolute, Automated 0.11 0.00 - 0.70 x10*3/uL    Lymphocytes Absolute 0.57 (L) 1.20 - 4.80 x10*3/uL    Monocytes Absolute 0.56 0.10 - 1.00 x10*3/uL    Eosinophils Absolute 0.01 0.00 - 0.70 x10*3/uL    Basophils Absolute 0.01 0.00 - 0.10 x10*3/uL   Morphology   Result Value Ref Range    RBC Morphology See Below     Polychromasia Mild     Hypochromia Mild     RBC Fragments Few     Ovalocytes Few     Acanthocytes Few     Giant Platelets Few    Prepare RBC: 2 Units   Result Value Ref Range    PRODUCT CODE T8079Z50     Unit Number [id number]     Unit ABO B     Unit RH POS     XM INTEP COMP     Dispense Status TR     Blood Expiration Date 9/26/2024 11:59:00 PM EDT     PRODUCT BLOOD TYPE 7300     UNIT VOLUME 350    Hepatic Function Panel   Result Value Ref Range    Albumin 3.0 (L) 3.4 - 5.0 g/dL    Bilirubin, Total 2.3 (H) 0.0 - 1.2 mg/dL    Bilirubin, Direct 0.5 (H) 0.0 - 0.3 mg/dL    Alkaline Phosphatase 43 33 - 136 U/L    ALT 29 10 - 52 U/L    AST 28 9 - 39 U/L    Total Protein 4.8 (L) 6.4 - 8.2 g/dL   Phosphorus   Result Value Ref Range    Phosphorus 5.3 (H) 2.5 - 4.9 mg/dL   Basic Metabolic Panel   Result Value Ref Range    Glucose 155 (H) 74 - 99 mg/dL    Sodium 136 136 - 145 mmol/L    Potassium 4.9 3.5 - 5.3 mmol/L    Chloride 106 98 - 107 mmol/L    Bicarbonate 21 21 - 32 mmol/L    Anion Gap 14 10 - 20 mmol/L    Urea Nitrogen 30 (H) 6 - 23 mg/dL    Creatinine 1.61 (H) 0.50 - 1.30 mg/dL    eGFR 46 (L) >60 mL/min/1.73m*2    Calcium 7.3 (L) 8.6 - 10.3 mg/dL   CBC   Result Value Ref Range    WBC 12.2 (H) 4.4 - 11.3 x10*3/uL    nRBC 0.2 (H) 0.0 - 0.0 /100 WBCs    RBC 3.00 (L) 4.50 - 5.90 x10*6/uL    Hemoglobin 8.6 (L) 13.5 - 17.5 g/dL    Hematocrit 25.9 (L) 41.0 - 52.0 %    MCV 86 80 - 100 fL    MCH 28.7 26.0 - 34.0 pg    MCHC 33.2 32.0 - 36.0 g/dL    RDW 18.5 (H) 11.5 - 14.5 %    Platelets 313 150 - 450 x10*3/uL   Magnesium   Result Value  Ref Range    Magnesium 1.56 (L) 1.60 - 2.40 mg/dL   Protime-INR   Result Value Ref Range    Protime 17.4 (H) 9.8 - 12.8 seconds    INR 1.5 (H) 0.9 - 1.1   Coagulation Screen   Result Value Ref Range    Protime 17.4 (H) 9.8 - 12.8 seconds    INR 1.5 (H) 0.9 - 1.1    aPTT 29 27 - 38 seconds   SST TOP   Result Value Ref Range    Extra Tube Hold for add-ons.    Electrocardiogram, 12-lead PRN ACS symptoms   Result Value Ref Range    Ventricular Rate 87 BPM    Atrial Rate 87 BPM    CO Interval 126 ms    QRS Duration 82 ms    QT Interval 431 ms    QTC Calculation(Bazett) 519 ms    P Axis 67 degrees    R Axis 57 degrees    T Axis 1 degrees    QRS Count 14 beats    Q Onset 249 ms    T Offset 465 ms    QTC Fredericia 487 ms   Urinalysis with Reflex Culture and Microscopic   Result Value Ref Range    Color, Urine Yellow Light-Yellow, Yellow, Dark-Yellow    Appearance, Urine Clear Clear    Specific Gravity, Urine 1.025 1.005 - 1.035    pH, Urine 5.5 5.0, 5.5, 6.0, 6.5, 7.0, 7.5, 8.0    Protein, Urine 30 (1+) (A) NEGATIVE, 10 (TRACE), 20 (TRACE) mg/dL    Glucose, Urine Normal Normal mg/dL    Blood, Urine NEGATIVE NEGATIVE    Ketones, Urine TRACE (A) NEGATIVE mg/dL    Bilirubin, Urine NEGATIVE NEGATIVE    Urobilinogen, Urine Normal Normal mg/dL    Nitrite, Urine NEGATIVE NEGATIVE    Leukocyte Esterase, Urine NEGATIVE NEGATIVE   Extra Urine Gray Tube   Result Value Ref Range    Extra Tube Hold for add-ons.    Urinalysis Microscopic   Result Value Ref Range    WBC, Urine 1-5 1-5, NONE /HPF    RBC, Urine 11-20 (A) NONE, 1-2, 3-5 /HPF    Mucus, Urine FEW Reference range not established. /LPF   CBC   Result Value Ref Range    WBC 18.4 (H) 4.4 - 11.3 x10*3/uL    nRBC 0.2 (H) 0.0 - 0.0 /100 WBCs    RBC 2.88 (L) 4.50 - 5.90 x10*6/uL    Hemoglobin 8.2 (L) 13.5 - 17.5 g/dL    Hematocrit 25.1 (L) 41.0 - 52.0 %    MCV 87 80 - 100 fL    MCH 28.5 26.0 - 34.0 pg    MCHC 32.7 32.0 - 36.0 g/dL    RDW 19.9 (H) 11.5 - 14.5 %    Platelets 336 150 -  450 x10*3/uL   Lavender Top   Result Value Ref Range    Extra Tube Hold for add-ons.    Fibrinogen   Result Value Ref Range    Fibrinogen 337 200 - 400 mg/dL   CBC   Result Value Ref Range    WBC 18.7 (H) 4.4 - 11.3 x10*3/uL    nRBC 0.3 (H) 0.0 - 0.0 /100 WBCs    RBC 2.79 (L) 4.50 - 5.90 x10*6/uL    Hemoglobin 8.0 (L) 13.5 - 17.5 g/dL    Hematocrit 24.9 (L) 41.0 - 52.0 %    MCV 89 80 - 100 fL    MCH 28.7 26.0 - 34.0 pg    MCHC 32.1 32.0 - 36.0 g/dL    RDW 20.5 (H) 11.5 - 14.5 %    Platelets 377 150 - 450 x10*3/uL   POCT GLUCOSE   Result Value Ref Range    POCT Glucose 138 (H) 74 - 99 mg/dL   POCT GLUCOSE   Result Value Ref Range    POCT Glucose 134 (H) 74 - 99 mg/dL   POCT GLUCOSE   Result Value Ref Range    POCT Glucose 117 (H) 74 - 99 mg/dL   CBC   Result Value Ref Range    WBC 19.9 (H) 4.4 - 11.3 x10*3/uL    nRBC 0.3 (H) 0.0 - 0.0 /100 WBCs    RBC 2.39 (L) 4.50 - 5.90 x10*6/uL    Hemoglobin 6.9 (L) 13.5 - 17.5 g/dL    Hematocrit 21.5 (L) 41.0 - 52.0 %    MCV 90 80 - 100 fL    MCH 28.9 26.0 - 34.0 pg    MCHC 32.1 32.0 - 36.0 g/dL    RDW 21.0 (H) 11.5 - 14.5 %    Platelets 352 150 - 450 x10*3/uL   Prepare RBC: 1 Units   Result Value Ref Range    PRODUCT CODE C8924F65     Unit Number [id number]     Unit ABO A     Unit RH POS     XM INTEP COMP     Dispense Status TR     Blood Expiration Date 10/18/2024 11:59:00 PM EDT     PRODUCT BLOOD TYPE 6200     UNIT VOLUME 350    POCT GLUCOSE   Result Value Ref Range    POCT Glucose 116 (H) 74 - 99 mg/dL   CBC   Result Value Ref Range    WBC 18.4 (H) 4.4 - 11.3 x10*3/uL    nRBC 0.4 (H) 0.0 - 0.0 /100 WBCs    RBC 2.81 (L) 4.50 - 5.90 x10*6/uL    Hemoglobin 8.0 (L) 13.5 - 17.5 g/dL    Hematocrit 25.4 (L) 41.0 - 52.0 %    MCV 90 80 - 100 fL    MCH 28.5 26.0 - 34.0 pg    MCHC 31.5 (L) 32.0 - 36.0 g/dL    RDW 20.0 (H) 11.5 - 14.5 %    Platelets 356 150 - 450 x10*3/uL   Comprehensive Metabolic Panel   Result Value Ref Range    Glucose 127 (H) 74 - 99 mg/dL    Sodium 135 (L) 136 -  145 mmol/L    Potassium 5.1 3.5 - 5.3 mmol/L    Chloride 103 98 - 107 mmol/L    Bicarbonate 21 21 - 32 mmol/L    Anion Gap 16 10 - 20 mmol/L    Urea Nitrogen 44 (H) 6 - 23 mg/dL    Creatinine 3.37 (H) 0.50 - 1.30 mg/dL    eGFR 19 (L) >60 mL/min/1.73m*2    Calcium 8.0 (L) 8.6 - 10.3 mg/dL    Albumin 3.3 (L) 3.4 - 5.0 g/dL    Alkaline Phosphatase 46 33 - 136 U/L    Total Protein 5.6 (L) 6.4 - 8.2 g/dL    AST 18 9 - 39 U/L    Bilirubin, Total 1.3 (H) 0.0 - 1.2 mg/dL    ALT 22 10 - 52 U/L   Protime-INR   Result Value Ref Range    Protime 15.9 (H) 9.8 - 12.8 seconds    INR 1.4 (H) 0.9 - 1.1   Fibrinogen   Result Value Ref Range    Fibrinogen 352 200 - 400 mg/dL   Magnesium   Result Value Ref Range    Magnesium 2.51 (H) 1.60 - 2.40 mg/dL   POCT GLUCOSE   Result Value Ref Range    POCT Glucose 110 (H) 74 - 99 mg/dL   Calcium, Ionized   Result Value Ref Range    POCT Calcium, Ionized 0.99 (L) 1.1 - 1.33 mmol/L   CBC   Result Value Ref Range    WBC 20.5 (H) 4.4 - 11.3 x10*3/uL    nRBC 0.5 (H) 0.0 - 0.0 /100 WBCs    RBC 2.73 (L) 4.50 - 5.90 x10*6/uL    Hemoglobin 7.9 (L) 13.5 - 17.5 g/dL    Hematocrit 26.0 (L) 41.0 - 52.0 %    MCV 95 80 - 100 fL    MCH 28.9 26.0 - 34.0 pg    MCHC 30.4 (L) 32.0 - 36.0 g/dL    RDW 20.8 (H) 11.5 - 14.5 %    Platelets 321 150 - 450 x10*3/uL   POCT GLUCOSE   Result Value Ref Range    POCT Glucose 101 (H) 74 - 99 mg/dL   POCT GLUCOSE   Result Value Ref Range    POCT Glucose 101 (H) 74 - 99 mg/dL   CBC   Result Value Ref Range    WBC 19.4 (H) 4.4 - 11.3 x10*3/uL    nRBC 0.6 (H) 0.0 - 0.0 /100 WBCs    RBC 2.64 (L) 4.50 - 5.90 x10*6/uL    Hemoglobin 7.5 (L) 13.5 - 17.5 g/dL    Hematocrit 24.0 (L) 41.0 - 52.0 %    MCV 91 80 - 100 fL    MCH 28.4 26.0 - 34.0 pg    MCHC 31.3 (L) 32.0 - 36.0 g/dL    RDW 20.7 (H) 11.5 - 14.5 %    Platelets 345 150 - 450 x10*3/uL   SST TOP   Result Value Ref Range    Extra Tube Hold for add-ons.    POCT GLUCOSE   Result Value Ref Range    POCT Glucose 117 (H) 74 - 99 mg/dL    POCT GLUCOSE   Result Value Ref Range    POCT Glucose 112 (H) 74 - 99 mg/dL   CBC   Result Value Ref Range    WBC 15.3 (H) 4.4 - 11.3 x10*3/uL    nRBC 0.8 (H) 0.0 - 0.0 /100 WBCs    RBC 2.44 (L) 4.50 - 5.90 x10*6/uL    Hemoglobin 7.1 (L) 13.5 - 17.5 g/dL    Hematocrit 22.0 (L) 41.0 - 52.0 %    MCV 90 80 - 100 fL    MCH 29.1 26.0 - 34.0 pg    MCHC 32.3 32.0 - 36.0 g/dL    RDW 20.3 (H) 11.5 - 14.5 %    Platelets 304 150 - 450 x10*3/uL   POCT GLUCOSE   Result Value Ref Range    POCT Glucose 101 (H) 74 - 99 mg/dL   CBC   Result Value Ref Range    WBC 13.3 (H) 4.4 - 11.3 x10*3/uL    nRBC 0.7 (H) 0.0 - 0.0 /100 WBCs    RBC 2.35 (L) 4.50 - 5.90 x10*6/uL    Hemoglobin 6.8 (L) 13.5 - 17.5 g/dL    Hematocrit 21.3 (L) 41.0 - 52.0 %    MCV 91 80 - 100 fL    MCH 28.9 26.0 - 34.0 pg    MCHC 31.9 (L) 32.0 - 36.0 g/dL    RDW 21.0 (H) 11.5 - 14.5 %    Platelets 307 150 - 450 x10*3/uL   Comprehensive Metabolic Panel   Result Value Ref Range    Glucose 102 (H) 74 - 99 mg/dL    Sodium 133 (L) 136 - 145 mmol/L    Potassium 3.9 3.5 - 5.3 mmol/L    Chloride 104 98 - 107 mmol/L    Bicarbonate 21 21 - 32 mmol/L    Anion Gap 12 10 - 20 mmol/L    Urea Nitrogen 44 (H) 6 - 23 mg/dL    Creatinine 2.77 (H) 0.50 - 1.30 mg/dL    eGFR 24 (L) >60 mL/min/1.73m*2    Calcium 7.9 (L) 8.6 - 10.3 mg/dL    Albumin 2.9 (L) 3.4 - 5.0 g/dL    Alkaline Phosphatase 49 33 - 136 U/L    Total Protein 5.1 (L) 6.4 - 8.2 g/dL    AST 17 9 - 39 U/L    Bilirubin, Total 0.9 0.0 - 1.2 mg/dL    ALT 17 10 - 52 U/L   Protime-INR   Result Value Ref Range    Protime 15.8 (H) 9.8 - 12.8 seconds    INR 1.4 (H) 0.9 - 1.1   Fibrinogen   Result Value Ref Range    Fibrinogen 332 200 - 400 mg/dL   Magnesium   Result Value Ref Range    Magnesium 2.35 1.60 - 2.40 mg/dL   Prepare RBC: 1 Units   Result Value Ref Range    PRODUCT CODE D3609I10     Unit Number [id number]     Unit ABO A     Unit RH POS     XM INTEP COMP     Dispense Status TR     Blood Expiration Date 10/15/2024  11:59:00 PM EDT     PRODUCT BLOOD TYPE 6200     UNIT VOLUME 350    POCT GLUCOSE   Result Value Ref Range    POCT Glucose 121 (H) 74 - 99 mg/dL   CBC   Result Value Ref Range    WBC 13.0 (H) 4.4 - 11.3 x10*3/uL    nRBC 0.4 (H) 0.0 - 0.0 /100 WBCs    RBC 2.60 (L) 4.50 - 5.90 x10*6/uL    Hemoglobin 7.7 (L) 13.5 - 17.5 g/dL    Hematocrit 23.8 (L) 41.0 - 52.0 %    MCV 92 80 - 100 fL    MCH 29.6 26.0 - 34.0 pg    MCHC 32.4 32.0 - 36.0 g/dL    RDW 21.1 (H) 11.5 - 14.5 %    Platelets 338 150 - 450 x10*3/uL   POCT GLUCOSE   Result Value Ref Range    POCT Glucose 118 (H) 74 - 99 mg/dL   POCT GLUCOSE   Result Value Ref Range    POCT Glucose 117 (H) 74 - 99 mg/dL           This patient has a urinary catheter   Reason for the urinary catheter remaining today?                 Assessment/Plan   Assessment & Plan  Pneumoperitoneum    Patient bleeding seems to be more or less stabilized and certainly no bleeding from the rectum.  No indication for any endoscopic evaluation.   Will sign of the case for the time being and will be back if requested to see again       I spent 30minutes in the professional and overall care of this patient.      Pavel Meléndez MD

## 2024-09-27 NOTE — PROGRESS NOTES
General Surgery Attending Note     My Acute Care Surgery KATHY (Advanced Practice Provider) evaluated this patient today.  Please see that documentation for details.     I saw the patient with the KATHY today, and personally evaluated and examined the patient.  My findings are consistent with those of the KATHY.          Impression:       Hospital day #5  Postoperative day #12     Patient had an uneventful night.  He has no abdominal pain.  He is tolerating full liquid diet.  He had a semisolid nonbloody bowel movement this morning. No further blood per rectum.       The patient remains hemodynamically stable.  On exam, the patient looks comfortable.  His lungs are clear.  His heart is sinus rhythm.  His abdomen is soft and nondistended nontender with positive bowel sounds.  Resolving abdominal wall ecchymosis.    H&H is pending.  BUN and creatinine improved to 34 and 2.1.  INR 1.4.     Overall impression is intra-abdominal and intraluminal hemorrhage status post laparoscopic segmental colectomy with patient on DOAC (Eliquis) for paroxysmal atrial fibrillation.  Clinically improved status post Kcentra, 2 FFP and 4 PRBCs.  Patient remains clinically and hemodynamically stable.     NERI is improving.     Plan:          Advance to regular diet  Out of bed to chair  Continue to hold anticoagulation  Discontinue cefepime and Flagyl  DC [place]  Continue to avoid nephrotoxins and try to avoid future IV contrast secondary to the NERI  Occupational & physical therapy  DC staples  Likely transfer to stepdown today

## 2024-09-27 NOTE — PROGRESS NOTES
Subjective Data:  Remains in sinus rhythm.  Diet has been advanced and now is taking pills.  Objective Data:  Last Recorded Vitals:  Vitals:    09/27/24 0753 09/27/24 0800 09/27/24 0900 09/27/24 1013   BP:  119/57  121/57   BP Location:       Patient Position:       Pulse:  71  78   Resp:  20  (!) 29   Temp:   36.2 °C (97.2 °F)    TempSrc:   Temporal    SpO2:  100%  100%   Weight: 95 kg (209 lb 8 oz)      Height:           Last Labs:  CBC - 9/27/2024:  5:31 AM  9.2 6.9 339    22.3      CMP - 9/27/2024:  5:31 AM  7.9 5.0 18 --- 1.0   5.3 2.8 16 49      PTT - 9/24/2024:  7:31 AM  1.4   15.5 29     TROPHS   Date/Time Value Ref Range Status   09/23/2024 08:00 PM 17 0 - 20 ng/L Final   09/23/2024 05:06 PM 18 0 - 20 ng/L Final   09/12/2024 10:37 AM 6,106 0 - 20 ng/L Final     BNP   Date/Time Value Ref Range Status   09/23/2024 05:06  0 - 99 pg/mL Final     HGBA1C   Date/Time Value Ref Range Status   08/02/2024 05:14 AM 5.4 see below % Final     LDLCALC   Date/Time Value Ref Range Status   09/12/2024 10:37  <=99 mg/dL Final     Comment:                                 Near   Borderline      AGE      Desirable  Optimal    High     High     Very High     0-19 Y     0 - 109     ---    110-129   >/= 130     ----    20-24 Y     0 - 119     ---    120-159   >/= 160     ----      >24 Y     0 -  99   100-129  130-159   160-189     >/=190     03/28/2024 11:11  <=99 mg/dL Final     Comment:                                 Near   Borderline      AGE      Desirable  Optimal    High     High     Very High     0-19 Y     0 - 109     ---    110-129   >/= 130     ----    20-24 Y     0 - 119     ---    120-159   >/= 160     ----      >24 Y     0 -  99   100-129  130-159   160-189     >/=190       VLDL   Date/Time Value Ref Range Status   09/12/2024 10:37 AM 9 0 - 40 mg/dL Final   03/28/2024 11:11 AM 15 0 - 40 mg/dL Final      Last I/O:  I/O last 3 completed shifts:  In: 3209.6 (33.8 mL/kg) [P.O.:875; I.V.:1334.6 (14  mL/kg); Blood:300; IV Piggyback:700]  Out: 2315 (24.4 mL/kg) [Urine:2315 (0.7 mL/kg/hr)]  Weight: 95 kg     Past Cardiology Tests (Last 3 Years):  EKG:  Electrocardiogram, 12-lead PRN ACS symptoms 09/24/2024      ECG 12 Lead 09/23/2024      ECG 12 lead 09/14/2024      ECG 12 lead 09/14/2024      ECG 12 lead 09/12/2024      ECG 12 lead 08/26/2024      ECG 12 lead 08/01/2024      ECG 12 lead 08/01/2024    Echo:  Transthoracic Echo (TTE) Complete 09/12/2024      Transthoracic Echo (TTE) Complete 08/02/2024    Ejection Fractions:  EF   Date/Time Value Ref Range Status   09/12/2024 11:41 AM 53 %    08/02/2024 10:35 AM 61 %      Cath:  Cardiac Catheterization Procedure 09/12/2024    Stress Test:  No results found for this or any previous visit from the past 1095 days.    Cardiac Imaging:  No results found for this or any previous visit from the past 1095 days.      Inpatient Medications:  Scheduled medications   Medication Dose Route Frequency    amiodarone  200 mg oral BID    [Held by provider] apixaban  5 mg oral BID    [Held by provider] aspirin  81 mg oral Daily    atorvastatin  80 mg oral Nightly    [Held by provider] gabapentin  300 mg oral TID    metoprolol succinate XL  50 mg oral Daily    oxygen   inhalation Continuous - Inhalation    pantoprazole  40 mg intravenous Daily     PRN medications   Medication    acetaminophen    Or    acetaminophen    Or    acetaminophen    dextrose    dextrose    glucagon    glucagon    ondansetron    sodium chloride     Continuous Medications   Medication Dose Last Rate       Physical Exam:  Constitutional:       Appearance: Normal appearance.   HENT:      Head: Normocephalic and atraumatic.      Mouth/Throat:      Mouth: Mucous membranes are moist.   Eyes:      Extraocular Movements: Extraocular movements intact.      Pupils: Pupils are equal, round, and reactive to light.   Cardiovascular:      Rate and Rhythm: Normal rate and regular rhythm.   Pulmonary:      Effort: Pulmonary  effort is normal.      Breath sounds: Normal breath sounds and air entry.   Abdominal:      General: Abdomen is flat. Bowel sounds are normal.      Palpations: Abdomen is soft.      Tenderness: There is abdominal tenderness.   Musculoskeletal:         General: Normal range of motion.      Cervical back: Normal range of motion and neck supple.   Skin:     General: Skin is warm and dry.   Neurological:      General: No focal deficit present.      Mental Status: He is alert and oriented to person, place, and time. Mental status is at baseline.         Assessment/Plan  #Intra-abdominal hemorrhage post op s/p K-centra, FFP and PRBCs  #Hemorrhagic shock - improved after resuscitation  #Afib on DOAC with bleed s/p K-centra   #NERI due to bleed - hypovolemic/hypotensive ATN likely  #Hypocalcemia due to blood products  #Leukocytosis - likely reactive from bleed vs intra-abdominal infection        Patient was admitted recently with non-ST elevation MI and known colon cancer.  Eventually underwent laparoscopic partial colon resection and was cleared for anticoagulation by surgery team.  Considering his atrial fibrillation and non-ST elevation MI episode that could be embolic in origin, he was placed on Eliquis.  Unfortunately he now presented with hemorrhagic shock transfusion.  Currently in sinus rhythm and denies having chest pain or shortness of breath.  Severe NERI hemorrhagic shock.     He has been placed on n.p.o. by surgery team medications.     Obviously at this point he cannot provide him any anticoagulation evaluation and stabilization.     I am concerned about him going back to atrial fibrillation with RVR medications including amiodarone and beta-blocker.  Therefore, we will initiate IV amiodarone sinus rhythm.     His blood pressure is elevated, we can consider IV labetalol for now until he is cleared to receive his oral medications.     Will continue to support him closely in ICU and follow closely with surgery and  ICU teams.     It appears that he is planned to receive his CT scan to see if exploratory surgical intervention will be needed.     I will follow-up with him tomorrow.     September 26, 2024  Remaining in sinus rhythm on IV amiodarone.  Surgical team has decided for conservative management for now and no immediate reoperation.  Will continue to hold any anticoagulation.  Will continue with IV amiodarone and when okay to take p.o. medications, will resume his amiodarone, and statin, metoprolol succinate.    September 27, 2024  Remains in sinus rhythm.  Hemoglobin is low but is stable.  Surgery and GI are following.  Will stop IV amiodarone and resume amiodarone and metoprolol p.o.  No anticoagulation for now.  Will need follow-up in cardiology clinic 2 weeks after discharge for further care.  Will sign off for now and please let us know if there is any changes in his clinical condition from cardiac standpoint.      Thank you for the consult. We will sign off. Please contact cardiology consult service with any additional questions.         Bob Rosario MD, PhD, FACC, [facility]  Interventional Cardiology, [facility]  Associate Professor of Medicine, [facility]  Office: [phone]

## 2024-09-27 NOTE — CARE PLAN
Problem: Nutrition  Goal: Oral intake greater 75%  Outcome: Progressing  Goal: Adequate PO fluid intake  Outcome: Progressing  Goal: Nutrition support is meeting 75% of nutrient needs  Outcome: Progressing  Goal: BG  mg/dL  Outcome: Progressing  Goal: Lab values WNL  Outcome: Progressing  Goal: Electrolytes WNL  Outcome: Progressing  Goal: Promote healing  Outcome: Progressing  Goal: Reduce weight from edema/fluid  Outcome: Progressing     Problem: Fall/Injury  Goal: Use assistive devices by end of the shift  Outcome: Progressing  Goal: Pace activities to prevent fatigue by end of the shift  Outcome: Progressing  Goal: Not fall by end of shift  Outcome: Progressing  Goal: Be free from injury by end of the shift  Outcome: Progressing  Goal: Verbalize understanding of personal risk factors for fall in the hospital  Outcome: Progressing  Goal: Verbalize understanding of risk factor reduction measures to prevent injury from fall in the home  Outcome: Progressing     Problem: Pain - Adult  Goal: Verbalizes/displays adequate comfort level or baseline comfort level  Outcome: Progressing     Problem: Safety - Adult  Goal: Free from fall injury  Outcome: Progressing     Problem: Discharge Planning  Goal: Discharge to home or other facility with appropriate resources  Outcome: Progressing     Problem: Chronic Conditions and Co-morbidities  Goal: Patient's chronic conditions and co-morbidity symptoms are monitored and maintained or improved  Outcome: Progressing     Problem: Pain  Goal: Takes deep breaths with improved pain control throughout the shift  Outcome: Progressing  Goal: Turns in bed with improved pain control throughout the shift  Outcome: Progressing     Problem: Skin  Goal: Prevent/minimize sheer/friction injuries  Outcome: Progressing  Flowsheets (Taken 9/27/2024 4420)  Prevent/minimize sheer/friction injuries:   HOB 30 degrees or less   Increase activity/out of bed for meals  Goal: Promote/optimize  nutrition  Outcome: Progressing  Flowsheets (Taken 9/27/2024 0647)  Promote/optimize nutrition:   Consume > 50% meals/supplements   Monitor/record intake including meals   Offer water/supplements/favorite foods  Goal: Promote skin healing  Outcome: Progressing  Flowsheets (Taken 9/27/2024 0647)  Promote skin healing:   Assess skin/pad under line(s)/device(s)   Turn/reposition every 2 hours/use positioning/transfer devices     The patient's goals for the shift include to feel better    The clinical goals for the shift include Remain hemodynamically stable throughout this shift

## 2024-09-27 NOTE — PROGRESS NOTES
"Krish Batista is a 69 y.o. male on day 4 of admission presenting with Pneumoperitoneum.    Subjective   Patient states he is feeling well today. He had a nonbloody BM and is passing gas. He states he is tolerating his diet well and denies N/V. Hemoglobin downtrending to 6.9 this morning.  1 unit of PRBC ordered along with 1 unite of FFP and Vitamin K. Patient will stay in ICU until his H&H stabilizes, patient verbalizes understanding.    Objective     Physical Exam  Constitutional:       General: He is not in acute distress.     Appearance: He is not toxic-appearing.   HENT:      Mouth/Throat:      Mouth: Mucous membranes are moist.   Eyes:      General: No scleral icterus.     Extraocular Movements: Extraocular movements intact.   Cardiovascular:      Rate and Rhythm: Normal rate and regular rhythm.      Heart sounds: Normal heart sounds.   Pulmonary:      Effort: Pulmonary effort is normal. No respiratory distress.      Breath sounds: Normal breath sounds.   Abdominal:      General: Bowel sounds are normal. There is no distension.      Palpations: Abdomen is soft.      Tenderness: There is no abdominal tenderness.      Comments: Extensive ecchymosis over most of the abdomen and bilateral flanks, tracking to mid-back, L>R. Firmness at areas of staples consistent with ecchymosis/hematoma. Improving.    Genitourinary:     Comments: Dark purple scrotal ecchymosis present on b/l testicles. No penile edema or ecchymosis noted  Skin:     General: Skin is warm and dry.      Coloration: Skin is not jaundiced.   Neurological:      General: No focal deficit present.      Mental Status: He is alert and oriented to person, place, and time.   Psychiatric:         Mood and Affect: Mood normal.         Behavior: Behavior normal.       Last Recorded Vitals  Blood pressure 125/62, pulse 67, temperature 36.8 °C (98.2 °F), temperature source Temporal, resp. rate 16, height 1.88 m (6' 2\"), weight 95 kg (209 lb 8 oz), SpO2 " 100%.  Intake/Output last 3 Shifts:  I/O last 3 completed shifts:  In: 3209.6 (33.8 mL/kg) [P.O.:875; I.V.:1334.6 (14 mL/kg); Blood:300; IV Piggyback:700]  Out: 2315 (24.4 mL/kg) [Urine:2315 (0.7 mL/kg/hr)]  Weight: 95 kg     Relevant Results  Results for orders placed or performed during the hospital encounter of 09/23/24 (from the past 24 hour(s))   CBC   Result Value Ref Range    WBC 13.0 (H) 4.4 - 11.3 x10*3/uL    nRBC 0.4 (H) 0.0 - 0.0 /100 WBCs    RBC 2.60 (L) 4.50 - 5.90 x10*6/uL    Hemoglobin 7.7 (L) 13.5 - 17.5 g/dL    Hematocrit 23.8 (L) 41.0 - 52.0 %    MCV 92 80 - 100 fL    MCH 29.6 26.0 - 34.0 pg    MCHC 32.4 32.0 - 36.0 g/dL    RDW 21.1 (H) 11.5 - 14.5 %    Platelets 338 150 - 450 x10*3/uL   POCT GLUCOSE   Result Value Ref Range    POCT Glucose 118 (H) 74 - 99 mg/dL   POCT GLUCOSE   Result Value Ref Range    POCT Glucose 117 (H) 74 - 99 mg/dL   Comprehensive Metabolic Panel   Result Value Ref Range    Glucose 103 (H) 74 - 99 mg/dL    Sodium 135 (L) 136 - 145 mmol/L    Potassium 4.0 3.5 - 5.3 mmol/L    Chloride 106 98 - 107 mmol/L    Bicarbonate 23 21 - 32 mmol/L    Anion Gap 10 10 - 20 mmol/L    Urea Nitrogen 34 (H) 6 - 23 mg/dL    Creatinine 2.10 (H) 0.50 - 1.30 mg/dL    eGFR 33 (L) >60 mL/min/1.73m*2    Calcium 7.9 (L) 8.6 - 10.3 mg/dL    Albumin 2.8 (L) 3.4 - 5.0 g/dL    Alkaline Phosphatase 49 33 - 136 U/L    Total Protein 5.0 (L) 6.4 - 8.2 g/dL    AST 18 9 - 39 U/L    Bilirubin, Total 1.0 0.0 - 1.2 mg/dL    ALT 16 10 - 52 U/L   Magnesium   Result Value Ref Range    Magnesium 2.26 1.60 - 2.40 mg/dL   Protime-INR   Result Value Ref Range    Protime 15.5 (H) 9.8 - 12.8 seconds    INR 1.4 (H) 0.9 - 1.1   Fibrinogen   Result Value Ref Range    Fibrinogen 354 200 - 400 mg/dL   CBC   Result Value Ref Range    WBC 9.2 4.4 - 11.3 x10*3/uL    nRBC 0.4 (H) 0.0 - 0.0 /100 WBCs    RBC 2.38 (L) 4.50 - 5.90 x10*6/uL    Hemoglobin 6.9 (L) 13.5 - 17.5 g/dL    Hematocrit 22.3 (L) 41.0 - 52.0 %    MCV 94 80 - 100 fL     MCH 29.0 26.0 - 34.0 pg    MCHC 30.9 (L) 32.0 - 36.0 g/dL    RDW 21.4 (H) 11.5 - 14.5 %    Platelets 339 150 - 450 x10*3/uL   POCT GLUCOSE   Result Value Ref Range    POCT Glucose 107 (H) 74 - 99 mg/dL   Prepare RBC: 1 Units   Result Value Ref Range    PRODUCT CODE J7809C07     Unit Number [id number]     Unit ABO A     Unit RH POS     XM INTEP COMP     Dispense Status IS     Blood Expiration Date 10/18/2024 11:59:00 PM EDT     PRODUCT BLOOD TYPE 6200     UNIT VOLUME 350    Type and screen   Result Value Ref Range    ABO TYPE AB     Rh TYPE POS     ANTIBODY SCREEN NEG    POCT GLUCOSE   Result Value Ref Range    POCT Glucose 132 (H) 74 - 99 mg/dL           Assessment/Plan   Krish Batista  is a 70 yo male with PMH significant for iron deficiency anemia, colon cancer w/ LGIB s/p segmental colectomy (9/15 with Dr. Clark), and Afib with RVR who presented to [facility] ED on 9/23 for rectal bleeding and weakness. Pt reports 4 days of rectal bleeding prior to admission.     Pt was recently admitted for NSTEMI and Afib w/ RVR and underwent segmental colectomy for colon cancer. Post-op course complicated by acute blood loss anemia requiring 2u pRBCs. He was noted to have significant ecchymosis of his scrotum which likely represented old surgical blood tracking down and pooling in the scrotum. Hgb stabilized and he was started on eliquis BID on the day of discharge.       Initial Hgb was similar to Hgb at time of discharge, but he was hypotensive and lightheaded, so he was given 1u pRBCs, and 2u FFP +Kcentra for eliquis reversal in the ED. Hgb dropped to 5.6 after IVF resuscitation, but BP improved. He was given another 2u pRBCs in the ICU, but BP is again down trending and pt is now nauseous with a few episodes of emesis.     Procedures/Significant events:  9/15: Laparoscopic segmental colectomy with mobilization of splenic flexure  9/16 - 1u pRBC  9/17 - 1u pRBC  9/23 - 1u pRBC, 2uFFP, Kcentra  9/24 - 1u pRBC  9/25 -  1 u pRBC  9/26 - 1 u pRBC  9/27 - 1 u PRBC, 1u FFP    Assessment and Plan:    #S/p segmental colectomy  #Post-op bleeding on AC  #Hemorrhagic shock  - CT with no evidence of anastomotic leak, decreasing pneumoperitoneum, increasing hemoperitoneum.     > Potential small venous bleed, will continue to monitor at this time.  No immediate surgical intervention is indicated  - Transfuse for Hgb <7 or hemodynamic instability  - Regular diet, oral nutritional supplements  - Discontinue fluids  - Trend CBC Q12  - No DVT chemoprophylaxis, SCDs only  - Continue to monitor closely  - Check coags tomorrow    #Hyperbilirubinemia, improving  - D/t extensive abdominal bleeding  - No concern for hepatobiliary cause   - Continue to monitor    #H/o Afib w/ RVR  - Currently in NSR  - Hold AC  - May need prolonged time off AC to ensure no repeat bleed  - Cardiology on consult, appreciate rec's     > on IV amiodarone due to concern for A-fib with RVR     > Consider IV labetalol for HTN    Dispo: Continue care in ICU until hemoglobin stabilizes     General surgery will follow closely and will take over as primary care when patient is ready to leave ICU.    Patient seen and discussed with attending surgeon, Dr. Amber Monroe PA-C

## 2024-09-27 NOTE — CONSULTS
"Nutrition Follow Up Assessment:   Nutrition Assessment         Patient is a 69 y.o. male presenting with weakness      Nutrition History:  Food and Nutrient History: Pt was advanced to a regular diet today.  He does not like his ensure clear but will try a magic cup.  Pt realizes he needs to eat well.  Food Allergies/Intolerances:  None  GI Symptoms: None  Oral Problems: None       Anthropometrics:  Height: 188 cm (6' 2\")   Weight: 95 kg (209 lb 8 oz)   BMI (Calculated): 26.89  IBW/kg (Dietitian Calculated): 83 kg  Percent of IBW: 110 %       Weight History:     Weight Change %:       Nutrition Focused Physical Exam Findings:    Subcutaneous Fat Loss:      Muscle Wasting:     Edema:     Physical Findings:       Nutrition Significant Labs:  BMP Trend:   Results from last 7 days   Lab Units 09/27/24  0531 09/26/24  0606 09/25/24  0613 09/24/24  0437   GLUCOSE mg/dL 103* 102* 127* 155*   CALCIUM mg/dL 7.9* 7.9* 8.0* 7.3*   SODIUM mmol/L 135* 133* 135* 136   POTASSIUM mmol/L 4.0 3.9 5.1 4.9   CO2 mmol/L 23 21 21 21   CHLORIDE mmol/L 106 104 103 106   BUN mg/dL 34* 44* 44* 30*   CREATININE mg/dL 2.10* 2.77* 3.37* 1.61*        Nutrition Specific Medications:  Eliquis; lipitor; toprol XL; protonix    I/O:   Last BM Date: 09/27/24; Stool Appearance: Loose (09/27/24 0600)    Dietary Orders (From admission, onward)       Start     Ordered    09/27/24 1248  Oral nutritional supplements  Until discontinued        Comments: APPLE ONLY   Question Answer Comment   Deliver with Lunch    Deliver with Dinner    Select supplement: Magic Cup        09/27/24 1247    09/27/24 0705  Adult diet Regular  Diet effective now        Question:  Diet type  Answer:  Regular    09/27/24 0704                     Estimated Needs:      Method for Estimating Needs: [id number]  30-35 kirk kg of IBW with post colectomy and possible active cancer     Method for Estimating Needs:    1-1.2 gm kg of IBW  due to abnormal renal panel.  ( Increase when " renal panel improves, for healing needs and possible active cancer)     Method for Estimating Needs: [id number]   20-30 ml kg of iBW as medically indicated        Nutrition Diagnosis   Malnutrition Diagnosis  Patient has Malnutrition Diagnosis: Yes  Diagnosis Status: New  Malnutrition Diagnosis: Severe malnutrition related to acute disease or injury  As Evidenced by: <50% energy intake compared to estimated needs for > 5 days.  >5% wt loss X 1 month  Additional Assessment Information: Wt is down 13 lbs   6%.  Pt has a  colectomy for colon cancer on 9/15    Nutrition Diagnosis  Patient has Nutrition Diagnosis: Yes  Diagnosis Status (1): Ongoing  Nutrition Diagnosis 1: Increased nutrient needs  Related to (1): physiological causes  As Evidenced by (1): wound healing needs and possible active cancer  Additional Nutrition Diagnosis: Diagnosis 2  Diagnosis Status (2): Ongoing  Nutrition Diagnosis 2: Altered GI function  Related to (2): compromised exocrine function  As Evidenced by (2): abnormal renal panel       Nutrition Interventions/Recommendations         Nutrition Prescription:  Individualized Nutrition Prescription Provided for : Continue regular diet to encourage intake,  changing ensure clear to magic cup to encourage intake.  Pt does not like ensure or creamy supplements per pt.        Nutrition Interventions:   Interventions: Meals and snacks, Medical food supplement  Goal: >75% of meals  Medical Food Supplement: Commercial food    Collaboration and Referral of Nutrition Care: Collaboration by nutrition professional with other providers    Nutrition Education:      Pt knows he needs to eat    Nutrition Monitoring and Evaluation   Food/Nutrient Related History Monitoring  Monitoring and Evaluation Plan: Energy intake, Fluid intake, Amount of food  Criteria: >75% of energy needs  Criteria: adequate fluid intake without fluid overload  Criteria: >75% of meals    Body Composition/Growth/Weight History  Monitoring  and Evaluation Plan: Weight    Biochemical Data, Medical Tests and Procedures  Monitoring and Evaluation Plan: Electrolyte/renal panel, Glucose/endocrine profile  Criteria: improved BMP  Criteria: glucose within desired range              Time Spent (min): 30 minutes

## 2024-09-28 LAB
ALBUMIN SERPL BCP-MCNC: 3 G/DL (ref 3.4–5)
ALP SERPL-CCNC: 52 U/L (ref 33–136)
ALT SERPL W P-5'-P-CCNC: 15 U/L (ref 10–52)
ANION GAP SERPL CALC-SCNC: 11 MMOL/L (ref 10–20)
AST SERPL W P-5'-P-CCNC: 18 U/L (ref 9–39)
BACTERIA BLD CULT: NORMAL
BACTERIA BLD CULT: NORMAL
BILIRUB SERPL-MCNC: 1.3 MG/DL (ref 0–1.2)
BUN SERPL-MCNC: 26 MG/DL (ref 6–23)
CALCIUM SERPL-MCNC: 7.7 MG/DL (ref 8.6–10.3)
CHLORIDE SERPL-SCNC: 104 MMOL/L (ref 98–107)
CO2 SERPL-SCNC: 24 MMOL/L (ref 21–32)
CREAT SERPL-MCNC: 1.62 MG/DL (ref 0.5–1.3)
EGFRCR SERPLBLD CKD-EPI 2021: 45 ML/MIN/1.73M*2
ERYTHROCYTE [DISTWIDTH] IN BLOOD BY AUTOMATED COUNT: 21.5 % (ref 11.5–14.5)
FIBRINOGEN PPP-MCNC: 400 MG/DL (ref 200–400)
GLUCOSE BLD MANUAL STRIP-MCNC: 106 MG/DL (ref 74–99)
GLUCOSE BLD MANUAL STRIP-MCNC: 107 MG/DL (ref 74–99)
GLUCOSE BLD MANUAL STRIP-MCNC: 114 MG/DL (ref 74–99)
GLUCOSE BLD MANUAL STRIP-MCNC: 99 MG/DL (ref 74–99)
GLUCOSE SERPL-MCNC: 103 MG/DL (ref 74–99)
HCT VFR BLD AUTO: 24.9 % (ref 41–52)
HCT VFR BLD AUTO: 29.4 % (ref 41–52)
HGB BLD-MCNC: 8 G/DL (ref 13.5–17.5)
HGB BLD-MCNC: 9.3 G/DL (ref 13.5–17.5)
INR PPP: 1.3 (ref 0.9–1.1)
MAGNESIUM SERPL-MCNC: 2.01 MG/DL (ref 1.6–2.4)
MCH RBC QN AUTO: 29.2 PG (ref 26–34)
MCHC RBC AUTO-ENTMCNC: 32.1 G/DL (ref 32–36)
MCV RBC AUTO: 91 FL (ref 80–100)
NRBC BLD-RTO: 0.3 /100 WBCS (ref 0–0)
PLATELET # BLD AUTO: 361 X10*3/UL (ref 150–450)
POTASSIUM SERPL-SCNC: 3.6 MMOL/L (ref 3.5–5.3)
PROT SERPL-MCNC: 5.2 G/DL (ref 6.4–8.2)
PROTHROMBIN TIME: 14.5 SECONDS (ref 9.8–12.8)
RBC # BLD AUTO: 2.74 X10*6/UL (ref 4.5–5.9)
SODIUM SERPL-SCNC: 135 MMOL/L (ref 136–145)
WBC # BLD AUTO: 10.3 X10*3/UL (ref 4.4–11.3)

## 2024-09-28 PROCEDURE — 85610 PROTHROMBIN TIME: CPT

## 2024-09-28 PROCEDURE — 2060000001 HC INTERMEDIATE ICU ROOM DAILY

## 2024-09-28 PROCEDURE — 82947 ASSAY GLUCOSE BLOOD QUANT: CPT

## 2024-09-28 PROCEDURE — 36415 COLL VENOUS BLD VENIPUNCTURE: CPT | Performed by: INTERNAL MEDICINE

## 2024-09-28 PROCEDURE — 2500000005 HC RX 250 GENERAL PHARMACY W/O HCPCS: Performed by: INTERNAL MEDICINE

## 2024-09-28 PROCEDURE — 2500000002 HC RX 250 W HCPCS SELF ADMINISTERED DRUGS (ALT 637 FOR MEDICARE OP, ALT 636 FOR OP/ED)

## 2024-09-28 PROCEDURE — 83735 ASSAY OF MAGNESIUM: CPT

## 2024-09-28 PROCEDURE — 36415 COLL VENOUS BLD VENIPUNCTURE: CPT

## 2024-09-28 PROCEDURE — 85384 FIBRINOGEN ACTIVITY: CPT

## 2024-09-28 PROCEDURE — 99232 SBSQ HOSP IP/OBS MODERATE 35: CPT

## 2024-09-28 PROCEDURE — 80053 COMPREHEN METABOLIC PANEL: CPT

## 2024-09-28 PROCEDURE — 85027 COMPLETE CBC AUTOMATED: CPT

## 2024-09-28 PROCEDURE — 99233 SBSQ HOSP IP/OBS HIGH 50: CPT

## 2024-09-28 PROCEDURE — 2500000001 HC RX 250 WO HCPCS SELF ADMINISTERED DRUGS (ALT 637 FOR MEDICARE OP)

## 2024-09-28 PROCEDURE — 2500000004 HC RX 250 GENERAL PHARMACY W/ HCPCS (ALT 636 FOR OP/ED): Performed by: INTERNAL MEDICINE

## 2024-09-28 PROCEDURE — 85014 HEMATOCRIT: CPT | Performed by: INTERNAL MEDICINE

## 2024-09-28 PROCEDURE — 99222 1ST HOSP IP/OBS MODERATE 55: CPT | Performed by: INTERNAL MEDICINE

## 2024-09-28 RX ORDER — PANTOPRAZOLE SODIUM 40 MG/1
40 TABLET, DELAYED RELEASE ORAL
Status: DISCONTINUED | OUTPATIENT
Start: 2024-09-29 | End: 2024-09-29 | Stop reason: HOSPADM

## 2024-09-28 ASSESSMENT — PAIN - FUNCTIONAL ASSESSMENT
PAIN_FUNCTIONAL_ASSESSMENT: 0-10

## 2024-09-28 ASSESSMENT — PAIN SCALES - GENERAL
PAINLEVEL_OUTOF10: 1
PAINLEVEL_OUTOF10: 0 - NO PAIN

## 2024-09-28 NOTE — PROGRESS NOTES
"Critical Care Progress Note    Patient Name: Krish Batista   YOB: 1955    Subjective:  Patient seen evaluated morning.  No active signs of bleeding.  Hemoglobin appears stable this time.  Creatinine is continue to improve.    Objective:    /61   Pulse 69   Temp 36.9 °C (98.4 °F) (Temporal)   Resp 14   Ht 1.88 m (6' 2\")   Wt 95.5 kg (210 lb 8.6 oz)   SpO2 97%   BMI 27.03 kg/m²     Physical Exam:  GEN: Alert, AO x 3.  HEENT: PERRL, EOMI, MMM OP clear, TMs clear bilaterally  NECK: supple, no cervical LAD, no carotid bruits  CV: S1, S2, regular, no murmur  PULM: CTAB  ABD: nontender, ecchymoses improved  EXT: bilateral edema LE  NEURO: no gross focal deficits  PSYCH: appropriate affect     Assessment/Plan:  Krish Batista is a 68yo man with PMH of recently diagnosed colon cancer, PIERRE, pAF, CAD (recent hospital admission for NSTEMI) who underwent laparoscopic segmental colectomy on 9/15 with course complicated by Afib with RVR. Patient had been initiated on Eliquis at time of discharge 9/19. Patient admitted to ICU for concern for hemorrhagic shock on 9/23.     Neuro:  - Patient orientated to person place and time  - Monitor for ICU delirium  - Patient requires no sedation or analgesia  - Maintain sleep hygiene  - Continue with PRN dilaudid    Cardiovascular  #Risk for hemorrhagic shock in setting of intra-abdominal hemorrhage  #pAfib  #Hx of NSTEMI  #HTN  Echo 9/12 EF 50-55%  - Hold beta-blocker and antihypertensives due to hypotension  - Monitor Hgb  - Cardiology following, will transition to PO amio, restart atorvastatin    Pulmonary:  - No acute issues, patient on RA    GI:  #Intrabdominal hemorrhage, stable  #Possible Anastomotic Bleed  #Concern for retroperitoneal bleed  - Surgery following, likely small venous bleed. No surgical intervention at this time  - Will advance diet to Regular    Renal:   #NERI, likely pre-renal vs ATN - improving  - Monitor urine output  - Will discontinue " escobar  - Patient was given fluid bolus and lasix 9/24 evening with some improvement in urination.    Endocrine:  - No acute issues, continue to monitor    Heme/Onc:  #Acute Blood Loss Anemia on Eliquis  #Leukocytosis, likely reactive  9/23: Given Kcentra/1 unit FFP/1 unit pRBC  9/24: Given 2 units pRBC  9/25: Given 1 units pRBC  9/26: Given 1 units pRBC  9/27: Given 1 unit pRBC  - Maintain fibrinogen > 150, plt > 50, Hgb > 7; CBC Q12  -9/28 stable H&H    ID:  #Concern for peritonitis, resolved  -No abx at this time    Skin/MSK:  #Abdominal Ecchymoses  #Superficial hematomas  - Continue to monitor     ICU CHECK LIST:   Antimicrobials: None  Oxygen: RA  Feeding: Regular  Fluids: None  Analgesia: PRN dilaudid  Sedation: None  Thromboprophylaxis: Held in setting of hemorrhage   Ulcer prophylaxis: PPI  Glycemic control: None  Bowel care: PRN  Indwelling catheters: None  Lines: PIV x2  Dispo: stable from transfer to SDU    Code Status: DNR/DNI     Luis Guadarrama DO, PhD  Internal Medicine PGY2

## 2024-09-28 NOTE — CARE PLAN
Problem: Nutrition  Goal: Less than 5 days NPO/clear liquids  9/28/2024 1253 by Ekaterina Fisher RN  Outcome: Progressing  9/28/2024 1252 by Ekaterina Fisher RN  Outcome: Progressing  Goal: Oral intake greater 75%  9/28/2024 1253 by Ekaterina Fisher RN  Outcome: Progressing  9/28/2024 1252 by Ekaterina Fisher, RN  Outcome: Progressing  Goal: Adequate PO fluid intake  9/28/2024 1253 by Ekaterina Fisher RN  Outcome: Progressing  9/28/2024 1252 by Ekaterina Fisher RN  Outcome: Progressing  Goal: Nutrition support goals are met within 48 hrs  9/28/2024 1253 by Ekaterina Fisher RN  Outcome: Progressing  9/28/2024 1252 by Ekaterina Fisher RN  Outcome: Progressing  Goal: Nutrition support is meeting 75% of nutrient needs  9/28/2024 1253 by Ekaterina Fisher RN  Outcome: Progressing  9/28/2024 1252 by Ekaterina Fisher RN  Outcome: Progressing  Goal: BG  mg/dL  9/28/2024 1253 by Ekaterina Fisher RN  Outcome: Progressing  9/28/2024 1252 by Ekaterina Fisher RN  Outcome: Progressing  Goal: Lab values WNL  9/28/2024 1253 by Ekaterina Fisher RN  Outcome: Progressing  9/28/2024 1252 by Ekaterina Fisher RN  Outcome: Progressing  Goal: Electrolytes WNL  9/28/2024 1253 by Ekaterina Fisher, HOLLY  Outcome: Progressing  9/28/2024 1252 by Ekaterina Fisher RN  Outcome: Progressing  Goal: Promote healing  9/28/2024 1253 by Ekaterina Fisher RN  Outcome: Progressing  9/28/2024 1252 by Ekaterina Fisher RN  Outcome: Progressing  Goal: Maintain stable weight  9/28/2024 1253 by Ekaterina Fisher, HOLLY  Outcome: Progressing  9/28/2024 1252 by Ekaterina Fisher RN  Outcome: Progressing  Goal: Reduce weight from edema/fluid  9/28/2024 1253 by Ekaterina Fisher, HOLLY  Outcome: Progressing  9/28/2024 1252 by Ekaterina Fisher RN  Outcome: Progressing     Problem: Fall/Injury  Goal: Use assistive devices by end of the shift  9/28/2024 1253 by Ekaterina  HOLLY Fisher  Outcome: Progressing  9/28/2024 1252 by Ekaterina Fisher RN  Outcome: Progressing  Goal: Pace activities to prevent fatigue by end of the shift  9/28/2024 1253 by Ekaterina Fisher RN  Outcome: Progressing  9/28/2024 1252 by Ekaterina Fisher RN  Outcome: Progressing  Goal: Not fall by end of shift  9/28/2024 1253 by Ekaterina Fisher RN  Outcome: Progressing  9/28/2024 1252 by Ekaterina Fisher RN  Outcome: Progressing  Goal: Be free from injury by end of the shift  9/28/2024 1253 by Ekaterina Fisher RN  Outcome: Progressing  9/28/2024 1252 by Ekaterina Fisher RN  Outcome: Progressing  Goal: Verbalize understanding of personal risk factors for fall in the hospital  9/28/2024 1253 by Ekaterina Fisher RN  Outcome: Progressing  9/28/2024 1252 by Ekaterina Fisher RN  Outcome: Progressing  Goal: Verbalize understanding of risk factor reduction measures to prevent injury from fall in the home  9/28/2024 1253 by Ekaterina Fisher RN  Outcome: Progressing  9/28/2024 1252 by Ekaterina Fisher RN  Outcome: Progressing     Problem: Pain - Adult  Goal: Verbalizes/displays adequate comfort level or baseline comfort level  9/28/2024 1253 by Ekaterina Fisher RN  Outcome: Progressing  9/28/2024 1252 by Ekaterina Fisher RN  Outcome: Progressing     Problem: Safety - Adult  Goal: Free from fall injury  9/28/2024 1253 by Ekaterina Fisher RN  Outcome: Progressing  9/28/2024 1252 by Ekaterina Fisher RN  Outcome: Progressing     Problem: Discharge Planning  Goal: Discharge to home or other facility with appropriate resources  9/28/2024 1253 by Ekaterina Fisher RN  Outcome: Progressing  9/28/2024 1252 by Ekaterina Fisher RN  Outcome: Progressing     Problem: Chronic Conditions and Co-morbidities  Goal: Patient's chronic conditions and co-morbidity symptoms are monitored and maintained or improved  9/28/2024 1253 by Ekaterina Fisher  RN  Outcome: Progressing  9/28/2024 1252 by Ekaterina Fisher RN  Outcome: Progressing     Problem: Pain  Goal: Takes deep breaths with improved pain control throughout the shift  9/28/2024 1253 by Ekaterina Fisher RN  Outcome: Progressing  9/28/2024 1252 by Ekaterina Fisher RN  Outcome: Progressing  Goal: Turns in bed with improved pain control throughout the shift  9/28/2024 1253 by Ekaterina Fisher RN  Outcome: Progressing  9/28/2024 1252 by Ekaterina Fisher RN  Outcome: Progressing  Goal: Walks with improved pain control throughout the shift  9/28/2024 1253 by Ekaterina Fisher RN  Outcome: Progressing  9/28/2024 1252 by Ekaterina Fisher RN  Outcome: Progressing  Goal: Performs ADL's with improved pain control throughout shift  9/28/2024 1253 by Ekaterina Fisher RN  Outcome: Progressing  9/28/2024 1252 by Ekaterina Fisher RN  Outcome: Progressing  Goal: Participates in PT with improved pain control throughout the shift  9/28/2024 1253 by Ekaterina Fisher RN  Outcome: Progressing  9/28/2024 1252 by Ekaterina Fisher RN  Outcome: Progressing     Problem: Skin  Goal: Participates in plan/prevention/treatment measures  9/28/2024 1253 by Ekaterina Fisher RN  Outcome: Progressing  Flowsheets (Taken 9/28/2024 1253)  Participates in plan/prevention/treatment measures:   Discuss with provider PT/OT consult   Elevate heels   Increase activity/out of bed for meals  9/28/2024 1252 by Ekaterina Fisher RN  Outcome: Progressing  Goal: Prevent/manage excess moisture  9/28/2024 1253 by Ekaterina Fisher RN  Outcome: Progressing  Flowsheets (Taken 9/28/2024 1253)  Prevent/manage excess moisture:   Cleanse incontinence/protect with barrier cream   Moisturize dry skin  9/28/2024 1252 by Ekaterina Fisher RN  Outcome: Progressing  Goal: Prevent/minimize sheer/friction injuries  9/28/2024 1253 by Ekaterina Fisher RN  Outcome: Progressing  Flowsheets (Taken  9/28/2024 0448 by Cindy Gupta RN)  Prevent/minimize sheer/friction injuries:   HOB 30 degrees or less   Increase activity/out of bed for meals  9/28/2024 1252 by Ekaterina Fisher RN  Outcome: Progressing  Goal: Promote/optimize nutrition  9/28/2024 1253 by Ekaterina Fisher RN  Outcome: Progressing  Flowsheets (Taken 9/28/2024 0448 by Cindy Gupta, HOLLY)  Promote/optimize nutrition:   Consume > 50% meals/supplements   Monitor/record intake including meals   Offer water/supplements/favorite foods  9/28/2024 1252 by Ekaterina Fisher RN  Outcome: Progressing  Goal: Promote skin healing  9/28/2024 1253 by Ekaterina Fisher RN  Outcome: Progressing  Flowsheets (Taken 9/28/2024 1253)  Promote skin healing:   Assess skin/pad under line(s)/device(s)   Protective dressings over bony prominences   Turn/reposition every 2 hours/use positioning/transfer devices  9/28/2024 1252 by Ekaterina Fisher RN  Outcome: Progressing   The patient's goals for the shift include to feel better    The clinical goals for the shift include Hemodynamic stability

## 2024-09-28 NOTE — CONSULTS
History Of Present Illness  Krish Batista is a 69 y.o. male presenting with with pmhx HTN, CAD, PIERRE, with recent Dx of Colon CA s/p recent segmental colectomy complicated by Afib RVR post op.  Heparin drip to Eliquis on DC.   Was doing well but presenting back to the ER 9/23 for feeling lightheaded.  Also noticed blood in bowels for 4 days described as bright red.   Increased dyspnea with exertion also noted as well as weakness and fatigue.     Patient noted to be hypotensive and received IVF, FFP and blood transfusion.   Transferred to ICU shortly after admission process.   Surgery and GI consulted.   No leukocytosis or signs of infection though patient has been receiving broad spectrum IV ABx.   Received Kcentra for eliquis reversal.    Cardiology following with HR maintained on IV Amiodarone switched to PO regimen with amio and metoprolol yesterday.   Appears to have received 6 units of blood thus far.    Last unit was given yesterday.  6.9 --> 9.6.   Down to 8.0 this AM.   No additional signs of bleeding.   Patient comfortable at this time.  Plan for transfer out of the ICU.  .              Past Medical History  He has a past medical history of Personal history of other diseases of the circulatory system.  As above      Surgical History  He has a past surgical history that includes Other surgical history (07/13/2021); Other surgical history (07/13/2021); Other surgical history (07/13/2021); and Cardiac catheterization (N/A, 9/12/2024).       Social History  He reports that he has never smoked. He has never used smokeless tobacco. He reports current alcohol use. He reports that he does not use drugs.      Family History  No family history on file.       Allergies  Amoxicillin      Review of Systems   Constitutional:  Negative for chills, fatigue and fever.   HENT:  Negative for congestion, ear pain, facial swelling, hearing loss and trouble swallowing.    Eyes:  Negative for photophobia, pain, redness and  "visual disturbance.   Respiratory:  Negative for cough, chest tightness, shortness of breath and wheezing.    Cardiovascular:  Negative for chest pain, palpitations and leg swelling.   Gastrointestinal:  Negative for abdominal distention, abdominal pain and nausea.   Endocrine: Negative for cold intolerance, heat intolerance, polydipsia and polyuria.   Genitourinary:  Negative for difficulty urinating, frequency and hematuria.   Skin:  Negative for color change, rash and wound.   Neurological:  Negative for dizziness, light-headedness, numbness and headaches.   Psychiatric/Behavioral:  Negative for agitation, confusion and suicidal ideas.        Physical Exam  GENERAL:   no distress, alert and cooperative  HEENT: Normal Inspection, Mucous membranes moist, No JVD, No Lymphadenopathy  CARDIOVASCULAR: RRR, no murmurs, 2+ equal pulses of the extremities, normal S1 and S 2  RESPIRATORY: Patent airways, CTAB, thorax symmetric, No significant wheezing, Rales or Rhonchi  ABDOMEN: Soft, Non-Tender, Normal Bowel Sounds, No Distention  SKIN: Warm and dry, no lesions, no rashes  EXTREMITIES: normal extremities, no significant cyanosis edema, contusions or wounds, no obsvious clubbing  NEURO: A&O x 3, CN II-XII grossly intact  PSYCH: Appropriate mood and behavior    Additional Physical Exam Notes/Findings      Last Recorded Vitals  /61 (BP Location: Left arm, Patient Position: Sitting)   Pulse 64   Temp 37.1 °C (98.8 °F) (Temporal)   Resp 22   Ht 1.88 m (6' 2\")   Wt 95.5 kg (210 lb 8.6 oz)   SpO2 94%   BMI 27.03 kg/m²     Intake/Output last 3 Shifts:  I/O last 3 completed shifts:  In: 1000.4 (10.5 mL/kg) [I.V.:277.3 (2.9 mL/kg); Blood:522.8; IV Piggyback:200.3]  Out: 2075 (21.7 mL/kg) [Urine:2075 (0.6 mL/kg/hr)]  Weight: 95.5 kg       =========    RELEVANT RESULTS      ==========  Labs  Lab Results   Component Value Date    WBC 10.3 09/28/2024    HGB 8.0 (L) 09/28/2024    HCT 24.9 (L) 09/28/2024    MCV 91 09/28/2024 "     09/28/2024     Lab Results   Component Value Date    GLUCOSE 103 (H) 09/28/2024    CALCIUM 7.7 (L) 09/28/2024     (L) 09/28/2024    K 3.6 09/28/2024    CO2 24 09/28/2024     09/28/2024    BUN 26 (H) 09/28/2024    CREATININE 1.62 (H) 09/28/2024      Lab Results   Component Value Date    ALT 15 09/28/2024    AST 18 09/28/2024    ALKPHOS 52 09/28/2024    BILITOT 1.3 (H) 09/28/2024          =======      SCHEDULED MEDICATIONS      =======    Scheduled medications   Medication Dose Route Frequency    amiodarone  200 mg oral BID    [Held by provider] apixaban  5 mg oral BID    [Held by provider] aspirin  81 mg oral Daily    atorvastatin  80 mg oral Nightly    metoprolol succinate XL  50 mg oral Daily    oxygen   inhalation Continuous - Inhalation    pantoprazole  40 mg intravenous Daily         ==========      PRN MEDICATIONS      =========  acetaminophen, 650 mg, q4h PRN   Or  acetaminophen, 650 mg, q4h PRN   Or  acetaminophen, 650 mg, q4h PRN  dextrose, 12.5 g, q15 min PRN  dextrose, 25 g, q15 min PRN  glucagon, 1 mg, q15 min PRN  glucagon, 1 mg, q15 min PRN  ondansetron, 4 mg, q6h PRN  sodium chloride, 1 spray, 4x daily PRN        ==============      DIET     ==============  Dietary Orders (From admission, onward)       Start     Ordered    09/27/24 1902  Oral nutritional supplements  Until discontinued        Comments: Vanilla only   Question Answer Comment   Deliver with Lunch    Deliver with Dinner    Select supplement: Magic Cup        09/27/24 1901 09/27/24 0705  Adult diet Regular  Diet effective now        Question:  Diet type  Answer:  Regular    09/27/24 0704                    ======    Assessment/Plan     =======    ASSESSMENT:  Principal Problem:    Pneumoperitoneum    ___________________________________________________    Acute Hemorrhagic Shock - Lower GI Bleed  Acute blood loss anemia  Colon CA S/p Segmental Colectomy 9/15  P-Afib with h/o RVR  NSTEMI due to above  H/o HTN  AKF  due to above  Reactive leukocytosis    PLAN:    Cr. Improving.  Peak 3.5 with baseline 0.9-1.1.   H&H lower today from yesterday evening.  Will check H&H this PM.   Degree of change suspected partly due to lab variation.  No additional signs of bleeding at this time.   Eliquis on Hold  Pharm DVT Proph contraindicated in setting of major bleeding on admission.   ASA on hold  Amiodarone 200mg daily.  Metoprolol 50 Daily.   Pantoprazole IV daily for now.       DVT Prophylaxis  Pharm DVT Proph contraindicated in setting of bleeding    SUMMARY/DISPOSITION  Vitals stable at this time.  Trend H&H.  No additional bleeding.  Patient hopeful for DC to home when H&H stable.                 Rush Mckeon, DO

## 2024-09-28 NOTE — PROGRESS NOTES
"Krish Batista is a 69 y.o. male on day 5 of admission presenting with Pneumoperitoneum.    Subjective   Patient states he is feeling well today. He had a nonbloody BM and is passing gas. He states he is tolerating his diet well and denies N/V. He does complain of a \"lump\" on his R upper arm in an area that is ecchymotic and likely due to a previous IV placement. He also notes his legs seem swollen today.     Patient received 1 unit of PRBC, 1 unit of FFP, and Vitamin K yesterday. Hemoglobin seems to be trending in the appropriate direction with most recent reading of 9.3. Patient can be transferred out of ICU for remainder of care. Staples at surgical incisions were removed yesterday.    Objective     Physical Exam  Constitutional:       General: He is not in acute distress.     Appearance: He is not toxic-appearing.   HENT:      Mouth/Throat:      Mouth: Mucous membranes are moist.   Eyes:      General: No scleral icterus.     Extraocular Movements: Extraocular movements intact.   Cardiovascular:      Rate and Rhythm: Normal rate and regular rhythm.      Heart sounds: Normal heart sounds.   Pulmonary:      Effort: Pulmonary effort is normal. No respiratory distress.      Breath sounds: Normal breath sounds.   Abdominal:      General: Bowel sounds are normal. There is no distension.      Palpations: Abdomen is soft.      Tenderness: There is no abdominal tenderness.      Comments: Improving ecchymosis over most of the abdomen and bilateral flanks, tracking to mid-back, L>R. Previously a dark purple color and extensive, currently becoming lighter and decreasing in area size. Midline scar healing nicely, no erythema surrounding area, no drainage from site.   Genitourinary:     Comments: Dark purple scrotal ecchymosis present on b/l testicles- improving. No penile edema or ecchymosis noted  Musculoskeletal:      Right lower leg: 3+ Pitting Edema present.      Left lower leg: 3+ Pitting Edema present.   Skin:     " "General: Skin is warm and dry.      Coloration: Skin is not jaundiced.      Comments: Ecchymosis and firm area on upper R arm.    Neurological:      General: No focal deficit present.      Mental Status: He is alert and oriented to person, place, and time.   Psychiatric:         Mood and Affect: Mood normal.         Behavior: Behavior normal.         Last Recorded Vitals  Blood pressure 117/61, pulse 69, temperature 36.9 °C (98.4 °F), temperature source Temporal, resp. rate 14, height 1.88 m (6' 2\"), weight 95.5 kg (210 lb 8.6 oz), SpO2 97%.  Intake/Output last 3 Shifts:  I/O last 3 completed shifts:  In: 2675.4 (28.2 mL/kg) [P.O.:675; I.V.:627.3 (6.6 mL/kg); Blood:822.8; IV Piggyback:550.3]  Out: 2635 (27.7 mL/kg) [Urine:2635 (0.8 mL/kg/hr)]  Weight: 95 kg     Relevant Results  Results for orders placed or performed during the hospital encounter of 09/23/24 (from the past 24 hour(s))   POCT GLUCOSE   Result Value Ref Range    POCT Glucose 107 (H) 74 - 99 mg/dL   Prepare RBC: 1 Units   Result Value Ref Range    PRODUCT CODE T9182R47     Unit Number [id number]     Unit ABO A     Unit RH POS     XM INTEP COMP     Dispense Status TR     Blood Expiration Date 10/18/2024 11:59:00 PM EDT     PRODUCT BLOOD TYPE 6200     UNIT VOLUME 350    Type and screen   Result Value Ref Range    ABO TYPE AB     Rh TYPE POS     ANTIBODY SCREEN NEG    POCT GLUCOSE   Result Value Ref Range    POCT Glucose 132 (H) 74 - 99 mg/dL   Prepare Plasma: 1 Units   Result Value Ref Range    PRODUCT CODE X1832C03     Unit Number [id number]     Unit ABO AB     Unit RH POS     Dispense Status TR     Blood Expiration Date 10/2/2024  1:44:00 PM EDT     PRODUCT BLOOD TYPE 8400     UNIT VOLUME 202    SST TOP   Result Value Ref Range    Extra Tube Hold for add-ons.    CBC   Result Value Ref Range    WBC 11.5 (H) 4.4 - 11.3 x10*3/uL    nRBC 0.5 (H) 0.0 - 0.0 /100 WBCs    RBC 3.26 (L) 4.50 - 5.90 x10*6/uL    Hemoglobin 9.6 (L) 13.5 - 17.5 g/dL    " Hematocrit 30.1 (L) 41.0 - 52.0 %    MCV 92 80 - 100 fL    MCH 29.4 26.0 - 34.0 pg    MCHC 31.9 (L) 32.0 - 36.0 g/dL    RDW 21.2 (H) 11.5 - 14.5 %    Platelets 404 150 - 450 x10*3/uL   POCT GLUCOSE   Result Value Ref Range    POCT Glucose 116 (H) 74 - 99 mg/dL   CBC   Result Value Ref Range    WBC 10.3 4.4 - 11.3 x10*3/uL    nRBC 0.3 (H) 0.0 - 0.0 /100 WBCs    RBC 2.74 (L) 4.50 - 5.90 x10*6/uL    Hemoglobin 8.0 (L) 13.5 - 17.5 g/dL    Hematocrit 24.9 (L) 41.0 - 52.0 %    MCV 91 80 - 100 fL    MCH 29.2 26.0 - 34.0 pg    MCHC 32.1 32.0 - 36.0 g/dL    RDW 21.5 (H) 11.5 - 14.5 %    Platelets 361 150 - 450 x10*3/uL   Protime-INR   Result Value Ref Range    Protime 14.5 (H) 9.8 - 12.8 seconds    INR 1.3 (H) 0.9 - 1.1   Comprehensive Metabolic Panel   Result Value Ref Range    Glucose 103 (H) 74 - 99 mg/dL    Sodium 135 (L) 136 - 145 mmol/L    Potassium 3.6 3.5 - 5.3 mmol/L    Chloride 104 98 - 107 mmol/L    Bicarbonate 24 21 - 32 mmol/L    Anion Gap 11 10 - 20 mmol/L    Urea Nitrogen 26 (H) 6 - 23 mg/dL    Creatinine 1.62 (H) 0.50 - 1.30 mg/dL    eGFR 45 (L) >60 mL/min/1.73m*2    Calcium 7.7 (L) 8.6 - 10.3 mg/dL    Albumin 3.0 (L) 3.4 - 5.0 g/dL    Alkaline Phosphatase 52 33 - 136 U/L    Total Protein 5.2 (L) 6.4 - 8.2 g/dL    AST 18 9 - 39 U/L    Bilirubin, Total 1.3 (H) 0.0 - 1.2 mg/dL    ALT 15 10 - 52 U/L   Magnesium   Result Value Ref Range    Magnesium 2.01 1.60 - 2.40 mg/dL   Fibrinogen   Result Value Ref Range    Fibrinogen 400 200 - 400 mg/dL           Assessment/Plan   Krish Batista  is a 70 yo male with PMH significant for iron deficiency anemia, colon cancer w/ LGIB s/p segmental colectomy (9/15 with Dr. Clark), and Afib with RVR who presented to [facility] ED on 9/23 for rectal bleeding and weakness. Pt reports 4 days of rectal bleeding prior to admission.     Pt was recently admitted for NSTEMI and Afib w/ RVR and underwent segmental colectomy for colon cancer. Post-op course complicated by acute blood loss  anemia requiring 2u pRBCs. He was noted to have significant ecchymosis of his scrotum which likely represented old surgical blood tracking down and pooling in the scrotum. Hgb stabilized and he was started on eliquis BID on the day of discharge.       Initial Hgb was similar to Hgb at time of discharge, but he was hypotensive and lightheaded, so he was given 1u pRBCs, and 2u FFP +Kcentra for eliquis reversal in the ED. Hgb dropped to 5.6 after IVF resuscitation, but BP improved. He was given another 2u pRBCs in the ICU, but BP is again down trending and pt is now nauseous with a few episodes of emesis.     Procedures/Significant events:  9/15: Laparoscopic segmental colectomy with mobilization of splenic flexure  9/16 - 1u pRBC  9/17 - 1u pRBC  9/23 - 1u pRBC, 2uFFP, Kcentra  9/24 - 1u pRBC  9/25 - 1 u pRBC  9/26 - 1 u pRBC  9/27 - 1 u PRBC, 1u FFP    Assessment and Plan:    #S/p segmental colectomy  #Post-op bleeding on AC  #Hemorrhagic shock  - CT with no evidence of anastomotic leak, decreasing pneumoperitoneum, increasing hemoperitoneum.     > Potential small venous bleed, will continue to monitor at this time.  No immediate surgical intervention is indicated  - Transfuse for Hgb <7 or hemodynamic instability  - Regular diet, oral nutritional supplements  - Discontinue fluids  - Trend CBC and BMP daily  - No DVT chemoprophylaxis, SCDs only  - Continue to monitor closely    #Hyperbilirubinemia, improving  - D/t extensive abdominal bleeding  - No concern for hepatobiliary cause   - Continue to monitor    #H/o Afib w/ RVR  - Currently in NSR  - Hold AC  - May need prolonged time off AC to ensure no repeat bleed  - Medicine on consult, appreciate rec's   - Cardiology on consult, appreciate rec's     > on PO amiodarone due to concern for A-fib with RVR     > Consider IV labetalol for HTN    Dispo: Patient is cleared to be transferred to [facility] for the remainder of care with general surgery taking over as primary  care team.    Patient seen and discussed with attending surgeon on call, Dr. Knutson.    Denise Monroe PA-C

## 2024-09-28 NOTE — CARE PLAN
Problem: Nutrition  Goal: Oral intake greater 75%  Outcome: Progressing  Goal: Adequate PO fluid intake  Outcome: Progressing  Goal: Nutrition support goals are met within 48 hrs  Outcome: Progressing  Goal: Nutrition support is meeting 75% of nutrient needs  Outcome: Progressing  Goal: BG  mg/dL  Outcome: Progressing  Goal: Lab values WNL  Outcome: Progressing  Goal: Electrolytes WNL  Outcome: Progressing  Goal: Promote healing  Outcome: Progressing  Goal: Maintain stable weight  Outcome: Progressing  Goal: Reduce weight from edema/fluid  Outcome: Progressing     Problem: Fall/Injury  Goal: Pace activities to prevent fatigue by end of the shift  Outcome: Progressing  Goal: Not fall by end of shift  Outcome: Progressing  Goal: Be free from injury by end of the shift  Outcome: Progressing  Goal: Verbalize understanding of personal risk factors for fall in the hospital  Outcome: Progressing  Goal: Verbalize understanding of risk factor reduction measures to prevent injury from fall in the home  Outcome: Progressing     Problem: Pain - Adult  Goal: Verbalizes/displays adequate comfort level or baseline comfort level  Outcome: Progressing     Problem: Safety - Adult  Goal: Free from fall injury  Outcome: Progressing     Problem: Discharge Planning  Goal: Discharge to home or other facility with appropriate resources  Outcome: Progressing     Problem: Chronic Conditions and Co-morbidities  Goal: Patient's chronic conditions and co-morbidity symptoms are monitored and maintained or improved  Outcome: Progressing     Problem: Pain  Goal: Takes deep breaths with improved pain control throughout the shift  Outcome: Progressing  Goal: Turns in bed with improved pain control throughout the shift  Outcome: Progressing     Problem: Skin  Goal: Prevent/manage excess moisture  Outcome: Progressing  Flowsheets (Taken 9/28/2024 6124)  Prevent/manage excess moisture: Moisturize dry skin  Goal: Prevent/minimize sheer/friction  injuries  Outcome: Progressing  Flowsheets (Taken 9/28/2024 0448)  Prevent/minimize sheer/friction injuries:   HOB 30 degrees or less   Increase activity/out of bed for meals  Goal: Promote/optimize nutrition  Outcome: Progressing  Flowsheets (Taken 9/28/2024 0448)  Promote/optimize nutrition:   Consume > 50% meals/supplements   Monitor/record intake including meals   Offer water/supplements/favorite foods  Goal: Promote skin healing  Outcome: Progressing  Flowsheets (Taken 9/28/2024 0448)  Promote skin healing: Assess skin/pad under line(s)/device(s)     The patient's goals for the shift include to feel better    The clinical goals for the shift include Remain hemodynamically stable throughout this shift

## 2024-09-29 ENCOUNTER — HOME HEALTH ADMISSION (OUTPATIENT)
Dept: HOME HEALTH SERVICES | Facility: HOME HEALTH | Age: 69
End: 2024-09-29
Payer: MEDICARE

## 2024-09-29 VITALS
OXYGEN SATURATION: 98 % | TEMPERATURE: 97.3 F | HEIGHT: 74 IN | DIASTOLIC BLOOD PRESSURE: 64 MMHG | HEART RATE: 78 BPM | SYSTOLIC BLOOD PRESSURE: 133 MMHG | RESPIRATION RATE: 18 BRPM | BODY MASS INDEX: 27.02 KG/M2 | WEIGHT: 210.54 LBS

## 2024-09-29 PROBLEM — K66.8 PNEUMOPERITONEUM: Status: RESOLVED | Noted: 2024-09-23 | Resolved: 2024-09-29

## 2024-09-29 LAB
ANION GAP SERPL CALC-SCNC: 9 MMOL/L (ref 10–20)
BUN SERPL-MCNC: 23 MG/DL (ref 6–23)
CALCIUM SERPL-MCNC: 8.3 MG/DL (ref 8.6–10.3)
CHLORIDE SERPL-SCNC: 104 MMOL/L (ref 98–107)
CO2 SERPL-SCNC: 26 MMOL/L (ref 21–32)
CREAT SERPL-MCNC: 1.35 MG/DL (ref 0.5–1.3)
EGFRCR SERPLBLD CKD-EPI 2021: 56 ML/MIN/1.73M*2
ERYTHROCYTE [DISTWIDTH] IN BLOOD BY AUTOMATED COUNT: 23.2 % (ref 11.5–14.5)
GLUCOSE BLD MANUAL STRIP-MCNC: 99 MG/DL (ref 74–99)
GLUCOSE SERPL-MCNC: 102 MG/DL (ref 74–99)
HCT VFR BLD AUTO: 29.8 % (ref 41–52)
HGB BLD-MCNC: 9.2 G/DL (ref 13.5–17.5)
MAGNESIUM SERPL-MCNC: 1.95 MG/DL (ref 1.6–2.4)
MCH RBC QN AUTO: 28.9 PG (ref 26–34)
MCHC RBC AUTO-ENTMCNC: 30.9 G/DL (ref 32–36)
MCV RBC AUTO: 94 FL (ref 80–100)
NRBC BLD-RTO: 0.2 /100 WBCS (ref 0–0)
PLATELET # BLD AUTO: 432 X10*3/UL (ref 150–450)
POTASSIUM SERPL-SCNC: 3.6 MMOL/L (ref 3.5–5.3)
RBC # BLD AUTO: 3.18 X10*6/UL (ref 4.5–5.9)
SODIUM SERPL-SCNC: 135 MMOL/L (ref 136–145)
WBC # BLD AUTO: 11 X10*3/UL (ref 4.4–11.3)

## 2024-09-29 PROCEDURE — 99024 POSTOP FOLLOW-UP VISIT: CPT | Performed by: SURGERY

## 2024-09-29 PROCEDURE — 85027 COMPLETE CBC AUTOMATED: CPT

## 2024-09-29 PROCEDURE — 83735 ASSAY OF MAGNESIUM: CPT

## 2024-09-29 PROCEDURE — 99232 SBSQ HOSP IP/OBS MODERATE 35: CPT

## 2024-09-29 PROCEDURE — 80048 BASIC METABOLIC PNL TOTAL CA: CPT

## 2024-09-29 PROCEDURE — 99239 HOSP IP/OBS DSCHRG MGMT >30: CPT | Performed by: INTERNAL MEDICINE

## 2024-09-29 PROCEDURE — 36415 COLL VENOUS BLD VENIPUNCTURE: CPT

## 2024-09-29 PROCEDURE — 82947 ASSAY GLUCOSE BLOOD QUANT: CPT

## 2024-09-29 PROCEDURE — 2500000005 HC RX 250 GENERAL PHARMACY W/O HCPCS: Performed by: INTERNAL MEDICINE

## 2024-09-29 PROCEDURE — 2500000002 HC RX 250 W HCPCS SELF ADMINISTERED DRUGS (ALT 637 FOR MEDICARE OP, ALT 636 FOR OP/ED)

## 2024-09-29 PROCEDURE — 2500000001 HC RX 250 WO HCPCS SELF ADMINISTERED DRUGS (ALT 637 FOR MEDICARE OP)

## 2024-09-29 ASSESSMENT — PAIN SCALES - GENERAL
PAINLEVEL_OUTOF10: 0 - NO PAIN
PAINLEVEL_OUTOF10: 0 - NO PAIN

## 2024-09-29 ASSESSMENT — PAIN - FUNCTIONAL ASSESSMENT
PAIN_FUNCTIONAL_ASSESSMENT: 0-10
PAIN_FUNCTIONAL_ASSESSMENT: 0-10

## 2024-09-29 NOTE — PROGRESS NOTES
"General surgery attending note:  I examined and evaluated the patient.  I discussed the patient with the KTAHY.    Subjective   The patient has no abdominal pain.  He is tolerating a regular diet without nausea or vomiting.  He is having normal bowel movements without blood.  He is anxious to go home.    Objective     Physical Exam    Afebrile, vital signs stable  Abdomen: Nondistended, soft, nontender  Extremities: Mild bilateral lower extremity edema, no calf tenderness.    Last Recorded Vitals  Blood pressure 133/64, pulse 78, temperature 36.3 °C (97.3 °F), temperature source Temporal, resp. rate 18, height 1.88 m (6' 2\"), weight 95.5 kg (210 lb 8.6 oz), SpO2 98%.  Intake/Output last 3 Shifts:  I/O last 3 completed shifts:  In: 120 (1.3 mL/kg) [P.O.:120]  Out: - (0 mL/kg)   Weight: 95.5 kg     Relevant Results  WBC 11.0, hemoglobin 9.2  BUN 23, creatinine 1.35    Assessment/Plan   Assessment & Plan    Postop day #14 following laparoscopic resection of splenic flexure of colon.  Hospital day #7, admission for intra-abdominal and intraluminal hemorrhage.  Eliquis discontinued on admission.  Remains hemodynamically stable.  No abdominal pain or tenderness.  Hemoglobin stable for 2 days.  No further bleeding.  The patient may be discharged home from a surgery standpoint.  I told the patient to notify us if he has any additional bleeding or problems.    I told him to call Dr. Clark' office tomorrow to schedule a follow-up appointment.  I discussed the patient with Dr. Mckeon.       Farooq Knutson MD      "

## 2024-09-29 NOTE — HOSPITAL COURSE
68 y/o male with pmhx HTN, CAD, PIERRE, with recent Dx of Colon CA s/p recent segmental colectomy complicated by Afib RVR post op.  Heparin drip to Eliquis on DC.   Was doing well but presenting back to the ER 9/23 for feeling lightheaded.  Also noticed blood in bowels for 4 days described as bright red.   Increased dyspnea with exertion also noted as well as weakness and fatigue.     Patient noted to be hypotensive and received IVF, FFP and blood transfusion.   Transferred to ICU shortly after admission process.   Surgery and GI consulted.   No leukocytosis or signs of infection though patient has been receiving broad spectrum IV ABx.   Received Kcentra for eliquis reversal.    Cardiology following with HR maintained on IV Amiodarone switched to PO regimen with amio and metoprolol yesterday.   Appears to have received 6 units of blood thus far.    Last unit was given yesterday.  6.9 --> 9.6.   Down to 8.0 this AM.   No additional signs of bleeding.   Patient comfortable at this time.  Plan for transfer out of the ICU.  Repeat labs stabilized.  No additional signs of bleeding.  Tolerating PO intake.  H&H Stable in the 9's.   Cr. Improving and resolving on PO intake.    Cleared by specialty services for DC.   Patient anxious to go home and feels well overall.

## 2024-09-29 NOTE — CARE PLAN
Problem: Nutrition  Goal: Adequate PO fluid intake  Outcome: Progressing  Goal: BG  mg/dL  Outcome: Progressing  Goal: Promote healing  Outcome: Progressing     Problem: Fall/Injury  Goal: Pace activities to prevent fatigue by end of the shift  Outcome: Progressing  Goal: Not fall by end of shift  Outcome: Progressing  Goal: Be free from injury by end of the shift  Outcome: Progressing     Problem: Safety - Adult  Goal: Free from fall injury  Outcome: Progressing     Problem: Pain  Goal: Walks with improved pain control throughout the shift  Outcome: Progressing     Problem: Skin  Goal: Participates in plan/prevention/treatment measures  Outcome: Progressing  Flowsheets (Taken 9/29/2024 0445)  Participates in plan/prevention/treatment measures: Elevate heels  Goal: Prevent/manage excess moisture  Outcome: Progressing  Flowsheets (Taken 9/29/2024 0445)  Prevent/manage excess moisture: Monitor for/manage infection if present  Goal: Prevent/minimize sheer/friction injuries  Outcome: Progressing  Flowsheets (Taken 9/29/2024 0445)  Prevent/minimize sheer/friction injuries: HOB 30 degrees or less  Goal: Promote/optimize nutrition  Outcome: Progressing  Flowsheets (Taken 9/29/2024 0445)  Promote/optimize nutrition: Monitor/record intake including meals  Goal: Promote skin healing  Outcome: Progressing  Flowsheets (Taken 9/29/2024 0445)  Promote skin healing: Assess skin/pad under line(s)/device(s)      The clinical goals for the shift include Patient will remain hemodynamically stable overnight

## 2024-09-29 NOTE — PROGRESS NOTES
"Krish Batista is a 69 y.o. male on day 6 of admission presenting with Pneumoperitoneum.    Subjective   Patient is doing well today. He is having regular Bms, passing gas, and is tolerating meals. The ecchymosis on his R arm and abdomen are improving and he denies pain to the areas. His hemoglobin is stable with a read of 9.2 today.        Objective     Physical Exam  Vitals reviewed.   Constitutional:       Appearance: Normal appearance.   Cardiovascular:      Rate and Rhythm: Normal rate and regular rhythm.   Pulmonary:      Effort: Pulmonary effort is normal. No respiratory distress.      Breath sounds: Normal breath sounds.   Abdominal:      General: Bowel sounds are normal.      Palpations: Abdomen is soft.      Tenderness: There is no abdominal tenderness.   Skin:     General: Skin is warm and dry.      Comments: Ecchymosis of abdomen and R arm is improving. Turning to a light purple/red color.    Neurological:      General: No focal deficit present.      Mental Status: He is alert and oriented to person, place, and time.   Psychiatric:         Mood and Affect: Mood normal.         Behavior: Behavior normal.         Last Recorded Vitals  Blood pressure 133/64, pulse 78, temperature 36.3 °C (97.3 °F), temperature source Temporal, resp. rate 18, height 1.88 m (6' 2\"), weight 95.5 kg (210 lb 8.6 oz), SpO2 98%.  Intake/Output last 3 Shifts:  I/O last 3 completed shifts:  In: 120 (1.3 mL/kg) [P.O.:120]  Out: - (0 mL/kg)   Weight: 95.5 kg     Relevant Results  Results for orders placed or performed during the hospital encounter of 09/23/24 (from the past 24 hour(s))   Hemoglobin and hematocrit, blood   Result Value Ref Range    Hemoglobin 9.3 (L) 13.5 - 17.5 g/dL    Hematocrit 29.4 (L) 41.0 - 52.0 %   POCT GLUCOSE   Result Value Ref Range    POCT Glucose 114 (H) 74 - 99 mg/dL   POCT GLUCOSE   Result Value Ref Range    POCT Glucose 106 (H) 74 - 99 mg/dL   Magnesium   Result Value Ref Range    Magnesium 1.95 1.60 - " 2.40 mg/dL   Basic Metabolic Panel   Result Value Ref Range    Glucose 102 (H) 74 - 99 mg/dL    Sodium 135 (L) 136 - 145 mmol/L    Potassium 3.6 3.5 - 5.3 mmol/L    Chloride 104 98 - 107 mmol/L    Bicarbonate 26 21 - 32 mmol/L    Anion Gap 9 (L) 10 - 20 mmol/L    Urea Nitrogen 23 6 - 23 mg/dL    Creatinine 1.35 (H) 0.50 - 1.30 mg/dL    eGFR 56 (L) >60 mL/min/1.73m*2    Calcium 8.3 (L) 8.6 - 10.3 mg/dL   CBC   Result Value Ref Range    WBC 11.0 4.4 - 11.3 x10*3/uL    nRBC 0.2 (H) 0.0 - 0.0 /100 WBCs    RBC 3.18 (L) 4.50 - 5.90 x10*6/uL    Hemoglobin 9.2 (L) 13.5 - 17.5 g/dL    Hematocrit 29.8 (L) 41.0 - 52.0 %    MCV 94 80 - 100 fL    MCH 28.9 26.0 - 34.0 pg    MCHC 30.9 (L) 32.0 - 36.0 g/dL    RDW 23.2 (H) 11.5 - 14.5 %    Platelets 432 150 - 450 x10*3/uL   POCT GLUCOSE   Result Value Ref Range    POCT Glucose 99 74 - 99 mg/dL     No results found.      Assessment/Plan   Krish Batista  is a 68 yo male with PMH significant for iron deficiency anemia, colon cancer w/ LGIB s/p segmental colectomy (9/15 with Dr. Clark), and Afib with RVR who presented to [facility] ED on 9/23 for rectal bleeding and weakness. Pt reports 4 days of rectal bleeding prior to admission.      Pt was recently admitted for NSTEMI and Afib w/ RVR and underwent segmental colectomy for colon cancer. Post-op course complicated by acute blood loss anemia requiring 2u pRBCs. He was noted to have significant ecchymosis of his scrotum which likely represented old surgical blood tracking down and pooling in the scrotum. Hgb stabilized and he was started on eliquis BID on the day of discharge.        Initial Hgb was similar to Hgb at time of discharge, but he was hypotensive and lightheaded, so he was given 1u pRBCs, and 2u FFP +Kcentra for eliquis reversal in the ED. Hgb dropped to 5.6 after IVF resuscitation, but BP improved. He was given another 2u pRBCs in the ICU, but BP is again down trending and pt is now nauseous with a few episodes of emesis.      Procedures/Significant events:  9/15: Laparoscopic segmental colectomy with mobilization of splenic flexure  9/16 - 1u pRBC  9/17 - 1u pRBC  9/23 - 1u pRBC, 2uFFP, Kcentra  9/24 - 1u pRBC  9/25 - 1 u pRBC  9/26 - 1 u pRBC  9/27 - 1 u PRBC, 1u FFP     Assessment and Plan:     #S/p segmental colectomy  #Post-op bleeding on AC- resolving  #Hemorrhagic shock- improved  - CT with no evidence of anastomotic leak, decreasing pneumoperitoneum, increasing hemoperitoneum.     > Potential small venous bleed, will continue to monitor at this time.  No immediate surgical intervention is indicated  - Regular diet  - No DVT chemoprophylaxis, SCDs only     #Hyperbilirubinemia, improving  - D/t extensive abdominal bleeding  - No concern for hepatobiliary cause     #H/o Afib w/ RVR  - Currently in NSR  - Hold AC  - May need prolonged time off AC to ensure no repeat bleed  - Medicine on consult, appreciate rec's   - Cardiology on consult, appreciate rec's     > on PO amiodarone due to concern for A-fib with RVR     > Consider IV labetalol for HTN     Dispo: Okay for discharge from general surgery standpoint. Patient should notify us if he has any bleeding occur or begins to feel like his condition is worsening. He should follow up with Dr. Clark in office by calling tomorrow to schedule an appointment. Patient should continue to hold Eliquis and discuss with Dr. Clark the extent of holding the medication.     Patient seen and discussed with attending surgeon on call, Dr. Knutson.    Denise Monroe PA-C

## 2024-09-29 NOTE — DISCHARGE SUMMARY
Discharge Diagnosis  Pneumoperitoneum    Issues Requiring Follow-Up      Discharge Meds     Medication List      CONTINUE taking these medications     amiodarone 200 mg tablet; Commonly known as: Pacerone; Take 1 tablet   (200 mg) by mouth 2 times a day.   atorvastatin 80 mg tablet; Commonly known as: Lipitor; Take 1 tablet (80   mg) by mouth once daily at bedtime.   gabapentin 300 mg capsule; Commonly known as: Neurontin; Take 1 capsule   (300 mg) by mouth 3 times a day.   metoprolol succinate XL 50 mg 24 hr tablet; Commonly known as:   Toprol-XL; Take 1 tablet (50 mg) by mouth once daily. Do not crush or   chew.   metoprolol tartrate 50 mg tablet; Commonly known as: Lopressor; Take 1   tablet by mouth 2 times a day. Do not start before September 2, 2024.   pantoprazole 40 mg EC tablet; Commonly known as: ProtoNix; Take 1 tablet   (40 mg) by mouth once daily. Do not crush, chew, or split.     STOP taking these medications     aspirin 81 mg chewable tablet   Eliquis 5 mg tablet; Generic drug: apixaban       Test Results Pending At Discharge  Pending Labs       No current pending labs.            Hospital Course  68 y/o male with pmhx HTN, CAD, PIERRE, with recent Dx of Colon CA s/p recent segmental colectomy complicated by Afib RVR post op.  Heparin drip to Eliquis on DC.   Was doing well but presenting back to the ER 9/23 for feeling lightheaded.  Also noticed blood in bowels for 4 days described as bright red.   Increased dyspnea with exertion also noted as well as weakness and fatigue.     Patient noted to be hypotensive and received IVF, FFP and blood transfusion.   Transferred to ICU shortly after admission process.   Surgery and GI consulted.   No leukocytosis or signs of infection though patient has been receiving broad spectrum IV ABx.   Received Kcentra for eliquis reversal.    Cardiology following with HR maintained on IV Amiodarone switched to PO regimen with amio and metoprolol yesterday.   Appears to have  received 6 units of blood thus far.    Last unit was given yesterday.  6.9 --> 9.6.   Down to 8.0 this AM.   No additional signs of bleeding.   Patient comfortable at this time.  Plan for transfer out of the ICU.  Repeat labs stabilized.  No additional signs of bleeding.  Tolerating PO intake.  H&H Stable in the 9's.   Cr. Improving and resolving on PO intake.    Cleared by specialty services for DC.   Patient anxious to go home and feels well overall.    The patient has been advised to maintain regular follow-up appointments with their primary care physician, and they have expressed their willingness to do so.     Discharge time spent 35 minutes.      Pertinent Physical Exam At Time of Discharge    GENERAL:   no distress, alert and cooperative  HEENT: Normal Inspection, Mucous membranes moist, No JVD, No Lymphadenopathy  CARDIOVASCULAR: RRR, no murmurs, 2+ equal pulses of the extremities, normal S1 and S 2  RESPIRATORY: Patent airways, CTAB, normal breath sounds with good chest expansion, thorax symmetric, No Wheezes, Rales or Rhonchi  ABDOMEN: Soft, Non-Tender, Normal Bowel Sounds, No Distention  SKIN: Warm and dry, no lesions, no rashes  EXTREMITIES: normal extremities, no cyanosis edema, contusions or wounds, no clubbing  NEURO: A&O x 3, CN II-XII grossly intact  PSYCH: Appropriate mood and behavior      Outpatient Follow-Up  Future Appointments   Date Time Provider Department [place]   10/3/2024 12:00 PM Rio Lawson MD [id number] [facility]   10/8/2024 10:30 AM Luis Daniel Archibald MD PhD [id number] [facility]   2/18/2025  3:00 PM Luis Daniel Archibald MD PhD [id number] [facility]         Rush Mckeon DO   frequency

## 2024-09-30 ENCOUNTER — TELEPHONE (OUTPATIENT)
Dept: SURGERY | Facility: CLINIC | Age: 69
End: 2024-09-30
Payer: MEDICARE

## 2024-09-30 ENCOUNTER — TELEPHONE (OUTPATIENT)
Dept: PRIMARY CARE | Facility: CLINIC | Age: 69
End: 2024-09-30
Payer: MEDICARE

## 2024-09-30 ENCOUNTER — PATIENT OUTREACH (OUTPATIENT)
Dept: PRIMARY CARE | Facility: CLINIC | Age: 69
End: 2024-09-30
Payer: MEDICARE

## 2024-09-30 LAB
LAB AP BLOCK FOR ADDITIONAL STUDIES: NORMAL
LABORATORY COMMENT REPORT: NORMAL
PATH REPORT.FINAL DX SPEC: NORMAL
PATH REPORT.GROSS SPEC: NORMAL
PATH REPORT.RELEVANT HX SPEC: NORMAL
PATH REPORT.TOTAL CANCER: NORMAL
PATHOLOGY SYNOPTIC REPORT: NORMAL

## 2024-09-30 NOTE — PROGRESS NOTES
Discharge facility: [facility]  Discharge diagnosis: Pneumoperitoneum     Admission date: 9/23/24  Discharge date: 9/29/24    PCP Appointment Date: No available appointments within 14 days/ message to office   Specialist Appointment Date: 10/3/24 Hem Onc, 108/24 cardiology  Hospital Encounter and Summary: Linked    See Discharge assessment below for further details    Medications  Medications reviewed with patient/caregiver?: Yes (9/30/2024  1:37 PM)  Is the patient having any side effects they believe may be caused by any medication additions or changes?: No (9/30/2024  1:37 PM)  Does the patient have all medications ordered at discharge?: Yes (9/30/2024  1:37 PM)  Care Management Interventions: Provided patient education (9/30/2024  1:37 PM)  Prescription Comments: No new prescriptions received (9/30/2024  1:37 PM)  Is the patient taking all medications as directed (includes completed medication regime)?: Yes (9/30/2024  1:37 PM)  Care Management Interventions: Provided patient education (9/30/2024  1:37 PM)  Medication Comments: Instructed STOP taking:  aspirin 81 mg chewable tablet   Eliquis 5 mg tablet (apixaban) (9/30/2024  1:37 PM)  Follow Up Tasks: -- (n/a) (9/30/2024  1:37 PM)    Appointments  Does the patient have a primary care provider?: Yes (9/30/2024  1:37 PM)  Care Management Interventions: Educated patient on importance of making appointment (9/30/2024  1:37 PM)  Has the patient kept scheduled appointments due by today?: Yes (9/30/2024  1:37 PM)  Care Management Interventions: Advised to schedule with specialist; Educated on importance of keeping appointment; Advised patient to keep appointment (9/30/2024  1:37 PM)  Follow Up Tasks: -- (n/a) (9/30/2024  1:37 PM)    Self Management  What is the home health agency?:  Home Care (9/30/2024  1:37 PM)  Has home health visited the patient within 72 hours of discharge?: Call prior to 72 hours (9/30/2024  1:37 PM)  Home Health Interventions: --  (n/a) (9/30/2024  1:37 PM)  What Durable Medical Equipment (DME) was ordered?: n/a (9/30/2024  1:37 PM)  Has all Durable Medical Equipment (DME) been delivered?: -- (n/a) (9/30/2024  1:37 PM)  DME Interventions: -- (n/a) (9/30/2024  1:37 PM)  Care Management Interventions: Notified PCP/provider (9/30/2024  1:37 PM)  Follow Up Tasks: -- (n/a) (9/30/2024  1:37 PM)    Patient Teaching  Does the patient have access to their discharge instructions?: Yes (9/30/2024  1:37 PM)  Care Management Interventions: Reviewed instructions with patient (9/30/2024  1:37 PM)  What is the patient's perception of their health status since discharge?: Improving (9/30/2024  1:37 PM)  Is the patient/caregiver able to teach back the hierarchy of who to call/visit for symptoms/problems? PCP, Specialist, Home Health nurse, Urgent Care, ED, 911: Yes (9/30/2024  1:37 PM)  Patient/Caregiver Education Comments: Instructed on hospital discharge instructions. Instructed on new and changed medications. Instructed if increased shortness of breath or chest pains to call 911. Provided my contact information and encouraged to call with any questions. (9/30/2024  1:37 PM)    Wrap Up  Is the patient/caregiver familiar with Advance Care Planning?: Yes (9/30/2024  1:37 PM)  Would the patient like more information on Advance Care Planning?: No (9/30/2024  1:37 PM)  Wrap Up Additional Comments: Patient reports feeling tired. he denies any further episodes of bleeding. Reports he is awaiting call back from Dr Clark's office. He has stopped eliquis and aspirin as he was instructed to upon hospital discharge. No questions at this time. (9/30/2024  1:37 PM)

## 2024-09-30 NOTE — TELEPHONE ENCOUNTER
Called patient and lvm to schedule TCM appointment. Available appointments on 10.10.24.      ----- Message from Celine Arteaga sent at 9/30/2024  1:46 PM EDT -----  Regarding: tcm  Discharge facility: [facility]  Discharge diagnosis: Pneumoperitoneum     Admission date: 9/23/24  Discharge date: 9/29/24     No PCP appointments available within 14 days of discharge  Please reach out to patient and schedule an appointment within 7-13 days from discharge date.   10/12/24

## 2024-10-01 ENCOUNTER — APPOINTMENT (OUTPATIENT)
Dept: GASTROENTEROLOGY | Facility: CLINIC | Age: 69
End: 2024-10-01
Payer: MEDICARE

## 2024-10-01 ENCOUNTER — TELEPHONE (OUTPATIENT)
Dept: SURGERY | Facility: CLINIC | Age: 69
End: 2024-10-01

## 2024-10-03 ENCOUNTER — OFFICE VISIT (OUTPATIENT)
Dept: HEMATOLOGY/ONCOLOGY | Facility: CLINIC | Age: 69
End: 2024-10-03
Payer: MEDICARE

## 2024-10-03 ENCOUNTER — EDUCATION (OUTPATIENT)
Dept: HEMATOLOGY/ONCOLOGY | Facility: CLINIC | Age: 69
End: 2024-10-03

## 2024-10-03 VITALS
HEART RATE: 64 BPM | BODY MASS INDEX: 25.25 KG/M2 | RESPIRATION RATE: 16 BRPM | OXYGEN SATURATION: 98 % | WEIGHT: 196.65 LBS | TEMPERATURE: 98.1 F | DIASTOLIC BLOOD PRESSURE: 78 MMHG | SYSTOLIC BLOOD PRESSURE: 128 MMHG

## 2024-10-03 DIAGNOSIS — C18.9 ADENOCARCINOMA, COLON (MULTI): ICD-10-CM

## 2024-10-03 PROCEDURE — 1111F DSCHRG MED/CURRENT MED MERGE: CPT | Performed by: INTERNAL MEDICINE

## 2024-10-03 PROCEDURE — 99215 OFFICE O/P EST HI 40 MIN: CPT | Performed by: INTERNAL MEDICINE

## 2024-10-03 PROCEDURE — 1159F MED LIST DOCD IN RCRD: CPT | Performed by: INTERNAL MEDICINE

## 2024-10-03 PROCEDURE — 1126F AMNT PAIN NOTED NONE PRSNT: CPT | Performed by: INTERNAL MEDICINE

## 2024-10-03 ASSESSMENT — PAIN SCALES - GENERAL: PAINLEVEL: 0-NO PAIN

## 2024-10-03 NOTE — PROGRESS NOTES
"Patient seen by Dr. Duque today in clinic. Reinforcement education provided regarding next steps with plan of care.      PER DR. DUQUE'S FUV NOTE TODAY: \"The patient and family had come for follow-up on October 3, 2024 as described above multiple admissions requiring stay in ICU, history of MI GI bleed requiring blood and iron transfusions.  Physical examination revealed pedal edema and CBC revealed hemoglobin 9.2 g/dL.  I have recommended evaluation of anemia as well as Doppler ultrasound.  Discussed in detail about options such as do-nothing, consider adjuvant chemotherapy using FOLFOX which might elicit 25% survival benefit.  The patient is agreeable to start chemotherapy.  This will necessitate port placement.  Return in 2 weeks.\"    Office contact information provided along with disease binder.  Bilateral lower extremity edema 2-3+ pitting with left leg increased edema than the right.  Orders for a stat doppler study. Doppler scheduled tomorrow 10/4/24, pt will have blood work same day.  Per Dr. Duque he is holding off on lasix right now and suggesting compression stockings.  Pt currently living with Niece Mariama and will  compression stockings today.  Pt states he continues with fatigue.  Diarrhea at its worse in 24 hour period is 6 \"soft stools\".  Pt reports no blood seen in toilet and stool is starting to turn a \"normal color\".  Dr. Duque to speak with cardiologist Dr. Archibald personally, message sent to Dr. Archibald and provided Dr. Duque's cell phone number.  Nutrition referral sent and pt scheduled same day as next FUV Dr. Duque on 10/18/24.  Patient verbalizes understanding of plan of care via teachback method.                      "

## 2024-10-03 NOTE — PROGRESS NOTES
Patient ID: Krish Batista is a 69 y.o. male.  Referring Physician: Rio Lawson MD  [address], [place] [address] [place] [address]  Primary Care Provider: Matthew Small,    8/15/24, EGD and colonoscopy  A.  Duodenum, first part, biopsy:  Duodenal mucosa within normal limits.     B.  Stomach, biopsy:  Antrum and corpus mucosa within normal limits.     C.  Colon, mass at 40 cm, biopsy:  Fragments of invasive adenocarcinoma, moderately differentiated.  See note.     NOTE: Immunohistochemical stains for mismatch repair proteins are pending and the result will be reported as an addendum.     D.  Colon, 20 cm, polypectomy:  Tubular adenoma.                               MISMATCH REPAIR PROTEIN EXPRESSION     C. Colon, mass at 40 cm, biopsy: Adenocarcinoma     Protein:  Result     MLH-1:  Expression Present                                    PMS-2: Expression Present                                    MSH-2: Expression Present                                    MSH-6: Expression Present    CEA at 1.5 NG per mL on August 2, 2024    Colon, splenic flexure, resection:  -Invasive adenocarcinoma (3.1 cm), moderately differentiated.  -Carcinoma invades pericolonic tissue.  -Extramural venous and perineural invasion present.  -Resection margins are negative for carcinoma.  -Metastatic adenocarcinoma involving two of thirty-seven lymph nodes (2/37).  -Pathologic stage: pT3, pN1b.  -See comment and synoptic report.  Comment:  Movat stains (blocks A4 and A5) confirm the venous invasion.    Electronically signed by Sharonda Maguire MD PhD on 9/30/2024 at 1350      [facility]   By the signature on this report, the individual or group listed as making the Final Interpretation/Diagnosis certifies that they have reviewed this case.    Case Summary Report   COLON AND RECTUM: Resection, Including Transanal Disk Excision of Rectal Neoplasms   8th Edition - Protocol posted: 6/22/2022COLON AND RECTUM: RESECTION, INCLUDING  TRANSANAL DISK EXCISION OF RECTAL NEOPLASMS - All Specimens  SPECIMEN   Procedure  resection   TUMOR   Tumor Site  Splenic flexure   Histologic Type  Adenocarcinoma   Histologic Grade  G2, moderately differentiated   Tumor Size  Greatest dimension (Centimeters): 3.1 cm   Tumor Extent  Invades through muscularis propria into the pericolonic or perirectal tissue   Macroscopic Tumor Perforation  Not identified   Lymphovascular Invasion  Large vessel (venous), extramural   Perineural Invasion  Present   Tumor Bud Score  Intermediate (5-9)   Treatment Effect  No known presurgical therapy   MARGINS   Margin Status for Invasive Carcinoma  All margins negative for invasive carcinoma   Closest Margin(s) to Invasive Carcinoma  Radial (circumferential) or mesenteric   Distance from Invasive Carcinoma to Closest Margin  Greater than 1 cm   Margin Status for Non-Invasive Tumor  All margins negative for high-grade dysplasia / intramucosal carcinoma and low-grade dysplasia   REGIONAL LYMPH NODES   Regional Lymph Node Status  Tumor present in regional lymph node(s)   Number of Lymph Nodes with Tumor  2   Number of Lymph Nodes Examined  37   Tumor Deposits  Not identified   PATHOLOGIC STAGE CLASSIFICATION (pTNM, AJCC 8th Edition)   Reporting of pT, pN, and (when applicable) pM categories is based on information available to the pathologist at the time the report is issued. As per the AJCC (Chapter 1, 8th Ed.) it is the managing physician’s responsibility to establish the final pathologic stage based upon all pertinent information, including but potentially not limited to this pathology report.   pT Category  pT3   pN Category  pN1b   .        Subjective    HPI  The patient was referred to me by Dr. Clark for further evaluation and management of newly diagnosed biopsy-proven left colon adenocarcinoma.  The patient presented to Dr. Small on March 25, 2024 complaining of fatigue and malaise.  Further evaluation revealed significant  anemia at hemoglobin 8.1 g/dL ferritin at 8 NG per mL iron saturation 3%.  The patient was referred to GI services however he failed to follow through.  He was referred to surgery as well.  Repeat hemoglobin on July 11, 2024 was 7.2 g/dL.  He then presented to ER on August 1, 2024 secondary to progressive fatigue and weakness.  He was diagnosed with severe microcytic anemia hemoglobin 7.4 g/dL.  The patient received 2 units of packed red blood cell transfusions.  Upper and lower endoscopy on August 3, 2024 revealed a mass at 40 cm in the anal verge.  Histology consistent with adenocarcinoma.  In ER the patient was found to be in atrial fibrillation/flutter with RVR.  Cardiology was consulted.  He spontaneously converted to normal sinus rhythm.  Color Doppler echocardiogram revealed normal ejection fraction.  He was placed on metoprolol 50 mg p.o. twice daily with a Holter monitor.  Anticoagulation was not recommended.  Colonoscopy on August 15, 2024 revealed internal small hemorrhoids.  1 8 mm sessile polyp 20 centimeter from anal verge.  This was snared.  Single friable and ulcerated mass not transferred stable 40 cm from the anal verge, covering the whole circumference; bleeding occurred before intervention.  Performed cold forceps biopsy with partial removal: Tattooed distal to the finding with spot.  CT scan of chest abdomen pelvis on August 1, 2024 with IV contrast did not reveal any obvious metastatic disease.  A PET scan revealed bilateral pleural effusion right greater than left.  Thoracentesis was performed on September 3, 2024 cytology is pending.  The patient claims that he is feeling better after thoracentesis.    At interview on September 4, 2024 he was accompanied by his niece denied history of weight loss, fevers, night sweats, chest pain, nausea, vomiting, hematemesis, melena, hematochezia and hematuria.  Since last evaluation the patient has had multiple admissions to the hospital for myocardial  infarction requiring cardiac catheterization, status post surgery/left hemicolectomy on September 15, 2024, GI bleed hypotension requiring iron infusion as well as blood transfusions.  The patient was discharged and claims that he is beginning to feel better although has pedal edema as well as anterior abdominal wall edema.    Past medical history:    Iron deficiency anemia, adenocarcinoma of descending colon, history of Raynaud's phenomenon.  Past surgical history:    Sinus surgery July 13, 2021, history of undescended testis surgery, and tonsillectomy.      Review of Systems   All other systems reviewed and are negative.       Objective   BSA: 2.16 meters squared  /78   Pulse 64   Temp 36.7 °C (98.1 °F) (Temporal)   Resp 16   Wt 89.2 kg (196 lb 10.4 oz)   SpO2 98%   BMI 25.25 kg/m²     No family history on file.  Oncology History    No history exists.     The patient is 69 years old, single has no children retired as a .  Krish Batista  reports that he has never smoked. He has never used smokeless tobacco.  He  reports current alcohol use.  He  reports no history of drug use.    Physical Exam  Constitutional:       Appearance: Normal appearance.   HENT:      Head: Normocephalic and atraumatic.      Nose: Nose normal.      Mouth/Throat:      Mouth: Mucous membranes are moist.      Pharynx: Oropharynx is clear.   Eyes:      Extraocular Movements: Extraocular movements intact.      Conjunctiva/sclera: Conjunctivae normal.      Pupils: Pupils are equal, round, and reactive to light.   Cardiovascular:      Rate and Rhythm: Normal rate and regular rhythm.   Pulmonary:      Effort: Pulmonary effort is normal.      Breath sounds: Normal breath sounds.   Abdominal:      General: Abdomen is flat. Bowel sounds are normal.      Palpations: Abdomen is soft.   Musculoskeletal:         General: Normal range of motion.      Cervical back: Normal range of motion and neck supple.   Neurological:      General:  No focal deficit present.      Mental Status: He is alert and oriented to person, place, and time. Mental status is at baseline.   Psychiatric:         Mood and Affect: Mood normal.         Behavior: Behavior normal.         Thought Content: Thought content normal.         Judgment: Judgment normal.         Performance Status:  Symptomatic; in bed <50% of the day    Assessment/Plan    The patient is a 69-year-old man presented with easy fatigability shortness of breath on exertion.  Diagnosed with iron deficiency anemia.  Cologuard was positive.  The patient was referred to GI services but did not follow through.  Eventually presented to ER with above symptoms did receive 2 units of packed red blood cell transfusions.  He did develop atrial fibrillation/flutter while in ER but spontaneously converted to normal sinus rhythm was placed on metoprolol.  Colonoscopy revealed a mass 40 cm from anal verge biopsy/histology consistent with adenocarcinoma.  Initially CT scan of chest abdomen pelvis did not reveal any evidence of metastatic disease.  But a PET scan revealed large right pleural effusion and small left pleural effusion.  Thoracentesis performed on right pleural effusion on September 3, 2024 cytology is pending.  If negative would seek opinion about hilar/mediastinal lymphadenopathy.    Iron deficiency anemia:    The patient is still symptomatic with anemia.  I have recommended intravenous Feraheme 510 mg/week x 2 weeks.  Effect side effects explained.  The patient understood appreciated all the details provided and was grateful.  T3 N1 M0 adenocarcinoma of sigmoid colon s/p resection on September 15, 2024.    The patient and family had come for follow-up on October 3, 2024 as described above multiple admissions requiring stay in ICU, history of MI GI bleed requiring blood and iron transfusions.  Physical examination revealed pedal edema and CBC revealed hemoglobin 9.2 g/dL.  I have recommended evaluation of  anemia as well as Doppler ultrasound.  Discussed in detail about options such as do-nothing, consider adjuvant chemotherapy using FOLFOX which might elicit 25% survival benefit.  The patient is agreeable to start chemotherapy.  This will necessitate port placement.  Return in 2 weeks.    Thank you for allowing me to participate in care of your patient if you have any questions please feel free to call me.      Diagnoses and all orders for this visit:  Microcytic anemia  -     Clinic Appointment Request ([facility] appt needed please); Future  Adenocarcinoma, colon (Multi)  -     Referral to Hematology and Oncology  -     Clinic Appointment Request ([facility] appt needed please); Future  Other orders  -     heparin flush 10 unit/mL syringe 50 Units  -     heparin flush 100 unit/mL syringe 500 Units  -     alteplase (Cathflo Activase) injection 2 mg  -     ferumoxytol (Feraheme) 510 mg in sodium chloride 0.9% 117 mL IV  -     sodium chloride 0.9 % bolus 500 mL  -     dextrose 5 % in water (D5W) bolus  -     diphenhydrAMINE (BENADryl) injection 50 mg  -     methylPREDNISolone sod succinate (SOLU-Medrol) 40 mg/mL injection 40 mg  -     famotidine PF (Pepcid) injection 20 mg  -     EPINEPHrine (Epipen) injection syringe 0.3 mg  -     albuterol 2.5 mg /3 mL (0.083 %) nebulizer solution 3 mL           Rio Lawson MD

## 2024-10-04 ENCOUNTER — HOSPITAL ENCOUNTER (OUTPATIENT)
Dept: RADIOLOGY | Facility: CLINIC | Age: 69
Discharge: HOME | End: 2024-10-04
Payer: MEDICARE

## 2024-10-04 ENCOUNTER — LAB (OUTPATIENT)
Dept: LAB | Facility: LAB | Age: 69
End: 2024-10-04
Payer: MEDICARE

## 2024-10-04 DIAGNOSIS — C18.9 ADENOCARCINOMA, COLON (MULTI): ICD-10-CM

## 2024-10-04 LAB
BASOPHILS # BLD AUTO: 0.06 X10*3/UL (ref 0–0.1)
BASOPHILS NFR BLD AUTO: 0.8 %
BURR CELLS BLD QL SMEAR: NORMAL
EOSINOPHIL # BLD AUTO: 0.32 X10*3/UL (ref 0–0.7)
EOSINOPHIL NFR BLD AUTO: 4.5 %
ERYTHROCYTE [DISTWIDTH] IN BLOOD BY AUTOMATED COUNT: 23.4 % (ref 11.5–14.5)
HCT VFR BLD AUTO: 34.2 % (ref 41–52)
HGB BLD-MCNC: 10.1 G/DL (ref 13.5–17.5)
IMM GRANULOCYTES # BLD AUTO: 0.04 X10*3/UL (ref 0–0.7)
IMM GRANULOCYTES NFR BLD AUTO: 0.6 % (ref 0–0.9)
LYMPHOCYTES # BLD AUTO: 0.75 X10*3/UL (ref 1.2–4.8)
LYMPHOCYTES NFR BLD AUTO: 10.5 %
MCH RBC QN AUTO: 29.6 PG (ref 26–34)
MCHC RBC AUTO-ENTMCNC: 29.5 G/DL (ref 32–36)
MCV RBC AUTO: 100 FL (ref 80–100)
MONOCYTES # BLD AUTO: 0.75 X10*3/UL (ref 0.1–1)
MONOCYTES NFR BLD AUTO: 10.5 %
NEUTROPHILS # BLD AUTO: 5.21 X10*3/UL (ref 1.2–7.7)
NEUTROPHILS NFR BLD AUTO: 73.1 %
NRBC BLD-RTO: 0 /100 WBCS (ref 0–0)
PLATELET # BLD AUTO: 409 X10*3/UL (ref 150–450)
RBC # BLD AUTO: 3.41 X10*6/UL (ref 4.5–5.9)
RBC MORPH BLD: NORMAL
WBC # BLD AUTO: 7.1 X10*3/UL (ref 4.4–11.3)

## 2024-10-04 PROCEDURE — 36415 COLL VENOUS BLD VENIPUNCTURE: CPT

## 2024-10-04 PROCEDURE — 93970 EXTREMITY STUDY: CPT

## 2024-10-04 NOTE — DOCUMENTATION CLARIFICATION NOTE
"    PATIENT:               MONIQUE MAN  ACCT #:                  [id number]  MRN:                       [id number]  :                       1955  ADMIT DATE:       2024 4:49 PM  DISCH DATE:        2024 1:00 PM  RESPONDING PROVIDER #:        [id number]          PROVIDER RESPONSE TEXT:    Acute kidney injury with acute tubular necrosis    CDI QUERY TEXT:    Clarification    Instruction:    Based on your assessment of the patient and the clinical information, please provide the requested documentation by clicking on the appropriate radio button and enter any additional information if prompted.    Question: Please further clarify the diagnosis of acute kidney injury as    When answering this query, please exercise your independent professional judgment. The fact that a question is being asked, does not imply that any particular answer is desired or expected.    The patient's clinical indicators include:  Clinical Information: 69 year old male presenting back to the ER  for feeling lightheaded.  Also noticed blood in bowels for 4 days described as bright red.   Increased dyspnea with exertion also noted as well as weakness and fatigue.    Clinical Indicators: Initial creatinine - 1.09- then  3.37 - - 2.77 - - 2.10- - 1.62-  1.35. Progress note  \"NERI, likely pre-renal vs ATN\",  significant event note per cardiology notes \"NERI due to bleed - hypovolemic/hypotensive ATN likely\" and likely ATN documented until 24 progress notes    Treatment: IV fluids- serial labs and I&O    Risk Factors: Hemoperitoneum, NSTEMI, shock, and hemorrhage  Options provided:  -- Acute kidney injury with acute tubular necrosis  -- Acute kidney injury without acute tubular necrosis  -- Other - I will add my own diagnosis  -- Refer to Clinical Documentation Reviewer    Query created by: Shaila Schmid on 10/3/2024 11:03 AM      Electronically signed by:  MARK CARTER DO 10/4/2024 8:08 " AM

## 2024-10-05 ENCOUNTER — PREP FOR PROCEDURE (OUTPATIENT)
Dept: SURGERY | Facility: HOSPITAL | Age: 69
End: 2024-10-05
Payer: MEDICARE

## 2024-10-05 DIAGNOSIS — C18.9 STAGE III CARCINOMA OF COLON (MULTI): Primary | ICD-10-CM

## 2024-10-05 LAB
ALBUMIN SERPL BCP-MCNC: 3.6 G/DL (ref 3.4–5)
ALP SERPL-CCNC: 74 U/L (ref 33–136)
ALT SERPL W P-5'-P-CCNC: 29 U/L (ref 10–52)
ANION GAP SERPL CALC-SCNC: 13 MMOL/L (ref 10–20)
AST SERPL W P-5'-P-CCNC: 39 U/L (ref 9–39)
BILIRUB SERPL-MCNC: 1.7 MG/DL (ref 0–1.2)
BUN SERPL-MCNC: 15 MG/DL (ref 6–23)
CALCIUM SERPL-MCNC: 8.6 MG/DL (ref 8.6–10.6)
CHLORIDE SERPL-SCNC: 103 MMOL/L (ref 98–107)
CO2 SERPL-SCNC: 26 MMOL/L (ref 21–32)
CREAT SERPL-MCNC: 0.89 MG/DL (ref 0.5–1.3)
EGFRCR SERPLBLD CKD-EPI 2021: >90 ML/MIN/1.73M*2
FERRITIN SERPL-MCNC: 806 NG/ML (ref 20–300)
FOLATE SERPL-MCNC: 10.2 NG/ML
GLUCOSE SERPL-MCNC: 94 MG/DL (ref 74–99)
IRON SATN MFR SERPL: 15 % (ref 25–45)
IRON SERPL-MCNC: 33 UG/DL (ref 35–150)
POTASSIUM SERPL-SCNC: 4.4 MMOL/L (ref 3.5–5.3)
PROT SERPL-MCNC: 5.9 G/DL (ref 6.4–8.2)
SODIUM SERPL-SCNC: 138 MMOL/L (ref 136–145)
T4 FREE SERPL-MCNC: 1.36 NG/DL (ref 0.78–1.48)
TIBC SERPL-MCNC: 222 UG/DL (ref 240–445)
TSH SERPL-ACNC: 4.24 MIU/L (ref 0.44–3.98)
UIBC SERPL-MCNC: 189 UG/DL (ref 110–370)
VIT B12 SERPL-MCNC: 392 PG/ML (ref 211–911)

## 2024-10-05 RX ORDER — SODIUM CHLORIDE, SODIUM LACTATE, POTASSIUM CHLORIDE, CALCIUM CHLORIDE 600; 310; 30; 20 MG/100ML; MG/100ML; MG/100ML; MG/100ML
125 INJECTION, SOLUTION INTRAVENOUS CONTINUOUS
OUTPATIENT
Start: 2024-10-05

## 2024-10-08 ENCOUNTER — OFFICE VISIT (OUTPATIENT)
Dept: CARDIOLOGY | Facility: CLINIC | Age: 69
End: 2024-10-08
Payer: MEDICARE

## 2024-10-08 VITALS
BODY MASS INDEX: 26.71 KG/M2 | DIASTOLIC BLOOD PRESSURE: 66 MMHG | HEART RATE: 52 BPM | SYSTOLIC BLOOD PRESSURE: 119 MMHG | HEIGHT: 72 IN | OXYGEN SATURATION: 99 % | WEIGHT: 197.2 LBS

## 2024-10-08 DIAGNOSIS — I48.3 TYPICAL ATRIAL FLUTTER (MULTI): ICD-10-CM

## 2024-10-08 DIAGNOSIS — I25.10 CORONARY ARTERY DISEASE INVOLVING NATIVE CORONARY ARTERY OF NATIVE HEART WITHOUT ANGINA PECTORIS: Primary | ICD-10-CM

## 2024-10-08 DIAGNOSIS — I48.0 PAROXYSMAL ATRIAL FIBRILLATION (MULTI): ICD-10-CM

## 2024-10-08 PROCEDURE — 1111F DSCHRG MED/CURRENT MED MERGE: CPT | Performed by: STUDENT IN AN ORGANIZED HEALTH CARE EDUCATION/TRAINING PROGRAM

## 2024-10-08 PROCEDURE — 99214 OFFICE O/P EST MOD 30 MIN: CPT | Performed by: STUDENT IN AN ORGANIZED HEALTH CARE EDUCATION/TRAINING PROGRAM

## 2024-10-08 PROCEDURE — 1159F MED LIST DOCD IN RCRD: CPT | Performed by: STUDENT IN AN ORGANIZED HEALTH CARE EDUCATION/TRAINING PROGRAM

## 2024-10-08 PROCEDURE — 1126F AMNT PAIN NOTED NONE PRSNT: CPT | Performed by: STUDENT IN AN ORGANIZED HEALTH CARE EDUCATION/TRAINING PROGRAM

## 2024-10-08 PROCEDURE — 1036F TOBACCO NON-USER: CPT | Performed by: STUDENT IN AN ORGANIZED HEALTH CARE EDUCATION/TRAINING PROGRAM

## 2024-10-08 PROCEDURE — 3008F BODY MASS INDEX DOCD: CPT | Performed by: STUDENT IN AN ORGANIZED HEALTH CARE EDUCATION/TRAINING PROGRAM

## 2024-10-08 RX ORDER — AMIODARONE HYDROCHLORIDE 200 MG/1
200 TABLET ORAL DAILY
Qty: 30 TABLET | Refills: 3 | Status: SHIPPED | OUTPATIENT
Start: 2024-10-08 | End: 2025-02-05

## 2024-10-08 RX ORDER — ISOSORBIDE MONONITRATE 30 MG/1
30 TABLET, EXTENDED RELEASE ORAL DAILY
Qty: 30 TABLET | Refills: 11 | Status: SHIPPED | OUTPATIENT
Start: 2024-10-08 | End: 2025-10-08

## 2024-10-08 ASSESSMENT — ENCOUNTER SYMPTOMS
DEPRESSION: 0
OCCASIONAL FEELINGS OF UNSTEADINESS: 0
LOSS OF SENSATION IN FEET: 0

## 2024-10-08 ASSESSMENT — PATIENT HEALTH QUESTIONNAIRE - PHQ9
2. FEELING DOWN, DEPRESSED OR HOPELESS: NOT AT ALL
SUM OF ALL RESPONSES TO PHQ9 QUESTIONS 1 AND 2: 0
1. LITTLE INTEREST OR PLEASURE IN DOING THINGS: NOT AT ALL

## 2024-10-08 ASSESSMENT — PAIN SCALES - GENERAL: PAINLEVEL: 0-NO PAIN

## 2024-10-08 NOTE — PROGRESS NOTES
Chief Complaint:   Follow-up (Had cath)     History Of Present Illness:    Krish Batista is a 69 y.o. male p recently hospitalized for episode of severe anemia and NSTEMI.  He was catheter showed moderate mainly branch vessel disease.  Was taken off oral anticoagulation and placed on amiodarone.  He received multiple transfusions.  Has been stable since that time.  States he is getting strength back.  Plans for patient undergo chemotherapy.     Last Recorded Vitals:  Vitals:    10/08/24 1108   BP: 119/66   BP Location: Left arm   Patient Position: Sitting   BP Cuff Size: Adult   Pulse: 52   SpO2: 99%   Weight: 89.4 kg (197 lb 3.2 oz)   Height: 1.829 m (6')       Review of Systems  ROS      Allergies:  Amoxicillin    Outpatient Medications:  Current Outpatient Medications   Medication Instructions    amiodarone (PACERONE) 200 mg, oral, Daily    atorvastatin (LIPITOR) 80 mg, oral, Nightly    gabapentin (NEURONTIN) 300 mg, oral, 3 times daily    metoprolol succinate XL (TOPROL-XL) 50 mg, oral, Daily, Do not crush or chew.    metoprolol tartrate (LOPRESSOR) 50 mg, oral, 2 times daily    pantoprazole (PROTONIX) 40 mg, oral, Daily, Do not crush, chew, or split.       Physical Exam:  General: No acute distress,  A&O x3, mild frailty  Skin: Warm and dry  Neck: JVD is not elevated  ENT: Moist mucous membranes no lesions appreciated  Pulmonary: CTAB  Cards: Regular rate rhythm, no murmurs gallops or rubs appreciated normal S1-S2  Abdomen: Soft nontender nondistended  Extremities: No edema or cyanosis  Psych: Appropriate mood and affect        Last Labs:  CBC -  Lab Results   Component Value Date    WBC 7.1 10/04/2024    HGB 10.1 (L) 10/04/2024    HCT 34.2 (L) 10/04/2024     10/04/2024     10/04/2024       CMP -  Lab Results   Component Value Date    CALCIUM 8.6 10/04/2024    PHOS 5.3 (H) 09/24/2024    PROT 5.9 (L) 10/04/2024    ALBUMIN 3.6 10/04/2024    AST 39 10/04/2024    ALT 29 10/04/2024    ALKPHOS 74  10/04/2024    BILITOT 1.7 (H) 10/04/2024       LIPID PANEL -   Lab Results   Component Value Date    CHOL 158 09/12/2024    TRIG 45 09/12/2024    HDL 44.1 09/12/2024    CHHDL 3.6 09/12/2024    VLDL 9 09/12/2024    NHDL 114 09/12/2024       RENAL FUNCTION PANEL -   Lab Results   Component Value Date    GLUCOSE 94 10/04/2024     10/04/2024    K 4.4 10/04/2024     10/04/2024    CO2 26 10/04/2024    ANIONGAP 13 10/04/2024    BUN 15 10/04/2024    CREATININE 0.89 10/04/2024    CALCIUM 8.6 10/04/2024    PHOS 5.3 (H) 09/24/2024    ALBUMIN 3.6 10/04/2024        Lab Results   Component Value Date     (H) 09/23/2024    HGBA1C 5.4 08/02/2024       Last Cardiology Tests:  ECG:  Encounter Date: 09/23/24   Electrocardiogram, 12-lead PRN ACS symptoms   Result Value    Ventricular Rate 87    Atrial Rate 87    TX Interval 126    QRS Duration 82    QT Interval 431    QTC Calculation(Bazett) 519    P Axis 67    R Axis 57    T Axis 1    QRS Count 14    Q Onset 249    T Offset 465    QTC Fredericia 487    Narrative    Sinus rhythm  Abnormal R-wave progression, early transition  Borderline T abnormalities, inferior leads  Prolonged QT interval    See ED provider note for full interpretation and clinical correlation  Confirmed by Ema Merrill ([id number]) on 9/25/2024 8:00:16 PM        Echo:  No results found for this or any previous visit from the past 1095 days.       Ejection Fractions:  EF   Date/Time Value Ref Range Status   09/12/2024 11:41 AM 53 %      Assessment and Plan    1.  Newly discovered atrial fibrillation/flutter with RVR in the setting of severe micro anemia.  CHADS2 Vascor of 1.  Spontaneous conversion back into normal sinus rhythm.  Normal LV function with severe normal pathology per echocardiogram.  Recurrence of atrial fibrillation in the setting of severe anemia.  Oral anticoagulation discontinued.  Discussed option of Watchman for now however we will continue amiodarone at 200 mg daily.     2.   Severe microcytic anemia: Found to have colon cancer status post resection of the colonic mass.  He is scheduled to undergo adjuvant chemotherapy.    3.  NSTEMI/demand ischemia: Recent severe anemia has moderate blockage with a 60% ostial OM and 30% proximal RCA luminal irregularities in the LAD territory.  Will start patient on long-acting nitrates and loop of upcoming chemotherapy.     Continue amiodarone to 200 mg daily  Add isosorbide mononitrate at 30 mg daily  Follow-up in 3 months    (This note was generated with voice recognition software and may contain errors including spelling, grammar, syntax and missed recognition of what was dictated, of which may not have been fully corrected)       Luis Daniel Archibald MD PhD

## 2024-10-10 ENCOUNTER — TELEPHONE (OUTPATIENT)
Dept: HOME HEALTH SERVICES | Facility: HOME HEALTH | Age: 69
End: 2024-10-10
Payer: MEDICARE

## 2024-10-10 ENCOUNTER — APPOINTMENT (OUTPATIENT)
Dept: HEMATOLOGY/ONCOLOGY | Facility: CLINIC | Age: 69
End: 2024-10-10
Payer: MEDICARE

## 2024-10-10 NOTE — TELEPHONE ENCOUNTER
Hello,    Your recent home care referral for Krish Batista has been made a Non Admit with  Home Care due to Inability to Contact Patient. If you have further questions, feel free to reach out to our office at [phone].     Thank you,   [facility]

## 2024-10-11 ENCOUNTER — TELEPHONE (OUTPATIENT)
Dept: CARDIOLOGY | Facility: CLINIC | Age: 69
End: 2024-10-11

## 2024-10-11 NOTE — TELEPHONE ENCOUNTER
Krish called in stating that Dr. Archibald started him on Isosorbide on 10/8. He states that he has been having horrible headaches and feeling very bad. Please advise

## 2024-10-11 NOTE — TELEPHONE ENCOUNTER
Spoke with patient- he reports he is feeling fatigue, diarrhea, body aches but mostly the headaches are bothering him.    Per OV note 10/8/24:  3.  NSTEMI/demand ischemia: Recent severe anemia has moderate blockage with a 60% ostial OM and 30% proximal RCA luminal irregularities in the LAD territory.  Will start patient on long-acting nitrates and loop of upcoming chemotherapy.     Continue amiodarone to 200 mg daily  Add isosorbide mononitrate at 30 mg daily  Follow-up in 3 months    Advised headaches common s/e and sometimes will resolve as patient conts to take medication but will review with Dr. Archibald as well to see if he would like changes. Advised to continue medication and can take Tylenol for H/A. Patient verb understanding.

## 2024-10-15 ENCOUNTER — PATIENT OUTREACH (OUTPATIENT)
Dept: PRIMARY CARE | Facility: CLINIC | Age: 69
End: 2024-10-15
Payer: MEDICARE

## 2024-10-15 NOTE — PROGRESS NOTES
History Of Present Illness :    Mr. Batista presents for his posthospitalization/postoperative clinic visit following his recent admission and operation, and subsequent readmission to the hospital, and also for preoperative visit with regard to port placement.    On 9/15/2024 (in the midst of his admission from 9/11/2024 - 9/19/2024 for paroxysmal atrial fibrillation and NSTEMI/demand ischemia) the patient underwent the following procedure:    LAPAROSCOPIC SEGMENTAL COLECTOMY WITH MOBILIZATION OF SPLENIC FLEXURE/ POSSIBLE OPEN  63286 - [place] LAPAROSCOPY COLECTOMY PARTIAL W/ANASTOMOSIS     [place] LAPS MOBLJ SPLENIC FLXR PFRMD W/PRTL COLECTOMY [35223]    Pathology revealed:    FINAL DIAGNOSIS   A.  Colon, splenic flexure, resection:  -Invasive adenocarcinoma (3.1 cm), moderately differentiated.  -Carcinoma invades pericolonic tissue.  -Extramural venous and perineural invasion present.  -Resection margins are negative for carcinoma.  -Metastatic adenocarcinoma involving two of thirty-seven lymph nodes (2/37).  -Pathologic stage: pT3, pN1b.  -See comment and synoptic report.     Postoperatively, patient was discharged to home on Eliquis for his paroxysmal atrial fibrillation.  His postoperative course was shortly thereafter complicated by postoperative bleeding,  both intra-abdominally (peritoneal cavity) and intraluminally (anastomotic bleed).  This required a rehospitalization from 9/23/2024 - 9/29/2024, during which the patient received an initial dose of Kcentra, a total of 6 units of PRBC, 4 packs of FFP and vitamin K.  The anticoagulation was stopped on admission and not reinstituted.  Operative therapy was not necessary, and the bleeding stopped spontaneously.      Since discharge, the patient saw Dr. Lawson on 10/3/2024 and that note was reviewed.  FOLFOX was recommended and the patient agrees.  Therefore, central venous plan a port is indicated and recommended.    The patient saw his cardiologist Dr. Archibald on  10/8/2024 and it was recommended to continue amiodarone 20 mg p.o. daily and add isosorbide 30 mg daily.  The anticoagulation remains discontinued.    Recent laboratory values on 10/4/2024 reviewed.  CBC revealed improvement and stabilization of the H&H at 10.1 and 34.2.  Platelet count was normal.  CMP was unremarkable except for a total bilirubin of 1.8.    Patient presents again today with his niece, Mariama.  Since discharge the patient has been doing well overall.  His main complaints are tiredness and fatigue.  His appetite is also diminished.  He is having bowel movements which are nonbloody but these are loose.  He is passing a large volume of flatus also.  He did have some right upper quadrant intermittent abdominal pain initially but this has completely resolved.  His lower extremity edema has resolved.  He has lost weight.  He is 196 pounds at the August visit to my office and is now 190 pounds 6 ounces.    With regard to the discomfort, he is taking gabapentin 300 mg at night.  He is also taking an occasional ibuprofen.    9/14/2024:    Our service discussed with John from the thoracic service and mediastinoscopy/mediastinal lymph node biopsy not indicated at this point     Will proceed with operative therapy tomorrow morning as previously described -laparoscopic, possible open, segmental colectomy with mobilization of splenic flexure     Preoperative preoperative orders instituted  Mechanical and antibiotic bowel preparation  Will follow  ERP perioperatively     Change prophylactic Lovenox to heparin  Will likely institute for anticoagulation postoperative day 1 or 2     9/13/2024:  Krish Batista is a 69 y.o. male, well-known to me, presenting with chest pain.  The patient presented with PAF/flutter with RVR as well as chest pain on 9/12/2024.  EMR was reviewed.  Cardiac workup revealed a elevated troponin.  Patient was medically converted to sinus rhythm.  Cardiac catheterization revealed  nonobstructive coronary disease.     General surgery service asked to evaluate this patient with regard to continued treatment of his known adenocarcinoma of the colon.  For details, please see my detailed initial office consultation dated 8/23/2024 as well as chart update/documentation dated 8/28/2024.     Patient underwent PET scan imaging on 8/23/2024 which revealed:     IMPRESSION:  1. Intense focus of hypermetabolic activity corresponding to masslike  thickening within the left descending colon, likely corresponding to  patient's biopsy-proven adenocarcinoma of the colon.  2. Moderate right and small left pleural effusion with superimposed  atelectasis, without hypermetabolic activity. No definite  hypermetabolic lung nodules, with 2-3 mm nodules within the lungs  below resolution of PET. Recommend short-term CT chest for follow-up.  3. Mild hypermetabolic mediastinal and hilar lymph nodes are likely  reactive. This can also be followed up with a short-term CT chest.  4. No other evidence of hypermetabolic disease throughout the body.      I personally reviewed the report and the images.  The hypermetabolic cavity in the colon is at the splenic flexure.  There is only mild hypermetabolic activity within the mediastinum and hilar lymph nodes and these were thought to be reactive.     Patient underwent ultrasound-guided right thoracentesis on 9/3/2024.  Please see that report for details.  This 575 cc of clear fluid was obtained and the cytology was negative.    8/28/2024:  Patient was seen by me for initial office consultation for a biopsy-proven adenocarcinoma the left colon on 8/23/2024.  Please see that note for details.     Preoperative laboratory values 8/23/2024 revealed that the H&H improved to 9.0, and 32.2, from previous of 7.6 and 27.0 on 8/3/2024.  WBC and platelet count were normal.  BMP that same day was normal.  Serum CEA was also normal at 1.5 UG/L.     CT of the chest with IV contrast was  performed on 8/27/2024 and this revealed new bilateral pleural effusions, moderately large on the right and small on the left with atelectasis.  This is new since his recent CT scan of the abdomen pelvis on 8/1/2024.  In addition, enlarged mediastinal lymph nodes are identified measuring 6 to 10 mm in diameter.  There are multiple, at least 3.  These findings are worrisome for metastatic disease.     I did talk to Dr. Luis Daniel Archibald from cardiology on 8/26/2024 and the patient is cleared for operative therapy.  Please see the update of Dr. Archibald's note dated 8/6/2024.     Impression: Adenocarcinoma, left colon.  CT imaging reveals mediastinal lymphadenopathy and new bilateral pleural effusions, right greater than left.  Rule out metastatic disease.     Plan:     Will cancel operation scheduled for 8/29/2024  PET scan to rule out metastatic disease with regard to the mediastinal lymph nodes  Interventional radiology for right pleurocentesis  to obtain pleural fluid for cytology to rule out metastatic disease  Refer the patient to Dr. Rio Lawson from medical oncology  Will call patient with the above results, and further management plan    8/23/2024:  Krish Batista is a 69 y.o. male who presents on referral from Dr. Alton Steiner for surgical evaluation and treatment of a biopsy-proven left colon adenocarcinoma.  The patient's primary physician is Dr. Matthew Small.       The patient saw Dr. Small on 3/25/2024 complaining of fatigue and malaise.  Workup ensued.  Laboratory values from 3/28/2024 revealed significant anemia at 8.1 and 28.2.  Cologuard was ordered and this was collected on 4/9/2024.  This resulted as positive on 4/23/2024.  Shortly thereafter, on 4/28/2024, Dr. Small referred the patient to gastroenterology.  The patient however failed to follow through.  Dr. Small saw him back in the office on 7/11/2024 to emphasize the importance of the referral.  He was also referred to general surgery.   Again the patient never followed through.  Repeat H&H on 7/11/2024 was 7.2 and 27.6.     The patient ultimately presented to the emergency department at [facility] on 8/1/2024 secondary to increasing fatigue and weakness.  He was diagnosed with severe microcytic anemia.  H&H on admission was 7.4 and 27.4.  He was transfused 2 units of packed red blood cells.  Gastroenterology was consulted and recommended outpatient follow-up upper and lower endoscopy.  On discharge 2 days later, on 8/3/2024, his H&H was 7.6 and 27.0.   In the emergency department, the patient was found to be in atrial flutter/fibrillation with RVR.  Cardiology was consulted.  He spontaneously converted to normal sinus rhythm.  Echocardiogram during that admission revealed normal ejection fraction without significant valvular pathology.  Anticoagulation was not initiated.     The patient did follow-up with Dr. Luis Daniel Archibald from cardiology on 8/6/2024.  Recommendation was continued metoprolol 50 mg p.o. twice daily with a Holter monitor.  Again, anticoagulation was not recommended.     Thereafter, the patient underwent upper and lower endoscopy on 8/15/2024 per Dr. Alton Steiner as an outpatient:     EGD revealed:  Findings  Z-line 42 cm from the incisors. Mild erythema of the left corniculate tubercle in the larynx  Mild erythematous mucosa in the body of the stomach and antrum; performed cold forceps biopsy to rule out H. pylori  The duodenal bulb, 1st part of the duodenum and 2nd part of the duodenum appeared normal. Performed random biopsy using biopsy forceps to rule out celiac disease.  Colonoscopy revealed:  Findings  Internal small hemorrhoids  One 8 mm sessile polyp 20 cm from the anal verge; lift performed with eleview injected into the submucosa; completely removed target lesion en bloc by EMR and retrieved specimen. EMR was performed with a cold snare. Post-procedure bleeding was not visualized  Single friable and ulcerated mass (not  traversable) 40 cm from the anal verge, covering the whole circumference; bleeding occurred before intervention; performed cold forceps biopsy with partial removal; tattooed distal to the finding with spot     Pathology revealed:   FINAL DIAGNOSIS   A.  Duodenum, first part, biopsy:  Duodenal mucosa within normal limits.     B.  Stomach, biopsy:  Antrum and corpus mucosa within normal limits.     C.  Colon, mass at 40 cm, biopsy:  Fragments of invasive adenocarcinoma, moderately differentiated.     D.  Colon, 20 cm, polypectomy:  Tubular adenoma.      On admission to the hospital on 8/1/2024, the patient did undergo imaging.  PA lateral chest x-ray was negative.  CT scan was performed of the abdomen pelvis with IV contrast.  I personally reviewed the report and the images.  This was read as essentially negative.  Please see the report for details.  On my retrospective review, there may be a mass at the splenic flexure.       To me, the patient notes a similar history as above.  He notes that he did not pursue workup with a Cologuard initially because he had more important family things to take care of.  Nonetheless, the patient ultimately presented to the emergency department on the advice of Dr. Small as his tiredness and fatigue progressed to the point that he could not walk back and forth to the mailbox.  He became very tired and somewhat short of breath.     Since discharge from the hospital, the patient still has tiredness and fatigue.  He is eating.  He notes that he does have left mid abdominal pain for a number of months.  He notes that the tiredness and fatigue started in March of this year but really worsened in May and June.  At that same time he noticed some black stools.  He noted occasional blood with the bowel movements also.  He denies fever chills sweats nausea vomiting diarrhea or constipation.     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  He  still has a high riding right testicle.     Patient is single and lives in a house in [place].  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  He has never been .  He has no children.  He presents today Mariama Arriola, his niece and medical POA.     Past Medical History   Medical History        Past Medical History:   Diagnosis Date    Personal history of other diseases of the circulatory system       History of Raynaud's syndrome            Surgical History    Surgical History         Past Surgical History:   Procedure Laterality Date    OTHER SURGICAL HISTORY   07/13/2021     Sinus surgery    OTHER SURGICAL HISTORY   07/13/2021     Testicular surgery    OTHER SURGICAL HISTORY   07/13/2021     Tonsillectomy            Allergies   RX Allergies         Allergies   Allergen Reactions    Amoxicillin Unknown       Pt cannot remember reaction            Home Meds       Current Outpatient Medications   Medication Instructions    ferrous sulfate 65 mg, oral, Daily, Do not crush, chew, or split.    [START ON 9/2/2024] metoprolol tartrate (LOPRESSOR) 50 mg, oral, 2 times daily    multivitamin with minerals tablet 1 tablet, oral, Daily    pantoprazole (PROTONIX) 40 mg, oral, Daily, Do not crush, chew, or split.         Family History    Family History   No family history on file.         Social History  Social History   Social History           Tobacco Use    Smoking status: Never    Smokeless tobacco: Never            Review Of Systems    Review of Systems     General: Not Present- Obesity, HIV, MRSA, Recent Cold/Flu,  and VRE.  HEENT: Not Present- Migraine, Cataracts, Glaucoma, Macular Degeneration and Retinal Detachment.  Respiratory:Not Present- Asthma, Chronic Cough, Difficulty Breathing on Exertion, Difficulty Breathing at Rest, Emphysema, Frequent Bronchitis, Home CPAP/BiPAP, Home Oxygen, Pulmonary Embolus, Pneumonia/TB, Sleep Apnea and Snoring.  Cardiovascular: Not  Present- Chest Pain, Congestive Heart Failure, Heart Attack, Coronary Artery Disease, Heart Stent, High Cholesterol/Lipids,Hypertension, Internal Defibrillator, Irregular Heart Beat, Mitral Valve Prolapse, Murmur, Pacemaker and Peripheral Vascular Disease.  Gastrointestinal: Not Present- Heartburn, Hepatitis, Hiatal Hernia, Jaundice, Reflux, Stomach Ulcer and IBS.  Male Genitourinary: Not Present- Enlarged Prostate, Kidney Failure, Kidney Stones, Dialysis and Urinary Tract Infection.  Musculoskeletal: Not Present- Arthritis, Back Pain and Fibromyalgia.  Neurological: Not Present- Headaches, Numbness, Tingling, Seizures, Stroke,  Shunt and Weakness.  Psychiatric: Not Present- Anxiety, Bipolar, Depression and Panic Attacks.  Endocrine: Not Present- Diabetes, Hyperthyroidism, Hypothyroidism and Low Blood Sugar.  Hematology: Not Present- Abnormal Bleeding and Blood Clots.     Vitals  There were no vitals taken for this visit.      Physical Exam   General Complete Physical Exam     The physical exam findings are as follows:     General Appearance - Cooperative and Well groomed. Not in acute distress. Build & Nutrition - Well nourished.  Posture - Normal posture. Gait - Normal. Hydration - Well hydrated.     Integumentary  General Characteristics: Overall examination of the patient's skin reveals - no rashes, no suspicious lesions, no bruises and no evidence of scars. Color - normal coloration of skin. Skin Moisture - normal skin moisture. Temperature - normal  warmth is noted. Texture - normal skin texture.     Head and Neck  Head - normocephalic, atraumatic with no lesions or palpable masses.  Neck  Global Assessment - full range of motion. no bruit auscultated on the right, no bruit auscultated on the left, non-tender, no lymphadenopathy, no palpable mass on the right and no palpable mass on the left.  Trachea - midline.  Thyroid Gland Characteristics - no palpable nodules, normal position, symmetric and smooth.      Eyes  Sclera/Conjunctiva - Bilateral - Normal. Pupil - Bilateral - Accommodating, Direct reaction to light normal and Equal.     ENMT  Global Assessment  Upon examination of the ears, nose, mouth and throat - examination of the oral cavity reveals normal lips, teeth, gums and oral mucosa and hard and soft palates, tongue, tonsils and posterior pharynx are moist and symmetric without lesions.     Chest and Lung Exam  Inspection: Shape - Symmetric. Movements - Symmetrical. Accessory muscles - No use of accessory muscles in breathing.  Percussion:  Quality and Intensity: - Percussion normal.  Palpation: - Palpation normal.  Auscultation:  Breath sounds: - Normal and equal bilaterally.  Adventitious sounds: - none bilaterally.     Cardiovascular  Inspection: Carotid artery - Bilateral - Inspection Normal.  Palpation/Percussion: Examination by palpation and percussion reveals - No Thrills.  Cardiac Borders - Normal.  Auscultation: Rhythm - Regular. Rate: Regular.  Heart Sounds - Normal heart sounds, S1 WNL and S2 WNL.  Murmurs & Other Heart Sounds: Auscultation of the heart reveals - No Murmurs or other extra heart sounds.     Abdomen  Inspection: Inspection of the abdomen reveals - No Hernias.  Palpation/Percussion: Palpation and Percussion of the abdomen reveal -no tenderness and No Palpable abdominal masses.  Liver: - Normal.  Spleen: - Normal.  Auscultation: Auscultation of the abdomen reveals - Bowel sounds normal.  Surgical scars: Old tangential right inguinal    New abdominal incisions from recent laparoscopic colectomy: 7 cm midline periumbilical, laparoscopic x 4, one in each quadrant; he still has some mild resolving ecchymosis inferior to the umbilicus     Inguinal and External Genitalia     The patient was examined supine, and standing, with and without Valsalva. There is no evidence of inguinal or femoral hernia or lymphadenopathy bilaterally. The testes are mildly atrophic and symmetric, with no masses,  nodules, or tenderness.  The patient's left testicle in its normal position at the base of the scrotum.  The right testicle is very high riding near the right external ring.     Rectal - Did not examine.     Peripheral Vascular  Lower Extremity: Inspection - Bilateral - No Varicose veins.  Palpation: Edema - Bilateral - No edema.     Musculoskeletal  Global Assessment  Right Upper Extremity - no deformities, masses or tenderness, no known fractures, normal strength and tone and  normal range of motion without pain. Left Upper Extremity - no deformities, masses or tenderness, no known fractures,  normal strength and tone and normal range of motion without pain. Right Lower Extremity - no deformities, masses or  tenderness, no known fractures, normal strength and tone and normal range of motion without pain. Left Lower  Extremity - no deformities, masses or tenderness, no known fractures, normal strength and tone and normal range of  motion without pain.     Lymphatic  Head & Neck  General Head & Neck Lymphatics:  Bilateral: Description - Normal.  Axillary  General Axillary Region:  Bilateral: Description - Normal.  Femoral & Inguinal  Generalized Femoral & Inguinal Lymphatics:  Bilateral: Description - Normal.     Assessment/Plan      Mr. 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  He is status post laparoscopic segmental colectomy with mobilization of the splenic flexure on 9/15/2024.    Postoperatively, patient was discharged to home on Eliquis for his paroxysmal atrial fibrillation.  His postoperative course was subsequent complicated by postoperative bleeding both intra-abdominal he and intraluminally (anastomotic bleed).  This required a rehospitalization from 9/23/2024 - 9/29/2024 during which the patient received an initial dose of Kcentra, a total of 6 units of PRBC, 4 packs of FFP and vitamin K.  The anticoagulation was stopped on admission and not reinstituted.   Operative therapy was not necessary and the bleeding stopped spontaneously.      Since discharge, the patient saw Dr. Lawson on 10/3/2024 and that note was reviewed.  FOLFOX was recommended and the patient agrees.  Therefore, central venous plan a port is indicated and recommended.    The patient saw his cardiologist Dr. Archibald on 10/8/2024 and it was recommended to continue amiodarone 20 mg p.o. daily and add isosorbide 30 mg daily.  The anticoagulation remains discontinued.    Recent laboratory values on 10/4/2024 reviewed.  CBC revealed improvement and stabilization of the H&H at 10.1 and 34.2.  Platelet count was normal.  CMP was unremarkable except for a total bilirubin of 1.8.    Plan:    We will proceed with placement of a central venous implanted port under local anesthesia and IV sedation (MAC anesthesia) as an outpatient on Thursday, 10[facility]. ([id number])    No further preoperative testing is necessary.    Patient will see me for a postoperative visit my [facility] office on Wednesday, 11/6/2024.    Insertion of Central Venous Implanted Port Consent: The procedure was explained to the patient in detail, including the risks, benefits and alternatives. The risks include, but are not limited to, infection, bleeding, hematoma, seroma, need for further surgery, hemothorax, hemomediastinum, pneumothorax, pneumomediastinum, venous thrombosis, catheter malfunction, catheter port infection, need for removal of port and catheter.  The patient agreed with the plan and signed the electronic consent.

## 2024-10-15 NOTE — PROGRESS NOTES
Follow up call after hospitalization.  At time of outreach call the patient feels as if their condition has improved some. He reports he does have aches and pains but to be expected. He has been seeing several specialist including hem Onc and cardiology .   Addressed any questions or concerns.    BP at goal by end of appt, con't current meds

## 2024-10-15 NOTE — H&P (VIEW-ONLY)
History Of Present Illness :    Mr. Batista presents for his posthospitalization/postoperative clinic visit following his recent admission and operation, and subsequent readmission to the hospital, and also for preoperative visit with regard to port placement.    On 9/15/2024 (in the midst of his admission from 9/11/2024 - 9/19/2024 for paroxysmal atrial fibrillation and NSTEMI/demand ischemia) the patient underwent the following procedure:    LAPAROSCOPIC SEGMENTAL COLECTOMY WITH MOBILIZATION OF SPLENIC FLEXURE/ POSSIBLE OPEN  30426 - TN LAPAROSCOPY COLECTOMY PARTIAL W/ANASTOMOSIS     TN LAPS MOBLJ SPLENIC FLXR PFRMD W/PRTL COLECTOMY [01661]    Pathology revealed:    FINAL DIAGNOSIS   A.  Colon, splenic flexure, resection:  -Invasive adenocarcinoma (3.1 cm), moderately differentiated.  -Carcinoma invades pericolonic tissue.  -Extramural venous and perineural invasion present.  -Resection margins are negative for carcinoma.  -Metastatic adenocarcinoma involving two of thirty-seven lymph nodes (2/37).  -Pathologic stage: pT3, pN1b.  -See comment and synoptic report.     Postoperatively, patient was discharged to home on Eliquis for his paroxysmal atrial fibrillation.  His postoperative course was shortly thereafter complicated by postoperative bleeding,  both intra-abdominally (peritoneal cavity) and intraluminally (anastomotic bleed).  This required a rehospitalization from 9/23/2024 - 9/29/2024, during which the patient received an initial dose of Kcentra, a total of 6 units of PRBC, 4 packs of FFP and vitamin K.  The anticoagulation was stopped on admission and not reinstituted.  Operative therapy was not necessary, and the bleeding stopped spontaneously.      Since discharge, the patient saw Dr. Lawson on 10/3/2024 and that note was reviewed.  FOLFOX was recommended and the patient agrees.  Therefore, central venous plan a port is indicated and recommended.    The patient saw his cardiologist Dr. Archibald on  10/8/2024 and it was recommended to continue amiodarone 20 mg p.o. daily and add isosorbide 30 mg daily.  The anticoagulation remains discontinued.    Recent laboratory values on 10/4/2024 reviewed.  CBC revealed improvement and stabilization of the H&H at 10.1 and 34.2.  Platelet count was normal.  CMP was unremarkable except for a total bilirubin of 1.8.    Patient presents again today with his niece, Mariama.  Since discharge the patient has been doing well overall.  His main complaints are tiredness and fatigue.  His appetite is also diminished.  He is having bowel movements which are nonbloody but these are loose.  He is passing a large volume of flatus also.  He did have some right upper quadrant intermittent abdominal pain initially but this has completely resolved.  His lower extremity edema has resolved.  He has lost weight.  He is 196 pounds at the August visit to my office and is now 190 pounds 6 ounces.    With regard to the discomfort, he is taking gabapentin 300 mg at night.  He is also taking an occasional ibuprofen.    9/14/2024:    Our service discussed with John from the thoracic service and mediastinoscopy/mediastinal lymph node biopsy not indicated at this point     Will proceed with operative therapy tomorrow morning as previously described -laparoscopic, possible open, segmental colectomy with mobilization of splenic flexure     Preoperative preoperative orders instituted  Mechanical and antibiotic bowel preparation  Will follow  ERP perioperatively     Change prophylactic Lovenox to heparin  Will likely institute for anticoagulation postoperative day 1 or 2     9/13/2024:  Krish Batista is a 69 y.o. male, well-known to me, presenting with chest pain.  The patient presented with PAF/flutter with RVR as well as chest pain on 9/12/2024.  EMR was reviewed.  Cardiac workup revealed a elevated troponin.  Patient was medically converted to sinus rhythm.  Cardiac catheterization revealed  nonobstructive coronary disease.     General surgery service asked to evaluate this patient with regard to continued treatment of his known adenocarcinoma of the colon.  For details, please see my detailed initial office consultation dated 8/23/2024 as well as chart update/documentation dated 8/28/2024.     Patient underwent PET scan imaging on 8/23/2024 which revealed:     IMPRESSION:  1. Intense focus of hypermetabolic activity corresponding to masslike  thickening within the left descending colon, likely corresponding to  patient's biopsy-proven adenocarcinoma of the colon.  2. Moderate right and small left pleural effusion with superimposed  atelectasis, without hypermetabolic activity. No definite  hypermetabolic lung nodules, with 2-3 mm nodules within the lungs  below resolution of PET. Recommend short-term CT chest for follow-up.  3. Mild hypermetabolic mediastinal and hilar lymph nodes are likely  reactive. This can also be followed up with a short-term CT chest.  4. No other evidence of hypermetabolic disease throughout the body.      I personally reviewed the report and the images.  The hypermetabolic cavity in the colon is at the splenic flexure.  There is only mild hypermetabolic activity within the mediastinum and hilar lymph nodes and these were thought to be reactive.     Patient underwent ultrasound-guided right thoracentesis on 9/3/2024.  Please see that report for details.  This 575 cc of clear fluid was obtained and the cytology was negative.    8/28/2024:  Patient was seen by me for initial office consultation for a biopsy-proven adenocarcinoma the left colon on 8/23/2024.  Please see that note for details.     Preoperative laboratory values 8/23/2024 revealed that the H&H improved to 9.0, and 32.2, from previous of 7.6 and 27.0 on 8/3/2024.  WBC and platelet count were normal.  BMP that same day was normal.  Serum CEA was also normal at 1.5 UG/L.     CT of the chest with IV contrast was  performed on 8/27/2024 and this revealed new bilateral pleural effusions, moderately large on the right and small on the left with atelectasis.  This is new since his recent CT scan of the abdomen pelvis on 8/1/2024.  In addition, enlarged mediastinal lymph nodes are identified measuring 6 to 10 mm in diameter.  There are multiple, at least 3.  These findings are worrisome for metastatic disease.     I did talk to Dr. Luis Daniel Archibald from cardiology on 8/26/2024 and the patient is cleared for operative therapy.  Please see the update of Dr. Archibald's note dated 8/6/2024.     Impression: Adenocarcinoma, left colon.  CT imaging reveals mediastinal lymphadenopathy and new bilateral pleural effusions, right greater than left.  Rule out metastatic disease.     Plan:     Will cancel operation scheduled for 8/29/2024  PET scan to rule out metastatic disease with regard to the mediastinal lymph nodes  Interventional radiology for right pleurocentesis  to obtain pleural fluid for cytology to rule out metastatic disease  Refer the patient to Dr. Rio Lawson from medical oncology  Will call patient with the above results, and further management plan    8/23/2024:  Krish Batista is a 69 y.o. male who presents on referral from Dr. Alton Steiner for surgical evaluation and treatment of a biopsy-proven left colon adenocarcinoma.  The patient's primary physician is Dr. Matthew Small.       The patient saw Dr. Small on 3/25/2024 complaining of fatigue and malaise.  Workup ensued.  Laboratory values from 3/28/2024 revealed significant anemia at 8.1 and 28.2.  Cologuard was ordered and this was collected on 4/9/2024.  This resulted as positive on 4/23/2024.  Shortly thereafter, on 4/28/2024, Dr. Small referred the patient to gastroenterology.  The patient however failed to follow through.  Dr. Small saw him back in the office on 7/11/2024 to emphasize the importance of the referral.  He was also referred to general surgery.   Again the patient never followed through.  Repeat H&H on 7/11/2024 was 7.2 and 27.6.     The patient ultimately presented to the emergency department at [facility] on 8/1/2024 secondary to increasing fatigue and weakness.  He was diagnosed with severe microcytic anemia.  H&H on admission was 7.4 and 27.4.  He was transfused 2 units of packed red blood cells.  Gastroenterology was consulted and recommended outpatient follow-up upper and lower endoscopy.  On discharge 2 days later, on 8/3/2024, his H&H was 7.6 and 27.0.   In the emergency department, the patient was found to be in atrial flutter/fibrillation with RVR.  Cardiology was consulted.  He spontaneously converted to normal sinus rhythm.  Echocardiogram during that admission revealed normal ejection fraction without significant valvular pathology.  Anticoagulation was not initiated.     The patient did follow-up with Dr. Luis Daniel Archibald from cardiology on 8/6/2024.  Recommendation was continued metoprolol 50 mg p.o. twice daily with a Holter monitor.  Again, anticoagulation was not recommended.     Thereafter, the patient underwent upper and lower endoscopy on 8/15/2024 per Dr. Alton Steiner as an outpatient:     EGD revealed:  Findings  Z-line 42 cm from the incisors. Mild erythema of the left corniculate tubercle in the larynx  Mild erythematous mucosa in the body of the stomach and antrum; performed cold forceps biopsy to rule out H. pylori  The duodenal bulb, 1st part of the duodenum and 2nd part of the duodenum appeared normal. Performed random biopsy using biopsy forceps to rule out celiac disease.  Colonoscopy revealed:  Findings  Internal small hemorrhoids  One 8 mm sessile polyp 20 cm from the anal verge; lift performed with eleview injected into the submucosa; completely removed target lesion en bloc by EMR and retrieved specimen. EMR was performed with a cold snare. Post-procedure bleeding was not visualized  Single friable and ulcerated mass (not  traversable) 40 cm from the anal verge, covering the whole circumference; bleeding occurred before intervention; performed cold forceps biopsy with partial removal; tattooed distal to the finding with spot     Pathology revealed:   FINAL DIAGNOSIS   A.  Duodenum, first part, biopsy:  Duodenal mucosa within normal limits.     B.  Stomach, biopsy:  Antrum and corpus mucosa within normal limits.     C.  Colon, mass at 40 cm, biopsy:  Fragments of invasive adenocarcinoma, moderately differentiated.     D.  Colon, 20 cm, polypectomy:  Tubular adenoma.      On admission to the hospital on 8/1/2024, the patient did undergo imaging.  PA lateral chest x-ray was negative.  CT scan was performed of the abdomen pelvis with IV contrast.  I personally reviewed the report and the images.  This was read as essentially negative.  Please see the report for details.  On my retrospective review, there may be a mass at the splenic flexure.       To me, the patient notes a similar history as above.  He notes that he did not pursue workup with a Cologuard initially because he had more important family things to take care of.  Nonetheless, the patient ultimately presented to the emergency department on the advice of Dr. Small as his tiredness and fatigue progressed to the point that he could not walk back and forth to the mailbox.  He became very tired and somewhat short of breath.     Since discharge from the hospital, the patient still has tiredness and fatigue.  He is eating.  He notes that he does have left mid abdominal pain for a number of months.  He notes that the tiredness and fatigue started in March of this year but really worsened in May and June.  At that same time he noticed some black stools.  He noted occasional blood with the bowel movements also.  He denies fever chills sweats nausea vomiting diarrhea or constipation.     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  He  still has a high riding right testicle.     Patient is single and lives in a house in [place].  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  He has never been .  He has no children.  He presents today Mariama Arriola, his niece and medical POA.     Past Medical History   Medical History        Past Medical History:   Diagnosis Date    Personal history of other diseases of the circulatory system       History of Raynaud's syndrome            Surgical History    Surgical History         Past Surgical History:   Procedure Laterality Date    OTHER SURGICAL HISTORY   07/13/2021     Sinus surgery    OTHER SURGICAL HISTORY   07/13/2021     Testicular surgery    OTHER SURGICAL HISTORY   07/13/2021     Tonsillectomy            Allergies   RX Allergies         Allergies   Allergen Reactions    Amoxicillin Unknown       Pt cannot remember reaction            Home Meds       Current Outpatient Medications   Medication Instructions    ferrous sulfate 65 mg, oral, Daily, Do not crush, chew, or split.    [START ON 9/2/2024] metoprolol tartrate (LOPRESSOR) 50 mg, oral, 2 times daily    multivitamin with minerals tablet 1 tablet, oral, Daily    pantoprazole (PROTONIX) 40 mg, oral, Daily, Do not crush, chew, or split.         Family History    Family History   No family history on file.         Social History  Social History   Social History           Tobacco Use    Smoking status: Never    Smokeless tobacco: Never            Review Of Systems    Review of Systems     General: Not Present- Obesity, HIV, MRSA, Recent Cold/Flu,  and VRE.  HEENT: Not Present- Migraine, Cataracts, Glaucoma, Macular Degeneration and Retinal Detachment.  Respiratory:Not Present- Asthma, Chronic Cough, Difficulty Breathing on Exertion, Difficulty Breathing at Rest, Emphysema, Frequent Bronchitis, Home CPAP/BiPAP, Home Oxygen, Pulmonary Embolus, Pneumonia/TB, Sleep Apnea and Snoring.  Cardiovascular: Not  Present- Chest Pain, Congestive Heart Failure, Heart Attack, Coronary Artery Disease, Heart Stent, High Cholesterol/Lipids,Hypertension, Internal Defibrillator, Irregular Heart Beat, Mitral Valve Prolapse, Murmur, Pacemaker and Peripheral Vascular Disease.  Gastrointestinal: Not Present- Heartburn, Hepatitis, Hiatal Hernia, Jaundice, Reflux, Stomach Ulcer and IBS.  Male Genitourinary: Not Present- Enlarged Prostate, Kidney Failure, Kidney Stones, Dialysis and Urinary Tract Infection.  Musculoskeletal: Not Present- Arthritis, Back Pain and Fibromyalgia.  Neurological: Not Present- Headaches, Numbness, Tingling, Seizures, Stroke,  Shunt and Weakness.  Psychiatric: Not Present- Anxiety, Bipolar, Depression and Panic Attacks.  Endocrine: Not Present- Diabetes, Hyperthyroidism, Hypothyroidism and Low Blood Sugar.  Hematology: Not Present- Abnormal Bleeding and Blood Clots.     Vitals  There were no vitals taken for this visit.      Physical Exam   General Complete Physical Exam     The physical exam findings are as follows:     General Appearance - Cooperative and Well groomed. Not in acute distress. Build & Nutrition - Well nourished.  Posture - Normal posture. Gait - Normal. Hydration - Well hydrated.     Integumentary  General Characteristics: Overall examination of the patient's skin reveals - no rashes, no suspicious lesions, no bruises and no evidence of scars. Color - normal coloration of skin. Skin Moisture - normal skin moisture. Temperature - normal  warmth is noted. Texture - normal skin texture.     Head and Neck  Head - normocephalic, atraumatic with no lesions or palpable masses.  Neck  Global Assessment - full range of motion. no bruit auscultated on the right, no bruit auscultated on the left, non-tender, no lymphadenopathy, no palpable mass on the right and no palpable mass on the left.  Trachea - midline.  Thyroid Gland Characteristics - no palpable nodules, normal position, symmetric and smooth.      Eyes  Sclera/Conjunctiva - Bilateral - Normal. Pupil - Bilateral - Accommodating, Direct reaction to light normal and Equal.     ENMT  Global Assessment  Upon examination of the ears, nose, mouth and throat - examination of the oral cavity reveals normal lips, teeth, gums and oral mucosa and hard and soft palates, tongue, tonsils and posterior pharynx are moist and symmetric without lesions.     Chest and Lung Exam  Inspection: Shape - Symmetric. Movements - Symmetrical. Accessory muscles - No use of accessory muscles in breathing.  Percussion:  Quality and Intensity: - Percussion normal.  Palpation: - Palpation normal.  Auscultation:  Breath sounds: - Normal and equal bilaterally.  Adventitious sounds: - none bilaterally.     Cardiovascular  Inspection: Carotid artery - Bilateral - Inspection Normal.  Palpation/Percussion: Examination by palpation and percussion reveals - No Thrills.  Cardiac Borders - Normal.  Auscultation: Rhythm - Regular. Rate: Regular.  Heart Sounds - Normal heart sounds, S1 WNL and S2 WNL.  Murmurs & Other Heart Sounds: Auscultation of the heart reveals - No Murmurs or other extra heart sounds.     Abdomen  Inspection: Inspection of the abdomen reveals - No Hernias.  Palpation/Percussion: Palpation and Percussion of the abdomen reveal -no tenderness and No Palpable abdominal masses.  Liver: - Normal.  Spleen: - Normal.  Auscultation: Auscultation of the abdomen reveals - Bowel sounds normal.  Surgical scars: Old tangential right inguinal    New abdominal incisions from recent laparoscopic colectomy: 7 cm midline periumbilical, laparoscopic x 4, one in each quadrant; he still has some mild resolving ecchymosis inferior to the umbilicus     Inguinal and External Genitalia     The patient was examined supine, and standing, with and without Valsalva. There is no evidence of inguinal or femoral hernia or lymphadenopathy bilaterally. The testes are mildly atrophic and symmetric, with no masses,  nodules, or tenderness.  The patient's left testicle in its normal position at the base of the scrotum.  The right testicle is very high riding near the right external ring.     Rectal - Did not examine.     Peripheral Vascular  Lower Extremity: Inspection - Bilateral - No Varicose veins.  Palpation: Edema - Bilateral - No edema.     Musculoskeletal  Global Assessment  Right Upper Extremity - no deformities, masses or tenderness, no known fractures, normal strength and tone and  normal range of motion without pain. Left Upper Extremity - no deformities, masses or tenderness, no known fractures,  normal strength and tone and normal range of motion without pain. Right Lower Extremity - no deformities, masses or  tenderness, no known fractures, normal strength and tone and normal range of motion without pain. Left Lower  Extremity - no deformities, masses or tenderness, no known fractures, normal strength and tone and normal range of  motion without pain.     Lymphatic  Head & Neck  General Head & Neck Lymphatics:  Bilateral: Description - Normal.  Axillary  General Axillary Region:  Bilateral: Description - Normal.  Femoral & Inguinal  Generalized Femoral & Inguinal Lymphatics:  Bilateral: Description - Normal.     Assessment/Plan      Mr. 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  He is status post laparoscopic segmental colectomy with mobilization of the splenic flexure on 9/15/2024.    Postoperatively, patient was discharged to home on Eliquis for his paroxysmal atrial fibrillation.  His postoperative course was subsequent complicated by postoperative bleeding both intra-abdominal he and intraluminally (anastomotic bleed).  This required a rehospitalization from 9/23/2024 - 9/29/2024 during which the patient received an initial dose of Kcentra, a total of 6 units of PRBC, 4 packs of FFP and vitamin K.  The anticoagulation was stopped on admission and not reinstituted.   Operative therapy was not necessary and the bleeding stopped spontaneously.      Since discharge, the patient saw Dr. Lawson on 10/3/2024 and that note was reviewed.  FOLFOX was recommended and the patient agrees.  Therefore, central venous plan a port is indicated and recommended.    The patient saw his cardiologist Dr. Archibald on 10/8/2024 and it was recommended to continue amiodarone 20 mg p.o. daily and add isosorbide 30 mg daily.  The anticoagulation remains discontinued.    Recent laboratory values on 10/4/2024 reviewed.  CBC revealed improvement and stabilization of the H&H at 10.1 and 34.2.  Platelet count was normal.  CMP was unremarkable except for a total bilirubin of 1.8.    Plan:    We will proceed with placement of a central venous implanted port under local anesthesia and IV sedation (MAC anesthesia) as an outpatient on Thursday, 10/24/2024 at [facility]. ([id number] & [id number])    No further preoperative testing is necessary.    Patient will see me for a postoperative visit my [facility] office on Wednesday, 11/6/2024.    Insertion of Central Venous Implanted Port Consent: The procedure was explained to the patient in detail, including the risks, benefits and alternatives. The risks include, but are not limited to, infection, bleeding, hematoma, seroma, need for further surgery, hemothorax, hemomediastinum, pneumothorax, pneumomediastinum, venous thrombosis, catheter malfunction, catheter port infection, need for removal of port and catheter.  The patient agreed with the plan and signed the electronic consent.

## 2024-10-16 ENCOUNTER — APPOINTMENT (OUTPATIENT)
Dept: SURGERY | Facility: CLINIC | Age: 69
End: 2024-10-16
Payer: MEDICARE

## 2024-10-16 VITALS
BODY MASS INDEX: 25.79 KG/M2 | DIASTOLIC BLOOD PRESSURE: 68 MMHG | HEIGHT: 72 IN | OXYGEN SATURATION: 95 % | WEIGHT: 190.4 LBS | HEART RATE: 51 BPM | SYSTOLIC BLOOD PRESSURE: 132 MMHG | RESPIRATION RATE: 16 BRPM | TEMPERATURE: 98.2 F

## 2024-10-16 DIAGNOSIS — C18.9 STAGE III CARCINOMA OF COLON (MULTI): ICD-10-CM

## 2024-10-16 DIAGNOSIS — D50.0 IRON DEFICIENCY ANEMIA DUE TO CHRONIC BLOOD LOSS: ICD-10-CM

## 2024-10-16 DIAGNOSIS — I48.0 PAROXYSMAL ATRIAL FIBRILLATION (MULTI): Primary | ICD-10-CM

## 2024-10-16 PROCEDURE — 3008F BODY MASS INDEX DOCD: CPT | Performed by: SURGERY

## 2024-10-16 PROCEDURE — 1111F DSCHRG MED/CURRENT MED MERGE: CPT | Performed by: SURGERY

## 2024-10-16 PROCEDURE — 1036F TOBACCO NON-USER: CPT | Performed by: SURGERY

## 2024-10-16 PROCEDURE — 1159F MED LIST DOCD IN RCRD: CPT | Performed by: SURGERY

## 2024-10-16 PROCEDURE — 99215 OFFICE O/P EST HI 40 MIN: CPT | Performed by: SURGERY

## 2024-10-18 ENCOUNTER — TELEPHONE (OUTPATIENT)
Dept: HEMATOLOGY/ONCOLOGY | Facility: CLINIC | Age: 69
End: 2024-10-18

## 2024-10-18 ENCOUNTER — NUTRITION (OUTPATIENT)
Dept: HEMATOLOGY/ONCOLOGY | Facility: CLINIC | Age: 69
End: 2024-10-18

## 2024-10-18 ENCOUNTER — APPOINTMENT (OUTPATIENT)
Dept: HEMATOLOGY/ONCOLOGY | Facility: CLINIC | Age: 69
End: 2024-10-18
Payer: MEDICARE

## 2024-10-18 DIAGNOSIS — C18.9 ADENOCARCINOMA, COLON (MULTI): Primary | ICD-10-CM

## 2024-10-18 NOTE — PROGRESS NOTES
"NUTRITION COMMUNICATION NOTE    Krish Batista   70 yo male with newly diagnosed T3 N1 M0 adenocarcinoma of sigmoid colon s/p resection 9/15/24  -Will be starting FOLFOX soon.    REASON FOR COMMUNICATION: Patient was scheduled for Dr Lawson  this am and also had an  appointment for initial Nutrition Assessment during his infusion visit (scheduled to receive Feraheme).      Patient cancelled as he is not feeling well today.  He spoke with RN indicating that he's been having diarrhea 4-5x/day.  Felt feverish yesterday, although did not take temperature.  Orders were placed for stool cultures to rule out c.diff. RN asked this RDN to reach out to patient .    Will plan for full nutrition assessment next week when patient is back in clinic.  Today we reviewed tips for diarrhea and hydration.    -Discussed that a Low-Fiber diet is generally encouraged for 4-6 weeks after colon surgery while allowing for healing   -Suggested avoiding raw produce (salads, raw veggies, fruit skins) as this is difficult for body to digest and can exacerbate diarrhea.   -Discouraged regular milk/ice cream.  Discussed that yogurt, cottage cheese, and cheese are fine to consume.   -Encouraged starchy foods like white rice, potatoes, pasta, noodles to help with binding and stool bulk. Also suggested bananas and applesauce for firming.   -Discussed avoiding greasy/fried foods and limiting caffeine     -Strongly encouraged pushing hydration with caffeine -free fluids and suggested electrolyte containing beverage such as Pedialyte or sports drink to prevent dehydration    -Encouraged avoiding Imodium until c.diff results come back.  If negative, patient can begin use of Imodium, as instructed by RN.         *Will mail  written \"Low Fiber\" handout to patient  (however also reviewed guidelines today over the phone).           Time Spent  Prep time on day of patient encounter: 15 minutes  Time spent directly with patient, family or caregiver: 10 " minutes  Additional Time Spent on Patient Care Activities: 5 minutes  Documentation Time: 20 minutes  Other Time Spent: 0 minutes  Total: 50 minutes

## 2024-10-18 NOTE — TELEPHONE ENCOUNTER
Received message through automated system that patient cancelled FUV with Dr. Lwason today.      Call placed to patient.  He states last night he had a severe headache 7/10, took tylenol and now headache is 2/10.      States he had night sweats yesterday and felt like he had a fever.  Pt reports he did not take temperature and feels like he doesn't have a fever this morning.     Reports diarrhea, constant x 1 week.  Mostly 4-5x's/day.  Has not taken any Imodium.  Denies abdominal pain.  Denies blood in toilet with diarrhea.    Reports he normally drinks 1 quart daily however appetite is diminished.  Reports he ate a taco/coffee/water/stuffing yesterday.  Will pass message along to Christiana, dietician to speak with patient regarding nutrition.    Pt reports moderate or less fatigue.  Denies SOB or chest pain.  Denies feeling any abnormal heart beating.  States he feels his heart feels regular.      ADDENDUM: Discussed symptoms/assessment above with Dr. Lawson.  Orders for stool collection for C.diff.  Call returned to patient and informed of stool collection.  Advised after stool collection pt should proceed with taking Imodium and follow package instructions.  Encouraged fluid hydration.  Pt aware Christiana will contact him for nutrition advise.  Pt rescheduled for FUV with Dr. Lawson on 10/25/24 since he was unable to come in for the original FUV scheduled today.  Pt has appt with Dr. Clark on 10/24/24 for mediport placement.  Will also schedule PF/L on 10/25/24 for pre-treatment labs.  Pt encouraged to report to an emergency room if any worsening symptoms of diarrhea, fever, abdominal pain, lightheadedness, SOB, rectal bleeding, chest pain.  Pt agreeable to plan of care.  Patient verbalizes understanding of plan of care via teachback method.

## 2024-10-21 ENCOUNTER — TELEPHONE (OUTPATIENT)
Dept: SURGERY | Facility: CLINIC | Age: 69
End: 2024-10-21

## 2024-10-21 ENCOUNTER — LAB (OUTPATIENT)
Dept: LAB | Facility: LAB | Age: 69
End: 2024-10-21
Payer: MEDICARE

## 2024-10-21 ENCOUNTER — TELEPHONE (OUTPATIENT)
Dept: SURGERY | Facility: CLINIC | Age: 69
End: 2024-10-21
Payer: MEDICARE

## 2024-10-21 LAB — C DIF TOX TCDA+TCDB STL QL NAA+PROBE: NOT DETECTED

## 2024-10-21 PROCEDURE — 87493 C DIFF AMPLIFIED PROBE: CPT

## 2024-10-21 NOTE — TELEPHONE ENCOUNTER
Per kinsey he did not prescribed the medication refills pt is asking for. Pt is to reach out to his pcp for refills.

## 2024-10-21 NOTE — TELEPHONE ENCOUNTER
Patient called and stated that he is running low on several medications, and would like to know if he can have the prescriptions refilled. The medications are:  metoprolol succinate XL (Toprol-XL) 50 mg   pantoprazole (ProtoNix) 40 mg EC tablet   atorvastatin (Lipitor) 80 mg tablet   Please advise. Thank you.

## 2024-10-23 NOTE — PREPROCEDURE INSTRUCTIONS
Current Medications   Medication Instructions    amiodarone (Pacerone) 200 mg tablet Take morning of surgery with sip of water    isosorbide mononitrate ER (Imdur) 30 mg 24 hr tablet Continue until night before surgery          NPO Instructions:    Do not eat any food after midnight the night before your surgery/procedure.  You may have up to 13 ounces of clear liquids until TWO hours before surgery/procedure. This includes water, black tea/coffee, (no milk or cream) apple juice and electrolyte drinks (Gatorade).    Additional Instructions:     Day of Surgery: Arrive at 12:15 PM for 1:50 PM surgery    Enter through the main entrance of [facility], located at [address] [facility]. Proceed to registration, located in the back right hand corner. You will need your ID and insurance card for registration. Please ensure you have a responsible adult to drive you home.     Take a shower the morning of or night before your procedure. After you shower avoid lotions, powders, deodorants or anything applied to the skin. If you wear contacts or glasses, wear the glasses. If you do not have glasses, please bring a case for your contacts. You may wear hearing aids and dentures, bring a case for them or we will provide one. Make sure you wear something loose and comfortable. Keep in mind your surgery type and wear something that will accommodate incisions or bandages. Please remove all jewelry.     For further questions Mj VIVAR can be contacted at [phone] between 7AM-3PM.

## 2024-10-24 ENCOUNTER — APPOINTMENT (OUTPATIENT)
Dept: RADIOLOGY | Facility: HOSPITAL | Age: 69
End: 2024-10-24
Payer: MEDICARE

## 2024-10-24 ENCOUNTER — HOSPITAL ENCOUNTER (OUTPATIENT)
Facility: HOSPITAL | Age: 69
Setting detail: OUTPATIENT SURGERY
Discharge: HOME | End: 2024-10-24
Attending: SURGERY | Admitting: SURGERY
Payer: MEDICARE

## 2024-10-24 ENCOUNTER — ANESTHESIA (OUTPATIENT)
Dept: OPERATING ROOM | Facility: HOSPITAL | Age: 69
End: 2024-10-24
Payer: MEDICARE

## 2024-10-24 ENCOUNTER — ANESTHESIA EVENT (OUTPATIENT)
Dept: OPERATING ROOM | Facility: HOSPITAL | Age: 69
End: 2024-10-24
Payer: MEDICARE

## 2024-10-24 VITALS
BODY MASS INDEX: 26.7 KG/M2 | HEART RATE: 50 BPM | RESPIRATION RATE: 18 BRPM | WEIGHT: 197.09 LBS | HEIGHT: 72 IN | SYSTOLIC BLOOD PRESSURE: 132 MMHG | TEMPERATURE: 98.1 F | OXYGEN SATURATION: 97 % | DIASTOLIC BLOOD PRESSURE: 68 MMHG

## 2024-10-24 DIAGNOSIS — C18.9 STAGE III CARCINOMA OF COLON (MULTI): Primary | ICD-10-CM

## 2024-10-24 PROCEDURE — 71045 X-RAY EXAM CHEST 1 VIEW: CPT | Mod: 59

## 2024-10-24 PROCEDURE — 77001 FLUOROGUIDE FOR VEIN DEVICE: CPT | Performed by: SURGERY

## 2024-10-24 PROCEDURE — 7100000009 HC PHASE TWO TIME - INITIAL BASE CHARGE: Performed by: SURGERY

## 2024-10-24 PROCEDURE — 76000 FLUOROSCOPY <1 HR PHYS/QHP: CPT

## 2024-10-24 PROCEDURE — 2720000007 HC OR 272 NO HCPCS: Performed by: SURGERY

## 2024-10-24 PROCEDURE — 2500000004 HC RX 250 GENERAL PHARMACY W/ HCPCS (ALT 636 FOR OP/ED): Performed by: SURGERY

## 2024-10-24 PROCEDURE — 71045 X-RAY EXAM CHEST 1 VIEW: CPT | Performed by: RADIOLOGY

## 2024-10-24 PROCEDURE — 3600000002 HC OR TIME - INITIAL BASE CHARGE - PROCEDURE LEVEL TWO: Performed by: SURGERY

## 2024-10-24 PROCEDURE — 7100000010 HC PHASE TWO TIME - EACH INCREMENTAL 1 MINUTE: Performed by: SURGERY

## 2024-10-24 PROCEDURE — 2500000004 HC RX 250 GENERAL PHARMACY W/ HCPCS (ALT 636 FOR OP/ED): Performed by: ANESTHESIOLOGIST ASSISTANT

## 2024-10-24 PROCEDURE — 36561 INSERT TUNNELED CV CATH: CPT | Performed by: SURGERY

## 2024-10-24 PROCEDURE — 3600000007 HC OR TIME - EACH INCREMENTAL 1 MINUTE - PROCEDURE LEVEL TWO: Performed by: SURGERY

## 2024-10-24 PROCEDURE — 3700000001 HC GENERAL ANESTHESIA TIME - INITIAL BASE CHARGE: Performed by: SURGERY

## 2024-10-24 PROCEDURE — 3700000002 HC GENERAL ANESTHESIA TIME - EACH INCREMENTAL 1 MINUTE: Performed by: SURGERY

## 2024-10-24 RX ORDER — SODIUM CHLORIDE, SODIUM LACTATE, POTASSIUM CHLORIDE, CALCIUM CHLORIDE 600; 310; 30; 20 MG/100ML; MG/100ML; MG/100ML; MG/100ML
100 INJECTION, SOLUTION INTRAVENOUS CONTINUOUS
Status: DISCONTINUED | OUTPATIENT
Start: 2024-10-24 | End: 2024-10-24 | Stop reason: HOSPADM

## 2024-10-24 RX ORDER — HYDRALAZINE HYDROCHLORIDE 20 MG/ML
5 INJECTION INTRAMUSCULAR; INTRAVENOUS EVERY 30 MIN PRN
Status: DISCONTINUED | OUTPATIENT
Start: 2024-10-24 | End: 2024-10-24 | Stop reason: HOSPADM

## 2024-10-24 RX ORDER — ACETAMINOPHEN 325 MG/1
650 TABLET ORAL EVERY 4 HOURS PRN
Status: DISCONTINUED | OUTPATIENT
Start: 2024-10-24 | End: 2024-10-24 | Stop reason: HOSPADM

## 2024-10-24 RX ORDER — HEPARIN SODIUM 200 [USP'U]/100ML
INJECTION, SOLUTION INTRAVENOUS CONTINUOUS PRN
Status: COMPLETED | OUTPATIENT
Start: 2024-10-24 | End: 2024-10-24

## 2024-10-24 RX ORDER — LIDOCAINE HYDROCHLORIDE 10 MG/ML
INJECTION, SOLUTION INFILTRATION; PERINEURAL AS NEEDED
Status: DISCONTINUED | OUTPATIENT
Start: 2024-10-24 | End: 2024-10-24 | Stop reason: HOSPADM

## 2024-10-24 RX ORDER — LABETALOL HYDROCHLORIDE 5 MG/ML
5 INJECTION, SOLUTION INTRAVENOUS ONCE AS NEEDED
Status: DISCONTINUED | OUTPATIENT
Start: 2024-10-24 | End: 2024-10-24 | Stop reason: HOSPADM

## 2024-10-24 RX ORDER — HEPARIN SODIUM,PORCINE/PF 10 UNIT/ML
SYRINGE (ML) INTRAVENOUS AS NEEDED
Status: DISCONTINUED | OUTPATIENT
Start: 2024-10-24 | End: 2024-10-24 | Stop reason: HOSPADM

## 2024-10-24 RX ORDER — FENTANYL CITRATE 50 UG/ML
INJECTION, SOLUTION INTRAMUSCULAR; INTRAVENOUS AS NEEDED
Status: DISCONTINUED | OUTPATIENT
Start: 2024-10-24 | End: 2024-10-24

## 2024-10-24 RX ORDER — ALBUTEROL SULFATE 0.83 MG/ML
2.5 SOLUTION RESPIRATORY (INHALATION) ONCE AS NEEDED
Status: DISCONTINUED | OUTPATIENT
Start: 2024-10-24 | End: 2024-10-24 | Stop reason: HOSPADM

## 2024-10-24 RX ORDER — MIDAZOLAM HYDROCHLORIDE 1 MG/ML
1 INJECTION, SOLUTION INTRAMUSCULAR; INTRAVENOUS ONCE AS NEEDED
Status: DISCONTINUED | OUTPATIENT
Start: 2024-10-24 | End: 2024-10-24 | Stop reason: HOSPADM

## 2024-10-24 RX ORDER — MEPERIDINE HYDROCHLORIDE 25 MG/ML
12.5 INJECTION INTRAMUSCULAR; INTRAVENOUS; SUBCUTANEOUS EVERY 10 MIN PRN
Status: DISCONTINUED | OUTPATIENT
Start: 2024-10-24 | End: 2024-10-24 | Stop reason: HOSPADM

## 2024-10-24 RX ORDER — SODIUM CHLORIDE, SODIUM LACTATE, POTASSIUM CHLORIDE, CALCIUM CHLORIDE 600; 310; 30; 20 MG/100ML; MG/100ML; MG/100ML; MG/100ML
125 INJECTION, SOLUTION INTRAVENOUS CONTINUOUS
Status: DISCONTINUED | OUTPATIENT
Start: 2024-10-24 | End: 2024-10-24 | Stop reason: HOSPADM

## 2024-10-24 RX ORDER — PROPOFOL 10 MG/ML
INJECTION, EMULSION INTRAVENOUS CONTINUOUS PRN
Status: DISCONTINUED | OUTPATIENT
Start: 2024-10-24 | End: 2024-10-24

## 2024-10-24 RX ORDER — LIDOCAINE HYDROCHLORIDE 10 MG/ML
0.1 INJECTION, SOLUTION INFILTRATION; PERINEURAL ONCE
Status: DISCONTINUED | OUTPATIENT
Start: 2024-10-24 | End: 2024-10-24 | Stop reason: HOSPADM

## 2024-10-24 RX ORDER — ONDANSETRON HYDROCHLORIDE 2 MG/ML
4 INJECTION, SOLUTION INTRAVENOUS ONCE AS NEEDED
Status: DISCONTINUED | OUTPATIENT
Start: 2024-10-24 | End: 2024-10-24 | Stop reason: HOSPADM

## 2024-10-24 SDOH — HEALTH STABILITY: MENTAL HEALTH: CURRENT SMOKER: 0

## 2024-10-24 ASSESSMENT — COLUMBIA-SUICIDE SEVERITY RATING SCALE - C-SSRS
2. HAVE YOU ACTUALLY HAD ANY THOUGHTS OF KILLING YOURSELF?: NO
1. IN THE PAST MONTH, HAVE YOU WISHED YOU WERE DEAD OR WISHED YOU COULD GO TO SLEEP AND NOT WAKE UP?: NO
6. HAVE YOU EVER DONE ANYTHING, STARTED TO DO ANYTHING, OR PREPARED TO DO ANYTHING TO END YOUR LIFE?: NO

## 2024-10-24 ASSESSMENT — PAIN SCALES - GENERAL
PAINLEVEL_OUTOF10: 0 - NO PAIN
PAINLEVEL_OUTOF10: 0 - NO PAIN
PAIN_LEVEL: 0
PAINLEVEL_OUTOF10: 0 - NO PAIN

## 2024-10-24 ASSESSMENT — PAIN - FUNCTIONAL ASSESSMENT
PAIN_FUNCTIONAL_ASSESSMENT: 0-10
PAIN_FUNCTIONAL_ASSESSMENT: 0-10

## 2024-10-24 NOTE — ANESTHESIA POSTPROCEDURE EVALUATION
Patient: Krish Batista    Procedure Summary       Date: 10/24/24 Room / Location: [facility] OR 06 / [facility]    Anesthesia Start: 1232 Anesthesia Stop: 1333    Procedure: CENTRAL VENOUS IMPLANTED PORT INSERTION LEFT CHEST WITH C-ARM (Left: Chest) Diagnosis:       Stage III carcinoma of colon (Multi)      (Stage III carcinoma of colon (Multi) [C18.9])    Surgeons: Shai Clark MD Responsible Provider: Arslan Narvaez MD    Anesthesia Type: MAC ASA Status: 3            Anesthesia Type: MAC    Vitals Value Taken Time   /68 10/24/24 1415   Temp 36.7 °C (98.1 °F) 10/24/24 1331   Pulse 52 10/24/24 1418   Resp 18 10/24/24 1415   SpO2 97 % 10/24/24 1418   Vitals shown include unfiled device data.    Anesthesia Post Evaluation    Patient location during evaluation: PACU  Patient participation: complete - patient participated  Level of consciousness: awake and alert  Pain score: 0  Pain management: adequate  Airway patency: patent  Cardiovascular status: acceptable  Respiratory status: acceptable  Hydration status: acceptable  Postoperative Nausea and Vomiting: none    No notable events documented.

## 2024-10-24 NOTE — OP NOTE
CENTRAL VENOUS IMPLANTED PORT INSERTION WITH C-ARM Operative Note     Date: 10/24/2024  OR Location: [facility]    Name: Krish Batista, : 1955, Age: 69 y.o., MRN: [id number], Sex: male    Diagnosis  Pre-op Diagnosis      * Stage III carcinoma of colon (Multi) [C18.9] Post-op Diagnosis     * Stage III carcinoma of colon (Multi) [C18.9]     Procedures  CENTRAL VENOUS IMPLANTED PORT INSERTION WITH C-ARM  82115 - [facility] TUNNELED CTR VAD W/SUBQ PORT AGE 5 YR/>    Fluoroscopic guidance for insertion of central venous implanted port [id number]    Surgeons      * Shai Clark - Primary    Resident/Fellow/Other Assistant:  Stephen Martínez SA    Procedure Summary  Anesthesia: Monitor Anesthesia Care monitored anesthesia care with a total of 18 cc of 1% lidocaine local infiltrative anesthesia ASA: III  Anesthesia Staff: Anesthesiologist: Arslan Narvaez MD  Estimated Blood Loss: Less than 10 mL  Intra-op Medications: Administrations occurring from 1130 to 1305 on 10/24/24:  * No intraprocedure medications in log *           Anesthesia Record               Intraprocedure I/O Totals       None           Specimen: No specimens collected     Staff:   Circulating nurses: Héctor  Scrub techs: Kristina and Dolores     Drains and/or Catheters: * None in log *    Tourniquet Times:         Implants:     Findings: n/a    Indications: Krish Batista is an 69 y.o. male who is having surgery for Stage III carcinoma of colon (Multi) [C18.9].     The patient was seen in the preoperative area. The risks, benefits, complications, treatment options, non-operative alternatives, expected recovery and outcomes were discussed with the patient. The possibilities of reaction to medication, pulmonary aspiration, injury to surrounding structures, bleeding, recurrent infection, the need for additional procedures, failure to diagnose a condition, and creating a complication requiring transfusion or operation were discussed with the patient. The  patient concurred with the proposed plan, giving informed consent.  The site of surgery was properly noted/marked if necessary per policy. The patient has been actively warmed in preoperative area. Preoperative antibiotics are not indicated. Venous thrombosis prophylaxis have been ordered including bilateral sequential compression devices    Procedure Details:     Findings:   n/a    Specimen:  none    Implant:  Port-A-Cath II, Power P.A.C., 7.8 [employer], by [employer]    Procedure:      The patient was taken to the operating room placed on the table in the supine position.  Preoperative huddle was accomplished with the OR team and the patient.  Satisfactory IV sedation was accomplished by the anesthesia service. A longitudinal roll was placed beneath the thoracic spine. The arms were tucked. The anterior chest and neck were then widely prepared and draped in the normal sterile fashion.  The patient was placed in Trendelenburg position. After the appropriate timeout, the case commenced.    The skin and subcutaneous tissues of the left mid subclavian region was then anesthetized using the above-noted local infiltrative anesthesia.  Using an 18-gauge needle, access was obtained to the left subclavian vein in the standard fashion. Blood was easily aspirated.  Using the Seldinger technique, the syringe was removed and a guidewire was placed through the needle and advanced without difficulty under real-time fluoroscopy. This was advanced such that the tip of the wire was at the junction of superior vena cava and the right atrium. The needle was removed. A small 1 cm incision was then made at the wire insertion site.    Following this, roughly 5-6 cm inferior to the wire insertion site, in the midclavicular line, a transverse incision was then marked corresponding to the diameter of the port. The skin and subcutaneous tissues along the site were then anesthetized using the above-noted local infiltrative anesthesia, as  well as a subcutaneous inferior pocket, and a subcutaneous tract from the port site to  the wire insertion site.    The pocket was then formed. The skin and subcutaneous tissues were divided down to the pectoralis fascia. Using electrocautery, an inferior subcutaneous pocket was then made.  Three 3-0 Prolene sutures were then placed between the above-described port and the pectoralis fascia in the subcutaneous pocket. Following this, the above-described catheter was then tunneled in an antegrade fashion from the subcutaneous pocket to the wire insertion site.    Following this, under real time fluoroscopy, a peel-away sheath and introducer were placed over the wire and advanced without difficulty. The wire and introducer removed. The catheter was then advanced through the peel-away sheath such that the tip the catheter was at the junction superior vena cava and right atrium. This was at   cm.  The peel-away sheath was removed. The catheter was then cut proximally to the appropriate length and secured to the port in the standard fashion. Blood was easily aspirated from the port, the port was then flushed with heparinized solution. The port was then secured to the fascia pectoralis fascia using the previously placed sutures. Fluoroscopy confirmed again excellent placement of the port and the catheter.     The subcutaneous tissue at the port site was then approximated using a running 3-0 Vicryl suture. The skin of both the port site and the wire insertion site was approximated using running 4-0 undyed subcuticular Vicryl sutures. Following this, blood was aspirated from the port without difficulty and the port was then flushed with heparinized solution for the final time. The wounds were cleaned and dried, benzoin and Steri-Strips were placed, followed by dry sterile bio-occlusive dressings.    The patient tolerated the procedure well. Sponge, needle, and instrument counts were correct times 2.  Total IVF's were 500 cc  of crystalloid.  EBL was less than 10 cc.   The patient was taken from the operating room to the PACU with stable vital signs, and in excellent condition.  Chest x-ray is pending.     Shai Clark M.D.  Complications:  None; patient tolerated the procedure well.    Disposition: PACU - hemodynamically stable.  Condition: stable         Additional Details: none    Attending Attestation: I performed the procedure.    Shai Clark  Phone Number: [phone]

## 2024-10-24 NOTE — DISCHARGE INSTRUCTIONS
1.  Patient may take Tylenol extra strength 1 or 2 tabs every 6 hours as needed for mild to moderate pain    2.  Patient has gabapentin 300 mg tablets at home.  May take 3 times a day as needed for breakthrough pain.

## 2024-10-24 NOTE — ANESTHESIA PREPROCEDURE EVALUATION
Patient: Krish Batista    Procedure Information       Date/Time: 10/24/24 1130    Procedure: CENTRAL VENOUS IMPLANTED PORT INSERTION WITH C-ARM - Insertion of central venous implanted port    Location: [facility] 06 / [facility]    Surgeons: Shai Clark MD            Relevant Problems   Anesthesia (within normal limits)      Cardiac   (+) Paroxysmal atrial fibrillation (Multi)   (+) Typical atrial flutter (Multi)      GI   (+) Adenocarcinoma, colon (Multi)      Liver   (+) Adenocarcinoma, colon (Multi)      Hematology   (+) Microcytic anemia       Clinical information reviewed:    Allergies  Meds               NPO Detail:  NPO/Void Status  Date of Last Liquid: 10/24/24  Time of Last Liquid: 0800  Date of Last Solid: 10/23/24  Time of Last Solid: 1800         Physical Exam    Airway  Mallampati: II  TM distance: >3 FB  Neck ROM: full     Cardiovascular   Rhythm: regular  Rate: normal     Dental - normal exam     Pulmonary    Abdominal        Anesthesia Plan    History of general anesthesia?: yes  History of complications of general anesthesia?: no    ASA 3     MAC     The patient is not a current smoker.  Patient was not previously instructed to abstain from smoking on day of procedure.  Patient did not smoke on day of procedure.    intravenous induction   Anesthetic plan and risks discussed with patient.  Use of blood products discussed with patient who.    Plan discussed with CRNA.

## 2024-10-24 NOTE — PERIOPERATIVE NURSING NOTE
DR. SCANLON READ THE PT'S CHEST XRAY AND NOTED THERE WAS NO PNEUMOTHORAX, AND THE CV IMPLANTED PORT WAS IN GOOD POSITION.  PER DR. SCANLON, PT CAN BE DISCHARGE HOME.

## 2024-10-25 ENCOUNTER — NUTRITION (OUTPATIENT)
Dept: HEMATOLOGY/ONCOLOGY | Facility: CLINIC | Age: 69
End: 2024-10-25

## 2024-10-25 ENCOUNTER — INFUSION (OUTPATIENT)
Dept: HEMATOLOGY/ONCOLOGY | Facility: CLINIC | Age: 69
End: 2024-10-25
Payer: MEDICARE

## 2024-10-25 ENCOUNTER — EDUCATION (OUTPATIENT)
Dept: HEMATOLOGY/ONCOLOGY | Facility: CLINIC | Age: 69
End: 2024-10-25

## 2024-10-25 ENCOUNTER — OFFICE VISIT (OUTPATIENT)
Dept: HEMATOLOGY/ONCOLOGY | Facility: CLINIC | Age: 69
End: 2024-10-25
Payer: MEDICARE

## 2024-10-25 ENCOUNTER — PATIENT OUTREACH (OUTPATIENT)
Dept: HEMATOLOGY/ONCOLOGY | Facility: CLINIC | Age: 69
End: 2024-10-25

## 2024-10-25 VITALS
TEMPERATURE: 98.4 F | DIASTOLIC BLOOD PRESSURE: 72 MMHG | WEIGHT: 187.06 LBS | OXYGEN SATURATION: 97 % | BODY MASS INDEX: 25.34 KG/M2 | HEIGHT: 72 IN | SYSTOLIC BLOOD PRESSURE: 156 MMHG | RESPIRATION RATE: 16 BRPM | HEART RATE: 67 BPM

## 2024-10-25 VITALS — HEIGHT: 72 IN | BODY MASS INDEX: 25.14 KG/M2

## 2024-10-25 DIAGNOSIS — C18.9 ADENOCARCINOMA, COLON (MULTI): ICD-10-CM

## 2024-10-25 DIAGNOSIS — D50.9 MICROCYTIC ANEMIA: ICD-10-CM

## 2024-10-25 DIAGNOSIS — I48.3 TYPICAL ATRIAL FLUTTER (MULTI): ICD-10-CM

## 2024-10-25 DIAGNOSIS — C18.9 STAGE III CARCINOMA OF COLON (MULTI): Primary | ICD-10-CM

## 2024-10-25 DIAGNOSIS — C18.9 STAGE III CARCINOMA OF COLON (MULTI): ICD-10-CM

## 2024-10-25 LAB
BASOPHILS # BLD AUTO: 0.01 X10*3/UL (ref 0–0.1)
BASOPHILS NFR BLD AUTO: 0.2 %
EOSINOPHIL # BLD AUTO: 0.06 X10*3/UL (ref 0–0.7)
EOSINOPHIL NFR BLD AUTO: 1.1 %
ERYTHROCYTE [DISTWIDTH] IN BLOOD BY AUTOMATED COUNT: 19.4 % (ref 11.5–14.5)
HCT VFR BLD AUTO: 36.3 % (ref 41–52)
HGB BLD-MCNC: 11.7 G/DL (ref 13.5–17.5)
IMM GRANULOCYTES # BLD AUTO: 0 X10*3/UL (ref 0–0.7)
IMM GRANULOCYTES NFR BLD AUTO: 0 % (ref 0–0.9)
LYMPHOCYTES # BLD AUTO: 0.7 X10*3/UL (ref 1.2–4.8)
LYMPHOCYTES NFR BLD AUTO: 12.9 %
MCH RBC QN AUTO: 30.2 PG (ref 26–34)
MCHC RBC AUTO-ENTMCNC: 32.2 G/DL (ref 32–36)
MCV RBC AUTO: 94 FL (ref 80–100)
MONOCYTES # BLD AUTO: 0.46 X10*3/UL (ref 0.1–1)
MONOCYTES NFR BLD AUTO: 8.5 %
NEUTROPHILS # BLD AUTO: 4.19 X10*3/UL (ref 1.2–7.7)
NEUTROPHILS NFR BLD AUTO: 77.3 %
NRBC BLD-RTO: ABNORMAL /100{WBCS}
PLATELET # BLD AUTO: 278 X10*3/UL (ref 150–450)
RBC # BLD AUTO: 3.87 X10*6/UL (ref 4.5–5.9)
WBC # BLD AUTO: 5.4 X10*3/UL (ref 4.4–11.3)

## 2024-10-25 PROCEDURE — 1111F DSCHRG MED/CURRENT MED MERGE: CPT | Performed by: INTERNAL MEDICINE

## 2024-10-25 PROCEDURE — 2500000004 HC RX 250 GENERAL PHARMACY W/ HCPCS (ALT 636 FOR OP/ED): Performed by: INTERNAL MEDICINE

## 2024-10-25 PROCEDURE — 36591 DRAW BLOOD OFF VENOUS DEVICE: CPT

## 2024-10-25 PROCEDURE — 99215 OFFICE O/P EST HI 40 MIN: CPT | Performed by: INTERNAL MEDICINE

## 2024-10-25 PROCEDURE — 1159F MED LIST DOCD IN RCRD: CPT | Performed by: INTERNAL MEDICINE

## 2024-10-25 PROCEDURE — 3008F BODY MASS INDEX DOCD: CPT | Performed by: INTERNAL MEDICINE

## 2024-10-25 PROCEDURE — 80053 COMPREHEN METABOLIC PANEL: CPT

## 2024-10-25 PROCEDURE — 1125F AMNT PAIN NOTED PAIN PRSNT: CPT | Performed by: INTERNAL MEDICINE

## 2024-10-25 PROCEDURE — 85025 COMPLETE CBC W/AUTO DIFF WBC: CPT

## 2024-10-25 RX ORDER — EPINEPHRINE 0.3 MG/.3ML
0.3 INJECTION SUBCUTANEOUS EVERY 5 MIN PRN
OUTPATIENT
Start: 2024-12-16

## 2024-10-25 RX ORDER — LORAZEPAM 2 MG/ML
1 INJECTION INTRAMUSCULAR AS NEEDED
OUTPATIENT
Start: 2024-11-04

## 2024-10-25 RX ORDER — DIPHENHYDRAMINE HYDROCHLORIDE 50 MG/ML
50 INJECTION INTRAMUSCULAR; INTRAVENOUS AS NEEDED
OUTPATIENT
Start: 2024-11-18

## 2024-10-25 RX ORDER — ALBUTEROL SULFATE 0.83 MG/ML
3 SOLUTION RESPIRATORY (INHALATION) AS NEEDED
OUTPATIENT
Start: 2024-11-18

## 2024-10-25 RX ORDER — EPINEPHRINE 0.3 MG/.3ML
0.3 INJECTION SUBCUTANEOUS EVERY 5 MIN PRN
OUTPATIENT
Start: 2024-11-18

## 2024-10-25 RX ORDER — ALBUTEROL SULFATE 0.83 MG/ML
3 SOLUTION RESPIRATORY (INHALATION) AS NEEDED
OUTPATIENT
Start: 2024-10-25

## 2024-10-25 RX ORDER — HEPARIN 100 UNIT/ML
500 SYRINGE INTRAVENOUS AS NEEDED
Status: DISCONTINUED | OUTPATIENT
Start: 2024-10-25 | End: 2024-10-25 | Stop reason: HOSPADM

## 2024-10-25 RX ORDER — PROCHLORPERAZINE EDISYLATE 5 MG/ML
10 INJECTION INTRAMUSCULAR; INTRAVENOUS EVERY 6 HOURS PRN
OUTPATIENT
Start: 2024-11-04

## 2024-10-25 RX ORDER — PROCHLORPERAZINE EDISYLATE 5 MG/ML
10 INJECTION INTRAMUSCULAR; INTRAVENOUS EVERY 6 HOURS PRN
OUTPATIENT
Start: 2024-12-16

## 2024-10-25 RX ORDER — FAMOTIDINE 10 MG/ML
20 INJECTION INTRAVENOUS ONCE AS NEEDED
OUTPATIENT
Start: 2024-11-18

## 2024-10-25 RX ORDER — LORAZEPAM 2 MG/ML
1 INJECTION INTRAMUSCULAR AS NEEDED
OUTPATIENT
Start: 2024-12-16

## 2024-10-25 RX ORDER — PROCHLORPERAZINE EDISYLATE 5 MG/ML
10 INJECTION INTRAMUSCULAR; INTRAVENOUS EVERY 6 HOURS PRN
OUTPATIENT
Start: 2024-12-02

## 2024-10-25 RX ORDER — PROCHLORPERAZINE MALEATE 10 MG
10 TABLET ORAL EVERY 6 HOURS PRN
OUTPATIENT
Start: 2024-12-16

## 2024-10-25 RX ORDER — FLUOROURACIL 50 MG/ML
400 INJECTION, SOLUTION INTRAVENOUS ONCE
OUTPATIENT
Start: 2024-12-02

## 2024-10-25 RX ORDER — PROCHLORPERAZINE MALEATE 10 MG
10 TABLET ORAL EVERY 6 HOURS PRN
Qty: 30 TABLET | Refills: 5 | Status: SHIPPED | OUTPATIENT
Start: 2024-11-03

## 2024-10-25 RX ORDER — PROCHLORPERAZINE MALEATE 10 MG
10 TABLET ORAL EVERY 6 HOURS PRN
OUTPATIENT
Start: 2024-11-18

## 2024-10-25 RX ORDER — FLUOROURACIL 50 MG/ML
400 INJECTION, SOLUTION INTRAVENOUS ONCE
OUTPATIENT
Start: 2024-11-04

## 2024-10-25 RX ORDER — PALONOSETRON 0.05 MG/ML
0.25 INJECTION, SOLUTION INTRAVENOUS ONCE
OUTPATIENT
Start: 2024-12-02

## 2024-10-25 RX ORDER — PALONOSETRON 0.05 MG/ML
0.25 INJECTION, SOLUTION INTRAVENOUS ONCE
OUTPATIENT
Start: 2024-11-18

## 2024-10-25 RX ORDER — FAMOTIDINE 10 MG/ML
20 INJECTION INTRAVENOUS ONCE AS NEEDED
OUTPATIENT
Start: 2024-12-16

## 2024-10-25 RX ORDER — EPINEPHRINE 0.3 MG/.3ML
0.3 INJECTION SUBCUTANEOUS EVERY 5 MIN PRN
OUTPATIENT
Start: 2024-12-02

## 2024-10-25 RX ORDER — AMIODARONE HYDROCHLORIDE 200 MG/1
200 TABLET ORAL DAILY
Qty: 90 TABLET | Refills: 3 | Status: SHIPPED | OUTPATIENT
Start: 2024-10-25

## 2024-10-25 RX ORDER — ONDANSETRON HYDROCHLORIDE 8 MG/1
8 TABLET, FILM COATED ORAL EVERY 8 HOURS PRN
Qty: 30 TABLET | Refills: 5 | Status: SHIPPED | OUTPATIENT
Start: 2024-11-03

## 2024-10-25 RX ORDER — HEPARIN 100 UNIT/ML
500 SYRINGE INTRAVENOUS AS NEEDED
OUTPATIENT
Start: 2024-10-25

## 2024-10-25 RX ORDER — LORAZEPAM 2 MG/ML
1 INJECTION INTRAMUSCULAR AS NEEDED
OUTPATIENT
Start: 2024-12-02

## 2024-10-25 RX ORDER — PROCHLORPERAZINE EDISYLATE 5 MG/ML
10 INJECTION INTRAMUSCULAR; INTRAVENOUS EVERY 6 HOURS PRN
OUTPATIENT
Start: 2024-11-18

## 2024-10-25 RX ORDER — ATORVASTATIN CALCIUM 80 MG/1
80 TABLET, FILM COATED ORAL NIGHTLY
Qty: 90 TABLET | Refills: 3 | Status: SHIPPED | OUTPATIENT
Start: 2024-10-25

## 2024-10-25 RX ORDER — HEPARIN SODIUM,PORCINE/PF 10 UNIT/ML
50 SYRINGE (ML) INTRAVENOUS AS NEEDED
OUTPATIENT
Start: 2024-10-25

## 2024-10-25 RX ORDER — PALONOSETRON 0.05 MG/ML
0.25 INJECTION, SOLUTION INTRAVENOUS ONCE
OUTPATIENT
Start: 2024-11-04

## 2024-10-25 RX ORDER — FAMOTIDINE 10 MG/ML
20 INJECTION INTRAVENOUS ONCE AS NEEDED
OUTPATIENT
Start: 2024-11-04

## 2024-10-25 RX ORDER — ALBUTEROL SULFATE 0.83 MG/ML
3 SOLUTION RESPIRATORY (INHALATION) AS NEEDED
OUTPATIENT
Start: 2024-12-02

## 2024-10-25 RX ORDER — PROCHLORPERAZINE MALEATE 10 MG
10 TABLET ORAL EVERY 6 HOURS PRN
OUTPATIENT
Start: 2024-12-02

## 2024-10-25 RX ORDER — EPINEPHRINE 0.3 MG/.3ML
0.3 INJECTION SUBCUTANEOUS EVERY 5 MIN PRN
OUTPATIENT
Start: 2024-11-04

## 2024-10-25 RX ORDER — FAMOTIDINE 10 MG/ML
20 INJECTION INTRAVENOUS ONCE AS NEEDED
OUTPATIENT
Start: 2024-12-02

## 2024-10-25 RX ORDER — ALBUTEROL SULFATE 0.83 MG/ML
3 SOLUTION RESPIRATORY (INHALATION) AS NEEDED
OUTPATIENT
Start: 2024-11-04

## 2024-10-25 RX ORDER — DIPHENHYDRAMINE HYDROCHLORIDE 50 MG/ML
50 INJECTION INTRAMUSCULAR; INTRAVENOUS AS NEEDED
OUTPATIENT
Start: 2024-11-04

## 2024-10-25 RX ORDER — METOPROLOL SUCCINATE 50 MG/1
50 TABLET, EXTENDED RELEASE ORAL DAILY
Qty: 90 TABLET | Refills: 3 | Status: SHIPPED | OUTPATIENT
Start: 2024-10-25

## 2024-10-25 RX ORDER — ALBUTEROL SULFATE 0.83 MG/ML
3 SOLUTION RESPIRATORY (INHALATION) AS NEEDED
OUTPATIENT
Start: 2024-12-16

## 2024-10-25 RX ORDER — DIPHENHYDRAMINE HYDROCHLORIDE 50 MG/ML
50 INJECTION INTRAMUSCULAR; INTRAVENOUS AS NEEDED
OUTPATIENT
Start: 2024-12-16

## 2024-10-25 RX ORDER — DIPHENHYDRAMINE HYDROCHLORIDE 50 MG/ML
50 INJECTION INTRAMUSCULAR; INTRAVENOUS AS NEEDED
OUTPATIENT
Start: 2024-12-02

## 2024-10-25 RX ORDER — FLUOROURACIL 50 MG/ML
400 INJECTION, SOLUTION INTRAVENOUS ONCE
OUTPATIENT
Start: 2024-11-18

## 2024-10-25 RX ORDER — DIPHENHYDRAMINE HYDROCHLORIDE 50 MG/ML
50 INJECTION INTRAMUSCULAR; INTRAVENOUS AS NEEDED
OUTPATIENT
Start: 2024-10-25

## 2024-10-25 RX ORDER — PROCHLORPERAZINE MALEATE 10 MG
10 TABLET ORAL EVERY 6 HOURS PRN
OUTPATIENT
Start: 2024-11-04

## 2024-10-25 RX ORDER — EPINEPHRINE 0.3 MG/.3ML
0.3 INJECTION SUBCUTANEOUS EVERY 5 MIN PRN
OUTPATIENT
Start: 2024-10-25

## 2024-10-25 RX ORDER — LORAZEPAM 2 MG/ML
1 INJECTION INTRAMUSCULAR AS NEEDED
OUTPATIENT
Start: 2024-11-18

## 2024-10-25 RX ORDER — FLUOROURACIL 50 MG/ML
400 INJECTION, SOLUTION INTRAVENOUS ONCE
OUTPATIENT
Start: 2024-12-16

## 2024-10-25 RX ORDER — FAMOTIDINE 10 MG/ML
20 INJECTION INTRAVENOUS ONCE AS NEEDED
OUTPATIENT
Start: 2024-10-25

## 2024-10-25 RX ORDER — PALONOSETRON 0.05 MG/ML
0.25 INJECTION, SOLUTION INTRAVENOUS ONCE
OUTPATIENT
Start: 2024-12-16

## 2024-10-25 SDOH — ECONOMIC STABILITY: FOOD INSECURITY: WITHIN THE PAST 12 MONTHS, THE FOOD YOU BOUGHT JUST DIDN'T LAST AND YOU DIDN'T HAVE MONEY TO GET MORE.: NEVER TRUE

## 2024-10-25 SDOH — HEALTH STABILITY: PHYSICAL HEALTH: ON AVERAGE, HOW MANY MINUTES DO YOU ENGAGE IN EXERCISE AT THIS LEVEL?: 30 MIN

## 2024-10-25 SDOH — ECONOMIC STABILITY: GENERAL
WHICH OF THE FOLLOWING WOULD YOU LIKE TO GET CONNECTED TO IN ORDER TO RECEIVE A DISCOUNT OR FOR FREE? (CHOOSE ALL THAT APPLY): NONE OF THESE

## 2024-10-25 SDOH — ECONOMIC STABILITY: GENERAL
WHICH OF THE FOLLOWING DO YOU KNOW HOW TO USE AND HAVE ACCESS TO EVERY DAY? (CHOOSE ALL THAT APPLY): SMARTPHONE WITH CELLULAR DATA PLAN;DESKTOP COMPUTER, LAPTOP COMPUTER, OR TABLET WITH BROADBAND INTERNET CONNECTION

## 2024-10-25 SDOH — ECONOMIC STABILITY: FOOD INSECURITY: WITHIN THE PAST 12 MONTHS, YOU WORRIED THAT YOUR FOOD WOULD RUN OUT BEFORE YOU GOT MONEY TO BUY MORE.: NEVER TRUE

## 2024-10-25 SDOH — HEALTH STABILITY: PHYSICAL HEALTH: ON AVERAGE, HOW MANY DAYS PER WEEK DO YOU ENGAGE IN MODERATE TO STRENUOUS EXERCISE (LIKE A BRISK WALK)?: 2 DAYS

## 2024-10-25 ASSESSMENT — PATIENT HEALTH QUESTIONNAIRE - PHQ9
1. LITTLE INTEREST OR PLEASURE IN DOING THINGS: MORE THAN HALF THE DAYS
SUM OF ALL RESPONSES TO PHQ9 QUESTIONS 1 & 2: 3
2. FEELING DOWN, DEPRESSED OR HOPELESS: SEVERAL DAYS

## 2024-10-25 ASSESSMENT — PAIN SCALES - GENERAL
PAINLEVEL_OUTOF10: 1
PAINLEVEL_OUTOF10: 4

## 2024-10-25 ASSESSMENT — LIFESTYLE VARIABLES
SKIP TO QUESTIONS 9-10: 1
HOW MANY STANDARD DRINKS CONTAINING ALCOHOL DO YOU HAVE ON A TYPICAL DAY: PATIENT DOES NOT DRINK
HOW OFTEN DO YOU HAVE A DRINK CONTAINING ALCOHOL: NEVER
HOW OFTEN DO YOU HAVE SIX OR MORE DRINKS ON ONE OCCASION: NEVER
AUDIT-C TOTAL SCORE: 0

## 2024-10-25 ASSESSMENT — SOCIAL DETERMINANTS OF HEALTH (SDOH)
DO YOU BELONG TO ANY CLUBS OR ORGANIZATIONS SUCH AS CHURCH GROUPS UNIONS, FRATERNAL OR ATHLETIC GROUPS, OR SCHOOL GROUPS?: NO
HOW HARD IS IT FOR YOU TO PAY FOR THE VERY BASICS LIKE FOOD, HOUSING, MEDICAL CARE, AND HEATING?: NOT VERY HARD
IN A TYPICAL WEEK, HOW MANY TIMES DO YOU TALK ON THE PHONE WITH FAMILY, FRIENDS, OR NEIGHBORS?: MORE THAN THREE TIMES A WEEK
HOW OFTEN DO YOU ATTENT MEETINGS OF THE CLUB OR ORGANIZATION YOU BELONG TO?: NEVER
WITHIN THE LAST YEAR, HAVE YOU BEEN KICKED, HIT, SLAPPED, OR OTHERWISE PHYSICALLY HURT BY YOUR PARTNER OR EX-PARTNER?: NO
WITHIN THE LAST YEAR, HAVE YOU BEEN AFRAID OF YOUR PARTNER OR EX-PARTNER?: NO
IN THE PAST 12 MONTHS, HAS THE ELECTRIC, GAS, OIL, OR WATER COMPANY THREATENED TO SHUT OFF SERVICE IN YOUR HOME?: NO
HOW OFTEN DO YOU GET TOGETHER WITH FRIENDS OR RELATIVES?: MORE THAN THREE TIMES A WEEK
WITHIN THE LAST YEAR, HAVE TO BEEN RAPED OR FORCED TO HAVE ANY KIND OF SEXUAL ACTIVITY BY YOUR PARTNER OR EX-PARTNER?: NO
WITHIN THE LAST YEAR, HAVE YOU BEEN HUMILIATED OR EMOTIONALLY ABUSED IN OTHER WAYS BY YOUR PARTNER OR EX-PARTNER?: NO
HOW OFTEN DO YOU ATTEND CHURCH OR RELIGIOUS SERVICES?: NEVER
ARE YOU MARRIED, WIDOWED, DIVORCED, SEPARATED, NEVER MARRIED, OR LIVING WITH A PARTNER?: NEVER MARRIED

## 2024-10-25 NOTE — PROGRESS NOTES
"NUTRITION Education NOTE    Nutrition Assessment     Reason for Visit:  Krish Batista is a 69 y.o. 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    Met with patient for assessment today, as patient with decreased appetite , wt loss, and previously with diarrhea.  He is here today for clinic with his niece Mariama.    Lab Results   Component Value Date/Time    GLUCOSE 94 10/04/2024 1532     10/04/2024 1532    K 4.4 10/04/2024 1532     10/04/2024 1532    CO2 26 10/04/2024 1532    ANIONGAP 13 10/04/2024 1532    BUN 15 10/04/2024 1532    CREATININE 0.89 10/04/2024 1532    EGFR >90 10/04/2024 1532    CALCIUM 8.6 10/04/2024 1532    ALBUMIN 3.6 10/04/2024 1532    ALKPHOS 74 10/04/2024 1532    PROT 5.9 (L) 10/04/2024 1532    AST 39 10/04/2024 1532    BILITOT 1.7 (H) 10/04/2024 1532    ALT 29 10/04/2024 1532     No results found for: \"VITD25\"    Anthropometrics:  Anthropometrics  Height: 183.7 cm (6' 0.32\")  Weight:  (84.85 kg)  IBW/kg (Dietitian Calculated):  (80.9)  Percent of IBW:  (105%)  Weight Change  Weight History / % Weight Change: Weight fluctuating often difficult to assess weight trend. On average down 4% x 1 month        Wt Readings from Last 10 Encounters:   10/25/24 84.8 kg (187 lb 1 oz)   10/24/24 89.4 kg (197 lb 1.5 oz)   10/16/24 86.4 kg (190 lb 6.4 oz)   10/08/24 89.4 kg (197 lb 3.2 oz)   10/03/24 89.2 kg (196 lb 10.4 oz)   09/28/24 95.5 kg (210 lb 8.6 oz)   09/12/24 86.6 kg (190 lb 14.7 oz)   09/11/24 87.5 kg (193 lb)   09/04/24 89.7 kg (197 lb 12 oz)   09/03/24 88.5 kg (195 lb)        Food And Nutrient Intake:  Food and Nutrient History  Food and Nutrient History: Patient reports that he was previously experiencing diarrhea and thus was affecting his appetite and oral intake.  Weight appears to fluctuate up and down often over the past month, thus difficult to assess accurate weight trend.  On avg: Down 8 " "lbs since over 6 weeks. (4%) Patient states that diarrhea has now subsided.  Does indicate that he gets full quickly and is eating 1/2 portions, compared to his normal intake.  He is not a fan of Boost/Ensure shakes.  Willing to try alternative supplements.  Energy Intake: Fair 50-75 %  GI Symptoms: early satiety, anorexia, diarrhea (Diarrhea has not subsided; neg for c.diff)        Food Preparation  Cooking:  (Staying with niece Mariama who is doing shopping/cooking for patient at this time)     Nutrition Focused Physical Exam Findings:      Subcutaneous Fat Loss  Buccal Fat Pads: Mild-Moderate (flat cheeks, minimal bounce)    Muscle Wasting  Temporalis: Mild-Moderate (slight depression)              Energy Needs  Calculated Energy Needs Using Equations  Height: 183.7 cm (6' 0.32\")  Estimated Energy Needs  Total Energy Estimated Needs (kCal):  (2175-2554 calories (30-32 cals/kg))  Estimated Protein Needs  Total Protein Estimated Needs (g):  (100-115 g /kg)        Nutrition Diagnosis   Malnutrition Diagnosis  Patient has Malnutrition Diagnosis: Yes  Diagnosis Status: New  Malnutrition Diagnosis: Mild malnutrition related to chronic disease or condition  As Evidenced by: 4% weight loss over the past month, mild muscle/fat wasting, and reports of suboptimal intake over the past month in setting of recent diarrhea    Nutrition Diagnosis  Patient has Nutrition Diagnosis: Yes  Diagnosis Status (1): New  Nutrition Diagnosis 1: Increased nutrient needs  Related to (1): disease process, recent surgery, and starting treatment  As Evidenced by (1): increased metabolic demands for healing    Nutrition Interventions/Recommendations   Nutrition Prescription  Individualized Nutrition Prescription Provided for : Low fiber (for 8 weeks post-op), High Calorie/High protein with treatment    Food and Nutrition Delivery  Food and Nutrition Delivery  Meals & Snacks: Fiber-modified diet, Energy-modified diet, Protein-modified " "diet  Reviewed Low Fiber diet and suggested continue for ~2-3 more weeks, as it is generally encouraged for 6-8 weeks post-op OR is diarrhea develops again.  Encouraged 4-6 high protein/high calorie small frequent meals/snacks.   Reviewed meal and snack examples (ie Greek yogurt, cottage cheese with fruit, hard boiled eggs, bagel with peanut butter, oral nutrition supplement shake-->can be made into a smoothie  Handout \"High Calorie Snack Ideas \" given and modified for Low fiber.  Provide samples of Compleat (1.5) Fruit Medley (375 cals/17 g prot)  -Daughter asking about Fairlife or Glide pre-mixed shakes.  Stated these would be fine to try too.     Nutrition Education  Nutrition Education  Nutrition Education Content: Content related nutrition education    Coordination of Care  Coordination of Nutrition Care by a Nutrition Professional  Collaboration and referral of nutrition care: Collaboration by nutrition professional with other providers    There are no Patient Instructions on file for this visit.    Nutrition Monitoring and Evaluation   Food/Nutrient Related History Monitoring  Monitoring and Evaluation Plan: Energy intake, Protein intake, Fiber intake  Energy Intake: Estimated energy intake  Criteria: Meet >75% estimated pro needs  Estimated protein intake: Estimated protein intake  Criteria: Meet >75% estim pro needs  Estimated fiber intake: Estimated fiber intake  Criteria: low fiber s/p colon resection          Time Spent  Prep time on day of patient encounter: 10 minutes  Time spent directly with patient, family or caregiver: 20 minutes  Additional Time Spent on Patient Care Activities: 5 minutes  Documentation Time: 45 minutes  Other Time Spent: 0 minutes  Total: 80 minutes              "

## 2024-10-25 NOTE — PROGRESS NOTES
"Patient is here today for port access/lab work/flush - port placed 10/24/24, CXR verified placement.  1\" bullock used, excellent blood return noted.  b/h/ lung sounds not auscultated  Patient tolerated procedure well.  No complaints. Call back instructions reviewed.    Patient verbalizes understanding of plan of care.  Patient in exam room seeing nurse partner.   "

## 2024-10-25 NOTE — PROGRESS NOTES
Patient ID: Krish Batista is a 69 y.o. male.  Referring Physician: Rio Lawson MD  [address] [facility], [place] [address] [place] [address]  Primary Care Provider: Matthew Small,    8/15/24, EGD and colonoscopy  A.  Duodenum, first part, biopsy:  Duodenal mucosa within normal limits.     B.  Stomach, biopsy:  Antrum and corpus mucosa within normal limits.     C.  Colon, mass at 40 cm, biopsy:  Fragments of invasive adenocarcinoma, moderately differentiated.  See note.     NOTE: Immunohistochemical stains for mismatch repair proteins are pending and the result will be reported as an addendum.     D.  Colon, 20 cm, polypectomy:  Tubular adenoma.                               MISMATCH REPAIR PROTEIN EXPRESSION     C. Colon, mass at 40 cm, biopsy: Adenocarcinoma     Protein:  Result     MLH-1:  Expression Present                                    PMS-2: Expression Present                                    MSH-2: Expression Present                                    MSH-6: Expression Present    CEA at 1.5 NG per mL on August 2, 2024    Colon, splenic flexure, resection:  -Invasive adenocarcinoma (3.1 cm), moderately differentiated.  -Carcinoma invades pericolonic tissue.  -Extramural venous and perineural invasion present.  -Resection margins are negative for carcinoma.  -Metastatic adenocarcinoma involving two of thirty-seven lymph nodes (2/37).  -Pathologic stage: pT3, pN1b.  -See comment and synoptic report.  Comment:  Movat stains (blocks A4 and A5) confirm the venous invasion.    Electronically signed by Sharonda Maguire MD PhD on 9/30/2024 at 1350      [facility]   By the signature on this report, the individual or group listed as making the Final Interpretation/Diagnosis certifies that they have reviewed this case.    Case Summary Report   COLON AND RECTUM: Resection, Including Transanal Disk Excision of Rectal Neoplasms   8th Edition - Protocol posted: 6/22/2022COLON AND RECTUM: RESECTION, INCLUDING  TRANSANAL DISK EXCISION OF RECTAL NEOPLASMS - All Specimens  SPECIMEN   Procedure  resection   TUMOR   Tumor Site  Splenic flexure   Histologic Type  Adenocarcinoma   Histologic Grade  G2, moderately differentiated   Tumor Size  Greatest dimension (Centimeters): 3.1 cm   Tumor Extent  Invades through muscularis propria into the pericolonic or perirectal tissue   Macroscopic Tumor Perforation  Not identified   Lymphovascular Invasion  Large vessel (venous), extramural   Perineural Invasion  Present   Tumor Bud Score  Intermediate (5-9)   Treatment Effect  No known presurgical therapy   MARGINS   Margin Status for Invasive Carcinoma  All margins negative for invasive carcinoma   Closest Margin(s) to Invasive Carcinoma  Radial (circumferential) or mesenteric   Distance from Invasive Carcinoma to Closest Margin  Greater than 1 cm   Margin Status for Non-Invasive Tumor  All margins negative for high-grade dysplasia / intramucosal carcinoma and low-grade dysplasia   REGIONAL LYMPH NODES   Regional Lymph Node Status  Tumor present in regional lymph node(s)   Number of Lymph Nodes with Tumor  2   Number of Lymph Nodes Examined  37   Tumor Deposits  Not identified   PATHOLOGIC STAGE CLASSIFICATION (pTNM, AJCC 8th Edition)   Reporting of pT, pN, and (when applicable) pM categories is based on information available to the pathologist at the time the report is issued. As per the AJCC (Chapter 1, 8th Ed.) it is the managing physician’s responsibility to establish the final pathologic stage based upon all pertinent information, including but potentially not limited to this pathology report.   pT Category  pT3   pN Category  pN1b   .        Subjective    HPI  The patient was referred to me by Dr. Clark for further evaluation and management of newly diagnosed biopsy-proven left colon adenocarcinoma.  The patient presented to Dr. Small on March 25, 2024 complaining of fatigue and malaise.  Further evaluation revealed significant  anemia at hemoglobin 8.1 g/dL ferritin at 8 NG per mL iron saturation 3%.  The patient was referred to GI services however he failed to follow through.  He was referred to surgery as well.  Repeat hemoglobin on July 11, 2024 was 7.2 g/dL.  He then presented to ER on August 1, 2024 secondary to progressive fatigue and weakness.  He was diagnosed with severe microcytic anemia hemoglobin 7.4 g/dL.  The patient received 2 units of packed red blood cell transfusions.  Upper and lower endoscopy on August 3, 2024 revealed a mass at 40 cm in the anal verge.  Histology consistent with adenocarcinoma.  In ER the patient was found to be in atrial fibrillation/flutter with RVR.  Cardiology was consulted.  He spontaneously converted to normal sinus rhythm.  Color Doppler echocardiogram revealed normal ejection fraction.  He was placed on metoprolol 50 mg p.o. twice daily with a Holter monitor.  Anticoagulation was not recommended.  Colonoscopy on August 15, 2024 revealed internal small hemorrhoids.  1 8 mm sessile polyp 20 centimeter from anal verge.  This was snared.  Single friable and ulcerated mass not transferred stable 40 cm from the anal verge, covering the whole circumference; bleeding occurred before intervention.  Performed cold forceps biopsy with partial removal: Tattooed distal to the finding with spot.  CT scan of chest abdomen pelvis on August 1, 2024 with IV contrast did not reveal any obvious metastatic disease.  A PET scan revealed bilateral pleural effusion right greater than left.  Thoracentesis was performed on September 3, 2024 cytology is pending.  The patient claims that he is feeling better after thoracentesis.    At interview on September 4, 2024 he was accompanied by his niece denied history of weight loss, fevers, night sweats, chest pain, nausea, vomiting, hematemesis, melena, hematochezia and hematuria.  Since last evaluation the patient has had multiple admissions to the hospital for myocardial  "infarction requiring cardiac catheterization, status post surgery/left hemicolectomy on September 15, 2024, GI bleed hypotension requiring iron infusion as well as blood transfusions.  The patient was discharged and claims that he is beginning to feel better although has pedal edema as well as anterior abdominal wall edema.  The patient and family had come for follow-up on October 25, 2024 regarding history of pT3, PN 1B, M0 adenocarcinoma of the colon s/p resection.  During previous evaluation the patient had presented with severe iron deficiency anemia as well as diarrhea.  C. difficile studies were negative.  Diarrhea is resolved.  Pvxerm-i-Haoz was placed.  Initiate intravenous Feraheme for iron 10 mg/week x 2 weeks and also initiate adjuvant chemotherapy using FOLFOX to be given every 2 weeks for 12 cycles.    You have any questions about chemotherapy side effects or of course of number    Okay while.  Let me to go talk to the nurse and set things up for you okay    Past medical history:    Iron deficiency anemia, adenocarcinoma of descending colon, history of Raynaud's phenomenon.  Past surgical history:    Sinus surgery July 13, 2021, history of undescended testis surgery, and tonsillectomy.      Review of Systems   All other systems reviewed and are negative.       Objective   BSA: 2.08 meters squared  /72   Pulse 67   Temp 36.9 °C (98.4 °F)   Resp 16   Ht 1.837 m (6' 0.32\")   Wt 84.8 kg (187 lb 1 oz)   SpO2 97%   BMI 25.14 kg/m²     No family history on file.  Oncology History    No history exists.     The patient is 69 years old, single has no children retired as a .  Krish Batista  reports that he has never smoked. He has never used smokeless tobacco.  He  reports that he does not currently use alcohol.  He  reports no history of drug use.    Physical Exam  Constitutional:       Appearance: Normal appearance.   HENT:      Head: Normocephalic and atraumatic.      Nose: Nose normal. "      Mouth/Throat:      Mouth: Mucous membranes are moist.      Pharynx: Oropharynx is clear.   Eyes:      Extraocular Movements: Extraocular movements intact.      Conjunctiva/sclera: Conjunctivae normal.      Pupils: Pupils are equal, round, and reactive to light.   Cardiovascular:      Rate and Rhythm: Normal rate and regular rhythm.   Pulmonary:      Effort: Pulmonary effort is normal.      Breath sounds: Normal breath sounds.   Abdominal:      General: Abdomen is flat. Bowel sounds are normal.      Palpations: Abdomen is soft.   Musculoskeletal:         General: Normal range of motion.      Cervical back: Normal range of motion and neck supple.   Neurological:      General: No focal deficit present.      Mental Status: He is alert and oriented to person, place, and time. Mental status is at baseline.   Psychiatric:         Mood and Affect: Mood normal.         Behavior: Behavior normal.         Thought Content: Thought content normal.         Judgment: Judgment normal.         Performance Status:  Symptomatic; in bed <50% of the day    Assessment/Plan    The patient is a 69-year-old man presented with easy fatigability shortness of breath on exertion.  Diagnosed with iron deficiency anemia.  Cologuard was positive.  The patient was referred to GI services but did not follow through.  Eventually presented to ER with above symptoms did receive 2 units of packed red blood cell transfusions.  He did develop atrial fibrillation/flutter while in ER but spontaneously converted to normal sinus rhythm was placed on metoprolol.  Colonoscopy revealed a mass 40 cm from anal verge biopsy/histology consistent with adenocarcinoma.  Initially CT scan of chest abdomen pelvis did not reveal any evidence of metastatic disease.  But a PET scan revealed large right pleural effusion and small left pleural effusion.  Thoracentesis performed on right pleural effusion on September 3, 2024 cytology is pending.  If negative would seek  opinion about hilar/mediastinal lymphadenopathy.    Iron deficiency anemia:    The patient is still symptomatic with anemia.  I have recommended intravenous Feraheme 510 mg/week x 2 weeks.  Effect side effects explained.  The patient understood appreciated all the details provided and was grateful.  T3 N1 M0 adenocarcinoma of sigmoid colon s/p resection on September 15, 2024.    The patient and family had come for follow-up on October 3, 2024 as described above multiple admissions requiring stay in ICU, history of MI GI bleed requiring blood and iron transfusions.  Physical examination revealed pedal edema and CBC revealed hemoglobin 9.2 g/dL.  I have recommended evaluation of anemia as well as Doppler ultrasound.  Discussed in detail about options such as do-nothing, consider adjuvant chemotherapy using FOLFOX which might elicit 25% survival benefit.  The patient is agreeable to start chemotherapy.  This will necessitate port placement.  Return in 2 weeks.  The patient and family had come for follow-up on October 25, 2024 regarding history of pT3, PN 1B, M0 adenocarcinoma of the colon s/p resection.  During previous evaluation the patient had presented with severe iron deficiency anemia as well as diarrhea.  C. difficile studies were negative.  Diarrhea is resolved.  Ehpwye-d-Oqdw was placed.  Initiate intravenous Feraheme for iron 10 mg/week x 2 weeks and also initiate adjuvant chemotherapy using FOLFOX to be given every 2 weeks for 12 cycles beginning November 4, 2024    Thank you for allowing me to participate in care of your patient if you have any questions please feel free to call me.      Diagnoses and all orders for this visit:  Microcytic anemia  -     Clinic Appointment Request ([facility] appt needed please); Future  Adenocarcinoma, colon (Multi)  -     Referral to Hematology and Oncology  -     Clinic Appointment Request ([facility] appt needed please); Future  Other orders  -     heparin flush 10  unit/mL syringe 50 Units  -     heparin flush 100 unit/mL syringe 500 Units  -     alteplase (Cathflo Activase) injection 2 mg  -     ferumoxytol (Feraheme) 510 mg in sodium chloride 0.9% 117 mL IV  -     sodium chloride 0.9 % bolus 500 mL  -     dextrose 5 % in water (D5W) bolus  -     diphenhydrAMINE (BENADryl) injection 50 mg  -     methylPREDNISolone sod succinate (SOLU-Medrol) 40 mg/mL injection 40 mg  -     famotidine PF (Pepcid) injection 20 mg  -     EPINEPHrine (Epipen) injection syringe 0.3 mg  -     albuterol 2.5 mg /3 mL (0.083 %) nebulizer solution 3 mL           Rio Lawson MD

## 2024-10-25 NOTE — PROGRESS NOTES
"Patient seen by Dr. Duque today in clinic. Reinforcement education provided regarding next steps with plan of care.      PER DR. DUQUE'S FUV NOTE TODAY: \"The patient and family had come for follow-up on October 25, 2024 regarding history of pT3, PN 1B, M0 adenocarcinoma of the colon s/p resection. During previous evaluation the patient had presented with severe iron deficiency anemia as well as diarrhea. C. difficile studies were negative. Diarrhea is resolved. Nnqytg-d-Bhgu was placed. Initiate intravenous Feraheme for iron 10 mg/week x 2 weeks and also initiate adjuvant chemotherapy using FOLFOX to be given every 2 weeks for 12 cycles beginning November 4, 2024 \"    Red bag, PI education sheets on Folfox provided.  Chemo cycle discussed along with appts for Venofer for iron replacement.  Pt to return on Monday 10/28/24 @ 11:00 for first dose Venofer.  Per Dr. Duque Venofer 200mg x 3 doses as pt had previous iron replacement and blood transfusion in-house.  Will continue new patient FOLFOX education on 10/28/24.  Patient verbalizes understanding of plan of care via teachback method. Appts sent to scheduling.             "

## 2024-10-26 LAB
ALBUMIN SERPL BCP-MCNC: 3.6 G/DL (ref 3.4–5)
ALP SERPL-CCNC: 88 U/L (ref 33–136)
ALT SERPL W P-5'-P-CCNC: 11 U/L (ref 10–52)
ANION GAP SERPL CALC-SCNC: 13 MMOL/L (ref 10–20)
AST SERPL W P-5'-P-CCNC: 16 U/L (ref 9–39)
BILIRUB SERPL-MCNC: 0.8 MG/DL (ref 0–1.2)
BUN SERPL-MCNC: 15 MG/DL (ref 6–23)
CALCIUM SERPL-MCNC: 8.5 MG/DL (ref 8.6–10.6)
CHLORIDE SERPL-SCNC: 103 MMOL/L (ref 98–107)
CO2 SERPL-SCNC: 27 MMOL/L (ref 21–32)
CREAT SERPL-MCNC: 0.86 MG/DL (ref 0.5–1.3)
EGFRCR SERPLBLD CKD-EPI 2021: >90 ML/MIN/1.73M*2
GLUCOSE SERPL-MCNC: 93 MG/DL (ref 74–99)
POTASSIUM SERPL-SCNC: 3.7 MMOL/L (ref 3.5–5.3)
PROT SERPL-MCNC: 6.2 G/DL (ref 6.4–8.2)
SODIUM SERPL-SCNC: 139 MMOL/L (ref 136–145)

## 2024-10-28 ENCOUNTER — TELEPHONE (OUTPATIENT)
Dept: HEMATOLOGY/ONCOLOGY | Facility: CLINIC | Age: 69
End: 2024-10-28

## 2024-10-28 ENCOUNTER — INFUSION (OUTPATIENT)
Dept: HEMATOLOGY/ONCOLOGY | Facility: CLINIC | Age: 69
End: 2024-10-28
Payer: MEDICARE

## 2024-10-28 ENCOUNTER — SOCIAL WORK (OUTPATIENT)
Dept: HEMATOLOGY/ONCOLOGY | Facility: CLINIC | Age: 69
End: 2024-10-28
Payer: MEDICARE

## 2024-10-28 VITALS
RESPIRATION RATE: 14 BRPM | SYSTOLIC BLOOD PRESSURE: 144 MMHG | WEIGHT: 186.51 LBS | OXYGEN SATURATION: 98 % | DIASTOLIC BLOOD PRESSURE: 75 MMHG | TEMPERATURE: 97.5 F | BODY MASS INDEX: 25.07 KG/M2 | HEART RATE: 56 BPM

## 2024-10-28 DIAGNOSIS — C18.9 ADENOCARCINOMA, COLON (MULTI): ICD-10-CM

## 2024-10-28 DIAGNOSIS — D50.9 MICROCYTIC ANEMIA: ICD-10-CM

## 2024-10-28 DIAGNOSIS — C18.9 STAGE III CARCINOMA OF COLON (MULTI): ICD-10-CM

## 2024-10-28 PROCEDURE — 2500000004 HC RX 250 GENERAL PHARMACY W/ HCPCS (ALT 636 FOR OP/ED): Performed by: INTERNAL MEDICINE

## 2024-10-28 PROCEDURE — 96374 THER/PROPH/DIAG INJ IV PUSH: CPT | Mod: INF

## 2024-10-28 RX ORDER — HEPARIN 100 UNIT/ML
500 SYRINGE INTRAVENOUS AS NEEDED
OUTPATIENT
Start: 2024-10-28

## 2024-10-28 RX ORDER — EPINEPHRINE 0.3 MG/.3ML
0.3 INJECTION SUBCUTANEOUS EVERY 5 MIN PRN
Status: DISCONTINUED | OUTPATIENT
Start: 2024-10-28 | End: 2024-10-28 | Stop reason: HOSPADM

## 2024-10-28 RX ORDER — HEPARIN SODIUM,PORCINE/PF 10 UNIT/ML
50 SYRINGE (ML) INTRAVENOUS AS NEEDED
OUTPATIENT
Start: 2024-10-28

## 2024-10-28 RX ORDER — FAMOTIDINE 10 MG/ML
20 INJECTION INTRAVENOUS ONCE AS NEEDED
Status: DISCONTINUED | OUTPATIENT
Start: 2024-10-28 | End: 2024-10-28 | Stop reason: HOSPADM

## 2024-10-28 RX ORDER — ALBUTEROL SULFATE 0.83 MG/ML
3 SOLUTION RESPIRATORY (INHALATION) AS NEEDED
OUTPATIENT
Start: 2024-10-31

## 2024-10-28 RX ORDER — EPINEPHRINE 0.3 MG/.3ML
0.3 INJECTION SUBCUTANEOUS EVERY 5 MIN PRN
OUTPATIENT
Start: 2024-10-31

## 2024-10-28 RX ORDER — HEPARIN 100 UNIT/ML
500 SYRINGE INTRAVENOUS AS NEEDED
Status: DISCONTINUED | OUTPATIENT
Start: 2024-10-28 | End: 2024-10-28 | Stop reason: HOSPADM

## 2024-10-28 RX ORDER — DIPHENHYDRAMINE HYDROCHLORIDE 50 MG/ML
50 INJECTION INTRAMUSCULAR; INTRAVENOUS AS NEEDED
OUTPATIENT
Start: 2024-10-31

## 2024-10-28 RX ORDER — ALBUTEROL SULFATE 0.83 MG/ML
3 SOLUTION RESPIRATORY (INHALATION) AS NEEDED
Status: DISCONTINUED | OUTPATIENT
Start: 2024-10-28 | End: 2024-10-28 | Stop reason: HOSPADM

## 2024-10-28 RX ORDER — FAMOTIDINE 10 MG/ML
20 INJECTION INTRAVENOUS ONCE AS NEEDED
OUTPATIENT
Start: 2024-10-31

## 2024-10-28 RX ORDER — DIPHENHYDRAMINE HYDROCHLORIDE 50 MG/ML
50 INJECTION INTRAMUSCULAR; INTRAVENOUS AS NEEDED
Status: DISCONTINUED | OUTPATIENT
Start: 2024-10-28 | End: 2024-10-28 | Stop reason: HOSPADM

## 2024-10-28 ASSESSMENT — PAIN SCALES - GENERAL
PAINLEVEL_OUTOF10: 0-NO PAIN

## 2024-10-29 ENCOUNTER — PATIENT OUTREACH (OUTPATIENT)
Dept: HEMATOLOGY/ONCOLOGY | Facility: CLINIC | Age: 69
End: 2024-10-29
Payer: MEDICARE

## 2024-10-29 ENCOUNTER — TELEPHONE (OUTPATIENT)
Dept: SCHEDULING | Age: 69
End: 2024-10-29
Payer: MEDICARE

## 2024-10-30 DIAGNOSIS — C18.9 STAGE III CARCINOMA OF COLON (MULTI): ICD-10-CM

## 2024-10-30 DIAGNOSIS — C18.9 ADENOCARCINOMA, COLON (MULTI): Primary | ICD-10-CM

## 2024-10-30 RX ORDER — PALONOSETRON 0.05 MG/ML
0.25 INJECTION, SOLUTION INTRAVENOUS ONCE
OUTPATIENT
Start: 2025-01-13

## 2024-10-30 RX ORDER — FLUOROURACIL 50 MG/ML
400 INJECTION, SOLUTION INTRAVENOUS ONCE
OUTPATIENT
Start: 2025-01-13

## 2024-10-30 RX ORDER — FAMOTIDINE 10 MG/ML
20 INJECTION INTRAVENOUS ONCE AS NEEDED
OUTPATIENT
Start: 2025-01-13

## 2024-10-30 RX ORDER — PROCHLORPERAZINE MALEATE 5 MG
10 TABLET ORAL EVERY 6 HOURS PRN
OUTPATIENT
Start: 2025-01-13

## 2024-10-30 RX ORDER — PROCHLORPERAZINE MALEATE 5 MG
10 TABLET ORAL EVERY 6 HOURS PRN
OUTPATIENT
Start: 2024-12-30

## 2024-10-30 RX ORDER — PALONOSETRON 0.05 MG/ML
0.25 INJECTION, SOLUTION INTRAVENOUS ONCE
OUTPATIENT
Start: 2024-12-30

## 2024-10-30 RX ORDER — DIPHENHYDRAMINE HYDROCHLORIDE 50 MG/ML
50 INJECTION INTRAMUSCULAR; INTRAVENOUS AS NEEDED
OUTPATIENT
Start: 2024-12-30

## 2024-10-30 RX ORDER — PROCHLORPERAZINE EDISYLATE 5 MG/ML
10 INJECTION INTRAMUSCULAR; INTRAVENOUS EVERY 6 HOURS PRN
OUTPATIENT
Start: 2025-01-13

## 2024-10-30 RX ORDER — FAMOTIDINE 10 MG/ML
20 INJECTION INTRAVENOUS ONCE AS NEEDED
OUTPATIENT
Start: 2024-12-30

## 2024-10-30 RX ORDER — LORAZEPAM 2 MG/ML
1 INJECTION INTRAMUSCULAR AS NEEDED
OUTPATIENT
Start: 2024-12-30

## 2024-10-30 RX ORDER — EPINEPHRINE 0.3 MG/.3ML
0.3 INJECTION SUBCUTANEOUS EVERY 5 MIN PRN
OUTPATIENT
Start: 2025-01-13

## 2024-10-30 RX ORDER — EPINEPHRINE 0.3 MG/.3ML
0.3 INJECTION SUBCUTANEOUS EVERY 5 MIN PRN
OUTPATIENT
Start: 2024-12-30

## 2024-10-30 RX ORDER — ALBUTEROL SULFATE 0.83 MG/ML
3 SOLUTION RESPIRATORY (INHALATION) AS NEEDED
OUTPATIENT
Start: 2024-12-30

## 2024-10-30 RX ORDER — FLUOROURACIL 50 MG/ML
400 INJECTION, SOLUTION INTRAVENOUS ONCE
OUTPATIENT
Start: 2024-12-30

## 2024-10-30 RX ORDER — PROCHLORPERAZINE EDISYLATE 5 MG/ML
10 INJECTION INTRAMUSCULAR; INTRAVENOUS EVERY 6 HOURS PRN
OUTPATIENT
Start: 2024-12-30

## 2024-10-30 RX ORDER — LORAZEPAM 2 MG/ML
1 INJECTION INTRAMUSCULAR AS NEEDED
OUTPATIENT
Start: 2025-01-13

## 2024-10-30 RX ORDER — ALBUTEROL SULFATE 0.83 MG/ML
3 SOLUTION RESPIRATORY (INHALATION) AS NEEDED
OUTPATIENT
Start: 2025-01-13

## 2024-10-30 RX ORDER — DIPHENHYDRAMINE HYDROCHLORIDE 50 MG/ML
50 INJECTION INTRAMUSCULAR; INTRAVENOUS AS NEEDED
OUTPATIENT
Start: 2025-01-13

## 2024-11-04 ENCOUNTER — INFUSION (OUTPATIENT)
Dept: HEMATOLOGY/ONCOLOGY | Facility: CLINIC | Age: 69
End: 2024-11-04
Payer: MEDICARE

## 2024-11-04 VITALS
DIASTOLIC BLOOD PRESSURE: 74 MMHG | BODY MASS INDEX: 24.95 KG/M2 | TEMPERATURE: 97.3 F | HEIGHT: 72 IN | OXYGEN SATURATION: 98 % | RESPIRATION RATE: 16 BRPM | WEIGHT: 184.19 LBS | HEART RATE: 45 BPM | SYSTOLIC BLOOD PRESSURE: 140 MMHG

## 2024-11-04 DIAGNOSIS — C18.9 ADENOCARCINOMA, COLON (MULTI): ICD-10-CM

## 2024-11-04 DIAGNOSIS — D50.9 MICROCYTIC ANEMIA: ICD-10-CM

## 2024-11-04 DIAGNOSIS — C18.9 STAGE III CARCINOMA OF COLON (MULTI): ICD-10-CM

## 2024-11-04 PROCEDURE — 96375 TX/PRO/DX INJ NEW DRUG ADDON: CPT | Mod: INF

## 2024-11-04 PROCEDURE — 2500000004 HC RX 250 GENERAL PHARMACY W/ HCPCS (ALT 636 FOR OP/ED): Performed by: INTERNAL MEDICINE

## 2024-11-04 PROCEDURE — 96411 CHEMO IV PUSH ADDL DRUG: CPT

## 2024-11-04 PROCEDURE — 96415 CHEMO IV INFUSION ADDL HR: CPT

## 2024-11-04 PROCEDURE — 96416 CHEMO PROLONG INFUSE W/PUMP: CPT

## 2024-11-04 PROCEDURE — 96413 CHEMO IV INFUSION 1 HR: CPT

## 2024-11-04 PROCEDURE — 96376 TX/PRO/DX INJ SAME DRUG ADON: CPT

## 2024-11-04 RX ORDER — FAMOTIDINE 10 MG/ML
20 INJECTION INTRAVENOUS ONCE AS NEEDED
Status: DISCONTINUED | OUTPATIENT
Start: 2024-11-04 | End: 2024-11-04 | Stop reason: HOSPADM

## 2024-11-04 RX ORDER — EPINEPHRINE 0.3 MG/.3ML
0.3 INJECTION SUBCUTANEOUS EVERY 5 MIN PRN
Status: CANCELLED | OUTPATIENT
Start: 2024-11-07

## 2024-11-04 RX ORDER — PROCHLORPERAZINE MALEATE 10 MG
10 TABLET ORAL EVERY 6 HOURS PRN
Status: DISCONTINUED | OUTPATIENT
Start: 2024-11-04 | End: 2024-11-04 | Stop reason: HOSPADM

## 2024-11-04 RX ORDER — PROCHLORPERAZINE EDISYLATE 5 MG/ML
10 INJECTION INTRAMUSCULAR; INTRAVENOUS EVERY 6 HOURS PRN
Status: DISCONTINUED | OUTPATIENT
Start: 2024-11-04 | End: 2024-11-04 | Stop reason: HOSPADM

## 2024-11-04 RX ORDER — ALBUTEROL SULFATE 0.83 MG/ML
3 SOLUTION RESPIRATORY (INHALATION) AS NEEDED
Status: DISCONTINUED | OUTPATIENT
Start: 2024-11-04 | End: 2024-11-04 | Stop reason: HOSPADM

## 2024-11-04 RX ORDER — ALBUTEROL SULFATE 0.83 MG/ML
3 SOLUTION RESPIRATORY (INHALATION) AS NEEDED
Status: CANCELLED | OUTPATIENT
Start: 2024-11-07

## 2024-11-04 RX ORDER — LORAZEPAM 2 MG/ML
1 INJECTION INTRAMUSCULAR AS NEEDED
Status: DISCONTINUED | OUTPATIENT
Start: 2024-11-04 | End: 2024-11-04 | Stop reason: HOSPADM

## 2024-11-04 RX ORDER — DIPHENHYDRAMINE HYDROCHLORIDE 50 MG/ML
50 INJECTION INTRAMUSCULAR; INTRAVENOUS AS NEEDED
Status: DISCONTINUED | OUTPATIENT
Start: 2024-11-04 | End: 2024-11-04 | Stop reason: HOSPADM

## 2024-11-04 RX ORDER — PALONOSETRON 0.05 MG/ML
0.25 INJECTION, SOLUTION INTRAVENOUS ONCE
Status: COMPLETED | OUTPATIENT
Start: 2024-11-04 | End: 2024-11-04

## 2024-11-04 RX ORDER — EPINEPHRINE 0.3 MG/.3ML
0.3 INJECTION SUBCUTANEOUS EVERY 5 MIN PRN
Status: DISCONTINUED | OUTPATIENT
Start: 2024-11-04 | End: 2024-11-04 | Stop reason: HOSPADM

## 2024-11-04 RX ORDER — DEXAMETHASONE 4 MG/1
12 TABLET ORAL ONCE
Status: COMPLETED | OUTPATIENT
Start: 2024-11-04 | End: 2024-11-04

## 2024-11-04 RX ORDER — DIPHENHYDRAMINE HYDROCHLORIDE 50 MG/ML
50 INJECTION INTRAMUSCULAR; INTRAVENOUS AS NEEDED
Status: CANCELLED | OUTPATIENT
Start: 2024-11-07

## 2024-11-04 RX ORDER — FAMOTIDINE 10 MG/ML
20 INJECTION INTRAVENOUS ONCE AS NEEDED
Status: CANCELLED | OUTPATIENT
Start: 2024-11-07

## 2024-11-04 RX ORDER — FLUOROURACIL 50 MG/ML
400 INJECTION, SOLUTION INTRAVENOUS ONCE
Status: COMPLETED | OUTPATIENT
Start: 2024-11-04 | End: 2024-11-04

## 2024-11-04 ASSESSMENT — PAIN SCALES - GENERAL: PAINLEVEL_OUTOF10: 0-NO PAIN

## 2024-11-04 NOTE — PROGRESS NOTES
Spoke with patient during today's treatment appt.  Teaching provided regarding anti-nausea plan.  PI provided.  Patient provided Ipad to watch chemotherapy class.  Per Dr Lawson patient can continue Amioderone however, if patient develops any heart palpitations he is to hold medication and call office.  Patient notified of this.  Questions and concerns addressed.    Call back instructions reviewed.  Patient verbalized understanding.

## 2024-11-06 ENCOUNTER — INFUSION (OUTPATIENT)
Dept: HEMATOLOGY/ONCOLOGY | Facility: CLINIC | Age: 69
End: 2024-11-06
Payer: MEDICARE

## 2024-11-06 ENCOUNTER — APPOINTMENT (OUTPATIENT)
Dept: SURGERY | Facility: CLINIC | Age: 69
End: 2024-11-06
Payer: MEDICARE

## 2024-11-06 VITALS
RESPIRATION RATE: 14 BRPM | OXYGEN SATURATION: 95 % | DIASTOLIC BLOOD PRESSURE: 68 MMHG | TEMPERATURE: 97.3 F | SYSTOLIC BLOOD PRESSURE: 135 MMHG | HEIGHT: 72 IN | BODY MASS INDEX: 25.43 KG/M2 | HEART RATE: 59 BPM | WEIGHT: 187.72 LBS

## 2024-11-06 DIAGNOSIS — C18.9 ADENOCARCINOMA, COLON (MULTI): ICD-10-CM

## 2024-11-06 DIAGNOSIS — C18.9 STAGE III CARCINOMA OF COLON (MULTI): ICD-10-CM

## 2024-11-06 DIAGNOSIS — D50.9 MICROCYTIC ANEMIA: ICD-10-CM

## 2024-11-06 PROCEDURE — 96372 THER/PROPH/DIAG INJ SC/IM: CPT

## 2024-11-06 PROCEDURE — 2500000004 HC RX 250 GENERAL PHARMACY W/ HCPCS (ALT 636 FOR OP/ED): Mod: JZ,JG | Performed by: INTERNAL MEDICINE

## 2024-11-06 PROCEDURE — 2500000004 HC RX 250 GENERAL PHARMACY W/ HCPCS (ALT 636 FOR OP/ED): Performed by: INTERNAL MEDICINE

## 2024-11-06 RX ORDER — HEPARIN SODIUM,PORCINE/PF 10 UNIT/ML
50 SYRINGE (ML) INTRAVENOUS AS NEEDED
Status: DISCONTINUED | OUTPATIENT
Start: 2024-11-06 | End: 2024-11-06 | Stop reason: HOSPADM

## 2024-11-06 RX ORDER — HEPARIN 100 UNIT/ML
500 SYRINGE INTRAVENOUS AS NEEDED
Status: DISCONTINUED | OUTPATIENT
Start: 2024-11-06 | End: 2024-11-06 | Stop reason: HOSPADM

## 2024-11-06 RX ORDER — HEPARIN SODIUM,PORCINE/PF 10 UNIT/ML
50 SYRINGE (ML) INTRAVENOUS AS NEEDED
Status: CANCELLED | OUTPATIENT
Start: 2024-11-06

## 2024-11-06 RX ORDER — HEPARIN 100 UNIT/ML
500 SYRINGE INTRAVENOUS AS NEEDED
Status: CANCELLED | OUTPATIENT
Start: 2024-11-06

## 2024-11-06 ASSESSMENT — PAIN SCALES - GENERAL: PAINLEVEL_OUTOF10: 0-NO PAIN

## 2024-11-06 NOTE — PROGRESS NOTES
Cvad with some old blood around port cleaned off steri strips intact some blood on them but they are very secure to incision sites it has been about two weeks- no active bleeding noted after hep flush- given extra bandages after. Pt may have bumped port but it is in good working order when flushed and no active bleeding.    Pt will rtc on Friday as scheduled -     Given gfs today - we went over bone yvrose side effects and use - pt verbalized understanding    Pt to have call form coordination nurse today also -     No  side effects after tx- encouraged to continue hydration

## 2024-11-07 ENCOUNTER — PATIENT OUTREACH (OUTPATIENT)
Dept: HEMATOLOGY/ONCOLOGY | Facility: CLINIC | Age: 69
End: 2024-11-07
Payer: MEDICARE

## 2024-11-07 NOTE — PROGRESS NOTES
"Call placed to patient to follow-up regarding first treatment.  States he had mild constipation and felt \"a little jittery\" yesterday.  Today feeling a little \"weak\" and tired.  States constipation did resolve after taking Miralax.  Reviewed [facility] when to call.  Follow-up as scheduled tomorrow with Iron.  Call back instructions reviewed.  Patient verbalized understanding.     "

## 2024-11-08 ENCOUNTER — INFUSION (OUTPATIENT)
Dept: HEMATOLOGY/ONCOLOGY | Facility: CLINIC | Age: 69
End: 2024-11-08
Payer: MEDICARE

## 2024-11-08 ENCOUNTER — TELEPHONE (OUTPATIENT)
Dept: HEMATOLOGY/ONCOLOGY | Facility: CLINIC | Age: 69
End: 2024-11-08

## 2024-11-08 ENCOUNTER — OFFICE VISIT (OUTPATIENT)
Dept: HEMATOLOGY/ONCOLOGY | Facility: CLINIC | Age: 69
End: 2024-11-08
Payer: MEDICARE

## 2024-11-08 VITALS
RESPIRATION RATE: 14 BRPM | HEART RATE: 49 BPM | TEMPERATURE: 98.4 F | SYSTOLIC BLOOD PRESSURE: 144 MMHG | DIASTOLIC BLOOD PRESSURE: 67 MMHG

## 2024-11-08 VITALS
OXYGEN SATURATION: 97 % | TEMPERATURE: 97.9 F | WEIGHT: 184.97 LBS | DIASTOLIC BLOOD PRESSURE: 75 MMHG | RESPIRATION RATE: 16 BRPM | SYSTOLIC BLOOD PRESSURE: 167 MMHG | HEART RATE: 58 BPM | BODY MASS INDEX: 24.92 KG/M2

## 2024-11-08 DIAGNOSIS — D50.9 MICROCYTIC ANEMIA: ICD-10-CM

## 2024-11-08 DIAGNOSIS — C18.9 ADENOCARCINOMA, COLON (MULTI): ICD-10-CM

## 2024-11-08 DIAGNOSIS — C18.9 STAGE III CARCINOMA OF COLON (MULTI): Primary | ICD-10-CM

## 2024-11-08 DIAGNOSIS — C18.9 STAGE III CARCINOMA OF COLON (MULTI): ICD-10-CM

## 2024-11-08 PROCEDURE — 1126F AMNT PAIN NOTED NONE PRSNT: CPT | Performed by: INTERNAL MEDICINE

## 2024-11-08 PROCEDURE — 96374 THER/PROPH/DIAG INJ IV PUSH: CPT | Mod: INF

## 2024-11-08 PROCEDURE — 99215 OFFICE O/P EST HI 40 MIN: CPT | Performed by: INTERNAL MEDICINE

## 2024-11-08 PROCEDURE — 2500000004 HC RX 250 GENERAL PHARMACY W/ HCPCS (ALT 636 FOR OP/ED): Performed by: INTERNAL MEDICINE

## 2024-11-08 PROCEDURE — 99215 OFFICE O/P EST HI 40 MIN: CPT | Mod: 25 | Performed by: INTERNAL MEDICINE

## 2024-11-08 RX ORDER — EPINEPHRINE 0.3 MG/.3ML
0.3 INJECTION SUBCUTANEOUS EVERY 5 MIN PRN
Status: DISCONTINUED | OUTPATIENT
Start: 2024-11-08 | End: 2024-11-08 | Stop reason: HOSPADM

## 2024-11-08 RX ORDER — ALBUTEROL SULFATE 0.83 MG/ML
3 SOLUTION RESPIRATORY (INHALATION) AS NEEDED
OUTPATIENT
Start: 2024-11-10

## 2024-11-08 RX ORDER — FAMOTIDINE 10 MG/ML
20 INJECTION INTRAVENOUS ONCE AS NEEDED
OUTPATIENT
Start: 2024-11-10

## 2024-11-08 RX ORDER — ALBUTEROL SULFATE 0.83 MG/ML
3 SOLUTION RESPIRATORY (INHALATION) AS NEEDED
Status: DISCONTINUED | OUTPATIENT
Start: 2024-11-08 | End: 2024-11-08 | Stop reason: HOSPADM

## 2024-11-08 RX ORDER — HEPARIN SODIUM,PORCINE/PF 10 UNIT/ML
50 SYRINGE (ML) INTRAVENOUS AS NEEDED
Status: DISCONTINUED | OUTPATIENT
Start: 2024-11-08 | End: 2024-11-08 | Stop reason: HOSPADM

## 2024-11-08 RX ORDER — HEPARIN 100 UNIT/ML
500 SYRINGE INTRAVENOUS AS NEEDED
OUTPATIENT
Start: 2024-11-08

## 2024-11-08 RX ORDER — DIPHENHYDRAMINE HYDROCHLORIDE 50 MG/ML
50 INJECTION INTRAMUSCULAR; INTRAVENOUS AS NEEDED
Status: DISCONTINUED | OUTPATIENT
Start: 2024-11-08 | End: 2024-11-08 | Stop reason: HOSPADM

## 2024-11-08 RX ORDER — HEPARIN SODIUM,PORCINE/PF 10 UNIT/ML
50 SYRINGE (ML) INTRAVENOUS AS NEEDED
OUTPATIENT
Start: 2024-11-08

## 2024-11-08 RX ORDER — DIPHENHYDRAMINE HYDROCHLORIDE 50 MG/ML
50 INJECTION INTRAMUSCULAR; INTRAVENOUS AS NEEDED
OUTPATIENT
Start: 2024-11-10

## 2024-11-08 RX ORDER — FAMOTIDINE 10 MG/ML
20 INJECTION INTRAVENOUS ONCE AS NEEDED
Status: DISCONTINUED | OUTPATIENT
Start: 2024-11-08 | End: 2024-11-08 | Stop reason: HOSPADM

## 2024-11-08 RX ORDER — EPINEPHRINE 0.3 MG/.3ML
0.3 INJECTION SUBCUTANEOUS EVERY 5 MIN PRN
OUTPATIENT
Start: 2024-11-10

## 2024-11-08 RX ORDER — HEPARIN 100 UNIT/ML
500 SYRINGE INTRAVENOUS AS NEEDED
Status: DISCONTINUED | OUTPATIENT
Start: 2024-11-08 | End: 2024-11-08 | Stop reason: HOSPADM

## 2024-11-08 ASSESSMENT — PAIN SCALES - GENERAL: PAINLEVEL_OUTOF10: 0-NO PAIN

## 2024-11-08 NOTE — PROGRESS NOTES
"C#1 mFOLFOX this week.  States he is feeling \"better\" than yesterday.  Per treatment nurse patient indicating tenderness in mouth.  Provider to send RX for BMX.  IV Iron today as ordered.  Patient scheduled through December.  Redlist reviewed.  Call back instructions reviewed.  Patient verbalized understanding.     "

## 2024-11-08 NOTE — PROGRESS NOTES
Patient ID: Krish Batista is a 69 y.o. male.  Referring Physician: Rio Lawson MD  [facility], [place] [address] [place] [address]  Primary Care Provider: Matthew Small,    8/15/24, EGD and colonoscopy  A.  Duodenum, first part, biopsy:  Duodenal mucosa within normal limits.     B.  Stomach, biopsy:  Antrum and corpus mucosa within normal limits.     C.  Colon, mass at 40 cm, biopsy:  Fragments of invasive adenocarcinoma, moderately differentiated.  See note.     NOTE: Immunohistochemical stains for mismatch repair proteins are pending and the result will be reported as an addendum.     D.  Colon, 20 cm, polypectomy:  Tubular adenoma.                               MISMATCH REPAIR PROTEIN EXPRESSION     C. Colon, mass at 40 cm, biopsy: Adenocarcinoma     Protein:  Result     MLH-1:  Expression Present                                    PMS-2: Expression Present                                    MSH-2: Expression Present                                    MSH-6: Expression Present    CEA at 1.5 NG per mL on August 2, 2024    Colon, splenic flexure, resection:  -Invasive adenocarcinoma (3.1 cm), moderately differentiated.  -Carcinoma invades pericolonic tissue.  -Extramural venous and perineural invasion present.  -Resection margins are negative for carcinoma.  -Metastatic adenocarcinoma involving two of thirty-seven lymph nodes (2/37).  -Pathologic stage: pT3, pN1b.  -See comment and synoptic report.  Comment:  Movat stains (blocks A4 and A5) confirm the venous invasion.    Electronically signed by Sharonda Maguire MD PhD on 9/30/2024 at 1350      [facility]   By the signature on this report, the individual or group listed as making the Final Interpretation/Diagnosis certifies that they have reviewed this case.    Case Summary Report   COLON AND RECTUM: Resection, Including Transanal Disk Excision of Rectal Neoplasms   8th Edition - Protocol posted: 6/22/2022COLON AND RECTUM: RESECTION, INCLUDING  TRANSANAL DISK EXCISION OF RECTAL NEOPLASMS - All Specimens  SPECIMEN   Procedure  resection   TUMOR   Tumor Site  Splenic flexure   Histologic Type  Adenocarcinoma   Histologic Grade  G2, moderately differentiated   Tumor Size  Greatest dimension (Centimeters): 3.1 cm   Tumor Extent  Invades through muscularis propria into the pericolonic or perirectal tissue   Macroscopic Tumor Perforation  Not identified   Lymphovascular Invasion  Large vessel (venous), extramural   Perineural Invasion  Present   Tumor Bud Score  Intermediate (5-9)   Treatment Effect  No known presurgical therapy   MARGINS   Margin Status for Invasive Carcinoma  All margins negative for invasive carcinoma   Closest Margin(s) to Invasive Carcinoma  Radial (circumferential) or mesenteric   Distance from Invasive Carcinoma to Closest Margin  Greater than 1 cm   Margin Status for Non-Invasive Tumor  All margins negative for high-grade dysplasia / intramucosal carcinoma and low-grade dysplasia   REGIONAL LYMPH NODES   Regional Lymph Node Status  Tumor present in regional lymph node(s)   Number of Lymph Nodes with Tumor  2   Number of Lymph Nodes Examined  37   Tumor Deposits  Not identified   PATHOLOGIC STAGE CLASSIFICATION (pTNM, AJCC 8th Edition)   Reporting of pT, pN, and (when applicable) pM categories is based on information available to the pathologist at the time the report is issued. As per the AJCC (Chapter 1, 8th Ed.) it is the managing physician’s responsibility to establish the final pathologic stage based upon all pertinent information, including but potentially not limited to this pathology report.   pT Category  pT3   pN Category  pN1b   .        Subjective    HPI  The patient was referred to me by Dr. Clark for further evaluation and management of newly diagnosed biopsy-proven left colon adenocarcinoma.  The patient presented to Dr. Small on March 25, 2024 complaining of fatigue and malaise.  Further evaluation revealed significant  anemia at hemoglobin 8.1 g/dL ferritin at 8 NG per mL iron saturation 3%.  The patient was referred to GI services however he failed to follow through.  He was referred to surgery as well.  Repeat hemoglobin on July 11, 2024 was 7.2 g/dL.  He then presented to ER on August 1, 2024 secondary to progressive fatigue and weakness.  He was diagnosed with severe microcytic anemia hemoglobin 7.4 g/dL.  The patient received 2 units of packed red blood cell transfusions.  Upper and lower endoscopy on August 3, 2024 revealed a mass at 40 cm in the anal verge.  Histology consistent with adenocarcinoma.  In ER the patient was found to be in atrial fibrillation/flutter with RVR.  Cardiology was consulted.  He spontaneously converted to normal sinus rhythm.  Color Doppler echocardiogram revealed normal ejection fraction.  He was placed on metoprolol 50 mg p.o. twice daily with a Holter monitor.  Anticoagulation was not recommended.  Colonoscopy on August 15, 2024 revealed internal small hemorrhoids.  1 8 mm sessile polyp 20 centimeter from anal verge.  This was snared.  Single friable and ulcerated mass not transferred stable 40 cm from the anal verge, covering the whole circumference; bleeding occurred before intervention.  Performed cold forceps biopsy with partial removal: Tattooed distal to the finding with spot.  CT scan of chest abdomen pelvis on August 1, 2024 with IV contrast did not reveal any obvious metastatic disease.  A PET scan revealed bilateral pleural effusion right greater than left.  Thoracentesis was performed on September 3, 2024 cytology is pending.  The patient claims that he is feeling better after thoracentesis.    At interview on September 4, 2024 he was accompanied by his niece denied history of weight loss, fevers, night sweats, chest pain, nausea, vomiting, hematemesis, melena, hematochezia and hematuria.  Since last evaluation the patient has had multiple admissions to the hospital for myocardial  infarction requiring cardiac catheterization, status post surgery/left hemicolectomy on September 15, 2024, GI bleed hypotension requiring iron infusion as well as blood transfusions.  The patient was discharged and claims that he is beginning to feel better although has pedal edema as well as anterior abdominal wall edema.  The patient and family had come for follow-up on November 8, 2024 regarding history of pT3, PN 1B, M0 adenocarcinoma of the colon s/p resection.  During previous evaluation the patient had presented with severe iron deficiency anemia as well as diarrhea.  C. difficile studies were negative.  Diarrhea is resolved.  [id number] was placed.  Initiate intravenous Feraheme for iron 10 mg/week x 2 weeks and also initiate adjuvant chemotherapy using FOLFOX to be given every 2 weeks for 12 cycles.  Cycle 1 on November 4, 2024 that is correct.    You have any questions about chemotherapy side effects or of course of number    Okay while.  Let me to go talk to the nurse and set things up for you okay    Past medical history:    Iron deficiency anemia, adenocarcinoma of descending colon, history of Raynaud's phenomenon.  Past surgical history:    Sinus surgery July 13, 2021, history of undescended testis surgery, and tonsillectomy.      Review of Systems   All other systems reviewed and are negative.       Objective   BSA: 2.07 meters squared  /75   Pulse 58   Temp 36.6 °C (97.9 °F) (Temporal)   Resp 16   Wt 83.9 kg (184 lb 15.5 oz)   SpO2 97%   BMI 24.92 kg/m²     No family history on file.  Oncology History   Adenocarcinoma, colon (Multi)   8/23/2024 Initial Diagnosis    Adenocarcinoma, colon (Multi)     11/4/2024 -  Chemotherapy    mFOLFOX6 (Fluorouracil Continuous Infusion / Leucovorin / Oxaliplatin), 14 Day Cycles       The patient is 69 years old, single has no children retired as a .  Krish Batista  reports that he has never smoked. He has never used smokeless tobacco.  He   reports that he does not currently use alcohol.  He  reports no history of drug use.    Physical Exam  Constitutional:       Appearance: Normal appearance.   HENT:      Head: Normocephalic and atraumatic.      Nose: Nose normal.      Mouth/Throat:      Mouth: Mucous membranes are moist.      Pharynx: Oropharynx is clear.   Eyes:      Extraocular Movements: Extraocular movements intact.      Conjunctiva/sclera: Conjunctivae normal.      Pupils: Pupils are equal, round, and reactive to light.   Cardiovascular:      Rate and Rhythm: Normal rate and regular rhythm.   Pulmonary:      Effort: Pulmonary effort is normal.      Breath sounds: Normal breath sounds.   Abdominal:      General: Abdomen is flat. Bowel sounds are normal.      Palpations: Abdomen is soft.   Musculoskeletal:         General: Normal range of motion.      Cervical back: Normal range of motion and neck supple.   Neurological:      General: No focal deficit present.      Mental Status: He is alert and oriented to person, place, and time. Mental status is at baseline.   Psychiatric:         Mood and Affect: Mood normal.         Behavior: Behavior normal.         Thought Content: Thought content normal.         Judgment: Judgment normal.         Performance Status:  Symptomatic; in bed <50% of the day    Assessment/Plan    The patient is a 69-year-old man presented with easy fatigability shortness of breath on exertion.  Diagnosed with iron deficiency anemia.  Cologuard was positive.  The patient was referred to GI services but did not follow through.  Eventually presented to ER with above symptoms did receive 2 units of packed red blood cell transfusions.  He did develop atrial fibrillation/flutter while in ER but spontaneously converted to normal sinus rhythm was placed on metoprolol.  Colonoscopy revealed a mass 40 cm from anal verge biopsy/histology consistent with adenocarcinoma.  Initially CT scan of chest abdomen pelvis did not reveal any evidence  of metastatic disease.  But a PET scan revealed large right pleural effusion and small left pleural effusion.  Thoracentesis performed on right pleural effusion on September 3, 2024 cytology is pending.  If negative would seek opinion about hilar/mediastinal lymphadenopathy.    Iron deficiency anemia:    The patient is still symptomatic with anemia.  I have recommended intravenous Feraheme 510 mg/week x 2 weeks.  Effect side effects explained.  The patient understood appreciated all the details provided and was grateful.  T3 N1 M0 adenocarcinoma of sigmoid colon s/p resection on September 15, 2024.    The patient and family had come for follow-up on October 3, 2024 as described above multiple admissions requiring stay in ICU, history of MI GI bleed requiring blood and iron transfusions.  Physical examination revealed pedal edema and CBC revealed hemoglobin 9.2 g/dL.  I have recommended evaluation of anemia as well as Doppler ultrasound.  Discussed in detail about options such as do-nothing, consider adjuvant chemotherapy using FOLFOX which might elicit 25% survival benefit.  The patient is agreeable to start chemotherapy.  This will necessitate port placement.  Return in 2 weeks.  The patient and family had come for follow-up on November 8, 2024 regarding history of pT3, PN 1B, M0 adenocarcinoma of the colon s/p resection.  During previous evaluation the patient had presented with severe iron deficiency anemia as well as diarrhea.  C. difficile studies were negative.  Diarrhea is resolved.  [id number]-k-[id number] was placed.  Initiate intravenous Feraheme for iron 10 mg/week x 2 weeks and also initiate adjuvant chemotherapy using FOLFOX to be given every 2 weeks for 12 cycles beginning November 4, 2024    Thank you for allowing me to participate in care of your patient if you have any questions please feel free to call me.      Diagnoses and all orders for this visit:  Microcytic anemia  -     Clinic Appointment Request  ([place] appt needed please); Future  Adenocarcinoma, colon (Multi)  -     Referral to Hematology and Oncology  -     Clinic Appointment Request ([place] appt needed please); Future  Other orders  -     heparin flush 10 unit/mL syringe 50 Units  -     heparin flush 100 unit/mL syringe 500 Units  -     alteplase (Cathflo Activase) injection 2 mg  -     ferumoxytol (Feraheme) 510 mg in sodium chloride 0.9% 117 mL IV  -     sodium chloride 0.9 % bolus 500 mL  -     dextrose 5 % in water (D5W) bolus  -     diphenhydrAMINE (BENADryl) injection 50 mg  -     methylPREDNISolone sod succinate (SOLU-Medrol) 40 mg/mL injection 40 mg  -     famotidine PF (Pepcid) injection 20 mg  -     EPINEPHrine (Epipen) injection syringe 0.3 mg  -     albuterol 2.5 mg /3 mL (0.083 %) nebulizer solution 3 mL           Rio Lawson MD

## 2024-11-11 RX ORDER — DEXAMETHASONE 6 MG/1
12 TABLET ORAL ONCE
OUTPATIENT
Start: 2024-12-02

## 2024-11-11 RX ORDER — DEXAMETHASONE 6 MG/1
12 TABLET ORAL ONCE
OUTPATIENT
Start: 2024-12-30

## 2024-11-11 RX ORDER — DEXAMETHASONE 6 MG/1
12 TABLET ORAL ONCE
OUTPATIENT
Start: 2024-11-18

## 2024-11-11 RX ORDER — DEXAMETHASONE 6 MG/1
12 TABLET ORAL ONCE
OUTPATIENT
Start: 2024-12-16

## 2024-11-11 RX ORDER — DEXAMETHASONE 6 MG/1
12 TABLET ORAL ONCE
OUTPATIENT
Start: 2025-01-13

## 2024-11-14 ENCOUNTER — PATIENT OUTREACH (OUTPATIENT)
Dept: PRIMARY CARE | Facility: CLINIC | Age: 69
End: 2024-11-14
Payer: MEDICARE

## 2024-11-15 ENCOUNTER — INFUSION (OUTPATIENT)
Dept: HEMATOLOGY/ONCOLOGY | Facility: CLINIC | Age: 69
End: 2024-11-15
Payer: MEDICARE

## 2024-11-15 ENCOUNTER — PATIENT OUTREACH (OUTPATIENT)
Dept: HEMATOLOGY/ONCOLOGY | Facility: CLINIC | Age: 69
End: 2024-11-15
Payer: MEDICARE

## 2024-11-15 VITALS
DIASTOLIC BLOOD PRESSURE: 74 MMHG | TEMPERATURE: 97.7 F | HEIGHT: 72 IN | OXYGEN SATURATION: 99 % | BODY MASS INDEX: 24.46 KG/M2 | RESPIRATION RATE: 16 BRPM | SYSTOLIC BLOOD PRESSURE: 144 MMHG | HEART RATE: 47 BPM | WEIGHT: 180.56 LBS

## 2024-11-15 DIAGNOSIS — D50.9 MICROCYTIC ANEMIA: ICD-10-CM

## 2024-11-15 DIAGNOSIS — C18.9 STAGE III CARCINOMA OF COLON (MULTI): ICD-10-CM

## 2024-11-15 DIAGNOSIS — C18.9 ADENOCARCINOMA, COLON (MULTI): ICD-10-CM

## 2024-11-15 LAB
BASOPHILS # BLD AUTO: 0.02 X10*3/UL (ref 0–0.1)
BASOPHILS NFR BLD AUTO: 0.2 %
EOSINOPHIL # BLD AUTO: 0.03 X10*3/UL (ref 0–0.7)
EOSINOPHIL NFR BLD AUTO: 0.2 %
ERYTHROCYTE [DISTWIDTH] IN BLOOD BY AUTOMATED COUNT: 20 % (ref 11.5–14.5)
HCT VFR BLD AUTO: 37.3 % (ref 41–52)
HGB BLD-MCNC: 11.8 G/DL (ref 13.5–17.5)
IMM GRANULOCYTES # BLD AUTO: 0.08 X10*3/UL (ref 0–0.7)
IMM GRANULOCYTES NFR BLD AUTO: 0.7 % (ref 0–0.9)
LYMPHOCYTES # BLD AUTO: 0.87 X10*3/UL (ref 1.2–4.8)
LYMPHOCYTES NFR BLD AUTO: 7.1 %
MCH RBC QN AUTO: 30.4 PG (ref 26–34)
MCHC RBC AUTO-ENTMCNC: 31.6 G/DL (ref 32–36)
MCV RBC AUTO: 96 FL (ref 80–100)
MONOCYTES # BLD AUTO: 0.58 X10*3/UL (ref 0.1–1)
MONOCYTES NFR BLD AUTO: 4.8 %
NEUTROPHILS # BLD AUTO: 10.6 X10*3/UL (ref 1.2–7.7)
NEUTROPHILS NFR BLD AUTO: 87 %
NRBC BLD-RTO: ABNORMAL /100{WBCS}
PLATELET # BLD AUTO: 153 X10*3/UL (ref 150–450)
RBC # BLD AUTO: 3.88 X10*6/UL (ref 4.5–5.9)
WBC # BLD AUTO: 12.2 X10*3/UL (ref 4.4–11.3)

## 2024-11-15 PROCEDURE — 84075 ASSAY ALKALINE PHOSPHATASE: CPT

## 2024-11-15 PROCEDURE — 82378 CARCINOEMBRYONIC ANTIGEN: CPT

## 2024-11-15 PROCEDURE — 85025 COMPLETE CBC W/AUTO DIFF WBC: CPT

## 2024-11-15 PROCEDURE — 2500000004 HC RX 250 GENERAL PHARMACY W/ HCPCS (ALT 636 FOR OP/ED): Performed by: INTERNAL MEDICINE

## 2024-11-15 PROCEDURE — 36591 DRAW BLOOD OFF VENOUS DEVICE: CPT

## 2024-11-15 RX ORDER — HEPARIN SODIUM,PORCINE/PF 10 UNIT/ML
50 SYRINGE (ML) INTRAVENOUS AS NEEDED
OUTPATIENT
Start: 2024-11-15

## 2024-11-15 RX ORDER — HEPARIN 100 UNIT/ML
500 SYRINGE INTRAVENOUS AS NEEDED
Status: DISCONTINUED | OUTPATIENT
Start: 2024-11-15 | End: 2024-11-15 | Stop reason: HOSPADM

## 2024-11-15 RX ORDER — HEPARIN SODIUM,PORCINE/PF 10 UNIT/ML
50 SYRINGE (ML) INTRAVENOUS AS NEEDED
Status: DISCONTINUED | OUTPATIENT
Start: 2024-11-15 | End: 2024-11-15 | Stop reason: HOSPADM

## 2024-11-15 RX ORDER — HEPARIN 100 UNIT/ML
500 SYRINGE INTRAVENOUS AS NEEDED
OUTPATIENT
Start: 2024-11-15

## 2024-11-15 ASSESSMENT — PAIN SCALES - GENERAL: PAINLEVEL_OUTOF10: 0-NO PAIN

## 2024-11-15 NOTE — PROGRESS NOTES
Pt here in infusion for PF/L pretreatment labs prior to C#2 Folfox scheduled 11/19/24 due to infusion availability.  Pt states he is doing well.  Mild fatigue, balancing rest/activity well.  Reports mild nausea and only needed 2 antinausea meds after infusion.  Has calendar for suggested antinausea with use of Aloxi during infusion to use as a resource.  Reports 1 episode of diarrhea that was managed well with imodium x1.  Eating well but decreased amount, reinforced frequent small meals and high protein.  Encouraged 64 oz fluid daily.  Pt denies any blood in toilet, dark stools.  Pt previously had multiple admissions due to bleeding with colon cancer.  Reinforced blood counts with chemo treatment and risk of low platelets.  Pt has office contact number and reinforced pt to call if any needs, concerns or unacceptable side effects.  Patient verbalizes understanding of plan of care via teachback method.

## 2024-11-16 LAB
ALBUMIN SERPL BCP-MCNC: 4.1 G/DL (ref 3.4–5)
ALP SERPL-CCNC: 109 U/L (ref 33–136)
ALT SERPL W P-5'-P-CCNC: 19 U/L (ref 10–52)
ANION GAP SERPL CALC-SCNC: 14 MMOL/L (ref 10–20)
AST SERPL W P-5'-P-CCNC: 18 U/L (ref 9–39)
BILIRUB SERPL-MCNC: 0.8 MG/DL (ref 0–1.2)
BUN SERPL-MCNC: 16 MG/DL (ref 6–23)
CALCIUM SERPL-MCNC: 8.7 MG/DL (ref 8.6–10.6)
CEA SERPL-MCNC: 2.4 UG/L
CHLORIDE SERPL-SCNC: 104 MMOL/L (ref 98–107)
CO2 SERPL-SCNC: 26 MMOL/L (ref 21–32)
CREAT SERPL-MCNC: 0.95 MG/DL (ref 0.5–1.3)
EGFRCR SERPLBLD CKD-EPI 2021: 86 ML/MIN/1.73M*2
GLUCOSE SERPL-MCNC: 101 MG/DL (ref 74–99)
POTASSIUM SERPL-SCNC: 3.9 MMOL/L (ref 3.5–5.3)
PROT SERPL-MCNC: 6.6 G/DL (ref 6.4–8.2)
SODIUM SERPL-SCNC: 140 MMOL/L (ref 136–145)

## 2024-11-18 NOTE — PROGRESS NOTES
"Postoperative Visit    Mr. Batista presents for his postoperative visit from insertion of a central venous implanted port in the left subclavian vein on 10/24/2024.  Please see that note for details.  Postoperative chest x-ray revealed excellent positioning of the port with the tip of the catheter overlying the superior vena cava, with no evidence of pneumothorax.  Chest x-ray at that time did show some bibasilar airspace consolidation left greater than right and perhaps some small pleural effusions.  There are also some interstitial infiltrates possibly edema.  Follow-up chest x-ray is warranted.    The patient saw Dr. Lawson on October 25 and 11 8, and open note reviewed, with the following plan: \"Initiate intravenous Feraheme for iron 10 mg/week x 2 weeks and also initiate adjuvant chemotherapy using FOLFOX to be given every 2 weeks for 12 cycles beginning November 4, 2024.\"      Patient is doing well overall.  The port is working well.  He is tolerating the chemotherapy.  He has occasional diarrhea but most of his bowel movements are formed.  No abdominal pain fever chills or sweats.  His main issue however is weight loss.  He is down to 185 pounds per his scale yesterday.  Today in the office he is 189 pounds.  He was around 210 pounds prior to the operation.    He is currently in the midst of his second cycle of FOLFOX.  He will turn in the infusion pump tomorrow.    10/16/2024:  Mr. Batista presents for his posthospitalization/postoperative clinic visit following his recent admission and operation, and subsequent readmission to the hospital, and also for preoperative visit with regard to port placement.     On 9/15/2024 (in the midst of his admission from 9/11/2024 - 9/19/2024 for paroxysmal atrial fibrillation and NSTEMI/demand ischemia) the patient underwent the following procedure:     LAPAROSCOPIC SEGMENTAL COLECTOMY WITH MOBILIZATION OF SPLENIC FLEXURE/ POSSIBLE OPEN  71067 - IA LAPAROSCOPY COLECTOMY " PARTIAL W/ANASTOMOSIS     ND LAPS MOBLJ SPLENIC FLXR PFRMD W/PRTL COLECTOMY [21384]     Pathology revealed:     FINAL DIAGNOSIS   A.  Colon, splenic flexure, resection:  -Invasive adenocarcinoma (3.1 cm), moderately differentiated.  -Carcinoma invades pericolonic tissue.  -Extramural venous and perineural invasion present.  -Resection margins are negative for carcinoma.  -Metastatic adenocarcinoma involving two of thirty-seven lymph nodes (2/37).  -Pathologic stage: pT3, pN1b.  -See comment and synoptic report.      Postoperatively, patient was discharged to home on Eliquis for his paroxysmal atrial fibrillation.  His postoperative course was shortly thereafter complicated by postoperative bleeding,  both intra-abdominally (peritoneal cavity) and intraluminally (anastomotic bleed).  This required a rehospitalization from 9/23/2024 - 9/29/2024, during which the patient received an initial dose of Kcentra, a total of 6 units of PRBC, 4 packs of FFP and vitamin K.  The anticoagulation was stopped on admission and not reinstituted.  Operative therapy was not necessary, and the bleeding stopped spontaneously.       Since discharge, the patient saw Dr. Lawson on 10/3/2024 and that note was reviewed.  FOLFOX was recommended and the patient agrees.  Therefore, central venous plan a port is indicated and recommended.     The patient saw his cardiologist Dr. Archibald on 10/8/2024 and it was recommended to continue amiodarone 20 mg p.o. daily and add isosorbide 30 mg daily.  The anticoagulation remains discontinued.     Recent laboratory values on 10/4/2024 reviewed.  CBC revealed improvement and stabilization of the H&H at 10.1 and 34.2.  Platelet count was normal.  CMP was unremarkable except for a total bilirubin of 1.8.     Patient presents again today with his niece, Mariama.  Since discharge the patient has been doing well overall.  His main complaints are tiredness and fatigue.  His appetite is also diminished.  He is  having bowel movements which are nonbloody but these are loose.  He is passing a large volume of flatus also.  He did have some right upper quadrant intermittent abdominal pain initially but this has completely resolved.  His lower extremity edema has resolved.  He has lost weight.  He is 196 pounds at the August visit to my office and is now 190 pounds 6 ounces.     With regard to the discomfort, he is taking gabapentin 300 mg at night.  He is also taking an occasional ibuprofen.     9/14/2024:     Our service discussed with John from the thoracic service and mediastinoscopy/mediastinal lymph node biopsy not indicated at this point     Will proceed with operative therapy tomorrow morning as previously described -laparoscopic, possible open, segmental colectomy with mobilization of splenic flexure     Preoperative preoperative orders instituted  Mechanical and antibiotic bowel preparation  Will follow  ERP perioperatively     Change prophylactic Lovenox to heparin  Will likely institute for anticoagulation postoperative day 1 or 2     9/13/2024:  Krish Batista is a 69 y.o. male, well-known to me, presenting with chest pain.  The patient presented with PAF/flutter with RVR as well as chest pain on 9/12/2024.  EMR was reviewed.  Cardiac workup revealed a elevated troponin.  Patient was medically converted to sinus rhythm.  Cardiac catheterization revealed nonobstructive coronary disease.     General surgery service asked to evaluate this patient with regard to continued treatment of his known adenocarcinoma of the colon.  For details, please see my detailed initial office consultation dated 8/23/2024 as well as chart update/documentation dated 8/28/2024.     Patient underwent PET scan imaging on 8/23/2024 which revealed:     IMPRESSION:  1. Intense focus of hypermetabolic activity corresponding to masslike  thickening within the left descending colon, likely corresponding to  patient's biopsy-proven adenocarcinoma  of the colon.  2. Moderate right and small left pleural effusion with superimposed  atelectasis, without hypermetabolic activity. No definite  hypermetabolic lung nodules, with 2-3 mm nodules within the lungs  below resolution of PET. Recommend short-term CT chest for follow-up.  3. Mild hypermetabolic mediastinal and hilar lymph nodes are likely  reactive. This can also be followed up with a short-term CT chest.  4. No other evidence of hypermetabolic disease throughout the body.      I personally reviewed the report and the images.  The hypermetabolic cavity in the colon is at the splenic flexure.  There is only mild hypermetabolic activity within the mediastinum and hilar lymph nodes and these were thought to be reactive.     Patient underwent ultrasound-guided right thoracentesis on 9/3/2024.  Please see that report for details.  This 575 cc of clear fluid was obtained and the cytology was negative.     8/28/2024:  Patient was seen by me for initial office consultation for a biopsy-proven adenocarcinoma the left colon on 8/23/2024.  Please see that note for details.     Preoperative laboratory values 8/23/2024 revealed that the H&H improved to 9.0, and 32.2, from previous of 7.6 and 27.0 on 8/3/2024.  WBC and platelet count were normal.  BMP that same day was normal.  Serum CEA was also normal at 1.5 UG/L.     CT of the chest with IV contrast was performed on 8/27/2024 and this revealed new bilateral pleural effusions, moderately large on the right and small on the left with atelectasis.  This is new since his recent CT scan of the abdomen pelvis on 8/1/2024.  In addition, enlarged mediastinal lymph nodes are identified measuring 6 to 10 mm in diameter.  There are multiple, at least 3.  These findings are worrisome for metastatic disease.     I did talk to Dr. Luis Daniel Archibald from cardiology on 8/26/2024 and the patient is cleared for operative therapy.  Please see the update of Dr. Archibald's note dated  8/6/2024.     Impression: Adenocarcinoma, left colon.  CT imaging reveals mediastinal lymphadenopathy and new bilateral pleural effusions, right greater than left.  Rule out metastatic disease.     Plan:     Will cancel operation scheduled for 8/29/2024  PET scan to rule out metastatic disease with regard to the mediastinal lymph nodes  Interventional radiology for right pleurocentesis  to obtain pleural fluid for cytology to rule out metastatic disease  Refer the patient to Dr. Rio Lawson from medical oncology  Will call patient with the above results, and further management plan     8/23/2024:  Krish Batista is a 69 y.o. male who presents on referral from Dr. Alton Steiner for surgical evaluation and treatment of a biopsy-proven left colon adenocarcinoma.  The patient's primary physician is Dr. Matthew Small.       The patient saw Dr. Small on 3/25/2024 complaining of fatigue and malaise.  Workup ensued.  Laboratory values from 3/28/2024 revealed significant anemia at 8.1 and 28.2.  Cologuard was ordered and this was collected on 4/9/2024.  This resulted as positive on 4/23/2024.  Shortly thereafter, on 4/28/2024, Dr. Small referred the patient to gastroenterology.  The patient however failed to follow through.  Dr. Small saw him back in the office on 7/11/2024 to emphasize the importance of the referral.  He was also referred to general surgery.  Again the patient never followed through.  Repeat H&H on 7/11/2024 was 7.2 and 27.6.     [facility] secondary to increasing fatigue and weakness.  He was diagnosed with severe microcytic anemia.  H&H on admission was 7.4 and 27.4.  He was transfused 2 units of packed red blood cells.  Gastroenterology was consulted and recommended outpatient follow-up upper and lower endoscopy.  On discharge 2 days later, on 8/3/2024, his H&H was 7.6 and 27.0.   In the emergency department, the patient was  found to be in atrial flutter/fibrillation with RVR.  Cardiology was consulted.  He spontaneously converted to normal sinus rhythm.  Echocardiogram during that admission revealed normal ejection fraction without significant valvular pathology.  Anticoagulation was not initiated.     The patient did follow-up with Dr. Luis Daniel Archibald from cardiology on 8/6/2024.  Recommendation was continued metoprolol 50 mg p.o. twice daily with a Holter monitor.  Again, anticoagulation was not recommended.     Thereafter, the patient underwent upper and lower endoscopy on 8/15/2024 per Dr. Alton Steiner as an outpatient:     EGD revealed:  Findings  Z-line 42 cm from the incisors. Mild erythema of the left corniculate tubercle in the larynx  Mild erythematous mucosa in the body of the stomach and antrum; performed cold forceps biopsy to rule out H. pylori  The duodenal bulb, 1st part of the duodenum and 2nd part of the duodenum appeared normal. Performed random biopsy using biopsy forceps to rule out celiac disease.  Colonoscopy revealed:  Findings  Internal small hemorrhoids  One 8 mm sessile polyp 20 cm from the anal verge; lift performed with eleview injected into the submucosa; completely removed target lesion en bloc by EMR and retrieved specimen. EMR was performed with a cold snare. Post-procedure bleeding was not visualized  Single friable and ulcerated mass (not traversable) 40 cm from the anal verge, covering the whole circumference; bleeding occurred before intervention; performed cold forceps biopsy with partial removal; tattooed distal to the finding with spot     Pathology revealed:   FINAL DIAGNOSIS   A.  Duodenum, first part, biopsy:  Duodenal mucosa within normal limits.     B.  Stomach, biopsy:  Antrum and corpus mucosa within normal limits.     C.  Colon, mass at 40 cm, biopsy:  Fragments of invasive adenocarcinoma, moderately differentiated.     D.  Colon, 20 cm, polypectomy:  Tubular adenoma.      On admission to  the hospital on 8/1/2024, the patient did undergo imaging.  PA lateral chest x-ray was negative.  CT scan was performed of the abdomen pelvis with IV contrast.  I personally reviewed the report and the images.  This was read as essentially negative.  Please see the report for details.  On my retrospective review, there may be a mass at the splenic flexure.       To me, the patient notes a similar history as above.  He notes that he did not pursue workup with a Cologuard initially because he had more important family things to take care of.  Nonetheless, the patient ultimately presented to the emergency department on the advice of Dr. Small as his tiredness and fatigue progressed to the point that he could not walk back and forth to the mailbox.  He became very tired and somewhat short of breath.     Since discharge from the hospital, the patient still has tiredness and fatigue.  He is eating.  He notes that he does have left mid abdominal pain for a number of months.  He notes that the tiredness and fatigue started in March of this year but really worsened in May and June.  At that same time he noticed some black stools.  He noted occasional blood with the bowel movements also.  He denies fever chills sweats nausea vomiting diarrhea or constipation.     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  He still has a high riding right testicle.     Patient is single and lives in a house in [place].  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  He has never been .  He has no children.  He presents today Mariama Arriola, his niece and medical POA.     Past Medical History   Medical History           Past Medical History:   Diagnosis Date    Personal history of other diseases of the circulatory system       History of Raynaud's syndrome            Surgical History    Surgical History             Past Surgical History:    Procedure Laterality Date    OTHER SURGICAL HISTORY   07/13/2021     Sinus surgery    OTHER SURGICAL HISTORY   07/13/2021     Testicular surgery    OTHER SURGICAL HISTORY   07/13/2021     Tonsillectomy            Allergies   RX Allergies             Allergies   Allergen Reactions    Amoxicillin Unknown       Pt cannot remember reaction            Home Meds          Current Outpatient Medications   Medication Instructions    ferrous sulfate 65 mg, oral, Daily, Do not crush, chew, or split.    [START ON 9/2/2024] metoprolol tartrate (LOPRESSOR) 50 mg, oral, 2 times daily    multivitamin with minerals tablet 1 tablet, oral, Daily    pantoprazole (PROTONIX) 40 mg, oral, Daily, Do not crush, chew, or split.         Family History    Family History   No family history on file.         Social History  Social History   Social History              Tobacco Use    Smoking status: Never    Smokeless tobacco: Never            Review Of Systems    Review of Systems     General: Not Present- Obesity, HIV, MRSA, Recent Cold/Flu,  and VRE.  HEENT: Not Present- Migraine, Cataracts, Glaucoma, Macular Degeneration and Retinal Detachment.  Respiratory:Not Present- Asthma, Chronic Cough, Difficulty Breathing on Exertion, Difficulty Breathing at Rest, Emphysema, Frequent Bronchitis, Home CPAP/BiPAP, Home Oxygen, Pulmonary Embolus, Pneumonia/TB, Sleep Apnea and Snoring.  Cardiovascular: Not Present- Chest Pain, Congestive Heart Failure, Heart Attack, Coronary Artery Disease, Heart Stent, High Cholesterol/Lipids,Hypertension, Internal Defibrillator, Irregular Heart Beat, Mitral Valve Prolapse, Murmur, Pacemaker and Peripheral Vascular Disease.  Gastrointestinal: Not Present- Heartburn, Hepatitis, Hiatal Hernia, Jaundice, Reflux, Stomach Ulcer and IBS.  Male Genitourinary: Not Present- Enlarged Prostate, Kidney Failure, Kidney Stones, Dialysis and Urinary Tract Infection.  Musculoskeletal: Not Present- Arthritis, Back Pain and  Fibromyalgia.  Neurological: Not Present- Headaches, Numbness, Tingling, Seizures, Stroke,  Shunt and Weakness.  Psychiatric: Not Present- Anxiety, Bipolar, Depression and Panic Attacks.  Endocrine: Not Present- Diabetes, Hyperthyroidism, Hypothyroidism and Low Blood Sugar.  Hematology: Not Present- Abnormal Bleeding and Blood Clots.     Vitals  There were no vitals taken for this visit.      Physical Exam   General Complete Physical Exam     The physical exam findings are as follows:     General Appearance - Cooperative and Well groomed. Not in acute distress. Build & Nutrition - Well nourished.  Posture - Normal posture. Gait - Normal. Hydration - Well hydrated.     Integumentary  General Characteristics: Overall examination of the patient's skin reveals - no rashes, no suspicious lesions, no bruises and no evidence of scars. Color - normal coloration of skin. Skin Moisture - normal skin moisture. Temperature - normal  warmth is noted. Texture - normal skin texture.     Head and Neck  Head - normocephalic, atraumatic with no lesions or palpable masses.  Neck  Global Assessment - full range of motion. no bruit auscultated on the right, no bruit auscultated on the left, non-tender, no lymphadenopathy, no palpable mass on the right and no palpable mass on the left.  Trachea - midline.  Thyroid Gland Characteristics - no palpable nodules, normal position, symmetric and smooth.     Eyes  Sclera/Conjunctiva - Bilateral - Normal. Pupil - Bilateral - Accommodating, Direct reaction to light normal and Equal.     ENMT  Global Assessment  Upon examination of the ears, nose, mouth and throat - examination of the oral cavity reveals normal lips, teeth, gums and oral mucosa and hard and soft palates, tongue, tonsils and posterior pharynx are moist and symmetric without lesions.     Chest and Lung Exam  Inspection: Shape - Symmetric. Movements - Symmetrical. Accessory muscles - No use of accessory muscles in  breathing.  Percussion:  Quality and Intensity: - Percussion normal.  Palpation: - Palpation normal.  Auscultation:  Breath sounds: - Normal and equal bilaterally.  Adventitious sounds: - none bilaterally.    Port is intact in the left upper chest; the port is connected to the infusion pump; the Steri-Strips over the insertion site were removed     Cardiovascular  Inspection: Carotid artery - Bilateral - Inspection Normal.  Palpation/Percussion: Examination by palpation and percussion reveals - No Thrills.  Cardiac Borders - Normal.  Auscultation: Rhythm - Regular. Rate: Regular.  Heart Sounds - Normal heart sounds, S1 WNL and S2 WNL.  Murmurs & Other Heart Sounds: Auscultation of the heart reveals - No Murmurs or other extra heart sounds.     Abdomen  Inspection: Inspection of the abdomen reveals - No Hernias.  Palpation/Percussion: Palpation and Percussion of the abdomen reveal -no tenderness and No Palpable abdominal masses.  Liver: - Normal.  Spleen: - Normal.  Auscultation: Auscultation of the abdomen reveals - Bowel sounds normal.  Surgical scars: Old tangential right inguinal     New abdominal incisions from recent laparoscopic colectomy: 7 cm midline periumbilical, laparoscopic x 4, one in each quadrant all well-healed     Inguinal and External Genitalia     The patient was examined supine, and standing, with and without Valsalva. There is no evidence of inguinal or femoral hernia or lymphadenopathy bilaterally. The testes are mildly atrophic and symmetric, with no masses, nodules, or tenderness.  The patient's left testicle in its normal position at the base of the scrotum.  The right testicle is very high riding near the right external ring.     Rectal - Did not examine.     Peripheral Vascular  Lower Extremity: Inspection - Bilateral - No Varicose veins.  Palpation: Edema - Bilateral - No edema.     Musculoskeletal  Global Assessment  Right Upper Extremity - no deformities, masses or tenderness, no known  fractures, normal strength and tone and  normal range of motion without pain. Left Upper Extremity - no deformities, masses or tenderness, no known fractures,  normal strength and tone and normal range of motion without pain. Right Lower Extremity - no deformities, masses or  tenderness, no known fractures, normal strength and tone and normal range of motion without pain. Left Lower  Extremity - no deformities, masses or tenderness, no known fractures, normal strength and tone and normal range of  motion without pain.     Lymphatic  Head & Neck  General Head & Neck Lymphatics:  Bilateral: Description - Normal.  Axillary  General Axillary Region:  Bilateral: Description - Normal.  Femoral & Inguinal  Generalized Femoral & Inguinal Lymphatics:  Bilateral: Description - Normal.     Assessment/Plan      Mr. 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  He is status post laparoscopic segmental colectomy with mobilization of the splenic flexure on 9/15/2024.     Postoperatively, patient was discharged to home on Eliquis for his paroxysmal atrial fibrillation.  His postoperative course was subsequent complicated by postoperative bleeding both intra-abdominal he and intraluminally (anastomotic bleed).  This required a rehospitalization from 9/23/2024 - 9/29/2024 during which the patient received an initial dose of Kcentra, a total of 6 units of PRBC, 4 packs of FFP and vitamin K.  The anticoagulation was stopped on admission and not reinstituted.  Operative therapy was not necessary and the bleeding stopped spontaneously.       Since discharge, the patient saw Dr. Lawson on 10/3/2024 and that note was reviewed.  FOLFOX was recommended and the patient agrees.  Therefore, central venous plan a port is indicated and recommended.     The patient saw his cardiologist Dr. Archibald on 10/8/2024 and it was recommended to continue amiodarone 20 mg p.o. daily and add isosorbide 30 mg  daily.  The anticoagulation remains discontinued.     S/p insertion Central venous implanted port, left SCV, 10/24/2024    FOLFOX commenced 11/4/2024    Satisfactory clinical follow-up    Plan:    Recommend patient increasing his caloric intake secondary to weight loss; he is lost muscle mass as well; recommend [id number] kirk a day; continue boost if can tolerate    No activity restrictions from my standpoint    Patient remains off Eliquis; to see Dr. Archibald cardiology February 15    Follow-up with me in 6 months for recheck    1 year colonoscopy roughly 8/15/2024 or shortly thereafter -will arrange at next visit     Addendum 12/3/2024 4:21 PM    Chest x-ray performed 11/29/2024 and is improved compared to 10/24/2024.  Patient still has a small persistent right pleural effusion with some possible right basilar airspace disease.  However, the the interstitial infiltrates have resolved.  Overall improved compared to previous.  I called the patient and discussed the results with him today.    Follow-up as scheduled

## 2024-11-19 ENCOUNTER — INFUSION (OUTPATIENT)
Dept: HEMATOLOGY/ONCOLOGY | Facility: CLINIC | Age: 69
End: 2024-11-19
Payer: MEDICARE

## 2024-11-19 ENCOUNTER — NUTRITION (OUTPATIENT)
Dept: HEMATOLOGY/ONCOLOGY | Facility: CLINIC | Age: 69
End: 2024-11-19
Payer: MEDICARE

## 2024-11-19 VITALS
DIASTOLIC BLOOD PRESSURE: 51 MMHG | OXYGEN SATURATION: 97 % | WEIGHT: 184.63 LBS | RESPIRATION RATE: 16 BRPM | BODY MASS INDEX: 25.01 KG/M2 | TEMPERATURE: 97.7 F | HEIGHT: 72 IN | SYSTOLIC BLOOD PRESSURE: 111 MMHG | HEART RATE: 43 BPM

## 2024-11-19 DIAGNOSIS — C18.9 ADENOCARCINOMA, COLON (MULTI): ICD-10-CM

## 2024-11-19 DIAGNOSIS — C18.9 STAGE III CARCINOMA OF COLON (MULTI): ICD-10-CM

## 2024-11-19 PROCEDURE — 2500000004 HC RX 250 GENERAL PHARMACY W/ HCPCS (ALT 636 FOR OP/ED): Performed by: INTERNAL MEDICINE

## 2024-11-19 PROCEDURE — 96415 CHEMO IV INFUSION ADDL HR: CPT

## 2024-11-19 PROCEDURE — 96375 TX/PRO/DX INJ NEW DRUG ADDON: CPT | Mod: INF

## 2024-11-19 PROCEDURE — 96413 CHEMO IV INFUSION 1 HR: CPT

## 2024-11-19 PROCEDURE — 96411 CHEMO IV PUSH ADDL DRUG: CPT

## 2024-11-19 PROCEDURE — 96416 CHEMO PROLONG INFUSE W/PUMP: CPT

## 2024-11-19 RX ORDER — EPINEPHRINE 0.3 MG/.3ML
0.3 INJECTION SUBCUTANEOUS EVERY 5 MIN PRN
Status: DISCONTINUED | OUTPATIENT
Start: 2024-11-19 | End: 2024-11-19 | Stop reason: HOSPADM

## 2024-11-19 RX ORDER — PALONOSETRON 0.05 MG/ML
0.25 INJECTION, SOLUTION INTRAVENOUS ONCE
Status: COMPLETED | OUTPATIENT
Start: 2024-11-19 | End: 2024-11-19

## 2024-11-19 RX ORDER — PROCHLORPERAZINE EDISYLATE 5 MG/ML
10 INJECTION INTRAMUSCULAR; INTRAVENOUS EVERY 6 HOURS PRN
Status: DISCONTINUED | OUTPATIENT
Start: 2024-11-19 | End: 2024-11-19 | Stop reason: HOSPADM

## 2024-11-19 RX ORDER — DIPHENHYDRAMINE HYDROCHLORIDE 50 MG/ML
50 INJECTION INTRAMUSCULAR; INTRAVENOUS AS NEEDED
Status: DISCONTINUED | OUTPATIENT
Start: 2024-11-19 | End: 2024-11-19 | Stop reason: HOSPADM

## 2024-11-19 RX ORDER — ALBUTEROL SULFATE 0.83 MG/ML
3 SOLUTION RESPIRATORY (INHALATION) AS NEEDED
Status: DISCONTINUED | OUTPATIENT
Start: 2024-11-19 | End: 2024-11-19 | Stop reason: HOSPADM

## 2024-11-19 RX ORDER — LORAZEPAM 2 MG/ML
1 INJECTION INTRAMUSCULAR AS NEEDED
Status: DISCONTINUED | OUTPATIENT
Start: 2024-11-19 | End: 2024-11-19 | Stop reason: HOSPADM

## 2024-11-19 RX ORDER — PROCHLORPERAZINE MALEATE 10 MG
10 TABLET ORAL EVERY 6 HOURS PRN
Status: DISCONTINUED | OUTPATIENT
Start: 2024-11-19 | End: 2024-11-19 | Stop reason: HOSPADM

## 2024-11-19 RX ORDER — FLUOROURACIL 50 MG/ML
400 INJECTION, SOLUTION INTRAVENOUS ONCE
Status: COMPLETED | OUTPATIENT
Start: 2024-11-19 | End: 2024-11-19

## 2024-11-19 RX ORDER — DEXAMETHASONE 4 MG/1
12 TABLET ORAL ONCE
Status: COMPLETED | OUTPATIENT
Start: 2024-11-19 | End: 2024-11-19

## 2024-11-19 RX ORDER — FAMOTIDINE 10 MG/ML
20 INJECTION INTRAVENOUS ONCE AS NEEDED
Status: DISCONTINUED | OUTPATIENT
Start: 2024-11-19 | End: 2024-11-19 | Stop reason: HOSPADM

## 2024-11-19 ASSESSMENT — PAIN SCALES - GENERAL: PAINLEVEL_OUTOF10: 2

## 2024-11-19 NOTE — PROGRESS NOTES
"NUTRITION Education NOTE    Nutrition Assessment     Reason for Visit:  Krish Batista is a 69 y.o. 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 He is here today in infusion for treatment  with his great niece.    Lab Results   Component Value Date/Time    GLUCOSE 101 (H) 11/15/2024 1032     11/15/2024 1032    K 3.9 11/15/2024 1032     11/15/2024 1032    CO2 26 11/15/2024 1032    ANIONGAP 14 11/15/2024 1032    BUN 16 11/15/2024 1032    CREATININE 0.95 11/15/2024 1032    EGFR 86 11/15/2024 1032    CALCIUM 8.7 11/15/2024 1032    ALBUMIN 4.1 11/15/2024 1032    ALKPHOS 109 11/15/2024 1032    PROT 6.6 11/15/2024 1032    AST 18 11/15/2024 1032    BILITOT 0.8 11/15/2024 1032    ALT 19 11/15/2024 1032     No results found for: \"VITD25\"    Anthropometrics:       8% wt loss x 1 month  Continues to fluctuate.  Appears to be gradually stabilization the past 3 weeks between 82-83 kg.  Will monitor trend     Wt Readings from Last 10 Encounters:   11/19/24 83.7 kg (184 lb 10.2 oz)   11/15/24 81.9 kg (180 lb 8.9 oz)   11/08/24 83.9 kg (184 lb 15.5 oz)   11/06/24 85.2 kg (187 lb 11.6 oz)   11/04/24 83.6 kg (184 lb 3.1 oz)   10/28/24 84.6 kg (186 lb 8.2 oz)   10/25/24 84.8 kg (187 lb 1 oz)   10/24/24 89.4 kg (197 lb 1.5 oz)   10/16/24 86.4 kg (190 lb 6.4 oz)   10/08/24 89.4 kg (197 lb 3.2 oz)        Food And Nutrient Intake:  Food and Nutrient History  Food and Nutrient History: Patient reports fair appetite and fluctuating oral intake.  He c/o of early satiety and not able to eat normal portion sizes.  He also indicates that he is not drinking enough fluids throughout the day .  (2-4 cups) . He is not a fan of Boost/Ensure but doesn't mind Core Power Elite (42 g protein), although not drinking consistently.  Mild nausea with last treatment resolved with anti-emetics. Red meat " does not taste the same to him. For protein, He is able to tolerate chicken.  He also likes peanut butter, scrambled eggs, and yogurt.   He states since surgery he does not do well with greasy/fried foods.  Diarrhea and constipation both resolved at this time.  Energy Intake: Fair 50-75 %  GI Symptoms: early satiety  Oral Problems: dysgeusia              Nutrition Focused Physical Exam Findings:      Subcutaneous Fat Loss  Orbital Fat Pads: Well nourished (slightly bulging fat pads)  Buccal Fat Pads: Mild-Moderate (flat cheeks, minimal bounce)  Triceps: Mild-Moderate (less than ample fat tissue)    Muscle Wasting  Temporalis: Mild-Moderate (slight depression)  Deltoid/Trapezius: Defer  Trapezius/Infraspinatus/Supraspinatus (Scapular Region): Defer  Quadriceps: Defer    Energy Needs  Estimated Energy Needs  Total Energy Estimated Needs (kCal):  (7507-6797 cals (30-32 cals/kg))  Estimated Fluid Needs  Total Fluid Estimated Needs (mL):  (2500 mls)  Estimated Protein Needs  Total Protein Estimated Needs (g):  (100-115 kg (1.2-1.4 g/kg))        Nutrition Diagnosis   Malnutrition Diagnosis  Patient has Malnutrition Diagnosis: Yes  Diagnosis Status: Ongoing  Malnutrition Diagnosis: Mild malnutrition related to chronic disease or condition  As Evidenced by: 8% wt loss over 1 month, now beginning to gradually stabilize, mild muscle/fat loss, and fluctuating oral intake    Nutrition Diagnosis  Patient has Nutrition Diagnosis: Yes  Diagnosis Status (1): Ongoing  Nutrition Diagnosis 1: Increased nutrient needs  Related to (1): disease process, recent surgery, and treatment  As Evidenced by (1): increased metabolic demands for healing    Nutrition Interventions/Recommendations   Nutrition Prescription  Individualized Nutrition Prescription Provided for : High calorie , High protein, 4-6 small meals/snacks    Food and Nutrition Delivery  Food and Nutrition Delivery  Meals & Snacks: Energy-modified diet, Protein-modified  "diet  Encouraged 4-6 high protein/high calorie small frequent meals/snacks. (Increased snacks between regular meals)   Reviewed meal and snack examples (ie Yogurt, Oatmeal made with milk and additives,  at least 2 T peanut butter with crackers or fruit, oral nutrition supplement shake-->can be made into a smoothie   Already has Handout \"High Calorie Snack Ideas \"  -Suggested at least 1 Core Power Elite shake to optimize cals/protein (may divide into 2 portions throughout the day, given early satiety: 42 g protein, 230 calories )    -Encouraged 64 oz caffeine free fluids (I.e water, milk, juice, broth, pro shakes etc)    Nutrition Education  Nutrition Education  Nutrition Education Content: Content related nutrition education    Coordination of Care  Coordination of Nutrition Care by a Nutrition Professional  Collaboration and referral of nutrition care: Collaboration by nutrition professional with other providers    There are no Patient Instructions on file for this visit.    Nutrition Monitoring and Evaluation   Food/Nutrient Related History Monitoring  Monitoring and Evaluation Plan: Energy intake, Protein intake, Fluid intake  Energy Intake: Estimated energy intake  Criteria: Meet 75% calorie needs  Fluid Intake: Estimated fluid intake  Criteria: 64 oz caffeine free fluids  Estimated protein intake: Estimated protein intake  Criteria: Meet 75% estimated protein needs          Time Spent  Prep time on day of patient encounter: 10 minutes  Time spent directly with patient, family or caregiver: 15 minutes  Additional Time Spent on Patient Care Activities: 0 minutes  Documentation Time: 20 minutes  Other Time Spent: 0 minutes  Total: 45 minutes                "

## 2024-11-19 NOTE — PROGRESS NOTES
[facility] Infusion Note     Krish Batista is a 69 y.o. year old male here today,11/19/24,  in the [facility] infusion center for cycle 2 day 1 of the following treatment regimen:    mFOLFOX6 (Fluorouracil Continuous Infusion / Leucovorin / Oxaliplatin), 14 Day Cycles       Medications given:  Administrations This Visit       dexAMETHasone (Decadron) tablet 12 mg       Admin Date  11/19/2024 Action  Given Dose  12 mg Route  oral Documented By  Anne Marie Villalobos RN              fluorouracil (Adrucil) 5,000 mg in sodium chloride 0.9% 138 mL IV via Home Infusion       Admin Date  11/19/2024 Action  Given Dose  5,000 mg Rate  3 mL/hr Route  intravenous Documented By  Anne Marie Villalobos RN              fluorouracil (Adrucil) IV syringe 825 mg       Admin Date  11/19/2024 Action  New Bag Dose  825 mg Route  intravenous Documented By  Anne Marie Villalobos RN              OXALIplatin (Eloxatin) 175 mg in dextrose 5% 582 mL IV       Admin Date  11/19/2024 Action  New Bag Dose  175 mg Route  intravenous Documented By  Anne Marie Villalobos RN              palonosetron (Aloxi) injection 250 mcg       Admin Date  11/19/2024 Action  Given Dose  250 mcg Route  intravenous Documented By  Anne Marie Villalobos RN                  Assigned pump [id number]. Pump disconnect apt scheduled for 11/21/24 at 10:30 am.   Pt tolerated medications above well and was discharged to home with great niece transporting pt in stable condition. Pt ambulated  off the unit independently with a slow and steady gait. Pt had no further questions or concerns at this time.

## 2024-11-20 ENCOUNTER — APPOINTMENT (OUTPATIENT)
Dept: SURGERY | Facility: CLINIC | Age: 69
End: 2024-11-20
Payer: MEDICARE

## 2024-11-20 VITALS
DIASTOLIC BLOOD PRESSURE: 62 MMHG | HEART RATE: 57 BPM | BODY MASS INDEX: 25.6 KG/M2 | SYSTOLIC BLOOD PRESSURE: 146 MMHG | OXYGEN SATURATION: 94 % | RESPIRATION RATE: 17 BRPM | HEIGHT: 72 IN | TEMPERATURE: 97.9 F | WEIGHT: 189 LBS

## 2024-11-20 DIAGNOSIS — I48.0 PAROXYSMAL ATRIAL FIBRILLATION (MULTI): Primary | ICD-10-CM

## 2024-11-20 DIAGNOSIS — J90 BILATERAL PLEURAL EFFUSION: ICD-10-CM

## 2024-11-20 DIAGNOSIS — C18.9 STAGE III CARCINOMA OF COLON (MULTI): ICD-10-CM

## 2024-11-20 DIAGNOSIS — D50.0 IRON DEFICIENCY ANEMIA DUE TO CHRONIC BLOOD LOSS: ICD-10-CM

## 2024-11-20 PROCEDURE — 99024 POSTOP FOLLOW-UP VISIT: CPT | Performed by: SURGERY

## 2024-11-20 PROCEDURE — 3008F BODY MASS INDEX DOCD: CPT | Performed by: SURGERY

## 2024-11-20 PROCEDURE — 1159F MED LIST DOCD IN RCRD: CPT | Performed by: SURGERY

## 2024-11-21 ENCOUNTER — INFUSION (OUTPATIENT)
Dept: HEMATOLOGY/ONCOLOGY | Facility: CLINIC | Age: 69
End: 2024-11-21
Payer: MEDICARE

## 2024-11-21 VITALS
DIASTOLIC BLOOD PRESSURE: 61 MMHG | HEART RATE: 49 BPM | TEMPERATURE: 97.9 F | WEIGHT: 185.19 LBS | RESPIRATION RATE: 16 BRPM | BODY MASS INDEX: 24.95 KG/M2 | SYSTOLIC BLOOD PRESSURE: 123 MMHG | OXYGEN SATURATION: 95 %

## 2024-11-21 DIAGNOSIS — D50.9 MICROCYTIC ANEMIA: ICD-10-CM

## 2024-11-21 DIAGNOSIS — C18.9 STAGE III CARCINOMA OF COLON (MULTI): ICD-10-CM

## 2024-11-21 DIAGNOSIS — C18.9 ADENOCARCINOMA, COLON (MULTI): ICD-10-CM

## 2024-11-21 PROCEDURE — 2500000004 HC RX 250 GENERAL PHARMACY W/ HCPCS (ALT 636 FOR OP/ED): Mod: JZ,JG | Performed by: INTERNAL MEDICINE

## 2024-11-21 PROCEDURE — 96372 THER/PROPH/DIAG INJ SC/IM: CPT

## 2024-11-21 PROCEDURE — 2500000004 HC RX 250 GENERAL PHARMACY W/ HCPCS (ALT 636 FOR OP/ED): Performed by: INTERNAL MEDICINE

## 2024-11-21 RX ORDER — HEPARIN SODIUM,PORCINE/PF 10 UNIT/ML
50 SYRINGE (ML) INTRAVENOUS AS NEEDED
OUTPATIENT
Start: 2024-11-21

## 2024-11-21 RX ORDER — HEPARIN 100 UNIT/ML
500 SYRINGE INTRAVENOUS AS NEEDED
Status: DISCONTINUED | OUTPATIENT
Start: 2024-11-21 | End: 2024-11-21 | Stop reason: HOSPADM

## 2024-11-21 RX ORDER — HEPARIN 100 UNIT/ML
500 SYRINGE INTRAVENOUS AS NEEDED
OUTPATIENT
Start: 2024-11-21

## 2024-11-21 ASSESSMENT — PAIN SCALES - GENERAL: PAINLEVEL_OUTOF10: 0-NO PAIN

## 2024-11-21 NOTE — PROGRESS NOTES
[facility] Infusion Note     Krish Batista is a 69 y.o. year old male here today,11/21/24,  in the [facility] infusion center for cycle 2 day 3 of the following treatment regimen:    mFOLFOX6 (Fluorouracil Continuous Infusion / Leucovorin / Oxaliplatin), 14 Day Cycles       Medications given:  Administrations This Visit       heparin flush 100 unit/mL syringe 500 Units       Admin Date  11/21/2024 Action  Given Dose  500 Units Route  intra-catheter Documented By  Anne Marie Villalobos RN              pegfilgrastim-fpgk (Stimufend) injection 6 mg       Admin Date  11/21/2024 Action  Given Dose  6 mg Route  subcutaneous Documented By  Anne Marie Villalobos RN                    Pt tolerated pump disconnect and injection well and was discharged to home with niece in stable condition. Pt ambulated  off the unit independently with a slow and steady gait. Pt had no further questions or concerns at this time.

## 2024-11-29 ENCOUNTER — INFUSION (OUTPATIENT)
Dept: HEMATOLOGY/ONCOLOGY | Facility: CLINIC | Age: 69
End: 2024-11-29
Payer: MEDICARE

## 2024-11-29 ENCOUNTER — HOSPITAL ENCOUNTER (OUTPATIENT)
Dept: RADIOLOGY | Facility: CLINIC | Age: 69
Discharge: HOME | End: 2024-11-29
Payer: MEDICARE

## 2024-11-29 VITALS
HEIGHT: 72 IN | BODY MASS INDEX: 24.78 KG/M2 | WEIGHT: 182.98 LBS | SYSTOLIC BLOOD PRESSURE: 148 MMHG | DIASTOLIC BLOOD PRESSURE: 79 MMHG | OXYGEN SATURATION: 99 % | HEART RATE: 54 BPM | TEMPERATURE: 97 F | RESPIRATION RATE: 14 BRPM

## 2024-11-29 DIAGNOSIS — C18.9 STAGE III CARCINOMA OF COLON (MULTI): ICD-10-CM

## 2024-11-29 DIAGNOSIS — D50.9 MICROCYTIC ANEMIA: ICD-10-CM

## 2024-11-29 DIAGNOSIS — C18.9 ADENOCARCINOMA, COLON (MULTI): ICD-10-CM

## 2024-11-29 LAB
BASOPHILS # BLD AUTO: 0.03 X10*3/UL (ref 0–0.1)
BASOPHILS NFR BLD AUTO: 0.2 %
EOSINOPHIL # BLD AUTO: 0.02 X10*3/UL (ref 0–0.7)
EOSINOPHIL NFR BLD AUTO: 0.2 %
ERYTHROCYTE [DISTWIDTH] IN BLOOD BY AUTOMATED COUNT: 19.8 % (ref 11.5–14.5)
HCT VFR BLD AUTO: 35.7 % (ref 41–52)
HGB BLD-MCNC: 11.5 G/DL (ref 13.5–17.5)
IMM GRANULOCYTES # BLD AUTO: 0.1 X10*3/UL (ref 0–0.7)
IMM GRANULOCYTES NFR BLD AUTO: 0.8 % (ref 0–0.9)
LYMPHOCYTES # BLD AUTO: 0.92 X10*3/UL (ref 1.2–4.8)
LYMPHOCYTES NFR BLD AUTO: 7.6 %
MCH RBC QN AUTO: 30.8 PG (ref 26–34)
MCHC RBC AUTO-ENTMCNC: 32.2 G/DL (ref 32–36)
MCV RBC AUTO: 96 FL (ref 80–100)
MONOCYTES # BLD AUTO: 0.87 X10*3/UL (ref 0.1–1)
MONOCYTES NFR BLD AUTO: 7.2 %
NEUTROPHILS # BLD AUTO: 10.19 X10*3/UL (ref 1.2–7.7)
NEUTROPHILS NFR BLD AUTO: 84 %
NRBC BLD-RTO: ABNORMAL /100{WBCS}
PLATELET # BLD AUTO: 110 X10*3/UL (ref 150–450)
RBC # BLD AUTO: 3.73 X10*6/UL (ref 4.5–5.9)
WBC # BLD AUTO: 12.1 X10*3/UL (ref 4.4–11.3)

## 2024-11-29 PROCEDURE — 2500000004 HC RX 250 GENERAL PHARMACY W/ HCPCS (ALT 636 FOR OP/ED): Performed by: INTERNAL MEDICINE

## 2024-11-29 PROCEDURE — 36591 DRAW BLOOD OFF VENOUS DEVICE: CPT

## 2024-11-29 PROCEDURE — 85025 COMPLETE CBC W/AUTO DIFF WBC: CPT

## 2024-11-29 PROCEDURE — 80053 COMPREHEN METABOLIC PANEL: CPT

## 2024-11-29 PROCEDURE — 71046 X-RAY EXAM CHEST 2 VIEWS: CPT

## 2024-11-29 RX ORDER — HEPARIN 100 UNIT/ML
500 SYRINGE INTRAVENOUS AS NEEDED
Status: DISCONTINUED | OUTPATIENT
Start: 2024-11-29 | End: 2024-11-29 | Stop reason: HOSPADM

## 2024-11-29 RX ORDER — HEPARIN 100 UNIT/ML
500 SYRINGE INTRAVENOUS AS NEEDED
OUTPATIENT
Start: 2024-11-29

## 2024-11-29 RX ORDER — HEPARIN SODIUM,PORCINE/PF 10 UNIT/ML
50 SYRINGE (ML) INTRAVENOUS AS NEEDED
OUTPATIENT
Start: 2024-11-29

## 2024-11-29 ASSESSMENT — PAIN SCALES - GENERAL: PAINLEVEL_OUTOF10: 0-NO PAIN

## 2024-11-30 LAB
ALBUMIN SERPL BCP-MCNC: 3.9 G/DL (ref 3.4–5)
ALP SERPL-CCNC: 133 U/L (ref 33–136)
ALT SERPL W P-5'-P-CCNC: 25 U/L (ref 10–52)
ANION GAP SERPL CALC-SCNC: 12 MMOL/L (ref 10–20)
AST SERPL W P-5'-P-CCNC: 27 U/L (ref 9–39)
BILIRUB SERPL-MCNC: 1.1 MG/DL (ref 0–1.2)
BUN SERPL-MCNC: 13 MG/DL (ref 6–23)
CALCIUM SERPL-MCNC: 8.4 MG/DL (ref 8.6–10.6)
CHLORIDE SERPL-SCNC: 104 MMOL/L (ref 98–107)
CO2 SERPL-SCNC: 27 MMOL/L (ref 21–32)
CREAT SERPL-MCNC: 0.82 MG/DL (ref 0.5–1.3)
EGFRCR SERPLBLD CKD-EPI 2021: >90 ML/MIN/1.73M*2
GLUCOSE SERPL-MCNC: 112 MG/DL (ref 74–99)
POTASSIUM SERPL-SCNC: 3.7 MMOL/L (ref 3.5–5.3)
PROT SERPL-MCNC: 5.9 G/DL (ref 6.4–8.2)
SODIUM SERPL-SCNC: 139 MMOL/L (ref 136–145)

## 2024-12-02 ENCOUNTER — APPOINTMENT (OUTPATIENT)
Dept: HEMATOLOGY/ONCOLOGY | Facility: CLINIC | Age: 69
End: 2024-12-02
Payer: MEDICARE

## 2024-12-03 ENCOUNTER — INFUSION (OUTPATIENT)
Dept: HEMATOLOGY/ONCOLOGY | Facility: CLINIC | Age: 69
End: 2024-12-03
Payer: MEDICARE

## 2024-12-03 ENCOUNTER — NUTRITION (OUTPATIENT)
Dept: HEMATOLOGY/ONCOLOGY | Facility: CLINIC | Age: 69
End: 2024-12-03

## 2024-12-03 VITALS
BODY MASS INDEX: 24.65 KG/M2 | DIASTOLIC BLOOD PRESSURE: 63 MMHG | RESPIRATION RATE: 14 BRPM | OXYGEN SATURATION: 98 % | WEIGHT: 182.98 LBS | SYSTOLIC BLOOD PRESSURE: 146 MMHG | HEART RATE: 51 BPM | TEMPERATURE: 97.2 F

## 2024-12-03 DIAGNOSIS — C18.9 STAGE III CARCINOMA OF COLON (MULTI): ICD-10-CM

## 2024-12-03 DIAGNOSIS — D50.9 MICROCYTIC ANEMIA: ICD-10-CM

## 2024-12-03 DIAGNOSIS — C18.9 ADENOCARCINOMA, COLON (MULTI): ICD-10-CM

## 2024-12-03 PROCEDURE — 2500000004 HC RX 250 GENERAL PHARMACY W/ HCPCS (ALT 636 FOR OP/ED): Performed by: INTERNAL MEDICINE

## 2024-12-03 PROCEDURE — 96416 CHEMO PROLONG INFUSE W/PUMP: CPT

## 2024-12-03 PROCEDURE — 96376 TX/PRO/DX INJ SAME DRUG ADON: CPT

## 2024-12-03 PROCEDURE — 96413 CHEMO IV INFUSION 1 HR: CPT

## 2024-12-03 PROCEDURE — 96415 CHEMO IV INFUSION ADDL HR: CPT

## 2024-12-03 RX ORDER — DEXAMETHASONE 4 MG/1
12 TABLET ORAL ONCE
Status: COMPLETED | OUTPATIENT
Start: 2024-12-03 | End: 2024-12-03

## 2024-12-03 RX ORDER — EPINEPHRINE 0.3 MG/.3ML
0.3 INJECTION SUBCUTANEOUS EVERY 5 MIN PRN
Status: DISCONTINUED | OUTPATIENT
Start: 2024-12-03 | End: 2024-12-03 | Stop reason: HOSPADM

## 2024-12-03 RX ORDER — FAMOTIDINE 10 MG/ML
20 INJECTION INTRAVENOUS ONCE AS NEEDED
Status: DISCONTINUED | OUTPATIENT
Start: 2024-12-03 | End: 2024-12-03 | Stop reason: HOSPADM

## 2024-12-03 RX ORDER — HEPARIN 100 UNIT/ML
500 SYRINGE INTRAVENOUS AS NEEDED
Status: DISCONTINUED | OUTPATIENT
Start: 2024-12-03 | End: 2024-12-03 | Stop reason: HOSPADM

## 2024-12-03 RX ORDER — ALBUTEROL SULFATE 0.83 MG/ML
3 SOLUTION RESPIRATORY (INHALATION) AS NEEDED
Status: DISCONTINUED | OUTPATIENT
Start: 2024-12-03 | End: 2024-12-03 | Stop reason: HOSPADM

## 2024-12-03 RX ORDER — PALONOSETRON 0.05 MG/ML
0.25 INJECTION, SOLUTION INTRAVENOUS ONCE
Status: COMPLETED | OUTPATIENT
Start: 2024-12-03 | End: 2024-12-03

## 2024-12-03 RX ORDER — PROCHLORPERAZINE EDISYLATE 5 MG/ML
10 INJECTION INTRAMUSCULAR; INTRAVENOUS EVERY 6 HOURS PRN
Status: DISCONTINUED | OUTPATIENT
Start: 2024-12-03 | End: 2024-12-03 | Stop reason: HOSPADM

## 2024-12-03 RX ORDER — HEPARIN SODIUM,PORCINE/PF 10 UNIT/ML
50 SYRINGE (ML) INTRAVENOUS AS NEEDED
Status: DISCONTINUED | OUTPATIENT
Start: 2024-12-03 | End: 2024-12-03 | Stop reason: HOSPADM

## 2024-12-03 RX ORDER — DIPHENHYDRAMINE HYDROCHLORIDE 50 MG/ML
50 INJECTION INTRAMUSCULAR; INTRAVENOUS AS NEEDED
Status: DISCONTINUED | OUTPATIENT
Start: 2024-12-03 | End: 2024-12-03 | Stop reason: HOSPADM

## 2024-12-03 RX ORDER — FLUOROURACIL 50 MG/ML
400 INJECTION, SOLUTION INTRAVENOUS ONCE
Status: COMPLETED | OUTPATIENT
Start: 2024-12-03 | End: 2024-12-03

## 2024-12-03 RX ORDER — HEPARIN SODIUM,PORCINE/PF 10 UNIT/ML
50 SYRINGE (ML) INTRAVENOUS AS NEEDED
Status: CANCELLED | OUTPATIENT
Start: 2024-12-03

## 2024-12-03 RX ORDER — PROCHLORPERAZINE MALEATE 10 MG
10 TABLET ORAL EVERY 6 HOURS PRN
Status: DISCONTINUED | OUTPATIENT
Start: 2024-12-03 | End: 2024-12-03 | Stop reason: HOSPADM

## 2024-12-03 RX ORDER — HEPARIN 100 UNIT/ML
500 SYRINGE INTRAVENOUS AS NEEDED
Status: CANCELLED | OUTPATIENT
Start: 2024-12-03

## 2024-12-03 RX ORDER — LORAZEPAM 2 MG/ML
1 INJECTION INTRAMUSCULAR AS NEEDED
Status: DISCONTINUED | OUTPATIENT
Start: 2024-12-03 | End: 2024-12-03 | Stop reason: HOSPADM

## 2024-12-03 NOTE — PROGRESS NOTES
"NUTRITION Education NOTE    Nutrition Assessment     Reason for Visit:  Krish Batista is a 69 y.o. 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 He is here today in infusion for treatment  with his great niece.    Lab Results   Component Value Date/Time    GLUCOSE 112 (H) 11/29/2024 1313     11/29/2024 1313    K 3.7 11/29/2024 1313     11/29/2024 1313    CO2 27 11/29/2024 1313    ANIONGAP 12 11/29/2024 1313    BUN 13 11/29/2024 1313    CREATININE 0.82 11/29/2024 1313    EGFR >90 11/29/2024 1313    CALCIUM 8.4 (L) 11/29/2024 1313    ALBUMIN 3.9 11/29/2024 1313    ALKPHOS 133 11/29/2024 1313    PROT 5.9 (L) 11/29/2024 1313    AST 27 11/29/2024 1313    BILITOT 1.1 11/29/2024 1313    ALT 25 11/29/2024 1313     No results found for: \"VITD25\"    Anthropometrics:       8% wt loss x 1 month  Continues to fluctuate.  Appears to be gradually stabilization the past 4 weeks between 82-83 kg.  Will monitor trend     Wt Readings from Last 10 Encounters:   12/03/24 83 kg (182 lb 15.7 oz)   11/29/24 83 kg (182 lb 15.7 oz)   11/21/24 84 kg (185 lb 3 oz)   11/20/24 85.7 kg (189 lb)   11/19/24 83.7 kg (184 lb 10.2 oz)   11/15/24 81.9 kg (180 lb 8.9 oz)   11/08/24 83.9 kg (184 lb 15.5 oz)   11/06/24 85.2 kg (187 lb 11.6 oz)   11/04/24 83.6 kg (184 lb 3.1 oz)   10/28/24 84.6 kg (186 lb 8.2 oz)        Food And Nutrient Intake:  Food and Nutrient History  Food and Nutrient History: Patient reports fair-appetite/intake.  He states that he does have ~3 down days following treatment in terms of appetite and cold sensitivity.  He does state he can only tolerate smaller portions at a time. He tries to eat additional snacks but states it's hard to adjust his mindset from eating just 3 meals/day.  For protein likes milk, yogurt, scrambled eggs, and peanut butter.  He does not take protein " "shakes consistently. He is not a fan of Boost/Ensure but does not mind [employer]  Energy Intake: Fair 50-75 %  GI Symptoms: early satiety (Intermittent nausea with treatment)         Energy Needs  Estimated Energy Needs  Total Energy Estimated Needs (kCal):  (4442-3840 calories)  Estimated Fluid Needs  Total Fluid Estimated Needs (mL):  (2500 mls)  Estimated Protein Needs  Total Protein Estimated Needs (g):  (100-115 g protein)        Nutrition Diagnosis   Malnutrition Diagnosis  Patient has Malnutrition Diagnosis: Yes  Diagnosis Status: Ongoing  Malnutrition Diagnosis: Mild malnutrition related to chronic disease or condition  As Evidenced by: 8% wt loss over 1 month, now beginning to stabilize, mild/muscle fat loss, and fluctuating oral intake    Nutrition Diagnosis  Patient has Nutrition Diagnosis: Yes  Diagnosis Status (1): Ongoing  Nutrition Diagnosis 1: Increased nutrient needs  Related to (1): disease process, recent surgery, and treatment  As Evidenced by (1): increased metabolic demands for healing    Nutrition Interventions/Recommendations   Nutrition Prescription  Individualized Nutrition Prescription Provided for : High calorie, High protein , 4-6 small meals/snacks    Food and Nutrition Delivery  Food and Nutrition Delivery  Meals & Snacks: Energy-modified diet, Protein-modified diet  Encouraged 4-6 high protein/high calorie small frequent meals/snacks. (Increased snacks between regular meals)      Already has Handout \"High Calorie Snack Ideas \"     -Encouraged 64 oz caffeine free fluids (I.e water, milk, juice, broth, pro shakes etc)    Nutrition Education  Nutrition Education  Nutrition Education Content: Content related nutrition education    Coordination of Care  Coordination of Nutrition Care by a Nutrition Professional  Collaboration and referral of nutrition care: Collaboration by nutrition professional with other providers    There are no Patient Instructions on file for this " visit.    Nutrition Monitoring and Evaluation   Food/Nutrient Related History Monitoring  Monitoring and Evaluation Plan: Energy intake  Energy Intake: Estimated energy intake  Criteria: Meet >75% estimated kirk needs  Fluid Intake: Estimated fluid intake  Criteria: 64 oz caffeine free fluids  Estimated protein intake: Estimated protein intake  Criteria: Meet >75% estimated protein needs          Time Spent  Prep time on day of patient encounter: 5 minutes  Time spent directly with patient, family or caregiver: 5 minutes  Additional Time Spent on Patient Care Activities: 0 minutes  Documentation Time: 15 minutes  Other Time Spent: 0 minutes  Total: 25 minutes

## 2024-12-03 NOTE — PROGRESS NOTES
Patient is here today for infusion - no complication since last being seen-  independant double check done prior to chemotherapy today-   b/h/ lung sounds not auscultated  patient verbalizes understanding of plan of care     Cvad site in good working order with good blood return no pain with saline flush steri strips off and intact

## 2024-12-04 ENCOUNTER — APPOINTMENT (OUTPATIENT)
Dept: HEMATOLOGY/ONCOLOGY | Facility: CLINIC | Age: 69
End: 2024-12-04
Payer: MEDICARE

## 2024-12-05 ENCOUNTER — APPOINTMENT (OUTPATIENT)
Dept: HEMATOLOGY/ONCOLOGY | Facility: CLINIC | Age: 69
End: 2024-12-05
Payer: MEDICARE

## 2024-12-05 ENCOUNTER — INFUSION (OUTPATIENT)
Dept: HEMATOLOGY/ONCOLOGY | Facility: CLINIC | Age: 69
End: 2024-12-05
Payer: MEDICARE

## 2024-12-05 VITALS
HEART RATE: 46 BPM | DIASTOLIC BLOOD PRESSURE: 76 MMHG | TEMPERATURE: 97.5 F | SYSTOLIC BLOOD PRESSURE: 161 MMHG | OXYGEN SATURATION: 98 % | RESPIRATION RATE: 18 BRPM

## 2024-12-05 DIAGNOSIS — C18.9 STAGE III CARCINOMA OF COLON (MULTI): ICD-10-CM

## 2024-12-05 DIAGNOSIS — D50.9 MICROCYTIC ANEMIA: ICD-10-CM

## 2024-12-05 DIAGNOSIS — C18.9 ADENOCARCINOMA, COLON (MULTI): ICD-10-CM

## 2024-12-05 PROCEDURE — 2500000004 HC RX 250 GENERAL PHARMACY W/ HCPCS (ALT 636 FOR OP/ED): Mod: JZ,JG | Performed by: INTERNAL MEDICINE

## 2024-12-05 PROCEDURE — 96372 THER/PROPH/DIAG INJ SC/IM: CPT

## 2024-12-05 PROCEDURE — 2500000004 HC RX 250 GENERAL PHARMACY W/ HCPCS (ALT 636 FOR OP/ED)

## 2024-12-05 RX ORDER — HEPARIN 100 UNIT/ML
SYRINGE INTRAVENOUS
Status: COMPLETED
Start: 2024-12-05 | End: 2024-12-05

## 2024-12-05 RX ORDER — HEPARIN SODIUM,PORCINE/PF 10 UNIT/ML
50 SYRINGE (ML) INTRAVENOUS AS NEEDED
OUTPATIENT
Start: 2024-12-05

## 2024-12-05 RX ORDER — HEPARIN 100 UNIT/ML
500 SYRINGE INTRAVENOUS AS NEEDED
Status: DISCONTINUED | OUTPATIENT
Start: 2024-12-05 | End: 2024-12-05 | Stop reason: HOSPADM

## 2024-12-05 RX ORDER — HEPARIN 100 UNIT/ML
500 SYRINGE INTRAVENOUS AS NEEDED
OUTPATIENT
Start: 2024-12-05

## 2024-12-05 RX ORDER — HEPARIN SODIUM,PORCINE/PF 10 UNIT/ML
50 SYRINGE (ML) INTRAVENOUS AS NEEDED
Status: DISCONTINUED | OUTPATIENT
Start: 2024-12-05 | End: 2024-12-05 | Stop reason: HOSPADM

## 2024-12-05 ASSESSMENT — PAIN SCALES - GENERAL: PAINLEVEL_OUTOF10: 0-NO PAIN

## 2024-12-05 NOTE — PROGRESS NOTES
Pump shayc. discussed cold sensitivity edu again with patient- especially while drinking fluid- + 1 non tender lower ankle edema bilateral - discussed elevating ankles with patient above heart level when lying down- some constipation but does not want to take anything for the constipation- encouraged hydration. Ambulating fine today     Cvad d.c. per protocol     Rtc next Friday as scheduled- encouraged to call if needed

## 2024-12-13 ENCOUNTER — EDUCATION (OUTPATIENT)
Dept: HEMATOLOGY/ONCOLOGY | Facility: CLINIC | Age: 69
End: 2024-12-13

## 2024-12-13 ENCOUNTER — OFFICE VISIT (OUTPATIENT)
Dept: HEMATOLOGY/ONCOLOGY | Facility: CLINIC | Age: 69
End: 2024-12-13
Payer: MEDICARE

## 2024-12-13 ENCOUNTER — INFUSION (OUTPATIENT)
Dept: HEMATOLOGY/ONCOLOGY | Facility: CLINIC | Age: 69
End: 2024-12-13
Payer: MEDICARE

## 2024-12-13 VITALS
RESPIRATION RATE: 16 BRPM | BODY MASS INDEX: 22.85 KG/M2 | SYSTOLIC BLOOD PRESSURE: 130 MMHG | HEART RATE: 78 BPM | TEMPERATURE: 96.6 F | WEIGHT: 169.64 LBS | DIASTOLIC BLOOD PRESSURE: 75 MMHG | OXYGEN SATURATION: 95 %

## 2024-12-13 DIAGNOSIS — D50.9 MICROCYTIC ANEMIA: ICD-10-CM

## 2024-12-13 DIAGNOSIS — C18.9 ADENOCARCINOMA, COLON (MULTI): ICD-10-CM

## 2024-12-13 DIAGNOSIS — C18.9 STAGE III CARCINOMA OF COLON (MULTI): ICD-10-CM

## 2024-12-13 LAB
BASOPHILS # BLD AUTO: 0.02 X10*3/UL (ref 0–0.1)
BASOPHILS NFR BLD AUTO: 0.2 %
EOSINOPHIL # BLD AUTO: 0.02 X10*3/UL (ref 0–0.7)
EOSINOPHIL NFR BLD AUTO: 0.2 %
ERYTHROCYTE [DISTWIDTH] IN BLOOD BY AUTOMATED COUNT: 19.5 % (ref 11.5–14.5)
HCT VFR BLD AUTO: 37.5 % (ref 41–52)
HGB BLD-MCNC: 12.3 G/DL (ref 13.5–17.5)
IMM GRANULOCYTES # BLD AUTO: 0.05 X10*3/UL (ref 0–0.7)
IMM GRANULOCYTES NFR BLD AUTO: 0.5 % (ref 0–0.9)
LYMPHOCYTES # BLD AUTO: 0.76 X10*3/UL (ref 1.2–4.8)
LYMPHOCYTES NFR BLD AUTO: 7.9 %
MCH RBC QN AUTO: 31.2 PG (ref 26–34)
MCHC RBC AUTO-ENTMCNC: 32.8 G/DL (ref 32–36)
MCV RBC AUTO: 95 FL (ref 80–100)
MONOCYTES # BLD AUTO: 0.63 X10*3/UL (ref 0.1–1)
MONOCYTES NFR BLD AUTO: 6.5 %
NEUTROPHILS # BLD AUTO: 8.15 X10*3/UL (ref 1.2–7.7)
NEUTROPHILS NFR BLD AUTO: 84.7 %
NRBC BLD-RTO: ABNORMAL /100{WBCS}
PLATELET # BLD AUTO: 83 X10*3/UL (ref 150–450)
RBC # BLD AUTO: 3.94 X10*6/UL (ref 4.5–5.9)
WBC # BLD AUTO: 9.6 X10*3/UL (ref 4.4–11.3)

## 2024-12-13 PROCEDURE — 1159F MED LIST DOCD IN RCRD: CPT | Performed by: INTERNAL MEDICINE

## 2024-12-13 PROCEDURE — 99215 OFFICE O/P EST HI 40 MIN: CPT | Performed by: INTERNAL MEDICINE

## 2024-12-13 PROCEDURE — 1126F AMNT PAIN NOTED NONE PRSNT: CPT | Performed by: INTERNAL MEDICINE

## 2024-12-13 PROCEDURE — 84075 ASSAY ALKALINE PHOSPHATASE: CPT

## 2024-12-13 PROCEDURE — 85025 COMPLETE CBC W/AUTO DIFF WBC: CPT

## 2024-12-13 PROCEDURE — 2500000004 HC RX 250 GENERAL PHARMACY W/ HCPCS (ALT 636 FOR OP/ED): Performed by: INTERNAL MEDICINE

## 2024-12-13 PROCEDURE — 96523 IRRIG DRUG DELIVERY DEVICE: CPT

## 2024-12-13 RX ORDER — HEPARIN 100 UNIT/ML
500 SYRINGE INTRAVENOUS AS NEEDED
Status: DISCONTINUED | OUTPATIENT
Start: 2024-12-13 | End: 2024-12-13 | Stop reason: HOSPADM

## 2024-12-13 RX ORDER — HEPARIN SODIUM,PORCINE/PF 10 UNIT/ML
50 SYRINGE (ML) INTRAVENOUS AS NEEDED
OUTPATIENT
Start: 2024-12-13

## 2024-12-13 RX ORDER — HEPARIN 100 UNIT/ML
500 SYRINGE INTRAVENOUS AS NEEDED
OUTPATIENT
Start: 2024-12-13

## 2024-12-13 ASSESSMENT — PAIN SCALES - GENERAL: PAINLEVEL_OUTOF10: 0-NO PAIN

## 2024-12-13 NOTE — PROGRESS NOTES
Patient ID: Krish Batista is a 69 y.o. male.  Referring Physician: Rio Lawson MD  [address] [facility], [place] [address] [place] [address]  Primary Care Provider: Matthew Small,    8/15/24, EGD and colonoscopy  A.  Duodenum, first part, biopsy:  Duodenal mucosa within normal limits.     B.  Stomach, biopsy:  Antrum and corpus mucosa within normal limits.     C.  Colon, mass at 40 cm, biopsy:  Fragments of invasive adenocarcinoma, moderately differentiated.  See note.     NOTE: Immunohistochemical stains for mismatch repair proteins are pending and the result will be reported as an addendum.     D.  Colon, 20 cm, polypectomy:  Tubular adenoma.                               MISMATCH REPAIR PROTEIN EXPRESSION     C. Colon, mass at 40 cm, biopsy: Adenocarcinoma     Protein:  Result     MLH-1:  Expression Present                                    PMS-2: Expression Present                                    MSH-2: Expression Present                                    MSH-6: Expression Present    CEA at 1.5 NG per mL on August 2, 2024    Colon, splenic flexure, resection:  -Invasive adenocarcinoma (3.1 cm), moderately differentiated.  -Carcinoma invades pericolonic tissue.  -Extramural venous and perineural invasion present.  -Resection margins are negative for carcinoma.  -Metastatic adenocarcinoma involving two of thirty-seven lymph nodes (2/37).  -Pathologic stage: pT3, pN1b.  -See comment and synoptic report.  Comment:  Movat stains (blocks A4 and A5) confirm the venous invasion.    Electronically signed by Sharonda Maguire MD PhD on 9/30/2024 at 1350      [facility]   By the signature on this report, the individual or group listed as making the Final Interpretation/Diagnosis certifies that they have reviewed this case.    Case Summary Report   COLON AND RECTUM: Resection, Including Transanal Disk Excision of Rectal Neoplasms   8th Edition - Protocol posted: 6/22/2022COLON AND RECTUM: RESECTION, INCLUDING  TRANSANAL DISK EXCISION OF RECTAL NEOPLASMS - All Specimens  SPECIMEN   Procedure  resection   TUMOR   Tumor Site  Splenic flexure   Histologic Type  Adenocarcinoma   Histologic Grade  G2, moderately differentiated   Tumor Size  Greatest dimension (Centimeters): 3.1 cm   Tumor Extent  Invades through muscularis propria into the pericolonic or perirectal tissue   Macroscopic Tumor Perforation  Not identified   Lymphovascular Invasion  Large vessel (venous), extramural   Perineural Invasion  Present   Tumor Bud Score  Intermediate (5-9)   Treatment Effect  No known presurgical therapy   MARGINS   Margin Status for Invasive Carcinoma  All margins negative for invasive carcinoma   Closest Margin(s) to Invasive Carcinoma  Radial (circumferential) or mesenteric   Distance from Invasive Carcinoma to Closest Margin  Greater than 1 cm   Margin Status for Non-Invasive Tumor  All margins negative for high-grade dysplasia / intramucosal carcinoma and low-grade dysplasia   REGIONAL LYMPH NODES   Regional Lymph Node Status  Tumor present in regional lymph node(s)   Number of Lymph Nodes with Tumor  2   Number of Lymph Nodes Examined  37   Tumor Deposits  Not identified   PATHOLOGIC STAGE CLASSIFICATION (pTNM, AJCC 8th Edition)   Reporting of pT, pN, and (when applicable) pM categories is based on information available to the pathologist at the time the report is issued. As per the AJCC (Chapter 1, 8th Ed.) it is the managing physician’s responsibility to establish the final pathologic stage based upon all pertinent information, including but potentially not limited to this pathology report.   pT Category  pT3   pN Category  pN1b   .        Subjective    HPI  The patient was referred to me by Dr. Clark for further evaluation and management of newly diagnosed biopsy-proven left colon adenocarcinoma.  The patient presented to Dr. Small on March 25, 2024 complaining of fatigue and malaise.  Further evaluation revealed significant  anemia at hemoglobin 8.1 g/dL ferritin at 8 NG per mL iron saturation 3%.  The patient was referred to GI services however he failed to follow through.  He was referred to surgery as well.  Repeat hemoglobin on July 11, 2024 was 7.2 g/dL.  He then presented to ER on August 1, 2024 secondary to progressive fatigue and weakness.  He was diagnosed with severe microcytic anemia hemoglobin 7.4 g/dL.  The patient received 2 units of packed red blood cell transfusions.  Upper and lower endoscopy on August 3, 2024 revealed a mass at 40 cm in the anal verge.  Histology consistent with adenocarcinoma.  In ER the patient was found to be in atrial fibrillation/flutter with RVR.  Cardiology was consulted.  He spontaneously converted to normal sinus rhythm.  Color Doppler echocardiogram revealed normal ejection fraction.  He was placed on metoprolol 50 mg p.o. twice daily with a Holter monitor.  Anticoagulation was not recommended.  Colonoscopy on August 15, 2024 revealed internal small hemorrhoids.  1 8 mm sessile polyp 20 centimeter from anal verge.  This was snared.  Single friable and ulcerated mass not transferred stable 40 cm from the anal verge, covering the whole circumference; bleeding occurred before intervention.  Performed cold forceps biopsy with partial removal: Tattooed distal to the finding with spot.  CT scan of chest abdomen pelvis on August 1, 2024 with IV contrast did not reveal any obvious metastatic disease.  A PET scan revealed bilateral pleural effusion right greater than left.  Thoracentesis was performed on September 3, 2024 cytology is pending.  The patient claims that he is feeling better after thoracentesis.    At interview on September 4, 2024 he was accompanied by his niece denied history of weight loss, fevers, night sweats, chest pain, nausea, vomiting, hematemesis, melena, hematochezia and hematuria.  Since last evaluation the patient has had multiple admissions to the hospital for myocardial  infarction requiring cardiac catheterization, status post surgery/left hemicolectomy on September 15, 2024, GI bleed hypotension requiring iron infusion as well as blood transfusions.  The patient was discharged and claims that he is beginning to feel better although has pedal edema as well as anterior abdominal wall edema.  The patient and family had come for follow-up on November 8, 2024 regarding history of pT3, PN 1B, M0 adenocarcinoma of the colon s/p resection.  During previous evaluation the patient had presented with severe iron deficiency anemia as well as diarrhea.  C. difficile studies were negative.  Diarrhea is resolved.  [id number] was placed.  Initiate intravenous Feraheme for iron 10 mg/week x 2 weeks and also initiate adjuvant chemotherapy using FOLFOX to be given every 2 weeks for 12 cycles.  Cycle 1 on November 4, 2024    The patient and family had come for follow-up on December 13 , 2024 regarding history of pT3, PN 1B, M0 adenocarcinoma of the colon s/p resection.  During previous evaluation the patient had presented with severe iron deficiency anemia as well as diarrhea.  C. difficile studies were negative.   The patient received cycle 3,  2 weeks earlier and complained of easy fatigability and diarrhea that was easily controlled with Imodium.  Will reduce dose by 25% with cycle 4 and going forward.       Past medical history:    Iron deficiency anemia, adenocarcinoma of descending colon, history of Raynaud's phenomenon.  Past surgical history:    Sinus surgery July 13, 2021, history of undescended testis surgery, and tonsillectomy.      Review of Systems   All other systems reviewed and are negative.       Objective   BSA: 1.98 meters squared  /75   Pulse 78   Temp 35.9 °C (96.6 °F)   Resp 16   Wt 76.9 kg (169 lb 10.3 oz)   SpO2 95%   BMI 22.85 kg/m²     No family history on file.  Oncology History   Adenocarcinoma, colon (Multi)   8/23/2024 Initial Diagnosis    Adenocarcinoma,  colon (Multi)     11/4/2024 -  Chemotherapy    mFOLFOX6 (Fluorouracil Continuous Infusion / Leucovorin / Oxaliplatin), 14 Day Cycles       The patient is 69 years old, single has no children retired as a .  Krish Donaldchad  reports that he has never smoked. He has never used smokeless tobacco.  He  reports that he does not currently use alcohol.  He  reports no history of drug use.    Physical Exam  Constitutional:       Appearance: Normal appearance.   HENT:      Head: Normocephalic and atraumatic.      Nose: Nose normal.      Mouth/Throat:      Mouth: Mucous membranes are moist.      Pharynx: Oropharynx is clear.   Eyes:      Extraocular Movements: Extraocular movements intact.      Conjunctiva/sclera: Conjunctivae normal.      Pupils: Pupils are equal, round, and reactive to light.   Cardiovascular:      Rate and Rhythm: Normal rate and regular rhythm.   Pulmonary:      Effort: Pulmonary effort is normal.      Breath sounds: Normal breath sounds.   Abdominal:      General: Abdomen is flat. Bowel sounds are normal.      Palpations: Abdomen is soft.   Musculoskeletal:         General: Normal range of motion.      Cervical back: Normal range of motion and neck supple.   Neurological:      General: No focal deficit present.      Mental Status: He is alert and oriented to person, place, and time. Mental status is at baseline.   Psychiatric:         Mood and Affect: Mood normal.         Behavior: Behavior normal.         Thought Content: Thought content normal.         Judgment: Judgment normal.         Performance Status:  Symptomatic; in bed <50% of the day    Assessment/Plan    The patient is a 69-year-old man presented with easy fatigability shortness of breath on exertion.  Diagnosed with iron deficiency anemia.  Cologuard was positive.  The patient was referred to GI services but did not follow through.  Eventually presented to ER with above symptoms did receive 2 units of packed red blood cell  transfusions.  He did develop atrial fibrillation/flutter while in ER but spontaneously converted to normal sinus rhythm was placed on metoprolol.  Colonoscopy revealed a mass 40 cm from anal verge biopsy/histology consistent with adenocarcinoma.  Initially CT scan of chest abdomen pelvis did not reveal any evidence of metastatic disease.  But a PET scan revealed large right pleural effusion and small left pleural effusion.  Thoracentesis performed on right pleural effusion on September 3, 2024 cytology is pending.  If negative would seek opinion about hilar/mediastinal lymphadenopathy.    Iron deficiency anemia:    The patient is still symptomatic with anemia.  I have recommended intravenous Feraheme 510 mg/week x 2 weeks.  Effect side effects explained.  The patient understood appreciated all the details provided and was grateful.  T3 N1 M0 adenocarcinoma of sigmoid colon s/p resection on September 15, 2024.    The patient and family had come for follow-up on October 3, 2024 as described above multiple admissions requiring stay in ICU, history of MI GI bleed requiring blood and iron transfusions.  Physical examination revealed pedal edema and CBC revealed hemoglobin 9.2 g/dL.  I have recommended evaluation of anemia as well as Doppler ultrasound.  Discussed in detail about options such as do-nothing, consider adjuvant chemotherapy using FOLFOX which might elicit 25% survival benefit.  The patient is agreeable to start chemotherapy.  This will necessitate port placement.  Return in 2 weeks.  The patient and family had come for follow-up on November 8, 2024 regarding history of pT3, PN 1B, M0 adenocarcinoma of the colon s/p resection.  During previous evaluation the patient had presented with severe iron deficiency anemia as well as diarrhea.  C. difficile studies were negative.  Diarrhea is resolved.  Vuxzjg-o-Blui was placed.  Initiate intravenous Feraheme for iron 10 mg/week x 2 weeks and also initiate adjuvant  chemotherapy using FOLFOX to be given every 2 weeks for 12 cycles beginning November for, 2024    The patient and family had come for follow-up on December 13 , 2024 regarding history of pT3, PN 1B, M0 adenocarcinoma of the colon s/p resection.  During previous evaluation the patient had presented with severe iron deficiency anemia as well as diarrhea.  C. difficile studies were negative.   The patient received cycle 3,  2 weeks earlier and complained of easy fatigability and diarrhea that was easily controlled with Imodium.  Will reduce dose by 25% with cycle 4 and going forward.    Thank you for allowing me to participate in care of your patient if you have any questions please feel free to call me.      Diagnoses and all orders for this visit:  Microcytic anemia  -     Clinic Appointment Request ([place] appt needed please); Future  Adenocarcinoma, colon (Multi)  -     Referral to Hematology and Oncology  -     Clinic Appointment Request ([place] appt needed please); Future  Other orders  -     heparin flush 10 unit/mL syringe 50 Units  -     heparin flush 100 unit/mL syringe 500 Units  -     alteplase (Cathflo Activase) injection 2 mg  -     ferumoxytol (Feraheme) 510 mg in sodium chloride 0.9% 117 mL IV  -     sodium chloride 0.9 % bolus 500 mL  -     dextrose 5 % in water (D5W) bolus  -     diphenhydrAMINE (BENADryl) injection 50 mg  -     methylPREDNISolone sod succinate (SOLU-Medrol) 40 mg/mL injection 40 mg  -     famotidine PF (Pepcid) injection 20 mg  -     EPINEPHrine (Epipen) injection syringe 0.3 mg  -     albuterol 2.5 mg /3 mL (0.083 %) nebulizer solution 3 mL           Rio Lawson MD

## 2024-12-13 NOTE — PROGRESS NOTES
Patient here for PF/L and FUV with provider.  Port accessed and deaccessed without incident.  No redness, swelling or bleeding noted. Bandaid applied. Patient independently ambulatory to waiting room for visit with provider in NAD and without complaints.  Gait steady.  Call back instructions reviewed.  Patient verbalized understanding.

## 2024-12-13 NOTE — PROGRESS NOTES
"Patient seen by Dr. Duque today in clinic. Reinforcement education provided regarding next steps with plan of care.      PER DR. DUQUE'S FUV NOTE TODAY: \"The patient and family had come for follow-up on December 13 , 2024 regarding history of pT3, PN 1B, M0 adenocarcinoma of the colon s/p resection.  During previous evaluation the patient had presented with severe iron deficiency anemia as well as diarrhea.  C. difficile studies were negative.   The patient received cycle 3,  2 weeks earlier and complained of easy fatigability and diarrhea that was easily controlled with Imodium.  Will reduce dose by 25% with cycle 4 and going forward. \"    Pt reports difficulty with last Folfox C#3 with increased fatigue and weakness.  Reports diarrhea 5 x's in 24 hour period but only using Imodium x 1 tab throughout the day.  Reinforced how to take Imodium 2 tabs after first loose stool with 1 tab after each subsequent stool.  Denies nausea and hasn't needed antinausea meds.  C#4 Folfox scheduled 12/17/24 with 25% dose reduction per Dr. Duque.  Pt agreeable to plan of care.  Patient verbalizes understanding of plan of care via teachback method.             "

## 2024-12-14 LAB
ALBUMIN SERPL BCP-MCNC: 4 G/DL (ref 3.4–5)
ALP SERPL-CCNC: 131 U/L (ref 33–136)
ALT SERPL W P-5'-P-CCNC: 22 U/L (ref 10–52)
ANION GAP SERPL CALC-SCNC: 13 MMOL/L (ref 10–20)
AST SERPL W P-5'-P-CCNC: 26 U/L (ref 9–39)
BILIRUB SERPL-MCNC: 1.3 MG/DL (ref 0–1.2)
BUN SERPL-MCNC: 20 MG/DL (ref 6–23)
CALCIUM SERPL-MCNC: 9.1 MG/DL (ref 8.6–10.6)
CHLORIDE SERPL-SCNC: 103 MMOL/L (ref 98–107)
CO2 SERPL-SCNC: 26 MMOL/L (ref 21–32)
CREAT SERPL-MCNC: 0.92 MG/DL (ref 0.5–1.3)
EGFRCR SERPLBLD CKD-EPI 2021: 89 ML/MIN/1.73M*2
GLUCOSE SERPL-MCNC: 125 MG/DL (ref 74–99)
POTASSIUM SERPL-SCNC: 3.8 MMOL/L (ref 3.5–5.3)
PROT SERPL-MCNC: 6.3 G/DL (ref 6.4–8.2)
SODIUM SERPL-SCNC: 138 MMOL/L (ref 136–145)

## 2024-12-16 ENCOUNTER — APPOINTMENT (OUTPATIENT)
Dept: HEMATOLOGY/ONCOLOGY | Facility: CLINIC | Age: 69
End: 2024-12-16
Payer: MEDICARE

## 2024-12-16 DIAGNOSIS — C18.9 STAGE III CARCINOMA OF COLON (MULTI): ICD-10-CM

## 2024-12-16 DIAGNOSIS — C18.9 ADENOCARCINOMA, COLON (MULTI): Primary | ICD-10-CM

## 2024-12-16 RX ORDER — ALBUTEROL SULFATE 0.83 MG/ML
3 SOLUTION RESPIRATORY (INHALATION) AS NEEDED
OUTPATIENT
Start: 2025-02-10

## 2024-12-16 RX ORDER — PROCHLORPERAZINE EDISYLATE 5 MG/ML
10 INJECTION INTRAMUSCULAR; INTRAVENOUS EVERY 6 HOURS PRN
OUTPATIENT
Start: 2025-01-27

## 2024-12-16 RX ORDER — FLUOROURACIL 50 MG/ML
300 INJECTION, SOLUTION INTRAVENOUS ONCE
OUTPATIENT
Start: 2025-02-10

## 2024-12-16 RX ORDER — FLUOROURACIL 50 MG/ML
300 INJECTION, SOLUTION INTRAVENOUS ONCE
OUTPATIENT
Start: 2025-02-24

## 2024-12-16 RX ORDER — FAMOTIDINE 10 MG/ML
20 INJECTION INTRAVENOUS ONCE AS NEEDED
OUTPATIENT
Start: 2025-02-24

## 2024-12-16 RX ORDER — PROCHLORPERAZINE EDISYLATE 5 MG/ML
10 INJECTION INTRAMUSCULAR; INTRAVENOUS EVERY 6 HOURS PRN
OUTPATIENT
Start: 2025-02-10

## 2024-12-16 RX ORDER — FAMOTIDINE 10 MG/ML
20 INJECTION INTRAVENOUS ONCE AS NEEDED
OUTPATIENT
Start: 2025-02-10

## 2024-12-16 RX ORDER — DEXAMETHASONE 6 MG/1
12 TABLET ORAL ONCE
OUTPATIENT
Start: 2025-02-10

## 2024-12-16 RX ORDER — DEXAMETHASONE 6 MG/1
12 TABLET ORAL ONCE
OUTPATIENT
Start: 2025-02-24

## 2024-12-16 RX ORDER — DIPHENHYDRAMINE HYDROCHLORIDE 50 MG/ML
50 INJECTION INTRAMUSCULAR; INTRAVENOUS AS NEEDED
OUTPATIENT
Start: 2025-02-10

## 2024-12-16 RX ORDER — PROCHLORPERAZINE MALEATE 5 MG
10 TABLET ORAL EVERY 6 HOURS PRN
OUTPATIENT
Start: 2025-02-10

## 2024-12-16 RX ORDER — LORAZEPAM 2 MG/ML
1 INJECTION INTRAMUSCULAR AS NEEDED
OUTPATIENT
Start: 2025-02-10

## 2024-12-16 RX ORDER — EPINEPHRINE 0.3 MG/.3ML
0.3 INJECTION SUBCUTANEOUS EVERY 5 MIN PRN
OUTPATIENT
Start: 2025-02-10

## 2024-12-16 RX ORDER — DEXAMETHASONE 6 MG/1
12 TABLET ORAL ONCE
OUTPATIENT
Start: 2025-01-27

## 2024-12-16 RX ORDER — FLUOROURACIL 50 MG/ML
300 INJECTION, SOLUTION INTRAVENOUS ONCE
OUTPATIENT
Start: 2024-12-30

## 2024-12-16 RX ORDER — FLUOROURACIL 50 MG/ML
300 INJECTION, SOLUTION INTRAVENOUS ONCE
OUTPATIENT
Start: 2025-01-27

## 2024-12-16 RX ORDER — DIPHENHYDRAMINE HYDROCHLORIDE 50 MG/ML
50 INJECTION INTRAMUSCULAR; INTRAVENOUS AS NEEDED
OUTPATIENT
Start: 2025-02-24

## 2024-12-16 RX ORDER — LORAZEPAM 2 MG/ML
1 INJECTION INTRAMUSCULAR AS NEEDED
OUTPATIENT
Start: 2025-01-27

## 2024-12-16 RX ORDER — DIPHENHYDRAMINE HYDROCHLORIDE 50 MG/ML
50 INJECTION INTRAMUSCULAR; INTRAVENOUS AS NEEDED
OUTPATIENT
Start: 2025-01-27

## 2024-12-16 RX ORDER — PROCHLORPERAZINE MALEATE 5 MG
10 TABLET ORAL EVERY 6 HOURS PRN
OUTPATIENT
Start: 2025-02-24

## 2024-12-16 RX ORDER — ALBUTEROL SULFATE 0.83 MG/ML
3 SOLUTION RESPIRATORY (INHALATION) AS NEEDED
OUTPATIENT
Start: 2025-01-27

## 2024-12-16 RX ORDER — PROCHLORPERAZINE EDISYLATE 5 MG/ML
10 INJECTION INTRAMUSCULAR; INTRAVENOUS EVERY 6 HOURS PRN
OUTPATIENT
Start: 2025-02-24

## 2024-12-16 RX ORDER — LORAZEPAM 2 MG/ML
1 INJECTION INTRAMUSCULAR AS NEEDED
OUTPATIENT
Start: 2025-02-24

## 2024-12-16 RX ORDER — PALONOSETRON 0.05 MG/ML
0.25 INJECTION, SOLUTION INTRAVENOUS ONCE
OUTPATIENT
Start: 2025-02-24

## 2024-12-16 RX ORDER — FLUOROURACIL 50 MG/ML
300 INJECTION, SOLUTION INTRAVENOUS ONCE
OUTPATIENT
Start: 2025-01-13

## 2024-12-16 RX ORDER — PALONOSETRON 0.05 MG/ML
0.25 INJECTION, SOLUTION INTRAVENOUS ONCE
OUTPATIENT
Start: 2025-02-10

## 2024-12-16 RX ORDER — PALONOSETRON 0.05 MG/ML
0.25 INJECTION, SOLUTION INTRAVENOUS ONCE
OUTPATIENT
Start: 2025-01-27

## 2024-12-16 RX ORDER — FLUOROURACIL 50 MG/ML
300 INJECTION, SOLUTION INTRAVENOUS ONCE
Status: CANCELLED | OUTPATIENT
Start: 2024-12-16

## 2024-12-16 RX ORDER — PROCHLORPERAZINE MALEATE 5 MG
10 TABLET ORAL EVERY 6 HOURS PRN
OUTPATIENT
Start: 2025-01-27

## 2024-12-16 RX ORDER — ALBUTEROL SULFATE 0.83 MG/ML
3 SOLUTION RESPIRATORY (INHALATION) AS NEEDED
OUTPATIENT
Start: 2025-02-24

## 2024-12-16 RX ORDER — EPINEPHRINE 0.3 MG/.3ML
0.3 INJECTION SUBCUTANEOUS EVERY 5 MIN PRN
OUTPATIENT
Start: 2025-01-27

## 2024-12-16 RX ORDER — FAMOTIDINE 10 MG/ML
20 INJECTION INTRAVENOUS ONCE AS NEEDED
OUTPATIENT
Start: 2025-01-27

## 2024-12-16 RX ORDER — EPINEPHRINE 0.3 MG/.3ML
0.3 INJECTION SUBCUTANEOUS EVERY 5 MIN PRN
OUTPATIENT
Start: 2025-02-24

## 2024-12-17 ENCOUNTER — INFUSION (OUTPATIENT)
Dept: HEMATOLOGY/ONCOLOGY | Facility: CLINIC | Age: 69
End: 2024-12-17
Payer: MEDICARE

## 2024-12-17 VITALS
HEART RATE: 43 BPM | SYSTOLIC BLOOD PRESSURE: 158 MMHG | TEMPERATURE: 97.5 F | OXYGEN SATURATION: 99 % | BODY MASS INDEX: 23.46 KG/M2 | WEIGHT: 173.17 LBS | RESPIRATION RATE: 16 BRPM | DIASTOLIC BLOOD PRESSURE: 81 MMHG | HEIGHT: 72 IN

## 2024-12-17 DIAGNOSIS — C18.9 STAGE III CARCINOMA OF COLON (MULTI): ICD-10-CM

## 2024-12-17 DIAGNOSIS — C18.9 ADENOCARCINOMA, COLON (MULTI): ICD-10-CM

## 2024-12-17 DIAGNOSIS — D50.9 MICROCYTIC ANEMIA: ICD-10-CM

## 2024-12-17 PROCEDURE — 96415 CHEMO IV INFUSION ADDL HR: CPT

## 2024-12-17 PROCEDURE — 96413 CHEMO IV INFUSION 1 HR: CPT

## 2024-12-17 PROCEDURE — 96376 TX/PRO/DX INJ SAME DRUG ADON: CPT

## 2024-12-17 PROCEDURE — 2500000004 HC RX 250 GENERAL PHARMACY W/ HCPCS (ALT 636 FOR OP/ED): Performed by: INTERNAL MEDICINE

## 2024-12-17 PROCEDURE — 96411 CHEMO IV PUSH ADDL DRUG: CPT

## 2024-12-17 PROCEDURE — 96416 CHEMO PROLONG INFUSE W/PUMP: CPT

## 2024-12-17 PROCEDURE — 96375 TX/PRO/DX INJ NEW DRUG ADDON: CPT | Mod: INF

## 2024-12-17 RX ORDER — FAMOTIDINE 10 MG/ML
20 INJECTION INTRAVENOUS ONCE AS NEEDED
Status: DISCONTINUED | OUTPATIENT
Start: 2024-12-17 | End: 2024-12-17 | Stop reason: HOSPADM

## 2024-12-17 RX ORDER — ALBUTEROL SULFATE 0.83 MG/ML
3 SOLUTION RESPIRATORY (INHALATION) AS NEEDED
Status: DISCONTINUED | OUTPATIENT
Start: 2024-12-17 | End: 2024-12-17 | Stop reason: HOSPADM

## 2024-12-17 RX ORDER — DEXAMETHASONE 4 MG/1
12 TABLET ORAL ONCE
Status: COMPLETED | OUTPATIENT
Start: 2024-12-17 | End: 2024-12-17

## 2024-12-17 RX ORDER — FLUOROURACIL 50 MG/ML
300 INJECTION, SOLUTION INTRAVENOUS ONCE
Status: COMPLETED | OUTPATIENT
Start: 2024-12-17 | End: 2024-12-17

## 2024-12-17 RX ORDER — PALONOSETRON 0.05 MG/ML
0.25 INJECTION, SOLUTION INTRAVENOUS ONCE
Status: COMPLETED | OUTPATIENT
Start: 2024-12-17 | End: 2024-12-17

## 2024-12-17 RX ORDER — LORAZEPAM 2 MG/ML
1 INJECTION INTRAMUSCULAR AS NEEDED
Status: DISCONTINUED | OUTPATIENT
Start: 2024-12-17 | End: 2024-12-17 | Stop reason: HOSPADM

## 2024-12-17 RX ORDER — EPINEPHRINE 0.3 MG/.3ML
0.3 INJECTION SUBCUTANEOUS EVERY 5 MIN PRN
Status: DISCONTINUED | OUTPATIENT
Start: 2024-12-17 | End: 2024-12-17 | Stop reason: HOSPADM

## 2024-12-17 RX ORDER — HEPARIN SODIUM,PORCINE/PF 10 UNIT/ML
50 SYRINGE (ML) INTRAVENOUS AS NEEDED
Status: CANCELLED | OUTPATIENT
Start: 2024-12-17

## 2024-12-17 RX ORDER — PROCHLORPERAZINE EDISYLATE 5 MG/ML
10 INJECTION INTRAMUSCULAR; INTRAVENOUS EVERY 6 HOURS PRN
Status: DISCONTINUED | OUTPATIENT
Start: 2024-12-17 | End: 2024-12-17 | Stop reason: HOSPADM

## 2024-12-17 RX ORDER — HEPARIN 100 UNIT/ML
500 SYRINGE INTRAVENOUS AS NEEDED
Status: CANCELLED | OUTPATIENT
Start: 2024-12-17

## 2024-12-17 RX ORDER — DIPHENHYDRAMINE HYDROCHLORIDE 50 MG/ML
50 INJECTION INTRAMUSCULAR; INTRAVENOUS AS NEEDED
Status: DISCONTINUED | OUTPATIENT
Start: 2024-12-17 | End: 2024-12-17 | Stop reason: HOSPADM

## 2024-12-17 RX ORDER — PROCHLORPERAZINE MALEATE 10 MG
10 TABLET ORAL EVERY 6 HOURS PRN
Status: DISCONTINUED | OUTPATIENT
Start: 2024-12-17 | End: 2024-12-17 | Stop reason: HOSPADM

## 2024-12-17 ASSESSMENT — PAIN SCALES - GENERAL: PAINLEVEL_OUTOF10: 0-NO PAIN

## 2024-12-17 NOTE — PROGRESS NOTES
Patient is here today for infusion - no complication since last being seen.  Independent double check done prior to chemotherapy today.  B/L lung sounds not auscultated.  Denies current chest pain. No acute distress noted.  Patient verbalizes understanding of plan of care.  Started CADD pump with 5-FU continuous infusion over 46 hours per written orders.  Patient tolerated infusion well.  Ambulated off unit - gait steady.  no complaints. Call back instructions reviewed.  Verbalized understanding.  Patient will return at 1130 on 12/19/24.

## 2024-12-18 ENCOUNTER — APPOINTMENT (OUTPATIENT)
Dept: HEMATOLOGY/ONCOLOGY | Facility: CLINIC | Age: 69
End: 2024-12-18
Payer: MEDICARE

## 2024-12-19 ENCOUNTER — INFUSION (OUTPATIENT)
Dept: HEMATOLOGY/ONCOLOGY | Facility: CLINIC | Age: 69
End: 2024-12-19
Payer: MEDICARE

## 2024-12-19 ENCOUNTER — PATIENT OUTREACH (OUTPATIENT)
Dept: PRIMARY CARE | Facility: CLINIC | Age: 69
End: 2024-12-19

## 2024-12-19 VITALS
HEART RATE: 44 BPM | SYSTOLIC BLOOD PRESSURE: 149 MMHG | OXYGEN SATURATION: 99 % | DIASTOLIC BLOOD PRESSURE: 64 MMHG | RESPIRATION RATE: 14 BRPM | TEMPERATURE: 97.3 F

## 2024-12-19 DIAGNOSIS — C18.9 STAGE III CARCINOMA OF COLON (MULTI): ICD-10-CM

## 2024-12-19 DIAGNOSIS — C18.9 ADENOCARCINOMA, COLON (MULTI): ICD-10-CM

## 2024-12-19 DIAGNOSIS — D50.9 MICROCYTIC ANEMIA: ICD-10-CM

## 2024-12-19 PROCEDURE — 2500000004 HC RX 250 GENERAL PHARMACY W/ HCPCS (ALT 636 FOR OP/ED): Performed by: INTERNAL MEDICINE

## 2024-12-19 PROCEDURE — 96372 THER/PROPH/DIAG INJ SC/IM: CPT

## 2024-12-19 PROCEDURE — 2500000004 HC RX 250 GENERAL PHARMACY W/ HCPCS (ALT 636 FOR OP/ED): Mod: JZ,JG | Performed by: INTERNAL MEDICINE

## 2024-12-19 RX ORDER — HEPARIN 100 UNIT/ML
500 SYRINGE INTRAVENOUS AS NEEDED
Status: DISCONTINUED | OUTPATIENT
Start: 2024-12-19 | End: 2024-12-19 | Stop reason: HOSPADM

## 2024-12-19 RX ORDER — HEPARIN SODIUM,PORCINE/PF 10 UNIT/ML
50 SYRINGE (ML) INTRAVENOUS AS NEEDED
OUTPATIENT
Start: 2024-12-19

## 2024-12-19 RX ORDER — HEPARIN 100 UNIT/ML
500 SYRINGE INTRAVENOUS AS NEEDED
OUTPATIENT
Start: 2024-12-19

## 2024-12-19 NOTE — PROGRESS NOTES
[facility] Infusion Note     Krish Batista is a 69 y.o. year old male here today,12/19/24,  in the [facility] infusion center for his 46 hour home infusion pump to be disconnected from his port and to receive his pegfilgrastim injection.     Medications given:  Administrations This Visit       heparin flush 100 unit/mL syringe 500 Units       Admin Date  12/19/2024 Action  Given Dose  500 Units Route  intra-catheter Documented By  Anne Marie Villalobos RN              pegfilgrastim-fpgk (Stimufend) injection 6 mg       Admin Date  12/19/2024 Action  Given Dose  6 mg Route  subcutaneous Documented By  Anne Marie Villalobos RN                    Pt tolerated disconnect and injection well and was discharged to home with family member transporting pt in stable condition. Pt ambulated  off the unit independently with a slow and steady gait. Pt had no further questions or concerns at this time.

## 2024-12-26 DIAGNOSIS — C18.9 ADENOCARCINOMA, COLON (MULTI): ICD-10-CM

## 2024-12-27 ENCOUNTER — EDUCATION (OUTPATIENT)
Dept: HEMATOLOGY/ONCOLOGY | Facility: CLINIC | Age: 69
End: 2024-12-27

## 2024-12-27 ENCOUNTER — OFFICE VISIT (OUTPATIENT)
Dept: HEMATOLOGY/ONCOLOGY | Facility: CLINIC | Age: 69
End: 2024-12-27
Payer: MEDICARE

## 2024-12-27 ENCOUNTER — INFUSION (OUTPATIENT)
Dept: HEMATOLOGY/ONCOLOGY | Facility: CLINIC | Age: 69
End: 2024-12-27
Payer: MEDICARE

## 2024-12-27 VITALS
BODY MASS INDEX: 22.79 KG/M2 | RESPIRATION RATE: 16 BRPM | DIASTOLIC BLOOD PRESSURE: 65 MMHG | TEMPERATURE: 96.8 F | OXYGEN SATURATION: 100 % | HEART RATE: 46 BPM | WEIGHT: 169.2 LBS | SYSTOLIC BLOOD PRESSURE: 123 MMHG

## 2024-12-27 DIAGNOSIS — D50.9 MICROCYTIC ANEMIA: ICD-10-CM

## 2024-12-27 DIAGNOSIS — C18.9 STAGE III CARCINOMA OF COLON (MULTI): ICD-10-CM

## 2024-12-27 DIAGNOSIS — C18.9 ADENOCARCINOMA, COLON (MULTI): ICD-10-CM

## 2024-12-27 LAB
BASOPHILS # BLD AUTO: 0.05 X10*3/UL (ref 0–0.1)
BASOPHILS NFR BLD AUTO: 0.3 %
EOSINOPHIL # BLD AUTO: 0.03 X10*3/UL (ref 0–0.7)
EOSINOPHIL NFR BLD AUTO: 0.2 %
ERYTHROCYTE [DISTWIDTH] IN BLOOD BY AUTOMATED COUNT: 20.2 % (ref 11.5–14.5)
HCT VFR BLD AUTO: 38.2 % (ref 41–52)
HGB BLD-MCNC: 12.6 G/DL (ref 13.5–17.5)
IMM GRANULOCYTES # BLD AUTO: 0.12 X10*3/UL (ref 0–0.7)
IMM GRANULOCYTES NFR BLD AUTO: 0.6 % (ref 0–0.9)
LYMPHOCYTES # BLD AUTO: 1.17 X10*3/UL (ref 1.2–4.8)
LYMPHOCYTES NFR BLD AUTO: 6.2 %
MCH RBC QN AUTO: 32.1 PG (ref 26–34)
MCHC RBC AUTO-ENTMCNC: 33 G/DL (ref 32–36)
MCV RBC AUTO: 97 FL (ref 80–100)
MONOCYTES # BLD AUTO: 1.1 X10*3/UL (ref 0.1–1)
MONOCYTES NFR BLD AUTO: 5.8 %
NEUTROPHILS # BLD AUTO: 16.45 X10*3/UL (ref 1.2–7.7)
NEUTROPHILS NFR BLD AUTO: 86.9 %
NRBC BLD-RTO: ABNORMAL /100{WBCS}
PLATELET # BLD AUTO: 97 X10*3/UL (ref 150–450)
RBC # BLD AUTO: 3.93 X10*6/UL (ref 4.5–5.9)
WBC # BLD AUTO: 18.9 X10*3/UL (ref 4.4–11.3)

## 2024-12-27 PROCEDURE — 1159F MED LIST DOCD IN RCRD: CPT | Performed by: INTERNAL MEDICINE

## 2024-12-27 PROCEDURE — 2500000004 HC RX 250 GENERAL PHARMACY W/ HCPCS (ALT 636 FOR OP/ED): Performed by: INTERNAL MEDICINE

## 2024-12-27 PROCEDURE — 1126F AMNT PAIN NOTED NONE PRSNT: CPT | Performed by: INTERNAL MEDICINE

## 2024-12-27 PROCEDURE — 85025 COMPLETE CBC W/AUTO DIFF WBC: CPT

## 2024-12-27 PROCEDURE — 99215 OFFICE O/P EST HI 40 MIN: CPT | Performed by: INTERNAL MEDICINE

## 2024-12-27 PROCEDURE — 80053 COMPREHEN METABOLIC PANEL: CPT

## 2024-12-27 PROCEDURE — 36591 DRAW BLOOD OFF VENOUS DEVICE: CPT

## 2024-12-27 RX ORDER — HEPARIN 100 UNIT/ML
500 SYRINGE INTRAVENOUS AS NEEDED
OUTPATIENT
Start: 2024-12-27

## 2024-12-27 RX ORDER — HEPARIN 100 UNIT/ML
500 SYRINGE INTRAVENOUS AS NEEDED
Status: DISCONTINUED | OUTPATIENT
Start: 2024-12-27 | End: 2024-12-27 | Stop reason: HOSPADM

## 2024-12-27 RX ORDER — HEPARIN SODIUM,PORCINE/PF 10 UNIT/ML
50 SYRINGE (ML) INTRAVENOUS AS NEEDED
OUTPATIENT
Start: 2024-12-27

## 2024-12-27 ASSESSMENT — PAIN SCALES - GENERAL: PAINLEVEL_OUTOF10: 0-NO PAIN

## 2024-12-27 NOTE — PROGRESS NOTES
"Patient seen by Dr. Duque today in clinic. Reinforcement education provided regarding next steps with plan of care.      PER DR. DUQUE'S FUV NOTE TODAY: \"The patient and family had come for follow-up on December 27, 2024 regarding history of pT3, PN 1B, M0 adenocarcinoma of colon s/p resection. Receiving adjuvant chemotherapy dose reduced 25%. Has received and tolerated 4 cycles without any problems. Physical examination within normal limits. Cycle 5 on December 30, 2024. \"    Pt scheduled for C5 Folfox on 12/31/24.  Pt reports diarrhea that is controlled with 1 Imodium.  Worries because if he takes Imodium he ends up constipated.  Fluids and Miralax encouraged.  Decreased appetite but eating. Encouraged high protein foods.  Follows with zbigniew Villeda.  Moderate Fatigue.  FUV in 2 weeks.  Patient verbalizes understanding of plan of care via teachback method.                 "

## 2024-12-27 NOTE — PROGRESS NOTES
Patient ID: Krish Batista is a 69 y.o. male.  Referring Physician: Rio Lawson MD  [address] [facility], [place] [address] [place] [address]  Primary Care Provider: Matthew Small,    8/15/24, EGD and colonoscopy  A.  Duodenum, first part, biopsy:  Duodenal mucosa within normal limits.     B.  Stomach, biopsy:  Antrum and corpus mucosa within normal limits.     C.  Colon, mass at 40 cm, biopsy:  Fragments of invasive adenocarcinoma, moderately differentiated.  See note.     NOTE: Immunohistochemical stains for mismatch repair proteins are pending and the result will be reported as an addendum.     D.  Colon, 20 cm, polypectomy:  Tubular adenoma.                               MISMATCH REPAIR PROTEIN EXPRESSION     C. Colon, mass at 40 cm, biopsy: Adenocarcinoma     Protein:  Result     MLH-1:  Expression Present                                    PMS-2: Expression Present                                    MSH-2: Expression Present                                    MSH-6: Expression Present    CEA at 1.5 NG per mL on August 2, 2024    Colon, splenic flexure, resection:  -Invasive adenocarcinoma (3.1 cm), moderately differentiated.  -Carcinoma invades pericolonic tissue.  -Extramural venous and perineural invasion present.  -Resection margins are negative for carcinoma.  -Metastatic adenocarcinoma involving two of thirty-seven lymph nodes (2/37).  -Pathologic stage: pT3, pN1b.  -See comment and synoptic report.  Comment:  Movat stains (blocks A4 and A5) confirm the venous invasion.    Electronically signed by Sharonda Maguire MD PhD on 9/30/2024 at 1350      [facility]   By the signature on this report, the individual or group listed as making the Final Interpretation/Diagnosis certifies that they have reviewed this case.    Case Summary Report   COLON AND RECTUM: Resection, Including Transanal Disk Excision of Rectal Neoplasms   8th Edition - Protocol posted: 6/22/2022COLON AND RECTUM: RESECTION, INCLUDING  TRANSANAL DISK EXCISION OF RECTAL NEOPLASMS - All Specimens  SPECIMEN   Procedure  resection   TUMOR   Tumor Site  Splenic flexure   Histologic Type  Adenocarcinoma   Histologic Grade  G2, moderately differentiated   Tumor Size  Greatest dimension (Centimeters): 3.1 cm   Tumor Extent  Invades through muscularis propria into the pericolonic or perirectal tissue   Macroscopic Tumor Perforation  Not identified   Lymphovascular Invasion  Large vessel (venous), extramural   Perineural Invasion  Present   Tumor Bud Score  Intermediate (5-9)   Treatment Effect  No known presurgical therapy   MARGINS   Margin Status for Invasive Carcinoma  All margins negative for invasive carcinoma   Closest Margin(s) to Invasive Carcinoma  Radial (circumferential) or mesenteric   Distance from Invasive Carcinoma to Closest Margin  Greater than 1 cm   Margin Status for Non-Invasive Tumor  All margins negative for high-grade dysplasia / intramucosal carcinoma and low-grade dysplasia   REGIONAL LYMPH NODES   Regional Lymph Node Status  Tumor present in regional lymph node(s)   Number of Lymph Nodes with Tumor  2   Number of Lymph Nodes Examined  37   Tumor Deposits  Not identified   PATHOLOGIC STAGE CLASSIFICATION (pTNM, AJCC 8th Edition)   Reporting of pT, pN, and (when applicable) pM categories is based on information available to the pathologist at the time the report is issued. As per the AJCC (Chapter 1, 8th Ed.) it is the managing physician’s responsibility to establish the final pathologic stage based upon all pertinent information, including but potentially not limited to this pathology report.   pT Category  pT3   pN Category  pN1b   .        Subjective    HPI  The patient was referred to me by Dr. Clark for further evaluation and management of newly diagnosed biopsy-proven left colon adenocarcinoma.  The patient presented to Dr. Small on March 25, 2024 complaining of fatigue and malaise.  Further evaluation revealed significant  anemia at hemoglobin 8.1 g/dL ferritin at 8 NG per mL iron saturation 3%.  The patient was referred to GI services however he failed to follow through.  He was referred to surgery as well.  Repeat hemoglobin on July 11, 2024 was 7.2 g/dL.  He then presented to ER on August 1, 2024 secondary to progressive fatigue and weakness.  He was diagnosed with severe microcytic anemia hemoglobin 7.4 g/dL.  The patient received 2 units of packed red blood cell transfusions.  Upper and lower endoscopy on August 3, 2024 revealed a mass at 40 cm in the anal verge.  Histology consistent with adenocarcinoma.  In ER the patient was found to be in atrial fibrillation/flutter with RVR.  Cardiology was consulted.  He spontaneously converted to normal sinus rhythm.  Color Doppler echocardiogram revealed normal ejection fraction.  He was placed on metoprolol 50 mg p.o. twice daily with a Holter monitor.  Anticoagulation was not recommended.  Colonoscopy on August 15, 2024 revealed internal small hemorrhoids.  1 8 mm sessile polyp 20 centimeter from anal verge.  This was snared.  Single friable and ulcerated mass not transferred stable 40 cm from the anal verge, covering the whole circumference; bleeding occurred before intervention.  Performed cold forceps biopsy with partial removal: Tattooed distal to the finding with spot.  CT scan of chest abdomen pelvis on August 1, 2024 with IV contrast did not reveal any obvious metastatic disease.  A PET scan revealed bilateral pleural effusion right greater than left.  Thoracentesis was performed on September 3, 2024 cytology is pending.  The patient claims that he is feeling better after thoracentesis.    At interview on September 4, 2024 he was accompanied by his niece denied history of weight loss, fevers, night sweats, chest pain, nausea, vomiting, hematemesis, melena, hematochezia and hematuria.  Since last evaluation the patient has had multiple admissions to the hospital for myocardial  infarction requiring cardiac catheterization, status post surgery/left hemicolectomy on September 15, 2024, GI bleed hypotension requiring iron infusion as well as blood transfusions.  The patient was discharged and claims that he is beginning to feel better although has pedal edema as well as anterior abdominal wall edema.  The patient and family had come for follow-up on November 8, 2024 regarding history of pT3, PN 1B, M0 adenocarcinoma of the colon s/p resection.  During previous evaluation the patient had presented with severe iron deficiency anemia as well as diarrhea.  C. difficile studies were negative.  Diarrhea is resolved.  [id number]-[id number] was placed.  Initiate intravenous Feraheme for iron 10 mg/week x 2 weeks and also initiate adjuvant chemotherapy using FOLFOX to be given every 2 weeks for 12 cycles.  Cycle 1 on November 4, 2024    The patient and family had come for follow-up on December 13 , 2024 regarding history of pT3, PN 1B, M0 adenocarcinoma of the colon s/p resection.  During previous evaluation the patient had presented with severe iron deficiency anemia as well as diarrhea.  C. difficile studies were negative.   The patient received cycle 3,  2 weeks earlier and complained of easy fatigability and diarrhea that was easily controlled with Imodium.  Will reduce dose by 25% with cycle 4 and going forward.    The patient and family had come for follow-up on December 27, 2024 regarding history of pT3, PN 1B, M0 adenocarcinoma of colon s/p resection.  Receiving adjuvant chemotherapy dose reduced 25%.  Has received and tolerated 4 cycles without any problems.       Past medical history:    Iron deficiency anemia, adenocarcinoma of descending colon, history of Raynaud's phenomenon.  Past surgical history:    Sinus surgery July 13, 2021, history of undescended testis surgery, and tonsillectomy.      Review of Systems   All other systems reviewed and are negative.       Objective   BSA: 1.98 meters  squared  /65   Pulse (!) 46   Temp 36 °C (96.8 °F)   Resp 16   Wt 76.8 kg (169 lb 3.3 oz)   SpO2 100%   BMI 22.79 kg/m²     No family history on file.  Oncology History   Adenocarcinoma, colon (Multi)   8/23/2024 Initial Diagnosis    Adenocarcinoma, colon (Multi)     11/4/2024 -  Chemotherapy    mFOLFOX6 (Fluorouracil Continuous Infusion / Leucovorin / Oxaliplatin), 14 Day Cycles       The patient is 69 years old, single has no children retired as a .  Krish Batista  reports that he has never smoked. He has never used smokeless tobacco.  He  reports that he does not currently use alcohol.  He  reports no history of drug use.    Physical Exam  Constitutional:       Appearance: Normal appearance.   HENT:      Head: Normocephalic and atraumatic.      Nose: Nose normal.      Mouth/Throat:      Mouth: Mucous membranes are moist.      Pharynx: Oropharynx is clear.   Eyes:      Extraocular Movements: Extraocular movements intact.      Conjunctiva/sclera: Conjunctivae normal.      Pupils: Pupils are equal, round, and reactive to light.   Cardiovascular:      Rate and Rhythm: Normal rate and regular rhythm.   Pulmonary:      Effort: Pulmonary effort is normal.      Breath sounds: Normal breath sounds.   Abdominal:      General: Abdomen is flat. Bowel sounds are normal.      Palpations: Abdomen is soft.   Musculoskeletal:         General: Normal range of motion.      Cervical back: Normal range of motion and neck supple.   Neurological:      General: No focal deficit present.      Mental Status: He is alert and oriented to person, place, and time. Mental status is at baseline.   Psychiatric:         Mood and Affect: Mood normal.         Behavior: Behavior normal.         Thought Content: Thought content normal.         Judgment: Judgment normal.         Performance Status:  Symptomatic; in bed <50% of the day    Assessment/Plan    The patient is a 69-year-old man presented with easy fatigability  shortness of breath on exertion.  Diagnosed with iron deficiency anemia.  Cologuard was positive.  The patient was referred to GI services but did not follow through.  Eventually presented to ER with above symptoms did receive 2 units of packed red blood cell transfusions.  He did develop atrial fibrillation/flutter while in ER but spontaneously converted to normal sinus rhythm was placed on metoprolol.  Colonoscopy revealed a mass 40 cm from anal verge biopsy/histology consistent with adenocarcinoma.  Initially CT scan of chest abdomen pelvis did not reveal any evidence of metastatic disease.  But a PET scan revealed large right pleural effusion and small left pleural effusion.  Thoracentesis performed on right pleural effusion on September 3, 2024 cytology is pending.  If negative would seek opinion about hilar/mediastinal lymphadenopathy.    Iron deficiency anemia:    The patient is still symptomatic with anemia.  I have recommended intravenous Feraheme 510 mg/week x 2 weeks.  Effect side effects explained.  The patient understood appreciated all the details provided and was grateful.  T3 N1 M0 adenocarcinoma of sigmoid colon s/p resection on September 15, 2024.    The patient and family had come for follow-up on October 3, 2024 as described above multiple admissions requiring stay in ICU, history of MI GI bleed requiring blood and iron transfusions.  Physical examination revealed pedal edema and CBC revealed hemoglobin 9.2 g/dL.  I have recommended evaluation of anemia as well as Doppler ultrasound.  Discussed in detail about options such as do-nothing, consider adjuvant chemotherapy using FOLFOX which might elicit 25% survival benefit.  The patient is agreeable to start chemotherapy.  This will necessitate port placement.  Return in 2 weeks.  The patient and family had come for follow-up on November 8, 2024 regarding history of pT3, PN 1B, M0 adenocarcinoma of the colon s/p resection.  During previous  evaluation the patient had presented with severe iron deficiency anemia as well as diarrhea.  C. difficile studies were negative.  Diarrhea is resolved.  Konrcu-k-Xdyx was placed.  Initiate intravenous Feraheme for iron 10 mg/week x 2 weeks and also initiate adjuvant chemotherapy using FOLFOX to be given every 2 weeks for 12 cycles beginning November for, 2024    The patient and family had come for follow-up on December 13 , 2024 regarding history of pT3, PN 1B, M0 adenocarcinoma of the colon s/p resection.  During previous evaluation the patient had presented with severe iron deficiency anemia as well as diarrhea.  C. difficile studies were negative.   The patient received cycle 3,  2 weeks earlier and complained of easy fatigability and diarrhea that was easily controlled with Imodium.  Will reduce dose by 25% with cycle 4 and going forward.    The patient and family had come for follow-up on December 27, 2024 regarding history of pT3, PN 1B, M0 adenocarcinoma of colon s/p resection.  Receiving adjuvant chemotherapy dose reduced 25%.  Has received and tolerated 4 cycles without any problems.  Physical examination within normal limits.  Cycle 5 on December 30, 2024.    Thank you for allowing me to participate in care of your patient if you have any questions please feel free to call me.      Diagnoses and all orders for this visit:  Microcytic anemia  -     Clinic Appointment Request ([facility] appt needed please); Future  Adenocarcinoma, colon (Multi)  -     Referral to Hematology and Oncology  -     Clinic Appointment Request ([facility] appt needed please); Future  Other orders  -     heparin flush 10 unit/mL syringe 50 Units  -     heparin flush 100 unit/mL syringe 500 Units  -     alteplase (Cathflo Activase) injection 2 mg  -     ferumoxytol (Feraheme) 510 mg in sodium chloride 0.9% 117 mL IV  -     sodium chloride 0.9 % bolus 500 mL  -     dextrose 5 % in water (D5W) bolus  -     diphenhydrAMINE  (BENADryl) injection 50 mg  -     methylPREDNISolone sod succinate (SOLU-Medrol) 40 mg/mL injection 40 mg  -     famotidine PF (Pepcid) injection 20 mg  -     EPINEPHrine (Epipen) injection syringe 0.3 mg  -     albuterol 2.5 mg /3 mL (0.083 %) nebulizer solution 3 mL           Rio Lawson MD

## 2024-12-28 LAB
ALBUMIN SERPL BCP-MCNC: 3.9 G/DL (ref 3.4–5)
ALP SERPL-CCNC: 164 U/L (ref 33–136)
ALT SERPL W P-5'-P-CCNC: 28 U/L (ref 10–52)
ANION GAP SERPL CALC-SCNC: 11 MMOL/L (ref 10–20)
AST SERPL W P-5'-P-CCNC: 29 U/L (ref 9–39)
BILIRUB SERPL-MCNC: 0.8 MG/DL (ref 0–1.2)
BUN SERPL-MCNC: 15 MG/DL (ref 6–23)
CALCIUM SERPL-MCNC: 8.7 MG/DL (ref 8.6–10.6)
CHLORIDE SERPL-SCNC: 104 MMOL/L (ref 98–107)
CO2 SERPL-SCNC: 26 MMOL/L (ref 21–32)
CREAT SERPL-MCNC: 0.78 MG/DL (ref 0.5–1.3)
EGFRCR SERPLBLD CKD-EPI 2021: >90 ML/MIN/1.73M*2
GLUCOSE SERPL-MCNC: 86 MG/DL (ref 74–99)
POTASSIUM SERPL-SCNC: 3.8 MMOL/L (ref 3.5–5.3)
PROT SERPL-MCNC: 6.3 G/DL (ref 6.4–8.2)
SODIUM SERPL-SCNC: 137 MMOL/L (ref 136–145)

## 2024-12-31 ENCOUNTER — INFUSION (OUTPATIENT)
Dept: HEMATOLOGY/ONCOLOGY | Facility: CLINIC | Age: 69
End: 2024-12-31
Payer: MEDICARE

## 2024-12-31 VITALS
DIASTOLIC BLOOD PRESSURE: 69 MMHG | TEMPERATURE: 97.7 F | HEART RATE: 60 BPM | OXYGEN SATURATION: 98 % | WEIGHT: 170.42 LBS | SYSTOLIC BLOOD PRESSURE: 148 MMHG | RESPIRATION RATE: 16 BRPM | BODY MASS INDEX: 22.96 KG/M2

## 2024-12-31 DIAGNOSIS — C18.9 ADENOCARCINOMA, COLON (MULTI): ICD-10-CM

## 2024-12-31 DIAGNOSIS — D50.9 MICROCYTIC ANEMIA: ICD-10-CM

## 2024-12-31 DIAGNOSIS — C18.9 STAGE III CARCINOMA OF COLON (MULTI): ICD-10-CM

## 2024-12-31 PROCEDURE — 96415 CHEMO IV INFUSION ADDL HR: CPT

## 2024-12-31 PROCEDURE — 2500000004 HC RX 250 GENERAL PHARMACY W/ HCPCS (ALT 636 FOR OP/ED): Performed by: INTERNAL MEDICINE

## 2024-12-31 PROCEDURE — 96375 TX/PRO/DX INJ NEW DRUG ADDON: CPT | Mod: INF

## 2024-12-31 PROCEDURE — 96413 CHEMO IV INFUSION 1 HR: CPT

## 2024-12-31 PROCEDURE — 96411 CHEMO IV PUSH ADDL DRUG: CPT

## 2024-12-31 PROCEDURE — 96416 CHEMO PROLONG INFUSE W/PUMP: CPT

## 2024-12-31 RX ORDER — LORAZEPAM 2 MG/ML
1 INJECTION INTRAMUSCULAR AS NEEDED
Status: DISCONTINUED | OUTPATIENT
Start: 2024-12-31 | End: 2024-12-31 | Stop reason: HOSPADM

## 2024-12-31 RX ORDER — ALBUTEROL SULFATE 0.83 MG/ML
3 SOLUTION RESPIRATORY (INHALATION) AS NEEDED
Status: DISCONTINUED | OUTPATIENT
Start: 2024-12-31 | End: 2024-12-31 | Stop reason: HOSPADM

## 2024-12-31 RX ORDER — PALONOSETRON 0.05 MG/ML
0.25 INJECTION, SOLUTION INTRAVENOUS ONCE
Status: COMPLETED | OUTPATIENT
Start: 2024-12-31 | End: 2024-12-31

## 2024-12-31 RX ORDER — PROCHLORPERAZINE EDISYLATE 5 MG/ML
10 INJECTION INTRAMUSCULAR; INTRAVENOUS EVERY 6 HOURS PRN
Status: DISCONTINUED | OUTPATIENT
Start: 2024-12-31 | End: 2024-12-31 | Stop reason: HOSPADM

## 2024-12-31 RX ORDER — FAMOTIDINE 10 MG/ML
20 INJECTION INTRAVENOUS ONCE AS NEEDED
Status: DISCONTINUED | OUTPATIENT
Start: 2024-12-31 | End: 2024-12-31 | Stop reason: HOSPADM

## 2024-12-31 RX ORDER — HEPARIN SODIUM,PORCINE/PF 10 UNIT/ML
50 SYRINGE (ML) INTRAVENOUS AS NEEDED
Status: CANCELLED | OUTPATIENT
Start: 2024-12-31

## 2024-12-31 RX ORDER — DIPHENHYDRAMINE HYDROCHLORIDE 50 MG/ML
50 INJECTION INTRAMUSCULAR; INTRAVENOUS AS NEEDED
Status: DISCONTINUED | OUTPATIENT
Start: 2024-12-31 | End: 2024-12-31 | Stop reason: HOSPADM

## 2024-12-31 RX ORDER — EPINEPHRINE 0.3 MG/.3ML
0.3 INJECTION SUBCUTANEOUS EVERY 5 MIN PRN
Status: DISCONTINUED | OUTPATIENT
Start: 2024-12-31 | End: 2024-12-31 | Stop reason: HOSPADM

## 2024-12-31 RX ORDER — PROCHLORPERAZINE MALEATE 10 MG
10 TABLET ORAL EVERY 6 HOURS PRN
Status: DISCONTINUED | OUTPATIENT
Start: 2024-12-31 | End: 2024-12-31 | Stop reason: HOSPADM

## 2024-12-31 RX ORDER — FLUOROURACIL 50 MG/ML
300 INJECTION, SOLUTION INTRAVENOUS ONCE
Status: COMPLETED | OUTPATIENT
Start: 2024-12-31 | End: 2024-12-31

## 2024-12-31 RX ORDER — HEPARIN 100 UNIT/ML
500 SYRINGE INTRAVENOUS AS NEEDED
Status: CANCELLED | OUTPATIENT
Start: 2024-12-31

## 2024-12-31 RX ORDER — DEXAMETHASONE 4 MG/1
12 TABLET ORAL ONCE
Status: COMPLETED | OUTPATIENT
Start: 2024-12-31 | End: 2024-12-31

## 2024-12-31 RX ORDER — HEPARIN 100 UNIT/ML
500 SYRINGE INTRAVENOUS AS NEEDED
Status: DISCONTINUED | OUTPATIENT
Start: 2024-12-31 | End: 2024-12-31 | Stop reason: HOSPADM

## 2024-12-31 RX ADMIN — DEXAMETHASONE 12 MG: 4 TABLET ORAL at 09:48

## 2024-12-31 RX ADMIN — FLUOROURACIL 625 MG: 50 INJECTION, SOLUTION INTRAVENOUS at 12:50

## 2024-12-31 RX ADMIN — PALONOSETRON 250 MCG: 0.05 INJECTION, SOLUTION INTRAVENOUS at 09:48

## 2024-12-31 RX ADMIN — FLUOROURACIL 3750 MG: 50 INJECTION, SOLUTION INTRAVENOUS at 13:00

## 2024-12-31 RX ADMIN — OXALIPLATIN 135 MG: 5 INJECTION, SOLUTION INTRAVENOUS at 10:35

## 2024-12-31 ASSESSMENT — PAIN SCALES - GENERAL: PAINLEVEL_OUTOF10: 0-NO PAIN

## 2024-12-31 NOTE — PROGRESS NOTES
Patient here for FOLFOX treatment.  Grand niece at side.  Patient without complaints.  Port accessed without incident.  Patient tolerated infusions without incident.  Port site benign.  Aware of new disconnect time on Thursday.  Message sent to scheduling for time adjustment. Pump verified as operating.  Connections secure.  Patient independently ambulatory off unit in NAD and without complaints.  Gait steady.  Call back instructions reviewed.  Patient verbalized understanding.

## 2025-01-02 ENCOUNTER — APPOINTMENT (OUTPATIENT)
Dept: HEMATOLOGY/ONCOLOGY | Facility: CLINIC | Age: 70
End: 2025-01-02
Payer: MEDICARE

## 2025-01-02 ENCOUNTER — INFUSION (OUTPATIENT)
Dept: HEMATOLOGY/ONCOLOGY | Facility: CLINIC | Age: 70
End: 2025-01-02
Payer: MEDICARE

## 2025-01-02 VITALS
TEMPERATURE: 97.5 F | HEART RATE: 52 BPM | HEIGHT: 72 IN | RESPIRATION RATE: 18 BRPM | OXYGEN SATURATION: 99 % | SYSTOLIC BLOOD PRESSURE: 134 MMHG | BODY MASS INDEX: 22.96 KG/M2 | DIASTOLIC BLOOD PRESSURE: 77 MMHG

## 2025-01-02 DIAGNOSIS — C18.9 ADENOCARCINOMA, COLON (MULTI): ICD-10-CM

## 2025-01-02 DIAGNOSIS — D50.9 MICROCYTIC ANEMIA: ICD-10-CM

## 2025-01-02 DIAGNOSIS — C18.9 STAGE III CARCINOMA OF COLON (MULTI): ICD-10-CM

## 2025-01-02 PROCEDURE — 96372 THER/PROPH/DIAG INJ SC/IM: CPT

## 2025-01-02 PROCEDURE — 2500000004 HC RX 250 GENERAL PHARMACY W/ HCPCS (ALT 636 FOR OP/ED): Performed by: INTERNAL MEDICINE

## 2025-01-02 PROCEDURE — 2500000004 HC RX 250 GENERAL PHARMACY W/ HCPCS (ALT 636 FOR OP/ED): Mod: JZ,JG,TB | Performed by: INTERNAL MEDICINE

## 2025-01-02 RX ORDER — HEPARIN 100 UNIT/ML
500 SYRINGE INTRAVENOUS AS NEEDED
Status: DISCONTINUED | OUTPATIENT
Start: 2025-01-02 | End: 2025-01-02 | Stop reason: HOSPADM

## 2025-01-02 RX ORDER — HEPARIN 100 UNIT/ML
500 SYRINGE INTRAVENOUS AS NEEDED
OUTPATIENT
Start: 2025-01-02

## 2025-01-02 RX ORDER — HEPARIN SODIUM,PORCINE/PF 10 UNIT/ML
50 SYRINGE (ML) INTRAVENOUS AS NEEDED
OUTPATIENT
Start: 2025-01-02

## 2025-01-02 RX ADMIN — PEGFLILGRASTIM-FPGK 6 MG: 6 INJECTION, SOLUTION SUBCUTANEOUS at 11:20

## 2025-01-02 RX ADMIN — HEPARIN 500 UNITS: 100 SYRINGE at 11:20

## 2025-01-02 ASSESSMENT — PAIN SCALES - GENERAL: PAINLEVEL_OUTOF10: 2

## 2025-01-02 NOTE — PROGRESS NOTES
Patient for pump disconnect today.  Patient states that he had dizziness on day of treatment after he went home until the next morning.  Completely resolved at this time.  Skin turgor good. Tongue moist but does have a white coating on it. Dr. Lawson aware, in to see patient and vitals reviewed.  Instructed patient to get tongue scraper on way home and use gently to get coating off.  Patient instructed on stopping low etoh listerine and start salt/baking soda mouth rinses and use biotene for mouth care.  Patient agreeable.  Tolerated injection without incident.  Instructed on pegfilgrastim side effects specifically bone pain and to use OTC generic claritin.  Grand niece and patient verbalized understanding. Port deaccessed without incident.  No redness, swelling or bleeding noted. Bandaid applied. Patient aware of upcoming appts and AVS printed and to patient. Patient independently ambulatory off unit in NAD and without complaints.  Gait steady.  Call back instructions reviewed.  Patient verbalized understanding.

## 2025-01-10 ENCOUNTER — INFUSION (OUTPATIENT)
Dept: HEMATOLOGY/ONCOLOGY | Facility: CLINIC | Age: 70
End: 2025-01-10
Payer: MEDICARE

## 2025-01-10 ENCOUNTER — OFFICE VISIT (OUTPATIENT)
Dept: HEMATOLOGY/ONCOLOGY | Facility: CLINIC | Age: 70
End: 2025-01-10
Payer: MEDICARE

## 2025-01-10 VITALS
TEMPERATURE: 97.9 F | BODY MASS INDEX: 22.84 KG/M2 | SYSTOLIC BLOOD PRESSURE: 143 MMHG | HEART RATE: 52 BPM | OXYGEN SATURATION: 96 % | WEIGHT: 169.53 LBS | RESPIRATION RATE: 16 BRPM | DIASTOLIC BLOOD PRESSURE: 68 MMHG

## 2025-01-10 DIAGNOSIS — C18.9 ADENOCARCINOMA, COLON (MULTI): ICD-10-CM

## 2025-01-10 DIAGNOSIS — C18.9 STAGE III CARCINOMA OF COLON (MULTI): ICD-10-CM

## 2025-01-10 DIAGNOSIS — D50.9 MICROCYTIC ANEMIA: ICD-10-CM

## 2025-01-10 LAB
BASOPHILS # BLD AUTO: 0.04 X10*3/UL (ref 0–0.1)
BASOPHILS NFR BLD AUTO: 0.2 %
EOSINOPHIL # BLD AUTO: 0.05 X10*3/UL (ref 0–0.7)
EOSINOPHIL NFR BLD AUTO: 0.2 %
ERYTHROCYTE [DISTWIDTH] IN BLOOD BY AUTOMATED COUNT: 19.6 % (ref 11.5–14.5)
HCT VFR BLD AUTO: 37.9 % (ref 41–52)
HGB BLD-MCNC: 12.7 G/DL (ref 13.5–17.5)
IMM GRANULOCYTES # BLD AUTO: 0.11 X10*3/UL (ref 0–0.7)
IMM GRANULOCYTES NFR BLD AUTO: 0.5 % (ref 0–0.9)
LYMPHOCYTES # BLD AUTO: 1.21 X10*3/UL (ref 1.2–4.8)
LYMPHOCYTES NFR BLD AUTO: 5.3 %
MCH RBC QN AUTO: 33.1 PG (ref 26–34)
MCHC RBC AUTO-ENTMCNC: 33.5 G/DL (ref 32–36)
MCV RBC AUTO: 99 FL (ref 80–100)
MONOCYTES # BLD AUTO: 1.32 X10*3/UL (ref 0.1–1)
MONOCYTES NFR BLD AUTO: 5.7 %
NEUTROPHILS # BLD AUTO: 20.31 X10*3/UL (ref 1.2–7.7)
NEUTROPHILS NFR BLD AUTO: 88.1 %
NRBC BLD-RTO: ABNORMAL /100{WBCS}
PLATELET # BLD AUTO: 110 X10*3/UL (ref 150–450)
RBC # BLD AUTO: 3.84 X10*6/UL (ref 4.5–5.9)
WBC # BLD AUTO: 23 X10*3/UL (ref 4.4–11.3)

## 2025-01-10 PROCEDURE — 99215 OFFICE O/P EST HI 40 MIN: CPT | Performed by: INTERNAL MEDICINE

## 2025-01-10 PROCEDURE — 2500000004 HC RX 250 GENERAL PHARMACY W/ HCPCS (ALT 636 FOR OP/ED): Performed by: INTERNAL MEDICINE

## 2025-01-10 PROCEDURE — 36591 DRAW BLOOD OFF VENOUS DEVICE: CPT

## 2025-01-10 PROCEDURE — 1126F AMNT PAIN NOTED NONE PRSNT: CPT | Performed by: INTERNAL MEDICINE

## 2025-01-10 PROCEDURE — 80053 COMPREHEN METABOLIC PANEL: CPT

## 2025-01-10 PROCEDURE — 1159F MED LIST DOCD IN RCRD: CPT | Performed by: INTERNAL MEDICINE

## 2025-01-10 PROCEDURE — 85025 COMPLETE CBC W/AUTO DIFF WBC: CPT

## 2025-01-10 RX ORDER — HEPARIN 100 UNIT/ML
500 SYRINGE INTRAVENOUS AS NEEDED
OUTPATIENT
Start: 2025-01-10

## 2025-01-10 RX ORDER — HEPARIN 100 UNIT/ML
500 SYRINGE INTRAVENOUS AS NEEDED
Status: DISCONTINUED | OUTPATIENT
Start: 2025-01-10 | End: 2025-01-10 | Stop reason: HOSPADM

## 2025-01-10 RX ORDER — HEPARIN SODIUM,PORCINE/PF 10 UNIT/ML
50 SYRINGE (ML) INTRAVENOUS AS NEEDED
OUTPATIENT
Start: 2025-01-10

## 2025-01-10 RX ADMIN — HEPARIN 500 UNITS: 100 SYRINGE at 15:08

## 2025-01-10 ASSESSMENT — PAIN SCALES - GENERAL: PAINLEVEL_OUTOF10: 0-NO PAIN

## 2025-01-10 NOTE — PROGRESS NOTES
"Patient seen by Dr. Duque today in clinic. Reinforcement education provided regarding next steps with plan of care.      PER DR. DUQUE'S FUV NOTE TODAY: \" The patient and family had come for follow-up on 1/10/25 regarding history of pT3, PN 1B, M0 adenocarcinoma of colon s/p resection.  Receiving adjuvant chemotherapy dose reduced 25%.  Has received and tolerated 5 cycles without any problems.  Reviewed lab data with the patient.  Cycle 6 on January 14, 2025 at 25% dose reduction.  Return in 2 weeks. \"    Pt states he had a little bout of diarrhea but thinks it was from mexican food last night.  Nausea well controlled.  Reports intermittent numbness and tingling in fingertips only.  Scheduled for Folfox 25% dose reduction on 1/14/25.  Dr. Duque informed of symptoms today.  Patient verbalizes understanding of plan of care via teachback method.     "

## 2025-01-10 NOTE — PROGRESS NOTES
Patient ID: Krish Batista is a 69 y.o. male.  Referring Physician: Rio Lawson MD  [address], [place] [address] [place] [address]  Primary Care Provider: Matthew Small,    8/15/24, EGD and colonoscopy  A.  Duodenum, first part, biopsy:  Duodenal mucosa within normal limits.     B.  Stomach, biopsy:  Antrum and corpus mucosa within normal limits.     C.  Colon, mass at 40 cm, biopsy:  Fragments of invasive adenocarcinoma, moderately differentiated.  See note.     NOTE: Immunohistochemical stains for mismatch repair proteins are pending and the result will be reported as an addendum.     D.  Colon, 20 cm, polypectomy:  Tubular adenoma.                               MISMATCH REPAIR PROTEIN EXPRESSION     C. Colon, mass at 40 cm, biopsy: Adenocarcinoma     Protein:  Result     MLH-1:  Expression Present                                    PMS-2: Expression Present                                    MSH-2: Expression Present                                    MSH-6: Expression Present    CEA at 1.5 NG per mL on August 2, 2024    Colon, splenic flexure, resection:  -Invasive adenocarcinoma (3.1 cm), moderately differentiated.  -Carcinoma invades pericolonic tissue.  -Extramural venous and perineural invasion present.  -Resection margins are negative for carcinoma.  -Metastatic adenocarcinoma involving two of thirty-seven lymph nodes (2/37).  -Pathologic stage: pT3, pN1b.  -See comment and synoptic report.  Comment:  Movat stains (blocks A4 and A5) confirm the venous invasion.    Electronically signed by Sharonda Maguire MD PhD on 9/30/2024 at 1350      [facility]   By the signature on this report, the individual or group listed as making the Final Interpretation/Diagnosis certifies that they have reviewed this case.    Case Summary Report   COLON AND RECTUM: Resection, Including Transanal Disk Excision of Rectal Neoplasms   8th Edition - Protocol posted: 6/22/2022COLON AND RECTUM: RESECTION, INCLUDING  TRANSANAL DISK EXCISION OF RECTAL NEOPLASMS - All Specimens  SPECIMEN   Procedure  resection   TUMOR   Tumor Site  Splenic flexure   Histologic Type  Adenocarcinoma   Histologic Grade  G2, moderately differentiated   Tumor Size  Greatest dimension (Centimeters): 3.1 cm   Tumor Extent  Invades through muscularis propria into the pericolonic or perirectal tissue   Macroscopic Tumor Perforation  Not identified   Lymphovascular Invasion  Large vessel (venous), extramural   Perineural Invasion  Present   Tumor Bud Score  Intermediate (5-9)   Treatment Effect  No known presurgical therapy   MARGINS   Margin Status for Invasive Carcinoma  All margins negative for invasive carcinoma   Closest Margin(s) to Invasive Carcinoma  Radial (circumferential) or mesenteric   Distance from Invasive Carcinoma to Closest Margin  Greater than 1 cm   Margin Status for Non-Invasive Tumor  All margins negative for high-grade dysplasia / intramucosal carcinoma and low-grade dysplasia   REGIONAL LYMPH NODES   Regional Lymph Node Status  Tumor present in regional lymph node(s)   Number of Lymph Nodes with Tumor  2   Number of Lymph Nodes Examined  37   Tumor Deposits  Not identified   PATHOLOGIC STAGE CLASSIFICATION (pTNM, AJCC 8th Edition)   Reporting of pT, pN, and (when applicable) pM categories is based on information available to the pathologist at the time the report is issued. As per the AJCC (Chapter 1, 8th Ed.) it is the managing physician’s responsibility to establish the final pathologic stage based upon all pertinent information, including but potentially not limited to this pathology report.   pT Category  pT3   pN Category  pN1b   .        Subjective    HPI  The patient was referred to me by Dr. Clark for further evaluation and management of newly diagnosed biopsy-proven left colon adenocarcinoma.  The patient presented to Dr. Small on March 25, 2024 complaining of fatigue and malaise.  Further evaluation revealed significant  anemia at hemoglobin 8.1 g/dL ferritin at 8 NG per mL iron saturation 3%.  The patient was referred to GI services however he failed to follow through.  He was referred to surgery as well.  Repeat hemoglobin on July 11, 2024 was 7.2 g/dL.  He then presented to ER on August 1, 2024 secondary to progressive fatigue and weakness.  He was diagnosed with severe microcytic anemia hemoglobin 7.4 g/dL.  The patient received 2 units of packed red blood cell transfusions.  Upper and lower endoscopy on August 3, 2024 revealed a mass at 40 cm in the anal verge.  Histology consistent with adenocarcinoma.  In ER the patient was found to be in atrial fibrillation/flutter with RVR.  Cardiology was consulted.  He spontaneously converted to normal sinus rhythm.  Color Doppler echocardiogram revealed normal ejection fraction.  He was placed on metoprolol 50 mg p.o. twice daily with a Holter monitor.  Anticoagulation was not recommended.  Colonoscopy on August 15, 2024 revealed internal small hemorrhoids.  1 8 mm sessile polyp 20 centimeter from anal verge.  This was snared.  Single friable and ulcerated mass not transferred stable 40 cm from the anal verge, covering the whole circumference; bleeding occurred before intervention.  Performed cold forceps biopsy with partial removal: Tattooed distal to the finding with spot.  CT scan of chest abdomen pelvis on August 1, 2024 with IV contrast did not reveal any obvious metastatic disease.  A PET scan revealed bilateral pleural effusion right greater than left.  Thoracentesis was performed on September 3, 2024 cytology is pending.  The patient claims that he is feeling better after thoracentesis.    At interview on September 4, 2024 he was accompanied by his niece denied history of weight loss, fevers, night sweats, chest pain, nausea, vomiting, hematemesis, melena, hematochezia and hematuria.  Since last evaluation the patient has had multiple admissions to the hospital for myocardial  infarction requiring cardiac catheterization, status post surgery/left hemicolectomy on September 15, 2024, GI bleed hypotension requiring iron infusion as well as blood transfusions.  The patient was discharged and claims that he is beginning to feel better although has pedal edema as well as anterior abdominal wall edema.  The patient and family had come for follow-up on November 8, 2024 regarding history of pT3, PN 1B, M0 adenocarcinoma of the colon s/p resection.  During previous evaluation the patient had presented with severe iron deficiency anemia as well as diarrhea.  C. difficile studies were negative.  Diarrhea is resolved.  Vewatq-i-Bpep was placed.  Initiate intravenous Feraheme for iron 10 mg/week x 2 weeks and also initiate adjuvant chemotherapy using FOLFOX to be given every 2 weeks for 12 cycles.  Cycle 1 on November 4, 2024    The patient and family had come for follow-up on December 13 , 2024 regarding history of pT3, PN 1B, M0 adenocarcinoma of the colon s/p resection.  During previous evaluation the patient had presented with severe iron deficiency anemia as well as diarrhea.  C. difficile studies were negative.   The patient received cycle 3,  2 weeks earlier and complained of easy fatigability and diarrhea that was easily controlled with Imodium.  Will reduce dose by 25% with cycle 4 and going forward.    The patient and family had come for follow-up on December 27, 2024 regarding history of pT3, PN 1B, M0 adenocarcinoma of colon s/p resection.  Receiving adjuvant chemotherapy dose reduced 25%.  Has received and tolerated 4 cycles without any problems.     The patient and family had come for follow-up on 1/10/25 regarding history of pT3, PN 1B, M0 adenocarcinoma of colon s/p resection.  Receiving adjuvant chemotherapy dose reduced 25%.  Has received and tolerated 5 cycles without any problems.  Past medical history:    Iron deficiency anemia, adenocarcinoma of descending colon, history of  Raynaud's phenomenon.  Past surgical history:    Sinus surgery July 13, 2021, history of undescended testis surgery, and tonsillectomy.      Review of Systems   All other systems reviewed and are negative.       Objective   BSA: 1.98 meters squared  /68   Pulse 52   Temp 36.6 °C (97.9 °F) (Temporal)   Resp 16   Wt 76.9 kg (169 lb 8.5 oz)   SpO2 96%   BMI 22.84 kg/m²     No family history on file.  Oncology History   Adenocarcinoma, colon (Multi)   8/23/2024 Initial Diagnosis    Adenocarcinoma, colon (Multi)     11/4/2024 -  Chemotherapy    mFOLFOX6 (Fluorouracil Continuous Infusion / Leucovorin / Oxaliplatin), 14 Day Cycles       The patient is 69 years old, single has no children retired as a .  Krish Batista  reports that he has never smoked. He has never used smokeless tobacco.  He  reports that he does not currently use alcohol.  He  reports no history of drug use.    Physical Exam  Constitutional:       Appearance: Normal appearance.   HENT:      Head: Normocephalic and atraumatic.      Nose: Nose normal.      Mouth/Throat:      Mouth: Mucous membranes are moist.      Pharynx: Oropharynx is clear.   Eyes:      Extraocular Movements: Extraocular movements intact.      Conjunctiva/sclera: Conjunctivae normal.      Pupils: Pupils are equal, round, and reactive to light.   Cardiovascular:      Rate and Rhythm: Normal rate and regular rhythm.   Pulmonary:      Effort: Pulmonary effort is normal.      Breath sounds: Normal breath sounds.   Abdominal:      General: Abdomen is flat. Bowel sounds are normal.      Palpations: Abdomen is soft.   Musculoskeletal:         General: Normal range of motion.      Cervical back: Normal range of motion and neck supple.   Neurological:      General: No focal deficit present.      Mental Status: He is alert and oriented to person, place, and time. Mental status is at baseline.   Psychiatric:         Mood and Affect: Mood normal.         Behavior: Behavior  normal.         Thought Content: Thought content normal.         Judgment: Judgment normal.         Performance Status:  Symptomatic; in bed <50% of the day    Assessment/Plan    The patient is a 69-year-old man presented with easy fatigability shortness of breath on exertion.  Diagnosed with iron deficiency anemia.  Cologuard was positive.  The patient was referred to GI services but did not follow through.  Eventually presented to ER with above symptoms did receive 2 units of packed red blood cell transfusions.  He did develop atrial fibrillation/flutter while in ER but spontaneously converted to normal sinus rhythm was placed on metoprolol.  Colonoscopy revealed a mass 40 cm from anal verge biopsy/histology consistent with adenocarcinoma.  Initially CT scan of chest abdomen pelvis did not reveal any evidence of metastatic disease.  But a PET scan revealed large right pleural effusion and small left pleural effusion.  Thoracentesis performed on right pleural effusion on September 3, 2024 cytology is pending.  If negative would seek opinion about hilar/mediastinal lymphadenopathy.    Iron deficiency anemia:    The patient is still symptomatic with anemia.  I have recommended intravenous Feraheme 510 mg/week x 2 weeks.  Effect side effects explained.  The patient understood appreciated all the details provided and was grateful.  T3 N1 M0 adenocarcinoma of sigmoid colon s/p resection on September 15, 2024.    The patient and family had come for follow-up on October 3, 2024 as described above multiple admissions requiring stay in ICU, history of MI GI bleed requiring blood and iron transfusions.  Physical examination revealed pedal edema and CBC revealed hemoglobin 9.2 g/dL.  I have recommended evaluation of anemia as well as Doppler ultrasound.  Discussed in detail about options such as do-nothing, consider adjuvant chemotherapy using FOLFOX which might elicit 25% survival benefit.  The patient is agreeable to start  chemotherapy.  This will necessitate port placement.  Return in 2 weeks.  The patient and family had come for follow-up on November 8, 2024 regarding history of pT3, PN 1B, M0 adenocarcinoma of the colon s/p resection.  During previous evaluation the patient had presented with severe iron deficiency anemia as well as diarrhea.  C. difficile studies were negative.  Diarrhea is resolved.  Xwqstw-s-Drbt was placed.  Initiate intravenous Feraheme for iron 10 mg/week x 2 weeks and also initiate adjuvant chemotherapy using FOLFOX to be given every 2 weeks for 12 cycles beginning November for, 2024    The patient and family had come for follow-up on December 13 , 2024 regarding history of pT3, PN 1B, M0 adenocarcinoma of the colon s/p resection.  During previous evaluation the patient had presented with severe iron deficiency anemia as well as diarrhea.  C. difficile studies were negative.   The patient received cycle 3,  2 weeks earlier and complained of easy fatigability and diarrhea that was easily controlled with Imodium.  Will reduce dose by 25% with cycle 4 and going forward.    The patient and family had come for follow-up on December 27, 2024 regarding history of pT3, PN 1B, M0 adenocarcinoma of colon s/p resection.  Receiving adjuvant chemotherapy dose reduced 25%.  Has received and tolerated 4 cycles without any problems.  Physical examination within normal limits.  Cycle 5 on December 30, 2024.     The patient and family had come for follow-up on 1/10/25 regarding history of pT3, PN 1B, M0 adenocarcinoma of colon s/p resection.  Receiving adjuvant chemotherapy dose reduced 25%.  Has received and tolerated 5 cycles without any problems.  Reviewed lab data with the patient.  Cycle 6 on January 14, 2025 at 25% dose reduction.  Return in 2 weeks.    Thank you for allowing me to participate in care of your patient if you have any questions please feel free to call me.      Diagnoses and all orders for this  visit:  Microcytic anemia  -     Clinic Appointment Request ([facility] appt needed please); Future  Adenocarcinoma, colon (Multi)  -     Referral to Hematology and Oncology  -     Clinic Appointment Request ([facility] appt needed please); Future  Other orders  -     heparin flush 10 unit/mL syringe 50 Units  -     heparin flush 100 unit/mL syringe 500 Units  -     alteplase (Cathflo Activase) injection 2 mg  -     ferumoxytol (Feraheme) 510 mg in sodium chloride 0.9% 117 mL IV  -     sodium chloride 0.9 % bolus 500 mL  -     dextrose 5 % in water (D5W) bolus  -     diphenhydrAMINE (BENADryl) injection 50 mg  -     methylPREDNISolone sod succinate (SOLU-Medrol) 40 mg/mL injection 40 mg  -     famotidine PF (Pepcid) injection 20 mg  -     EPINEPHrine (Epipen) injection syringe 0.3 mg  -     albuterol 2.5 mg /3 mL (0.083 %) nebulizer solution 3 mL           Rio Lawson MD

## 2025-01-10 NOTE — PROGRESS NOTES
[facility] Infusion Note     Krish Batista is a 69 y.o. year old male here today,01/10/25,  in the [facility] infusion center for his pre-treatment labs to be drawn for cycle 6 day 1 of the following treatment regimen:    mFOLFOX6 (Fluorouracil Continuous Infusion / Leucovorin / Oxaliplatin), 14 Day Cycles        Medications given:  Administrations This Visit       heparin flush 100 unit/mL syringe 500 Units       Admin Date  01/10/2025 Action  Given Dose  500 Units Route  intra-catheter Documented By  Anne Marie Villalobos RN                    Pt tolerated CVAD access and blood draw well. Pt remained in the room to see the provider. Pt had no further questions or concerns at this time.

## 2025-01-11 DIAGNOSIS — I48.3 TYPICAL ATRIAL FLUTTER (MULTI): ICD-10-CM

## 2025-01-11 LAB
ALBUMIN SERPL BCP-MCNC: 3.9 G/DL (ref 3.4–5)
ALP SERPL-CCNC: 150 U/L (ref 33–136)
ALT SERPL W P-5'-P-CCNC: 35 U/L (ref 10–52)
ANION GAP SERPL CALC-SCNC: 12 MMOL/L (ref 10–20)
AST SERPL W P-5'-P-CCNC: 35 U/L (ref 9–39)
BILIRUB SERPL-MCNC: 0.8 MG/DL (ref 0–1.2)
BUN SERPL-MCNC: 20 MG/DL (ref 6–23)
CALCIUM SERPL-MCNC: 8.9 MG/DL (ref 8.6–10.6)
CHLORIDE SERPL-SCNC: 103 MMOL/L (ref 98–107)
CO2 SERPL-SCNC: 27 MMOL/L (ref 21–32)
CREAT SERPL-MCNC: 0.92 MG/DL (ref 0.5–1.3)
EGFRCR SERPLBLD CKD-EPI 2021: 89 ML/MIN/1.73M*2
GLUCOSE SERPL-MCNC: 108 MG/DL (ref 74–99)
POTASSIUM SERPL-SCNC: 3.8 MMOL/L (ref 3.5–5.3)
PROT SERPL-MCNC: 6.3 G/DL (ref 6.4–8.2)
SODIUM SERPL-SCNC: 138 MMOL/L (ref 136–145)

## 2025-01-13 RX ORDER — AMIODARONE HYDROCHLORIDE 200 MG/1
200 TABLET ORAL DAILY
Qty: 90 TABLET | Refills: 3 | Status: SHIPPED | OUTPATIENT
Start: 2025-01-13

## 2025-01-14 ENCOUNTER — INFUSION (OUTPATIENT)
Dept: HEMATOLOGY/ONCOLOGY | Facility: CLINIC | Age: 70
End: 2025-01-14
Payer: MEDICARE

## 2025-01-14 ENCOUNTER — NUTRITION (OUTPATIENT)
Dept: HEMATOLOGY/ONCOLOGY | Facility: CLINIC | Age: 70
End: 2025-01-14
Payer: MEDICARE

## 2025-01-14 VITALS
SYSTOLIC BLOOD PRESSURE: 120 MMHG | BODY MASS INDEX: 23.11 KG/M2 | RESPIRATION RATE: 16 BRPM | WEIGHT: 170.64 LBS | HEIGHT: 72 IN | DIASTOLIC BLOOD PRESSURE: 68 MMHG | TEMPERATURE: 97.7 F | HEART RATE: 45 BPM | OXYGEN SATURATION: 98 %

## 2025-01-14 DIAGNOSIS — C18.9 ADENOCARCINOMA, COLON (MULTI): ICD-10-CM

## 2025-01-14 DIAGNOSIS — C18.9 STAGE III CARCINOMA OF COLON (MULTI): Primary | ICD-10-CM

## 2025-01-14 DIAGNOSIS — D50.9 MICROCYTIC ANEMIA: ICD-10-CM

## 2025-01-14 PROCEDURE — 96411 CHEMO IV PUSH ADDL DRUG: CPT

## 2025-01-14 PROCEDURE — 96413 CHEMO IV INFUSION 1 HR: CPT

## 2025-01-14 PROCEDURE — 2500000004 HC RX 250 GENERAL PHARMACY W/ HCPCS (ALT 636 FOR OP/ED): Performed by: INTERNAL MEDICINE

## 2025-01-14 PROCEDURE — 96416 CHEMO PROLONG INFUSE W/PUMP: CPT

## 2025-01-14 PROCEDURE — 96415 CHEMO IV INFUSION ADDL HR: CPT

## 2025-01-14 PROCEDURE — 96375 TX/PRO/DX INJ NEW DRUG ADDON: CPT | Mod: INF

## 2025-01-14 RX ORDER — HEPARIN SODIUM,PORCINE/PF 10 UNIT/ML
50 SYRINGE (ML) INTRAVENOUS AS NEEDED
Status: DISCONTINUED | OUTPATIENT
Start: 2025-01-14 | End: 2025-01-14 | Stop reason: HOSPADM

## 2025-01-14 RX ORDER — PROCHLORPERAZINE MALEATE 10 MG
10 TABLET ORAL EVERY 6 HOURS PRN
Status: DISCONTINUED | OUTPATIENT
Start: 2025-01-14 | End: 2025-01-14 | Stop reason: HOSPADM

## 2025-01-14 RX ORDER — HEPARIN SODIUM,PORCINE/PF 10 UNIT/ML
50 SYRINGE (ML) INTRAVENOUS AS NEEDED
Status: CANCELLED | OUTPATIENT
Start: 2025-01-14

## 2025-01-14 RX ORDER — HEPARIN 100 UNIT/ML
500 SYRINGE INTRAVENOUS AS NEEDED
Status: CANCELLED | OUTPATIENT
Start: 2025-01-14

## 2025-01-14 RX ORDER — FLUOROURACIL 50 MG/ML
300 INJECTION, SOLUTION INTRAVENOUS ONCE
Status: COMPLETED | OUTPATIENT
Start: 2025-01-14 | End: 2025-01-14

## 2025-01-14 RX ORDER — PROCHLORPERAZINE EDISYLATE 5 MG/ML
10 INJECTION INTRAMUSCULAR; INTRAVENOUS EVERY 6 HOURS PRN
Status: DISCONTINUED | OUTPATIENT
Start: 2025-01-14 | End: 2025-01-14 | Stop reason: HOSPADM

## 2025-01-14 RX ORDER — LORAZEPAM 2 MG/ML
1 INJECTION INTRAMUSCULAR AS NEEDED
Status: DISCONTINUED | OUTPATIENT
Start: 2025-01-14 | End: 2025-01-14 | Stop reason: HOSPADM

## 2025-01-14 RX ORDER — DIPHENHYDRAMINE HYDROCHLORIDE 50 MG/ML
50 INJECTION INTRAMUSCULAR; INTRAVENOUS AS NEEDED
Status: DISCONTINUED | OUTPATIENT
Start: 2025-01-14 | End: 2025-01-14 | Stop reason: HOSPADM

## 2025-01-14 RX ORDER — EPINEPHRINE 0.3 MG/.3ML
0.3 INJECTION SUBCUTANEOUS EVERY 5 MIN PRN
Status: DISCONTINUED | OUTPATIENT
Start: 2025-01-14 | End: 2025-01-14 | Stop reason: HOSPADM

## 2025-01-14 RX ORDER — DEXAMETHASONE 4 MG/1
12 TABLET ORAL ONCE
Status: DISCONTINUED | OUTPATIENT
Start: 2025-01-14 | End: 2025-01-14

## 2025-01-14 RX ORDER — ALBUTEROL SULFATE 0.83 MG/ML
3 SOLUTION RESPIRATORY (INHALATION) AS NEEDED
Status: DISCONTINUED | OUTPATIENT
Start: 2025-01-14 | End: 2025-01-14 | Stop reason: HOSPADM

## 2025-01-14 RX ORDER — DEXAMETHASONE 4 MG/1
12 TABLET ORAL ONCE
Status: COMPLETED | OUTPATIENT
Start: 2025-01-14 | End: 2025-01-14

## 2025-01-14 RX ORDER — PALONOSETRON 0.05 MG/ML
0.25 INJECTION, SOLUTION INTRAVENOUS ONCE
Status: COMPLETED | OUTPATIENT
Start: 2025-01-14 | End: 2025-01-14

## 2025-01-14 RX ORDER — FAMOTIDINE 10 MG/ML
20 INJECTION INTRAVENOUS ONCE AS NEEDED
Status: DISCONTINUED | OUTPATIENT
Start: 2025-01-14 | End: 2025-01-14 | Stop reason: HOSPADM

## 2025-01-14 RX ORDER — HEPARIN 100 UNIT/ML
500 SYRINGE INTRAVENOUS AS NEEDED
Status: DISCONTINUED | OUTPATIENT
Start: 2025-01-14 | End: 2025-01-14 | Stop reason: HOSPADM

## 2025-01-14 RX ADMIN — DEXAMETHASONE 12 MG: 4 TABLET ORAL at 09:18

## 2025-01-14 RX ADMIN — FLUOROURACIL 3750 MG: 50 INJECTION, SOLUTION INTRAVENOUS at 11:59

## 2025-01-14 RX ADMIN — OXALIPLATIN 135 MG: 5 INJECTION, SOLUTION INTRAVENOUS at 09:42

## 2025-01-14 RX ADMIN — FLUOROURACIL 625 MG: 50 INJECTION, SOLUTION INTRAVENOUS at 11:51

## 2025-01-14 RX ADMIN — PALONOSETRON HYDROCHLORIDE 250 MCG: 0.25 INJECTION INTRAVENOUS at 09:18

## 2025-01-14 ASSESSMENT — PAIN SCALES - GENERAL: PAINLEVEL_OUTOF10: 0-NO PAIN

## 2025-01-14 NOTE — PROGRESS NOTES
"NUTRITION Education NOTE    Nutrition Assessment     Reason for Visit:  Krish Batista is a 69 y.o. male 68 yo male with newly diagnosed T3 N1 M0 adenocarcinoma of sigmoid colon s/p resection 9/15/24  -Currently on treatment with FOLFOX    Met with patient for nutrition follow up visit. He is here today in infusion for C6 treatment with his niece.     Lab Results   Component Value Date/Time    GLUCOSE 108 (H) 01/10/2025 1504     01/10/2025 1504    K 3.8 01/10/2025 1504     01/10/2025 1504    CO2 27 01/10/2025 1504    ANIONGAP 12 01/10/2025 1504    BUN 20 01/10/2025 1504    CREATININE 0.92 01/10/2025 1504    EGFR 89 01/10/2025 1504    CALCIUM 8.9 01/10/2025 1504    ALBUMIN 3.9 01/10/2025 1504    ALKPHOS 150 (H) 01/10/2025 1504    PROT 6.3 (L) 01/10/2025 1504    AST 35 01/10/2025 1504    BILITOT 0.8 01/10/2025 1504    ALT 35 01/10/2025 1504     No results found for: \"VITD25\"    Anthropometrics:       13% wt loss x 2 months (October-December)   7% over 2 weeks (12/3-12/13)   Now maintaining over the past 4 weeks.      Wt Readings from Last 20 Encounters:   01/14/25 77.4 kg (170 lb 10.2 oz)   01/10/25 76.9 kg (169 lb 8.5 oz)   12/31/24 77.3 kg (170 lb 6.7 oz)   12/27/24 76.8 kg (169 lb 3.3 oz)   12/17/24 78.5 kg (173 lb 2.7 oz)   12/13/24 76.9 kg (169 lb 10.3 oz)   12/03/24 83 kg (182 lb 15.7 oz)   11/29/24 83 kg (182 lb 15.7 oz)   11/21/24 84 kg (185 lb 3 oz)   11/20/24 85.7 kg (189 lb)   11/19/24 83.7 kg (184 lb 10.2 oz)   11/15/24 81.9 kg (180 lb 8.9 oz)   11/08/24 83.9 kg (184 lb 15.5 oz)   11/06/24 85.2 kg (187 lb 11.6 oz)   11/04/24 83.6 kg (184 lb 3.1 oz)   10/28/24 84.6 kg (186 lb 8.2 oz)   10/25/24 84.8 kg (187 lb 1 oz)   10/24/24 89.4 kg (197 lb 1.5 oz)   10/16/24 86.4 kg (190 lb 6.4 oz)   10/08/24 89.4 kg (197 lb 3.2 oz)          Food And Nutrient Intake:  Food and Nutrient History  Food and Nutrient History: Patient reports good appetite and oral intake.  Niece states that patient grazes all day " long with small frequent meals/snacks.  Did review concerns as patient with large weight drop in December, although now appears to have stablized over the last month.  Does have ongoing early satiety , thus the reason for smaller meals/snacks.  Does have cold sensitivity several days post-oxaliplatin.  Has not been taking protein shakes. Although niece states he did not mind the [employer].  Has not tried [employer] Instant Breakfast. Denies n/v/d at this time.  Feels he is drinking fluids pretty well, despite cold sensitivity. Enjoys Oatmeal and homemade soups in the cold weather. Patient c/o sinus drainage and dry throat currently.  GI Symptoms: early satiety         Energy Needs  Estimated Energy Needs  Total Energy Estimated Needs (kCal):  (1674-1767 (30-33 cals/kg))  Estimated Fluid Needs  Total Fluid Estimated Needs (mL):  (2350 mls)  Estimated Protein Needs  Total Protein Estimated Needs (g):  ( g (1.2-1.5 g/kg))        Nutrition Diagnosis   Malnutrition Diagnosis  Patient has Malnutrition Diagnosis: Yes  Diagnosis Status: New  Malnutrition Diagnosis: Moderate malnutrition related to chronic disease or condition  As Evidenced by: 13% weight loss over the past 2 months (7% over a 2 week period in Dec) , moderate muscle mass, and inadequate oral intake to meet elevated nutrient demands    Nutrition Diagnosis  Patient has Nutrition Diagnosis: Yes  Diagnosis Status (1): Ongoing  Nutrition Diagnosis 1: Increased nutrient needs  Related to (1): current chemotheapy and disease process  As Evidenced by (1): increased metabolic demands for catabolism    Nutrition Interventions/Recommendations   Nutrition Prescription  Individualized Nutrition Prescription Provided for : High Calorie High Protein; 4-6 small frequent meals/snacks, at least 1 High calorie oral supplement shake to meet elevated caloric/protein requirements    Food and Nutrition Delivery  Food and Nutrition Delivery  Meals & Snacks:  "Energy-modified diet, Protein-modified diet  Encouraged 4-6 high protein/high calorie small frequent meals/snacks. (Increased snacks between regular meals)      Already has Handout \"High Calorie Snack Ideas \"     -Encouraged 64 oz caffeine free fluids (I.e water, milk, juice, broth, pro shakes etc)    Nutrition Education  Nutrition Education  Nutrition Education Content: Content related nutrition education  Continue to optimize calorie/protein intake at meals/snacks to meet elevated nutrient needs in setting of chemotherapy treatment, as patient with 13% wt decline over 2 months, 8% in 2 week period)   Suggest resuming high kirk oral supplement shake daily (I.e [employer], as patient does not mind the taste or can try [employer] Instant Breakfast Essentials  With cold sensitivity, can warm up like hot chocolate if desired.     Coordination of Care  Coordination of Nutrition Care by a Nutrition Professional  Collaboration and referral of nutrition care: Collaboration by nutrition professional with other providers    There are no Patient Instructions on file for this visit.    Nutrition Monitoring and Evaluation   Food/Nutrient Related History Monitoring  Monitoring and Evaluation Plan: Energy intake  Energy Intake: Estimated energy intake  Criteria: Meet >75% estimated kirk needs  Fluid Intake: Estimated fluid intake  Criteria: 64 oz caffeine free fluids  Estimated protein intake: Estimated protein intake  Criteria: Meet >75% estimated protein needs          Time Spent  Prep time on day of patient encounter: 5 minutes  Time spent directly with patient, family or caregiver: 10 minutes  Additional Time Spent on Patient Care Activities: 0 minutes  Documentation Time: 20 minutes  Other Time Spent: 0 minutes  Total: 35 minutes                    "

## 2025-01-16 ENCOUNTER — INFUSION (OUTPATIENT)
Dept: HEMATOLOGY/ONCOLOGY | Facility: CLINIC | Age: 70
End: 2025-01-16
Payer: MEDICARE

## 2025-01-16 VITALS
HEART RATE: 50 BPM | RESPIRATION RATE: 14 BRPM | OXYGEN SATURATION: 99 % | TEMPERATURE: 98.1 F | SYSTOLIC BLOOD PRESSURE: 113 MMHG | HEIGHT: 72 IN | BODY MASS INDEX: 22.99 KG/M2 | DIASTOLIC BLOOD PRESSURE: 69 MMHG

## 2025-01-16 DIAGNOSIS — C18.9 ADENOCARCINOMA, COLON (MULTI): ICD-10-CM

## 2025-01-16 DIAGNOSIS — C18.9 STAGE III CARCINOMA OF COLON (MULTI): ICD-10-CM

## 2025-01-16 DIAGNOSIS — D50.9 MICROCYTIC ANEMIA: ICD-10-CM

## 2025-01-16 PROCEDURE — 96523 IRRIG DRUG DELIVERY DEVICE: CPT

## 2025-01-16 PROCEDURE — 2500000004 HC RX 250 GENERAL PHARMACY W/ HCPCS (ALT 636 FOR OP/ED): Mod: JZ,JG,TB | Performed by: INTERNAL MEDICINE

## 2025-01-16 PROCEDURE — 2500000004 HC RX 250 GENERAL PHARMACY W/ HCPCS (ALT 636 FOR OP/ED): Performed by: INTERNAL MEDICINE

## 2025-01-16 PROCEDURE — 96372 THER/PROPH/DIAG INJ SC/IM: CPT

## 2025-01-16 RX ORDER — HEPARIN SODIUM,PORCINE/PF 10 UNIT/ML
50 SYRINGE (ML) INTRAVENOUS AS NEEDED
OUTPATIENT
Start: 2025-01-16

## 2025-01-16 RX ORDER — HEPARIN 100 UNIT/ML
500 SYRINGE INTRAVENOUS AS NEEDED
Status: DISCONTINUED | OUTPATIENT
Start: 2025-01-16 | End: 2025-01-16 | Stop reason: HOSPADM

## 2025-01-16 RX ORDER — HEPARIN SODIUM,PORCINE/PF 10 UNIT/ML
50 SYRINGE (ML) INTRAVENOUS AS NEEDED
Status: DISCONTINUED | OUTPATIENT
Start: 2025-01-16 | End: 2025-01-16 | Stop reason: HOSPADM

## 2025-01-16 RX ORDER — HEPARIN 100 UNIT/ML
500 SYRINGE INTRAVENOUS AS NEEDED
OUTPATIENT
Start: 2025-01-16

## 2025-01-16 RX ADMIN — PEGFLILGRASTIM-FPGK 6 MG: 6 INJECTION, SOLUTION SUBCUTANEOUS at 10:34

## 2025-01-16 RX ADMIN — HEPARIN 500 UNITS: 100 SYRINGE at 10:34

## 2025-01-16 ASSESSMENT — PAIN SCALES - GENERAL: PAINLEVEL_OUTOF10: 0-NO PAIN

## 2025-01-24 ENCOUNTER — OFFICE VISIT (OUTPATIENT)
Dept: HEMATOLOGY/ONCOLOGY | Facility: CLINIC | Age: 70
End: 2025-01-24
Payer: MEDICARE

## 2025-01-24 ENCOUNTER — HOSPITAL ENCOUNTER (OUTPATIENT)
Dept: HEMATOLOGY/ONCOLOGY | Facility: CLINIC | Age: 70
Discharge: HOME | End: 2025-01-24
Payer: MEDICARE

## 2025-01-24 VITALS
BODY MASS INDEX: 22.9 KG/M2 | WEIGHT: 169.97 LBS | TEMPERATURE: 97 F | DIASTOLIC BLOOD PRESSURE: 68 MMHG | OXYGEN SATURATION: 99 % | RESPIRATION RATE: 14 BRPM | SYSTOLIC BLOOD PRESSURE: 154 MMHG

## 2025-01-24 DIAGNOSIS — C18.9 ADENOCARCINOMA, COLON (MULTI): ICD-10-CM

## 2025-01-24 DIAGNOSIS — C18.9 STAGE III CARCINOMA OF COLON (MULTI): ICD-10-CM

## 2025-01-24 DIAGNOSIS — D50.9 MICROCYTIC ANEMIA: ICD-10-CM

## 2025-01-24 LAB
BASOPHILS # BLD AUTO: 0.03 X10*3/UL (ref 0–0.1)
BASOPHILS NFR BLD AUTO: 0.2 %
EOSINOPHIL # BLD AUTO: 0.04 X10*3/UL (ref 0–0.7)
EOSINOPHIL NFR BLD AUTO: 0.3 %
ERYTHROCYTE [DISTWIDTH] IN BLOOD BY AUTOMATED COUNT: 19 % (ref 11.5–14.5)
HCT VFR BLD AUTO: 36.6 % (ref 41–52)
HGB BLD-MCNC: 12.3 G/DL (ref 13.5–17.5)
IMM GRANULOCYTES # BLD AUTO: 0.1 X10*3/UL (ref 0–0.7)
IMM GRANULOCYTES NFR BLD AUTO: 0.7 % (ref 0–0.9)
LYMPHOCYTES # BLD AUTO: 1.1 X10*3/UL (ref 1.2–4.8)
LYMPHOCYTES NFR BLD AUTO: 7.2 %
MCH RBC QN AUTO: 34.1 PG (ref 26–34)
MCHC RBC AUTO-ENTMCNC: 33.6 G/DL (ref 32–36)
MCV RBC AUTO: 101 FL (ref 80–100)
MONOCYTES # BLD AUTO: 1.16 X10*3/UL (ref 0.1–1)
MONOCYTES NFR BLD AUTO: 7.6 %
NEUTROPHILS # BLD AUTO: 12.9 X10*3/UL (ref 1.2–7.7)
NEUTROPHILS NFR BLD AUTO: 84 %
NRBC BLD-RTO: ABNORMAL /100{WBCS}
PLATELET # BLD AUTO: 83 X10*3/UL (ref 150–450)
RBC # BLD AUTO: 3.61 X10*6/UL (ref 4.5–5.9)
WBC # BLD AUTO: 15.3 X10*3/UL (ref 4.4–11.3)

## 2025-01-24 PROCEDURE — 80053 COMPREHEN METABOLIC PANEL: CPT | Performed by: INTERNAL MEDICINE

## 2025-01-24 PROCEDURE — 1036F TOBACCO NON-USER: CPT | Performed by: INTERNAL MEDICINE

## 2025-01-24 PROCEDURE — 2500000004 HC RX 250 GENERAL PHARMACY W/ HCPCS (ALT 636 FOR OP/ED)

## 2025-01-24 PROCEDURE — 99215 OFFICE O/P EST HI 40 MIN: CPT | Performed by: INTERNAL MEDICINE

## 2025-01-24 PROCEDURE — 36591 DRAW BLOOD OFF VENOUS DEVICE: CPT

## 2025-01-24 PROCEDURE — 85025 COMPLETE CBC W/AUTO DIFF WBC: CPT | Performed by: INTERNAL MEDICINE

## 2025-01-24 RX ORDER — HEPARIN 100 UNIT/ML
500 SYRINGE INTRAVENOUS AS NEEDED
Status: DISCONTINUED | OUTPATIENT
Start: 2025-01-24 | End: 2025-01-25 | Stop reason: HOSPADM

## 2025-01-24 RX ORDER — HEPARIN 100 UNIT/ML
SYRINGE INTRAVENOUS
Status: COMPLETED
Start: 2025-01-24 | End: 2025-01-24

## 2025-01-24 RX ORDER — HEPARIN SODIUM,PORCINE/PF 10 UNIT/ML
50 SYRINGE (ML) INTRAVENOUS AS NEEDED
OUTPATIENT
Start: 2025-01-24

## 2025-01-24 RX ORDER — HEPARIN 100 UNIT/ML
500 SYRINGE INTRAVENOUS AS NEEDED
OUTPATIENT
Start: 2025-01-24

## 2025-01-24 RX ORDER — HEPARIN SODIUM,PORCINE/PF 10 UNIT/ML
50 SYRINGE (ML) INTRAVENOUS AS NEEDED
Status: DISCONTINUED | OUTPATIENT
Start: 2025-01-24 | End: 2025-01-25 | Stop reason: HOSPADM

## 2025-01-24 RX ADMIN — HEPARIN 500 UNITS: 100 SYRINGE at 13:02

## 2025-01-24 ASSESSMENT — PAIN SCALES - GENERAL: PAINLEVEL_OUTOF10: 0-NO PAIN

## 2025-01-24 NOTE — PROGRESS NOTES
"Patient seen by Dr. Duque today in clinic. Reinforcement education provided regarding next steps with plan of care.      PER DR. DUQUE'S FUV NOTE TODAY: \" The patient and family had come for follow-up on 1/24/25 regarding history of pT3, PN 1B, M0 adenocarcinoma of colon s/p resection.  Receiving adjuvant chemotherapy dose reduced 25%.  Has received and tolerated 5 cycles without any problems.  Reviewed lab data with the patient.  Cycle 7 on January 28, 2025 at 25% dose reduction.  Return in 2 weeks. \"    Pt reports feeling ok except for moderate fatigue.  Reports drinking only 32-48 oz fluid daily.  Encouraged hydration.  Pt to continue with FOLFOX C#7 on 1/28/25.  Patient verbalizes understanding of plan of care via teachback method.             "

## 2025-01-24 NOTE — PROGRESS NOTES
Patient ID: Krish Batista is a 69 y.o. male.  Referring Physician: Rio Lawson MD  [address] [facility], [place] [address] [place] [address]  Primary Care Provider: Matthew Small,    8/15/24, EGD and colonoscopy  A.  Duodenum, first part, biopsy:  Duodenal mucosa within normal limits.     B.  Stomach, biopsy:  Antrum and corpus mucosa within normal limits.     C.  Colon, mass at 40 cm, biopsy:  Fragments of invasive adenocarcinoma, moderately differentiated.  See note.     NOTE: Immunohistochemical stains for mismatch repair proteins are pending and the result will be reported as an addendum.     D.  Colon, 20 cm, polypectomy:  Tubular adenoma.                               MISMATCH REPAIR PROTEIN EXPRESSION     C. Colon, mass at 40 cm, biopsy: Adenocarcinoma     Protein:  Result     MLH-1:  Expression Present                                    PMS-2: Expression Present                                    MSH-2: Expression Present                                    MSH-6: Expression Present    CEA at 1.5 NG per mL on August 2, 2024    Colon, splenic flexure, resection:  -Invasive adenocarcinoma (3.1 cm), moderately differentiated.  -Carcinoma invades pericolonic tissue.  -Extramural venous and perineural invasion present.  -Resection margins are negative for carcinoma.  -Metastatic adenocarcinoma involving two of thirty-seven lymph nodes (2/37).  -Pathologic stage: pT3, pN1b.  -See comment and synoptic report.  Comment:  Movat stains (blocks A4 and A5) confirm the venous invasion.    Electronically signed by Sharonda Maguire MD PhD on 9/30/2024 at 1350      [facility]   By the signature on this report, the individual or group listed as making the Final Interpretation/Diagnosis certifies that they have reviewed this case.    Case Summary Report   COLON AND RECTUM: Resection, Including Transanal Disk Excision of Rectal Neoplasms   8th Edition - Protocol posted: 6/22/2022COLON AND RECTUM: RESECTION, INCLUDING  TRANSANAL DISK EXCISION OF RECTAL NEOPLASMS - All Specimens  SPECIMEN   Procedure  resection   TUMOR   Tumor Site  Splenic flexure   Histologic Type  Adenocarcinoma   Histologic Grade  G2, moderately differentiated   Tumor Size  Greatest dimension (Centimeters): 3.1 cm   Tumor Extent  Invades through muscularis propria into the pericolonic or perirectal tissue   Macroscopic Tumor Perforation  Not identified   Lymphovascular Invasion  Large vessel (venous), extramural   Perineural Invasion  Present   Tumor Bud Score  Intermediate (5-9)   Treatment Effect  No known presurgical therapy   MARGINS   Margin Status for Invasive Carcinoma  All margins negative for invasive carcinoma   Closest Margin(s) to Invasive Carcinoma  Radial (circumferential) or mesenteric   Distance from Invasive Carcinoma to Closest Margin  Greater than 1 cm   Margin Status for Non-Invasive Tumor  All margins negative for high-grade dysplasia / intramucosal carcinoma and low-grade dysplasia   REGIONAL LYMPH NODES   Regional Lymph Node Status  Tumor present in regional lymph node(s)   Number of Lymph Nodes with Tumor  2   Number of Lymph Nodes Examined  37   Tumor Deposits  Not identified   PATHOLOGIC STAGE CLASSIFICATION (pTNM, AJCC 8th Edition)   Reporting of pT, pN, and (when applicable) pM categories is based on information available to the pathologist at the time the report is issued. As per the AJCC (Chapter 1, 8th Ed.) it is the managing physician’s responsibility to establish the final pathologic stage based upon all pertinent information, including but potentially not limited to this pathology report.   pT Category  pT3   pN Category  pN1b   .        Subjective    HPI  The patient was referred to me by Dr. Clark for further evaluation and management of newly diagnosed biopsy-proven left colon adenocarcinoma.  The patient presented to Dr. Small on March 25, 2024 complaining of fatigue and malaise.  Further evaluation revealed significant  anemia at hemoglobin 8.1 g/dL ferritin at 8 NG per mL iron saturation 3%.  The patient was referred to GI services however he failed to follow through.  He was referred to surgery as well.  Repeat hemoglobin on July 11, 2024 was 7.2 g/dL.  He then presented to ER on August 1, 2024 secondary to progressive fatigue and weakness.  He was diagnosed with severe microcytic anemia hemoglobin 7.4 g/dL.  The patient received 2 units of packed red blood cell transfusions.  Upper and lower endoscopy on August 3, 2024 revealed a mass at 40 cm in the anal verge.  Histology consistent with adenocarcinoma.  In ER the patient was found to be in atrial fibrillation/flutter with RVR.  Cardiology was consulted.  He spontaneously converted to normal sinus rhythm.  Color Doppler echocardiogram revealed normal ejection fraction.  He was placed on metoprolol 50 mg p.o. twice daily with a Holter monitor.  Anticoagulation was not recommended.  Colonoscopy on August 15, 2024 revealed internal small hemorrhoids.  1 8 mm sessile polyp 20 centimeter from anal verge.  This was snared.  Single friable and ulcerated mass not transferred stable 40 cm from the anal verge, covering the whole circumference; bleeding occurred before intervention.  Performed cold forceps biopsy with partial removal: Tattooed distal to the finding with spot.  CT scan of chest abdomen pelvis on August 1, 2024 with IV contrast did not reveal any obvious metastatic disease.  A PET scan revealed bilateral pleural effusion right greater than left.  Thoracentesis was performed on September 3, 2024 cytology is pending.  The patient claims that he is feeling better after thoracentesis.    At interview on September 4, 2024 he was accompanied by his niece denied history of weight loss, fevers, night sweats, chest pain, nausea, vomiting, hematemesis, melena, hematochezia and hematuria.  Since last evaluation the patient has had multiple admissions to the hospital for myocardial  infarction requiring cardiac catheterization, status post surgery/left hemicolectomy on September 15, 2024, GI bleed hypotension requiring iron infusion as well as blood transfusions.  The patient was discharged and claims that he is beginning to feel better although has pedal edema as well as anterior abdominal wall edema.  The patient and family had come for follow-up on November 8, 2024 regarding history of pT3, PN 1B, M0 adenocarcinoma of the colon s/p resection.  During previous evaluation the patient had presented with severe iron deficiency anemia as well as diarrhea.  C. difficile studies were negative.  Diarrhea is resolved.  [id number] was placed.  Initiate intravenous Feraheme for iron 10 mg/week x 2 weeks and also initiate adjuvant chemotherapy using FOLFOX to be given every 2 weeks for 12 cycles.  Cycle 1 on November 4, 2024    The patient and family had come for follow-up on December 13 , 2024 regarding history of pT3, PN 1B, M0 adenocarcinoma of the colon s/p resection.  During previous evaluation the patient had presented with severe iron deficiency anemia as well as diarrhea.  C. difficile studies were negative.   The patient received cycle 3,  2 weeks earlier and complained of easy fatigability and diarrhea that was easily controlled with Imodium.  Will reduce dose by 25% with cycle 4 and going forward.    The patient and family had come for follow-up on December 27, 2024 regarding history of pT3, PN 1B, M0 adenocarcinoma of colon s/p resection.  Receiving adjuvant chemotherapy dose reduced 25%.  Has received and tolerated 4 cycles without any problems.     The patient and family had come for follow-up on 1/24/25 regarding history of pT3, PN 1B, M0 adenocarcinoma of colon s/p resection.  Receiving adjuvant chemotherapy dose reduced 25%.  Has received and tolerated 6 cycles without any problems.  Past medical history:    Iron deficiency anemia, adenocarcinoma of descending colon, history of  Raynaud's phenomenon.  Past surgical history:    Sinus surgery July 13, 2021, history of undescended testis surgery, and tonsillectomy.      Review of Systems   All other systems reviewed and are negative.       Objective   BSA: There is no height or weight on file to calculate BSA.  There were no vitals taken for this visit.    No family history on file.  Oncology History   Adenocarcinoma, colon (Multi)   8/23/2024 Initial Diagnosis    Adenocarcinoma, colon (Multi)     11/4/2024 -  Chemotherapy    mFOLFOX6 (Fluorouracil Continuous Infusion / Leucovorin / Oxaliplatin), 14 Day Cycles       The patient is 69 years old, single has no children retired as a .  Esbon Lester  reports that he has never smoked. He has never used smokeless tobacco.  He  reports that he does not currently use alcohol.  He  reports no history of drug use.    Physical Exam  Constitutional:       Appearance: Normal appearance.   HENT:      Head: Normocephalic and atraumatic.      Nose: Nose normal.      Mouth/Throat:      Mouth: Mucous membranes are moist.      Pharynx: Oropharynx is clear.   Eyes:      Extraocular Movements: Extraocular movements intact.      Conjunctiva/sclera: Conjunctivae normal.      Pupils: Pupils are equal, round, and reactive to light.   Cardiovascular:      Rate and Rhythm: Normal rate and regular rhythm.   Pulmonary:      Effort: Pulmonary effort is normal.      Breath sounds: Normal breath sounds.   Abdominal:      General: Abdomen is flat. Bowel sounds are normal.      Palpations: Abdomen is soft.   Musculoskeletal:         General: Normal range of motion.      Cervical back: Normal range of motion and neck supple.   Neurological:      General: No focal deficit present.      Mental Status: He is alert and oriented to person, place, and time. Mental status is at baseline.   Psychiatric:         Mood and Affect: Mood normal.         Behavior: Behavior normal.         Thought Content: Thought content normal.          Judgment: Judgment normal.         Performance Status:  Symptomatic; in bed <50% of the day    Assessment/Plan    The patient is a 69-year-old man presented with easy fatigability shortness of breath on exertion.  Diagnosed with iron deficiency anemia.  Cologuard was positive.  The patient was referred to GI services but did not follow through.  Eventually presented to ER with above symptoms did receive 2 units of packed red blood cell transfusions.  He did develop atrial fibrillation/flutter while in ER but spontaneously converted to normal sinus rhythm was placed on metoprolol.  Colonoscopy revealed a mass 40 cm from anal verge biopsy/histology consistent with adenocarcinoma.  Initially CT scan of chest abdomen pelvis did not reveal any evidence of metastatic disease.  But a PET scan revealed large right pleural effusion and small left pleural effusion.  Thoracentesis performed on right pleural effusion on September 3, 2024 cytology is pending.  If negative would seek opinion about hilar/mediastinal lymphadenopathy.    Iron deficiency anemia:    The patient is still symptomatic with anemia.  I have recommended intravenous Feraheme 510 mg/week x 2 weeks.  Effect side effects explained.  The patient understood appreciated all the details provided and was grateful.  T3 N1 M0 adenocarcinoma of sigmoid colon s/p resection on September 15, 2024.    The patient and family had come for follow-up on October 3, 2024 as described above multiple admissions requiring stay in ICU, history of MI GI bleed requiring blood and iron transfusions.  Physical examination revealed pedal edema and CBC revealed hemoglobin 9.2 g/dL.  I have recommended evaluation of anemia as well as Doppler ultrasound.  Discussed in detail about options such as do-nothing, consider adjuvant chemotherapy using FOLFOX which might elicit 25% survival benefit.  The patient is agreeable to start chemotherapy.  This will necessitate port placement.   Return in 2 weeks.  The patient and family had come for follow-up on November 8, 2024 regarding history of pT3, PN 1B, M0 adenocarcinoma of the colon s/p resection.  During previous evaluation the patient had presented with severe iron deficiency anemia as well as diarrhea.  C. difficile studies were negative.  Diarrhea is resolved.  [id number] was placed.  Initiate intravenous Feraheme for iron 10 mg/week x 2 weeks and also initiate adjuvant chemotherapy using FOLFOX to be given every 2 weeks for 12 cycles beginning November for, 2024    The patient and family had come for follow-up on December 13 , 2024 regarding history of pT3, PN 1B, M0 adenocarcinoma of the colon s/p resection.  During previous evaluation the patient had presented with severe iron deficiency anemia as well as diarrhea.  C. difficile studies were negative.   The patient received cycle 3,  2 weeks earlier and complained of easy fatigability and diarrhea that was easily controlled with Imodium.  Will reduce dose by 25% with cycle 4 and going forward.    The patient and family had come for follow-up on December 27, 2024 regarding history of pT3, PN 1B, M0 adenocarcinoma of colon s/p resection.  Receiving adjuvant chemotherapy dose reduced 25%.  Has received and tolerated 4 cycles without any problems.  Physical examination within normal limits.  Cycle 5 on December 30, 2024.     The patient and family had come for follow-up on 1/24/25 regarding history of pT3, PN 1B, M0 adenocarcinoma of colon s/p resection.  Receiving adjuvant chemotherapy dose reduced 25%.  Has received and tolerated 5 cycles without any problems.  Reviewed lab data with the patient.  Cycle 7 on January 28, 2025 at 25% dose reduction.  Return in 2 weeks.    Thank you for allowing me to participate in care of your patient if you have any questions please feel free to call me.      Diagnoses and all orders for this visit:  Microcytic anemia  -     Clinic Appointment Request  ([place] appt needed please); Future  Adenocarcinoma, colon (Multi)  -     Referral to Hematology and Oncology  -     Clinic Appointment Request ([place] appt needed please); Future  Other orders  -     heparin flush 10 unit/mL syringe 50 Units  -     heparin flush 100 unit/mL syringe 500 Units  -     alteplase (Cathflo Activase) injection 2 mg  -     ferumoxytol (Feraheme) 510 mg in sodium chloride 0.9% 117 mL IV  -     sodium chloride 0.9 % bolus 500 mL  -     dextrose 5 % in water (D5W) bolus  -     diphenhydrAMINE (BENADryl) injection 50 mg  -     methylPREDNISolone sod succinate (SOLU-Medrol) 40 mg/mL injection 40 mg  -     famotidine PF (Pepcid) injection 20 mg  -     EPINEPHrine (Epipen) injection syringe 0.3 mg  -     albuterol 2.5 mg /3 mL (0.083 %) nebulizer solution 3 mL           Rio Laswon MD

## 2025-01-25 LAB
ALBUMIN SERPL BCP-MCNC: 4 G/DL (ref 3.4–5)
ALP SERPL-CCNC: 129 U/L (ref 33–136)
ALT SERPL W P-5'-P-CCNC: 24 U/L (ref 10–52)
ANION GAP SERPL CALC-SCNC: 9 MMOL/L (ref 10–20)
AST SERPL W P-5'-P-CCNC: 37 U/L (ref 9–39)
BILIRUB SERPL-MCNC: 0.9 MG/DL (ref 0–1.2)
BUN SERPL-MCNC: 21 MG/DL (ref 6–23)
CALCIUM SERPL-MCNC: 8.8 MG/DL (ref 8.6–10.6)
CHLORIDE SERPL-SCNC: 105 MMOL/L (ref 98–107)
CO2 SERPL-SCNC: 27 MMOL/L (ref 21–32)
CREAT SERPL-MCNC: 0.8 MG/DL (ref 0.5–1.3)
EGFRCR SERPLBLD CKD-EPI 2021: >90 ML/MIN/1.73M*2
GLUCOSE SERPL-MCNC: 97 MG/DL (ref 74–99)
POTASSIUM SERPL-SCNC: 4 MMOL/L (ref 3.5–5.3)
PROT SERPL-MCNC: 6.4 G/DL (ref 6.4–8.2)
SODIUM SERPL-SCNC: 137 MMOL/L (ref 136–145)

## 2025-01-28 ENCOUNTER — HOSPITAL ENCOUNTER (OUTPATIENT)
Dept: HEMATOLOGY/ONCOLOGY | Facility: CLINIC | Age: 70
Discharge: HOME | End: 2025-01-28
Payer: MEDICARE

## 2025-01-28 VITALS
SYSTOLIC BLOOD PRESSURE: 179 MMHG | OXYGEN SATURATION: 97 % | HEART RATE: 60 BPM | RESPIRATION RATE: 16 BRPM | TEMPERATURE: 97.9 F | DIASTOLIC BLOOD PRESSURE: 77 MMHG | BODY MASS INDEX: 23.5 KG/M2 | HEIGHT: 72 IN | WEIGHT: 173.5 LBS

## 2025-01-28 DIAGNOSIS — D50.9 MICROCYTIC ANEMIA: ICD-10-CM

## 2025-01-28 DIAGNOSIS — C18.9 STAGE III CARCINOMA OF COLON (MULTI): ICD-10-CM

## 2025-01-28 DIAGNOSIS — C18.9 ADENOCARCINOMA, COLON (MULTI): ICD-10-CM

## 2025-01-28 PROCEDURE — 96413 CHEMO IV INFUSION 1 HR: CPT

## 2025-01-28 PROCEDURE — 2500000004 HC RX 250 GENERAL PHARMACY W/ HCPCS (ALT 636 FOR OP/ED): Performed by: INTERNAL MEDICINE

## 2025-01-28 PROCEDURE — 96416 CHEMO PROLONG INFUSE W/PUMP: CPT

## 2025-01-28 PROCEDURE — 96411 CHEMO IV PUSH ADDL DRUG: CPT

## 2025-01-28 PROCEDURE — 96415 CHEMO IV INFUSION ADDL HR: CPT

## 2025-01-28 PROCEDURE — 96375 TX/PRO/DX INJ NEW DRUG ADDON: CPT | Mod: INF

## 2025-01-28 RX ORDER — HEPARIN 100 UNIT/ML
500 SYRINGE INTRAVENOUS AS NEEDED
Status: CANCELLED | OUTPATIENT
Start: 2025-01-28

## 2025-01-28 RX ORDER — HEPARIN SODIUM,PORCINE/PF 10 UNIT/ML
50 SYRINGE (ML) INTRAVENOUS AS NEEDED
Status: CANCELLED | OUTPATIENT
Start: 2025-01-28

## 2025-01-28 RX ORDER — ALBUTEROL SULFATE 0.83 MG/ML
3 SOLUTION RESPIRATORY (INHALATION) AS NEEDED
Status: DISCONTINUED | OUTPATIENT
Start: 2025-01-28 | End: 2025-01-29 | Stop reason: HOSPADM

## 2025-01-28 RX ORDER — PROCHLORPERAZINE EDISYLATE 5 MG/ML
10 INJECTION INTRAMUSCULAR; INTRAVENOUS EVERY 6 HOURS PRN
Status: DISCONTINUED | OUTPATIENT
Start: 2025-01-28 | End: 2025-01-29 | Stop reason: HOSPADM

## 2025-01-28 RX ORDER — LORAZEPAM 2 MG/ML
1 INJECTION INTRAMUSCULAR AS NEEDED
Status: DISCONTINUED | OUTPATIENT
Start: 2025-01-28 | End: 2025-01-29 | Stop reason: HOSPADM

## 2025-01-28 RX ORDER — DIPHENHYDRAMINE HYDROCHLORIDE 50 MG/ML
50 INJECTION INTRAMUSCULAR; INTRAVENOUS AS NEEDED
Status: DISCONTINUED | OUTPATIENT
Start: 2025-01-28 | End: 2025-01-29 | Stop reason: HOSPADM

## 2025-01-28 RX ORDER — FLUOROURACIL 50 MG/ML
300 INJECTION, SOLUTION INTRAVENOUS ONCE
Status: COMPLETED | OUTPATIENT
Start: 2025-01-28 | End: 2025-01-28

## 2025-01-28 RX ORDER — PALONOSETRON 0.05 MG/ML
0.25 INJECTION, SOLUTION INTRAVENOUS ONCE
Status: COMPLETED | OUTPATIENT
Start: 2025-01-28 | End: 2025-01-28

## 2025-01-28 RX ORDER — FAMOTIDINE 10 MG/ML
20 INJECTION INTRAVENOUS ONCE AS NEEDED
Status: DISCONTINUED | OUTPATIENT
Start: 2025-01-28 | End: 2025-01-29 | Stop reason: HOSPADM

## 2025-01-28 RX ORDER — EPINEPHRINE 0.3 MG/.3ML
0.3 INJECTION SUBCUTANEOUS EVERY 5 MIN PRN
Status: DISCONTINUED | OUTPATIENT
Start: 2025-01-28 | End: 2025-01-29 | Stop reason: HOSPADM

## 2025-01-28 RX ORDER — DEXAMETHASONE 4 MG/1
12 TABLET ORAL ONCE
Status: COMPLETED | OUTPATIENT
Start: 2025-01-28 | End: 2025-01-28

## 2025-01-28 RX ORDER — PROCHLORPERAZINE MALEATE 10 MG
10 TABLET ORAL EVERY 6 HOURS PRN
Status: DISCONTINUED | OUTPATIENT
Start: 2025-01-28 | End: 2025-01-29 | Stop reason: HOSPADM

## 2025-01-28 RX ADMIN — PALONOSETRON HYDROCHLORIDE 250 MCG: 0.25 INJECTION INTRAVENOUS at 09:31

## 2025-01-28 RX ADMIN — OXALIPLATIN 135 MG: 5 INJECTION, SOLUTION INTRAVENOUS at 10:21

## 2025-01-28 RX ADMIN — FLUOROURACIL 3750 MG: 50 INJECTION, SOLUTION INTRAVENOUS at 12:40

## 2025-01-28 RX ADMIN — DEXAMETHASONE 12 MG: 4 TABLET ORAL at 09:31

## 2025-01-28 RX ADMIN — FLUOROURACIL 625 MG: 50 INJECTION, SOLUTION INTRAVENOUS at 12:35

## 2025-01-28 ASSESSMENT — PAIN SCALES - GENERAL: PAINLEVEL_OUTOF10: 0-NO PAIN

## 2025-01-28 NOTE — PROGRESS NOTES
Patient tolerated infusions without incident.  CADD pump verified as operating correctly.  Patient without complaints or questions.  Thursday appt time changed for pump disconnect and patient aware. Patient independently ambulatory off unit in NAD and without complaints.  Gait steady.  Call back instructions reviewed.  Patient verbalized understanding.

## 2025-01-28 NOTE — PROGRESS NOTES
Patient here for C7D1 mFOLFOX.  Port accessed without issue.  Patient states only change in symptoms is that his writing and fine dexterity is off but he also states it could be from years as a .  Will send SHM to team.  Patient gained a little weight so he is pleased with that.

## 2025-01-30 ENCOUNTER — DOCUMENTATION (OUTPATIENT)
Dept: HEMATOLOGY/ONCOLOGY | Facility: CLINIC | Age: 70
End: 2025-01-30
Payer: MEDICARE

## 2025-01-30 ENCOUNTER — HOSPITAL ENCOUNTER (OUTPATIENT)
Dept: HEMATOLOGY/ONCOLOGY | Facility: CLINIC | Age: 70
Discharge: HOME | End: 2025-01-30
Payer: MEDICARE

## 2025-01-30 VITALS
DIASTOLIC BLOOD PRESSURE: 75 MMHG | RESPIRATION RATE: 14 BRPM | TEMPERATURE: 97.9 F | SYSTOLIC BLOOD PRESSURE: 135 MMHG | HEART RATE: 41 BPM | OXYGEN SATURATION: 100 %

## 2025-01-30 DIAGNOSIS — D50.9 MICROCYTIC ANEMIA: ICD-10-CM

## 2025-01-30 DIAGNOSIS — C18.9 STAGE III CARCINOMA OF COLON (MULTI): ICD-10-CM

## 2025-01-30 DIAGNOSIS — C18.9 ADENOCARCINOMA, COLON (MULTI): ICD-10-CM

## 2025-01-30 PROCEDURE — 2500000004 HC RX 250 GENERAL PHARMACY W/ HCPCS (ALT 636 FOR OP/ED): Mod: JZ,TB | Performed by: INTERNAL MEDICINE

## 2025-01-30 PROCEDURE — 2500000004 HC RX 250 GENERAL PHARMACY W/ HCPCS (ALT 636 FOR OP/ED): Performed by: INTERNAL MEDICINE

## 2025-01-30 PROCEDURE — 96372 THER/PROPH/DIAG INJ SC/IM: CPT

## 2025-01-30 RX ORDER — HEPARIN SODIUM,PORCINE/PF 10 UNIT/ML
50 SYRINGE (ML) INTRAVENOUS AS NEEDED
Status: DISCONTINUED | OUTPATIENT
Start: 2025-01-30 | End: 2025-01-31 | Stop reason: HOSPADM

## 2025-01-30 RX ORDER — HEPARIN 100 UNIT/ML
500 SYRINGE INTRAVENOUS AS NEEDED
Status: DISCONTINUED | OUTPATIENT
Start: 2025-01-30 | End: 2025-01-31 | Stop reason: HOSPADM

## 2025-01-30 RX ORDER — HEPARIN SODIUM,PORCINE/PF 10 UNIT/ML
50 SYRINGE (ML) INTRAVENOUS AS NEEDED
OUTPATIENT
Start: 2025-01-30

## 2025-01-30 RX ORDER — HEPARIN 100 UNIT/ML
500 SYRINGE INTRAVENOUS AS NEEDED
OUTPATIENT
Start: 2025-01-30

## 2025-01-30 RX ADMIN — PEGFILGRASTIM 6 MG: 6 INJECTION SUBCUTANEOUS at 11:38

## 2025-01-30 RX ADMIN — HEPARIN 500 UNITS: 100 SYRINGE at 11:38

## 2025-01-30 ASSESSMENT — PAIN SCALES - GENERAL: PAINLEVEL_OUTOF10: 0-NO PAIN

## 2025-01-30 NOTE — PROGRESS NOTES
"Received message from infusion nurse Todd as pt was here today for pump disconnect:  \"Just wanted to make you aware that Ed is becoming more and more bradycardiac with each visit. His farris rate was in low 50's & is now dropped to the low 40's. Patient stated that he is drinking a lot of extra fluid but is still experiencing lightheadedness. \"    Will discuss with Dr. Lawson when he returns on 1/30/25.   "

## 2025-01-31 NOTE — PROGRESS NOTES
"Discussed assessment below with Dr. Lawson.  Orders to bring patient in today for orthostatics and EKG.    Call placed to Ed and he states he does not want to come in.  States he is drinking 48-64 oz fluid daily and his heartrate is \"always low\".  States when he stands up his balance is off at times but he quickly recovers and no continued lightheadedness.  Pt has appt with Dr. Archibald, cardiology on 2/18/25.  Encouraged patient to call Dr. Archibald to see if he can get a sooner appt.  Also reinforced not to ignore symptoms if any shortness of breath, worsening lightheadedness or balance issues, chest pain, heart fluttering he should go to emergency room.  Patient verbalizes understanding of plan of care via teachback method.     Dr. Lawson notified of pt not wanting to come in today for orthos/EKG and agrees with patient advise provided.   "

## 2025-02-07 ENCOUNTER — EDUCATION (OUTPATIENT)
Dept: HEMATOLOGY/ONCOLOGY | Facility: CLINIC | Age: 70
End: 2025-02-07

## 2025-02-07 ENCOUNTER — OFFICE VISIT (OUTPATIENT)
Dept: HEMATOLOGY/ONCOLOGY | Facility: CLINIC | Age: 70
End: 2025-02-07
Payer: MEDICARE

## 2025-02-07 ENCOUNTER — INFUSION (OUTPATIENT)
Dept: HEMATOLOGY/ONCOLOGY | Facility: CLINIC | Age: 70
End: 2025-02-07
Payer: MEDICARE

## 2025-02-07 VITALS
HEART RATE: 62 BPM | TEMPERATURE: 99 F | BODY MASS INDEX: 23.16 KG/M2 | OXYGEN SATURATION: 99 % | DIASTOLIC BLOOD PRESSURE: 61 MMHG | RESPIRATION RATE: 16 BRPM | WEIGHT: 171.96 LBS | SYSTOLIC BLOOD PRESSURE: 136 MMHG

## 2025-02-07 DIAGNOSIS — D50.9 MICROCYTIC ANEMIA: ICD-10-CM

## 2025-02-07 DIAGNOSIS — C18.9 STAGE III CARCINOMA OF COLON (MULTI): ICD-10-CM

## 2025-02-07 DIAGNOSIS — C18.9 ADENOCARCINOMA, COLON (MULTI): ICD-10-CM

## 2025-02-07 LAB
BASOPHILS # BLD AUTO: 0.06 X10*3/UL (ref 0–0.1)
BASOPHILS NFR BLD AUTO: 0.5 %
EOSINOPHIL # BLD AUTO: 0.06 X10*3/UL (ref 0–0.7)
EOSINOPHIL NFR BLD AUTO: 0.5 %
ERYTHROCYTE [DISTWIDTH] IN BLOOD BY AUTOMATED COUNT: 18.4 % (ref 11.5–14.5)
HCT VFR BLD AUTO: 36 % (ref 41–52)
HGB BLD-MCNC: 12.1 G/DL (ref 13.5–17.5)
IMM GRANULOCYTES # BLD AUTO: 0.04 X10*3/UL (ref 0–0.7)
IMM GRANULOCYTES NFR BLD AUTO: 0.3 % (ref 0–0.9)
LYMPHOCYTES # BLD AUTO: 1.04 X10*3/UL (ref 1.2–4.8)
LYMPHOCYTES NFR BLD AUTO: 7.8 %
MCH RBC QN AUTO: 35 PG (ref 26–34)
MCHC RBC AUTO-ENTMCNC: 33.6 G/DL (ref 32–36)
MCV RBC AUTO: 104 FL (ref 80–100)
MONOCYTES # BLD AUTO: 1 X10*3/UL (ref 0.1–1)
MONOCYTES NFR BLD AUTO: 7.5 %
NEUTROPHILS # BLD AUTO: 11.1 X10*3/UL (ref 1.2–7.7)
NEUTROPHILS NFR BLD AUTO: 83.4 %
NRBC BLD-RTO: ABNORMAL /100{WBCS}
PLATELET # BLD AUTO: 92 X10*3/UL (ref 150–450)
RBC # BLD AUTO: 3.46 X10*6/UL (ref 4.5–5.9)
WBC # BLD AUTO: 13.3 X10*3/UL (ref 4.4–11.3)

## 2025-02-07 PROCEDURE — 99215 OFFICE O/P EST HI 40 MIN: CPT | Performed by: INTERNAL MEDICINE

## 2025-02-07 PROCEDURE — 2500000004 HC RX 250 GENERAL PHARMACY W/ HCPCS (ALT 636 FOR OP/ED): Performed by: INTERNAL MEDICINE

## 2025-02-07 PROCEDURE — 1126F AMNT PAIN NOTED NONE PRSNT: CPT | Performed by: INTERNAL MEDICINE

## 2025-02-07 PROCEDURE — 80053 COMPREHEN METABOLIC PANEL: CPT

## 2025-02-07 PROCEDURE — 36591 DRAW BLOOD OFF VENOUS DEVICE: CPT

## 2025-02-07 PROCEDURE — 1159F MED LIST DOCD IN RCRD: CPT | Performed by: INTERNAL MEDICINE

## 2025-02-07 PROCEDURE — 85025 COMPLETE CBC W/AUTO DIFF WBC: CPT

## 2025-02-07 RX ORDER — HEPARIN SODIUM,PORCINE/PF 10 UNIT/ML
50 SYRINGE (ML) INTRAVENOUS AS NEEDED
Status: DISCONTINUED | OUTPATIENT
Start: 2025-02-07 | End: 2025-02-07 | Stop reason: HOSPADM

## 2025-02-07 RX ORDER — HEPARIN 100 UNIT/ML
500 SYRINGE INTRAVENOUS AS NEEDED
Status: DISCONTINUED | OUTPATIENT
Start: 2025-02-07 | End: 2025-02-07 | Stop reason: HOSPADM

## 2025-02-07 RX ORDER — HEPARIN 100 UNIT/ML
500 SYRINGE INTRAVENOUS AS NEEDED
OUTPATIENT
Start: 2025-02-07

## 2025-02-07 RX ORDER — HEPARIN SODIUM,PORCINE/PF 10 UNIT/ML
50 SYRINGE (ML) INTRAVENOUS AS NEEDED
OUTPATIENT
Start: 2025-02-07

## 2025-02-07 RX ADMIN — HEPARIN 500 UNITS: 100 SYRINGE at 15:17

## 2025-02-07 ASSESSMENT — PAIN SCALES - GENERAL: PAINLEVEL_OUTOF10: 0-NO PAIN

## 2025-02-07 NOTE — PROGRESS NOTES
Patient ID: Krish Batista is a 69 y.o. male.  Referring Physician: Rio Lawson MD  [address] [facility], [place] [address] [place] [address]  Primary Care Provider: Matthew Small,    8/15/24, EGD and colonoscopy  A.  Duodenum, first part, biopsy:  Duodenal mucosa within normal limits.     B.  Stomach, biopsy:  Antrum and corpus mucosa within normal limits.     C.  Colon, mass at 40 cm, biopsy:  Fragments of invasive adenocarcinoma, moderately differentiated.  See note.     NOTE: Immunohistochemical stains for mismatch repair proteins are pending and the result will be reported as an addendum.     D.  Colon, 20 cm, polypectomy:  Tubular adenoma.                               MISMATCH REPAIR PROTEIN EXPRESSION     C. Colon, mass at 40 cm, biopsy: Adenocarcinoma     Protein:  Result     MLH-1:  Expression Present                                    PMS-2: Expression Present                                    MSH-2: Expression Present                                    MSH-6: Expression Present    CEA at 1.5 NG per mL on August 2, 2024    Colon, splenic flexure, resection:  -Invasive adenocarcinoma (3.1 cm), moderately differentiated.  -Carcinoma invades pericolonic tissue.  -Extramural venous and perineural invasion present.  -Resection margins are negative for carcinoma.  -Metastatic adenocarcinoma involving two of thirty-seven lymph nodes (2/37).  -Pathologic stage: pT3, pN1b.  -See comment and synoptic report.  Comment:  Movat stains (blocks A4 and A5) confirm the venous invasion.    Electronically signed by Sharonda Maguire MD PhD on 9/30/2024 at 1350      [facility]   By the signature on this report, the individual or group listed as making the Final Interpretation/Diagnosis certifies that they have reviewed this case.    Case Summary Report   COLON AND RECTUM: Resection, Including Transanal Disk Excision of Rectal Neoplasms   8th Edition - Protocol posted: 6/22/2022COLON AND RECTUM: RESECTION, INCLUDING  TRANSANAL DISK EXCISION OF RECTAL NEOPLASMS - All Specimens  SPECIMEN   Procedure  resection   TUMOR   Tumor Site  Splenic flexure   Histologic Type  Adenocarcinoma   Histologic Grade  G2, moderately differentiated   Tumor Size  Greatest dimension (Centimeters): 3.1 cm   Tumor Extent  Invades through muscularis propria into the pericolonic or perirectal tissue   Macroscopic Tumor Perforation  Not identified   Lymphovascular Invasion  Large vessel (venous), extramural   Perineural Invasion  Present   Tumor Bud Score  Intermediate (5-9)   Treatment Effect  No known presurgical therapy   MARGINS   Margin Status for Invasive Carcinoma  All margins negative for invasive carcinoma   Closest Margin(s) to Invasive Carcinoma  Radial (circumferential) or mesenteric   Distance from Invasive Carcinoma to Closest Margin  Greater than 1 cm   Margin Status for Non-Invasive Tumor  All margins negative for high-grade dysplasia / intramucosal carcinoma and low-grade dysplasia   REGIONAL LYMPH NODES   Regional Lymph Node Status  Tumor present in regional lymph node(s)   Number of Lymph Nodes with Tumor  2   Number of Lymph Nodes Examined  37   Tumor Deposits  Not identified   PATHOLOGIC STAGE CLASSIFICATION (pTNM, AJCC 8th Edition)   Reporting of pT, pN, and (when applicable) pM categories is based on information available to the pathologist at the time the report is issued. As per the AJCC (Chapter 1, 8th Ed.) it is the managing physician’s responsibility to establish the final pathologic stage based upon all pertinent information, including but potentially not limited to this pathology report.   pT Category  pT3   pN Category  pN1b   .        Subjective    HPI  The patient was referred to me by Dr. Clark for further evaluation and management of newly diagnosed biopsy-proven left colon adenocarcinoma.  The patient presented to Dr. Small on March 25, 2024 complaining of fatigue and malaise.  Further evaluation revealed significant  anemia at hemoglobin 8.1 g/dL ferritin at 8 NG per mL iron saturation 3%.  The patient was referred to GI services however he failed to follow through.  He was referred to surgery as well.  Repeat hemoglobin on July 11, 2024 was 7.2 g/dL.  He then presented to ER on August 1, 2024 secondary to progressive fatigue and weakness.  He was diagnosed with severe microcytic anemia hemoglobin 7.4 g/dL.  The patient received 2 units of packed red blood cell transfusions.  Upper and lower endoscopy on August 3, 2024 revealed a mass at 40 cm in the anal verge.  Histology consistent with adenocarcinoma.  In ER the patient was found to be in atrial fibrillation/flutter with RVR.  Cardiology was consulted.  He spontaneously converted to normal sinus rhythm.  Color Doppler echocardiogram revealed normal ejection fraction.  He was placed on metoprolol 50 mg p.o. twice daily with a Holter monitor.  Anticoagulation was not recommended.  Colonoscopy on August 15, 2024 revealed internal small hemorrhoids.  1 8 mm sessile polyp 20 centimeter from anal verge.  This was snared.  Single friable and ulcerated mass not transferred stable 40 cm from the anal verge, covering the whole circumference; bleeding occurred before intervention.  Performed cold forceps biopsy with partial removal: Tattooed distal to the finding with spot.  CT scan of chest abdomen pelvis on August 1, 2024 with IV contrast did not reveal any obvious metastatic disease.  A PET scan revealed bilateral pleural effusion right greater than left.  Thoracentesis was performed on September 3, 2024 cytology is pending.  The patient claims that he is feeling better after thoracentesis.    At interview on September 4, 2024 he was accompanied by his niece denied history of weight loss, fevers, night sweats, chest pain, nausea, vomiting, hematemesis, melena, hematochezia and hematuria.  Since last evaluation the patient has had multiple admissions to the hospital for myocardial  infarction requiring cardiac catheterization, status post surgery/left hemicolectomy on September 15, 2024, GI bleed hypotension requiring iron infusion as well as blood transfusions.  The patient was discharged and claims that he is beginning to feel better although has pedal edema as well as anterior abdominal wall edema.  The patient and family had come for follow-up on November 8, 2024 regarding history of pT3, PN 1B, M0 adenocarcinoma of the colon s/p resection.  During previous evaluation the patient had presented with severe iron deficiency anemia as well as diarrhea.  C. difficile studies were negative.  Diarrhea is resolved.  [id number] was placed.  Initiate intravenous Feraheme for iron 10 mg/week x 2 weeks and also initiate adjuvant chemotherapy using FOLFOX to be given every 2 weeks for 12 cycles.  Cycle 1 on November 4, 2024    The patient and family had come for follow-up on December 13 , 2024 regarding history of pT3, PN 1B, M0 adenocarcinoma of the colon s/p resection.  During previous evaluation the patient had presented with severe iron deficiency anemia as well as diarrhea.  C. difficile studies were negative.   The patient received cycle 3,  2 weeks earlier and complained of easy fatigability and diarrhea that was easily controlled with Imodium.  Will reduce dose by 25% with cycle 4 and going forward.    The patient and family had come for follow-up on December 27, 2024 regarding history of pT3, PN 1B, M0 adenocarcinoma of colon s/p resection.  Receiving adjuvant chemotherapy dose reduced 25%.  Has received and tolerated 4 cycles without any problems.     The patient and family had come for follow-up on 2/7/25 regarding history of pT3, PN 1B, M0 adenocarcinoma of colon s/p resection.  Receiving adjuvant chemotherapy dose reduced 25%.  Has received and tolerated 7 cycles without any problems.  Past medical history:    Iron deficiency anemia, adenocarcinoma of descending colon, history of  Raynaud's phenomenon.  Past surgical history:    Sinus surgery July 13, 2021, history of undescended testis surgery, and tonsillectomy.      Review of Systems   All other systems reviewed and are negative.       Objective   BSA: 1.99 meters squared  /61   Pulse 62   Temp 37.2 °C (99 °F) (Temporal)   Resp 16   Wt 78 kg (171 lb 15.3 oz)   SpO2 99%   BMI 23.16 kg/m²     No family history on file.  Oncology History   Adenocarcinoma, colon (Multi)   8/23/2024 Initial Diagnosis    Adenocarcinoma, colon (Multi)     11/4/2024 -  Chemotherapy    mFOLFOX6 (Fluorouracil Continuous Infusion / Leucovorin / Oxaliplatin), 14 Day Cycles       The patient is 69 years old, single has no children retired as a .  Krish Batista  reports that he has never smoked. He has never used smokeless tobacco.  He  reports that he does not currently use alcohol.  He  reports no history of drug use.    Physical Exam  Constitutional:       Appearance: Normal appearance.   HENT:      Head: Normocephalic and atraumatic.      Nose: Nose normal.      Mouth/Throat:      Mouth: Mucous membranes are moist.      Pharynx: Oropharynx is clear.   Eyes:      Extraocular Movements: Extraocular movements intact.      Conjunctiva/sclera: Conjunctivae normal.      Pupils: Pupils are equal, round, and reactive to light.   Cardiovascular:      Rate and Rhythm: Normal rate and regular rhythm.   Pulmonary:      Effort: Pulmonary effort is normal.      Breath sounds: Normal breath sounds.   Abdominal:      General: Abdomen is flat. Bowel sounds are normal.      Palpations: Abdomen is soft.   Musculoskeletal:         General: Normal range of motion.      Cervical back: Normal range of motion and neck supple.   Neurological:      General: No focal deficit present.      Mental Status: He is alert and oriented to person, place, and time. Mental status is at baseline.   Psychiatric:         Mood and Affect: Mood normal.         Behavior: Behavior normal.          Thought Content: Thought content normal.         Judgment: Judgment normal.         Performance Status:  Symptomatic; in bed <50% of the day    Assessment/Plan    The patient is a 69-year-old man presented with easy fatigability shortness of breath on exertion.  Diagnosed with iron deficiency anemia.  Cologuard was positive.  The patient was referred to GI services but did not follow through.  Eventually presented to ER with above symptoms did receive 2 units of packed red blood cell transfusions.  He did develop atrial fibrillation/flutter while in ER but spontaneously converted to normal sinus rhythm was placed on metoprolol.  Colonoscopy revealed a mass 40 cm from anal verge biopsy/histology consistent with adenocarcinoma.  Initially CT scan of chest abdomen pelvis did not reveal any evidence of metastatic disease.  But a PET scan revealed large right pleural effusion and small left pleural effusion.  Thoracentesis performed on right pleural effusion on September 3, 2024 cytology is pending.  If negative would seek opinion about hilar/mediastinal lymphadenopathy.    Iron deficiency anemia:    The patient is still symptomatic with anemia.  I have recommended intravenous Feraheme 510 mg/week x 2 weeks.  Effect side effects explained.  The patient understood appreciated all the details provided and was grateful.  T3 N1 M0 adenocarcinoma of sigmoid colon s/p resection on September 15, 2024.    The patient and family had come for follow-up on October 3, 2024 as described above multiple admissions requiring stay in ICU, history of MI GI bleed requiring blood and iron transfusions.  Physical examination revealed pedal edema and CBC revealed hemoglobin 9.2 g/dL.  I have recommended evaluation of anemia as well as Doppler ultrasound.  Discussed in detail about options such as do-nothing, consider adjuvant chemotherapy using FOLFOX which might elicit 25% survival benefit.  The patient is agreeable to start  chemotherapy.  This will necessitate port placement.  Return in 2 weeks.  The patient and family had come for follow-up on November 8, 2024 regarding history of pT3, PN 1B, M0 adenocarcinoma of the colon s/p resection.  During previous evaluation the patient had presented with severe iron deficiency anemia as well as diarrhea.  C. difficile studies were negative.  Diarrhea is resolved.  Hhfggj-[id number] was placed.  Initiate intravenous Feraheme for iron 10 mg/week x 2 weeks and also initiate adjuvant chemotherapy using FOLFOX to be given every 2 weeks for 12 cycles beginning November for, 2024    The patient and family had come for follow-up on December 13 , 2024 regarding history of pT3, PN 1B, M0 adenocarcinoma of the colon s/p resection.  During previous evaluation the patient had presented with severe iron deficiency anemia as well as diarrhea.  C. difficile studies were negative.   The patient received cycle 3,  2 weeks earlier and complained of easy fatigability and diarrhea that was easily controlled with Imodium.  Will reduce dose by 25% with cycle 4 and going forward.    The patient and family had come for follow-up on December 27, 2024 regarding history of pT3, PN 1B, M0 adenocarcinoma of colon s/p resection.  Receiving adjuvant chemotherapy dose reduced 25%.  Has received and tolerated 4 cycles without any problems.  Physical examination within normal limits.  Cycle 5 on December 30, 2024.     The patient and family had come for follow-up on 1/24/25 regarding history of pT3, PN 1B, M0 adenocarcinoma of colon s/p resection.  Receiving adjuvant chemotherapy dose reduced 25%.  Has received and tolerated 5 cycles without any problems.  Reviewed lab data with the patient.  Cycle 7 on January 28, 2025 at 25% dose reduction.  Return in 2 weeks.     The patient and family had come for follow-up on 2/7/25 regarding history of pT3, PN 1B, M0 adenocarcinoma of colon s/p resection.  Receiving adjuvant chemotherapy dose  reduced 25%.  Has received and tolerated 7 cycles without any problems physical examination within normal limits reviewed lab data with the patient.  Cycle 10 on February 12, 2025.    Thank you for allowing me to participate in care of your patient if you have any questions please feel free to call me.      Diagnoses and all orders for this visit:  Microcytic anemia  -     Clinic Appointment Request ([place] appt needed please); Future  Adenocarcinoma, colon (Multi)  -     Referral to Hematology and Oncology  -     Clinic Appointment Request ([place] appt needed please); Future  Other orders  -     heparin flush 10 unit/mL syringe 50 Units  -     heparin flush 100 unit/mL syringe 500 Units  -     alteplase (Cathflo Activase) injection 2 mg  -     ferumoxytol (Feraheme) 510 mg in sodium chloride 0.9% 117 mL IV  -     sodium chloride 0.9 % bolus 500 mL  -     dextrose 5 % in water (D5W) bolus  -     diphenhydrAMINE (BENADryl) injection 50 mg  -     methylPREDNISolone sod succinate (SOLU-Medrol) 40 mg/mL injection 40 mg  -     famotidine PF (Pepcid) injection 20 mg  -     EPINEPHrine (Epipen) injection syringe 0.3 mg  -     albuterol 2.5 mg /3 mL (0.083 %) nebulizer solution 3 mL           Rio Lawson MD

## 2025-02-07 NOTE — PROGRESS NOTES
[facility] Infusion Note     Krish Batista is a 69 y.o. year old male here today,02/07/25,  in the [facility] infusion center for  following treatment regimen:    Medications given:  Administrations This Visit       heparin flush 100 unit/mL syringe 500 Units       Admin Date  02/07/2025 Action  Given Dose  500 Units Route  intra-catheter Documented By  Jah Hickman RN                        Pt tolerated and was discharged to home in stable condition. Pt ambulated  off the unit with a slow and steady gait. Pt had no further questions or concerns at this time.

## 2025-02-08 LAB
ALBUMIN SERPL BCP-MCNC: 3.9 G/DL (ref 3.4–5)
ALP SERPL-CCNC: 126 U/L (ref 33–136)
ALT SERPL W P-5'-P-CCNC: 22 U/L (ref 10–52)
ANION GAP SERPL CALC-SCNC: 11 MMOL/L (ref 10–20)
AST SERPL W P-5'-P-CCNC: 24 U/L (ref 9–39)
BILIRUB SERPL-MCNC: 0.6 MG/DL (ref 0–1.2)
BUN SERPL-MCNC: 17 MG/DL (ref 6–23)
CALCIUM SERPL-MCNC: 8.7 MG/DL (ref 8.6–10.6)
CHLORIDE SERPL-SCNC: 105 MMOL/L (ref 98–107)
CO2 SERPL-SCNC: 27 MMOL/L (ref 21–32)
CREAT SERPL-MCNC: 0.79 MG/DL (ref 0.5–1.3)
EGFRCR SERPLBLD CKD-EPI 2021: >90 ML/MIN/1.73M*2
GLUCOSE SERPL-MCNC: 100 MG/DL (ref 74–99)
POTASSIUM SERPL-SCNC: 3.8 MMOL/L (ref 3.5–5.3)
PROT SERPL-MCNC: 5.9 G/DL (ref 6.4–8.2)
SODIUM SERPL-SCNC: 139 MMOL/L (ref 136–145)

## 2025-02-12 ENCOUNTER — INFUSION (OUTPATIENT)
Dept: HEMATOLOGY/ONCOLOGY | Facility: CLINIC | Age: 70
End: 2025-02-12
Payer: MEDICARE

## 2025-02-12 VITALS
HEART RATE: 54 BPM | OXYGEN SATURATION: 99 % | RESPIRATION RATE: 14 BRPM | BODY MASS INDEX: 23.41 KG/M2 | TEMPERATURE: 98.1 F | HEIGHT: 72 IN | SYSTOLIC BLOOD PRESSURE: 121 MMHG | DIASTOLIC BLOOD PRESSURE: 61 MMHG | WEIGHT: 172.84 LBS

## 2025-02-12 DIAGNOSIS — C18.9 STAGE III CARCINOMA OF COLON (MULTI): ICD-10-CM

## 2025-02-12 DIAGNOSIS — C18.9 ADENOCARCINOMA, COLON (MULTI): ICD-10-CM

## 2025-02-12 PROCEDURE — 96375 TX/PRO/DX INJ NEW DRUG ADDON: CPT | Mod: INF

## 2025-02-12 PROCEDURE — 2500000004 HC RX 250 GENERAL PHARMACY W/ HCPCS (ALT 636 FOR OP/ED): Performed by: INTERNAL MEDICINE

## 2025-02-12 PROCEDURE — 96416 CHEMO PROLONG INFUSE W/PUMP: CPT

## 2025-02-12 PROCEDURE — 96411 CHEMO IV PUSH ADDL DRUG: CPT

## 2025-02-12 PROCEDURE — 96415 CHEMO IV INFUSION ADDL HR: CPT

## 2025-02-12 PROCEDURE — 96413 CHEMO IV INFUSION 1 HR: CPT

## 2025-02-12 RX ORDER — PROCHLORPERAZINE EDISYLATE 5 MG/ML
10 INJECTION INTRAMUSCULAR; INTRAVENOUS EVERY 6 HOURS PRN
Status: DISCONTINUED | OUTPATIENT
Start: 2025-02-12 | End: 2025-02-12 | Stop reason: HOSPADM

## 2025-02-12 RX ORDER — FAMOTIDINE 10 MG/ML
20 INJECTION INTRAVENOUS ONCE AS NEEDED
Status: DISCONTINUED | OUTPATIENT
Start: 2025-02-12 | End: 2025-02-12 | Stop reason: HOSPADM

## 2025-02-12 RX ORDER — EPINEPHRINE 0.3 MG/.3ML
0.3 INJECTION SUBCUTANEOUS EVERY 5 MIN PRN
Status: DISCONTINUED | OUTPATIENT
Start: 2025-02-12 | End: 2025-02-12 | Stop reason: HOSPADM

## 2025-02-12 RX ORDER — PALONOSETRON 0.05 MG/ML
0.25 INJECTION, SOLUTION INTRAVENOUS ONCE
Status: COMPLETED | OUTPATIENT
Start: 2025-02-12 | End: 2025-02-12

## 2025-02-12 RX ORDER — LORAZEPAM 2 MG/ML
1 INJECTION INTRAMUSCULAR AS NEEDED
Status: DISCONTINUED | OUTPATIENT
Start: 2025-02-12 | End: 2025-02-12 | Stop reason: HOSPADM

## 2025-02-12 RX ORDER — DIPHENHYDRAMINE HYDROCHLORIDE 50 MG/ML
50 INJECTION INTRAMUSCULAR; INTRAVENOUS AS NEEDED
Status: DISCONTINUED | OUTPATIENT
Start: 2025-02-12 | End: 2025-02-12 | Stop reason: HOSPADM

## 2025-02-12 RX ORDER — PROCHLORPERAZINE MALEATE 10 MG
10 TABLET ORAL EVERY 6 HOURS PRN
Status: DISCONTINUED | OUTPATIENT
Start: 2025-02-12 | End: 2025-02-12 | Stop reason: HOSPADM

## 2025-02-12 RX ORDER — ALBUTEROL SULFATE 0.83 MG/ML
3 SOLUTION RESPIRATORY (INHALATION) AS NEEDED
Status: DISCONTINUED | OUTPATIENT
Start: 2025-02-12 | End: 2025-02-12 | Stop reason: HOSPADM

## 2025-02-12 RX ORDER — DEXAMETHASONE 4 MG/1
12 TABLET ORAL ONCE
Status: COMPLETED | OUTPATIENT
Start: 2025-02-12 | End: 2025-02-12

## 2025-02-12 RX ORDER — FLUOROURACIL 50 MG/ML
300 INJECTION, SOLUTION INTRAVENOUS ONCE
Status: COMPLETED | OUTPATIENT
Start: 2025-02-12 | End: 2025-02-12

## 2025-02-12 RX ADMIN — PALONOSETRON HYDROCHLORIDE 250 MCG: 0.25 INJECTION INTRAVENOUS at 08:55

## 2025-02-12 RX ADMIN — FLUOROURACIL 3750 MG: 50 INJECTION, SOLUTION INTRAVENOUS at 12:15

## 2025-02-12 RX ADMIN — FLUOROURACIL 625 MG: 50 INJECTION, SOLUTION INTRAVENOUS at 12:00

## 2025-02-12 RX ADMIN — OXALIPLATIN 135 MG: 5 INJECTION, SOLUTION INTRAVENOUS at 09:45

## 2025-02-12 RX ADMIN — DEXAMETHASONE 12 MG: 4 TABLET ORAL at 08:53

## 2025-02-12 ASSESSMENT — PAIN SCALES - GENERAL: PAINLEVEL_OUTOF10: 0-NO PAIN

## 2025-02-12 NOTE — PROGRESS NOTES
[facility] Infusion Note     Krish Batista is a 69 y.o. year old male here today,02/12/25,  in the [facility] infusion center for cycle 8 day 1 of the following treatment regimen:    mFOLFOX6 (Fluorouracil Continuous Infusion / Leucovorin / Oxaliplatin), 14 Day Cycles     Medications given:  Administrations This Visit       dexAMETHasone (Decadron) tablet 12 mg       Admin Date  02/12/2025 Action  Given Dose  12 mg Route  oral Documented By  Anne Marie Villalobos RN              fluorouracil (Adrucil) 3,750 mg in sodium chloride 0.9% 138 mL IV via Home Infusion       Admin Date  02/12/2025 Action  Given Dose  3,750 mg Rate  3 mL/hr Route  intravenous Documented By  Anne Marie Villalobos RN              fluorouracil (Adrucil) IV syringe 625 mg       Admin Date  02/12/2025 Action  New Bag Dose  625 mg Route  intravenous Documented By  Anne Marie Villalobos RN              OXALIplatin (Eloxatin) 135 mg in dextrose 5% 574 mL IV       Admin Date  02/12/2025 Action  New Bag Dose  135 mg Route  intravenous Documented By  Anne Marie Villalobos RN              palonosetron (Aloxi) injection 250 mcg       Admin Date  02/12/2025 Action  Given Dose  250 mcg Route  intravenous Documented By  Anne Marie Villalobos RN                  Pt aware of the change in his appointment time due to his home infusion hook up. Pt acknowledges that pump will be done Friday (2/14/25) around 10:15 am and his appointment will appear in [facility] as 10:30 am.     Pt tolerated medications above well and was discharged to home with family member in stable condition. Pt ambulated  off the unit independently with a slow and steady gait. Pt had no further questions or concerns at this time.

## 2025-02-13 ENCOUNTER — APPOINTMENT (OUTPATIENT)
Dept: HEMATOLOGY/ONCOLOGY | Facility: CLINIC | Age: 70
End: 2025-02-13
Payer: MEDICARE

## 2025-02-14 ENCOUNTER — APPOINTMENT (OUTPATIENT)
Dept: HEMATOLOGY/ONCOLOGY | Facility: CLINIC | Age: 70
End: 2025-02-14
Payer: MEDICARE

## 2025-02-14 ENCOUNTER — INFUSION (OUTPATIENT)
Dept: HEMATOLOGY/ONCOLOGY | Facility: CLINIC | Age: 70
End: 2025-02-14
Payer: MEDICARE

## 2025-02-14 VITALS
OXYGEN SATURATION: 99 % | HEART RATE: 60 BPM | DIASTOLIC BLOOD PRESSURE: 72 MMHG | TEMPERATURE: 98.4 F | RESPIRATION RATE: 14 BRPM | SYSTOLIC BLOOD PRESSURE: 149 MMHG

## 2025-02-14 DIAGNOSIS — C18.9 STAGE III CARCINOMA OF COLON (MULTI): ICD-10-CM

## 2025-02-14 DIAGNOSIS — D50.9 MICROCYTIC ANEMIA: ICD-10-CM

## 2025-02-14 DIAGNOSIS — C18.9 ADENOCARCINOMA, COLON (MULTI): ICD-10-CM

## 2025-02-14 PROCEDURE — 2500000004 HC RX 250 GENERAL PHARMACY W/ HCPCS (ALT 636 FOR OP/ED): Mod: JZ,TB | Performed by: INTERNAL MEDICINE

## 2025-02-14 PROCEDURE — 2500000004 HC RX 250 GENERAL PHARMACY W/ HCPCS (ALT 636 FOR OP/ED): Performed by: INTERNAL MEDICINE

## 2025-02-14 PROCEDURE — 96372 THER/PROPH/DIAG INJ SC/IM: CPT

## 2025-02-14 RX ORDER — HEPARIN 100 UNIT/ML
500 SYRINGE INTRAVENOUS AS NEEDED
Status: DISCONTINUED | OUTPATIENT
Start: 2025-02-14 | End: 2025-02-14 | Stop reason: HOSPADM

## 2025-02-14 RX ORDER — HEPARIN 100 UNIT/ML
500 SYRINGE INTRAVENOUS AS NEEDED
OUTPATIENT
Start: 2025-02-14

## 2025-02-14 RX ORDER — HEPARIN SODIUM,PORCINE/PF 10 UNIT/ML
50 SYRINGE (ML) INTRAVENOUS AS NEEDED
OUTPATIENT
Start: 2025-02-14

## 2025-02-14 RX ADMIN — PEGFLILGRASTIM-FPGK 6 MG: 6 INJECTION, SOLUTION SUBCUTANEOUS at 10:44

## 2025-02-14 RX ADMIN — HEPARIN 500 UNITS: 100 SYRINGE at 10:40

## 2025-02-14 ASSESSMENT — PAIN SCALES - GENERAL: PAINLEVEL_OUTOF10: 0-NO PAIN

## 2025-02-14 NOTE — PROGRESS NOTES
Patient here for pump disconnect.  States doing good.  States eating and drinking well.  Denies nausea/vomiting, diarrhea but does admit to a little constipation.  Home meds effective.  Denies any new complaints.  Port deaccessed without incident.  No redness, swelling or bleeding noted. Bandaid applied.  No other appts in system.  To scheduling to schedule next cycle. Patient independently ambulatory off unit in NAD and without complaints.  Gait steady.  Call back instructions reviewed.  Patient verbalized understanding.

## 2025-02-18 ENCOUNTER — TELEPHONE (OUTPATIENT)
Dept: HEMATOLOGY/ONCOLOGY | Facility: CLINIC | Age: 70
End: 2025-02-18
Payer: MEDICARE

## 2025-02-18 ENCOUNTER — OFFICE VISIT (OUTPATIENT)
Dept: CARDIOLOGY | Facility: CLINIC | Age: 70
End: 2025-02-18
Payer: MEDICARE

## 2025-02-18 VITALS
SYSTOLIC BLOOD PRESSURE: 142 MMHG | BODY MASS INDEX: 23.44 KG/M2 | HEART RATE: 65 BPM | WEIGHT: 174 LBS | DIASTOLIC BLOOD PRESSURE: 79 MMHG | OXYGEN SATURATION: 98 %

## 2025-02-18 DIAGNOSIS — C18.9 STAGE III CARCINOMA OF COLON (MULTI): ICD-10-CM

## 2025-02-18 DIAGNOSIS — I25.10 CORONARY ARTERY DISEASE INVOLVING NATIVE CORONARY ARTERY OF NATIVE HEART WITHOUT ANGINA PECTORIS: Primary | ICD-10-CM

## 2025-02-18 DIAGNOSIS — C18.9 ADENOCARCINOMA, COLON (MULTI): ICD-10-CM

## 2025-02-18 DIAGNOSIS — I48.0 PAROXYSMAL ATRIAL FIBRILLATION (MULTI): ICD-10-CM

## 2025-02-18 DIAGNOSIS — I48.3 TYPICAL ATRIAL FLUTTER (MULTI): ICD-10-CM

## 2025-02-18 PROCEDURE — 99214 OFFICE O/P EST MOD 30 MIN: CPT | Performed by: STUDENT IN AN ORGANIZED HEALTH CARE EDUCATION/TRAINING PROGRAM

## 2025-02-18 PROCEDURE — 1159F MED LIST DOCD IN RCRD: CPT | Performed by: STUDENT IN AN ORGANIZED HEALTH CARE EDUCATION/TRAINING PROGRAM

## 2025-02-18 RX ORDER — ATORVASTATIN CALCIUM 10 MG/1
10 TABLET, FILM COATED ORAL DAILY
Qty: 30 TABLET | Refills: 11 | Status: SHIPPED | OUTPATIENT
Start: 2025-02-18 | End: 2026-02-18

## 2025-02-18 NOTE — TELEPHONE ENCOUNTER
Call placed to patient as he has no FUV with Dr. Lawson this week and needs chemo the following week.  Pt states he would like to continue with chemo on Wed/Friday schedule.  New scheduling orders placed.

## 2025-02-18 NOTE — PROGRESS NOTES
Chief Complaint:   No chief complaint on file.     History Of Present Illness:    Krish Batista is a 69 y.o. male returns to clinic for follow-up.  Patient is receiving chemotherapy.  Had episode of bradycardia is no longer on amiodarone or metoprolol.  He also discontinued atorvastatin.  Occasionally has chest pain which he believes is related to his chest port.  No overt angina symptoms.  He is receiving iron infusions for his anemia his hemoglobin is stable in the 12 range.  persistent leukocytosis as well as thrombocytopenia.     Last Recorded Vitals:  Vitals:    02/18/25 1454   BP: 142/79   BP Location: Left arm   Patient Position: Sitting   Pulse: 65   SpO2: 98%   Weight: 78.9 kg (174 lb)       Review of Systems  ROS      Allergies:  Amoxicillin    Outpatient Medications:  Current Outpatient Medications   Medication Instructions    atorvastatin (LIPITOR) 80 mg, oral, Nightly    atorvastatin (LIPITOR) 10 mg, oral, Daily    diphenhydramine/Maalox/lidocaine (Magic Mouthwash) - Compounded - Outpatient Swish and spit 10 mL every 4 hours As needed for mucositis.    gabapentin (NEURONTIN) 300 mg, oral, 3 times daily    ondansetron (ZOFRAN) 8 mg, oral, Every 8 hours PRN    pantoprazole (PROTONIX) 40 mg, oral, Daily, Do not crush, chew, or split.    prochlorperazine (COMPAZINE) 10 mg, oral, Every 6 hours PRN       Physical Exam:  General: No acute distress,  A&O x3, daughters present  Skin: Warm and dry  Neck: JVD is not elevated  ENT: Moist mucous membranes no lesions appreciated  Pulmonary: CTAB  Cards: Chest port over the left chest wall.  Regular rate rhythm, no murmurs gallops or rubs appreciated normal S1-S2  Abdomen: Soft nontender nondistended  Extremities: No edema or cyanosis  Psych: Appropriate mood and affect        Last Labs:  CBC -  Lab Results   Component Value Date    WBC 13.3 (H) 02/07/2025    HGB 12.1 (L) 02/07/2025    HCT 36.0 (L) 02/07/2025     (H) 02/07/2025    PLT 92 (L) 02/07/2025        CMP -  Lab Results   Component Value Date    CALCIUM 8.7 02/07/2025    PHOS 5.3 (H) 09/24/2024    PROT 5.9 (L) 02/07/2025    ALBUMIN 3.9 02/07/2025    AST 24 02/07/2025    ALT 22 02/07/2025    ALKPHOS 126 02/07/2025    BILITOT 0.6 02/07/2025       LIPID PANEL -   Lab Results   Component Value Date    CHOL 158 09/12/2024    TRIG 45 09/12/2024    HDL 44.1 09/12/2024    CHHDL 3.6 09/12/2024    VLDL 9 09/12/2024    NHDL 114 09/12/2024       RENAL FUNCTION PANEL -   Lab Results   Component Value Date    GLUCOSE 100 (H) 02/07/2025     02/07/2025    K 3.8 02/07/2025     02/07/2025    CO2 27 02/07/2025    ANIONGAP 11 02/07/2025    BUN 17 02/07/2025    CREATININE 0.79 02/07/2025    CALCIUM 8.7 02/07/2025    PHOS 5.3 (H) 09/24/2024    ALBUMIN 3.9 02/07/2025        Lab Results   Component Value Date     (H) 09/23/2024    HGBA1C 5.4 08/02/2024       Last Cardiology Tests:  ECG:  Encounter Date: 09/23/24   Electrocardiogram, 12-lead PRN ACS symptoms   Result Value    Ventricular Rate 87    Atrial Rate 87    AL Interval 126    QRS Duration 82    QT Interval 431    QTC Calculation(Bazett) 519    P Axis 67    R Axis 57    T Axis 1    QRS Count 14    Q Onset 249    T Offset 465    QTC Fredericia 487    Narrative    Sinus rhythm  Abnormal R-wave progression, early transition  Borderline T abnormalities, inferior leads  Prolonged QT interval    See ED provider note for full interpretation and clinical correlation  Confirmed by Ema Merrill ([id number]) on 9/25/2024 8:00:16 PM        Echo:  No results found for this or any previous visit from the past 1095 days.    Ejection Fractions:  EF   Date/Time Value Ref Range Status   09/12/2024 11:41 AM 53 %      Assessment and Plan    1.  Newly discovered atrial fibrillation/flutter with RVR in the setting of severe micro anemia.  CHADS2 Vascor of 1.  Spontaneous conversion back into normal sinus rhythm.  Normal LV function with severe normal pathology per  echocardiogram.  Recurrence of atrial fibrillation in the setting of severe anemia.  Oral anticoagulation discontinued.    Recent episode of bradycardia with heart rates in the 40s.  Since that time patient discontinued metoprolol and amiodarone.  He appears to be maintaining sinus rhythm today.     2.  Severe microcytic anemia: Resolved.     3.  NSTEMI/demand ischemia: Recent severe anemia has moderate blockage with a 60% ostial OM and 30% proximal RCA luminal irregularities in the LAD territory.  No longer on nitrates.  Advised patient to restart Lipitor 10 mg daily.     Follow-up in 6 months     (This note was generated with voice recognition software and may contain errors including spelling, grammar, syntax and missed recognition of what was dictated, of which may not have been fully corrected)     Luis Daniel Archibald MD PhD

## 2025-02-21 ENCOUNTER — INFUSION (OUTPATIENT)
Dept: HEMATOLOGY/ONCOLOGY | Facility: CLINIC | Age: 70
End: 2025-02-21
Payer: MEDICARE

## 2025-02-21 ENCOUNTER — OFFICE VISIT (OUTPATIENT)
Dept: HEMATOLOGY/ONCOLOGY | Facility: CLINIC | Age: 70
End: 2025-02-21
Payer: MEDICARE

## 2025-02-21 ENCOUNTER — EDUCATION (OUTPATIENT)
Dept: HEMATOLOGY/ONCOLOGY | Facility: CLINIC | Age: 70
End: 2025-02-21

## 2025-02-21 VITALS
WEIGHT: 172.18 LBS | TEMPERATURE: 96.6 F | SYSTOLIC BLOOD PRESSURE: 140 MMHG | OXYGEN SATURATION: 99 % | DIASTOLIC BLOOD PRESSURE: 75 MMHG | HEART RATE: 68 BPM | RESPIRATION RATE: 16 BRPM | BODY MASS INDEX: 23.19 KG/M2

## 2025-02-21 DIAGNOSIS — C18.9 STAGE III CARCINOMA OF COLON (MULTI): ICD-10-CM

## 2025-02-21 DIAGNOSIS — D50.9 MICROCYTIC ANEMIA: ICD-10-CM

## 2025-02-21 DIAGNOSIS — C18.9 ADENOCARCINOMA, COLON (MULTI): ICD-10-CM

## 2025-02-21 DIAGNOSIS — C18.9 STAGE III CARCINOMA OF COLON (MULTI): Primary | ICD-10-CM

## 2025-02-21 LAB
BASOPHILS # BLD AUTO: 0.04 X10*3/UL (ref 0–0.1)
BASOPHILS NFR BLD AUTO: 0.2 %
EOSINOPHIL # BLD AUTO: 0.05 X10*3/UL (ref 0–0.7)
EOSINOPHIL NFR BLD AUTO: 0.3 %
ERYTHROCYTE [DISTWIDTH] IN BLOOD BY AUTOMATED COUNT: 16.9 % (ref 11.5–14.5)
HCT VFR BLD AUTO: 38.1 % (ref 41–52)
HGB BLD-MCNC: 12.9 G/DL (ref 13.5–17.5)
IMM GRANULOCYTES # BLD AUTO: 0.05 X10*3/UL (ref 0–0.7)
IMM GRANULOCYTES NFR BLD AUTO: 0.3 % (ref 0–0.9)
LYMPHOCYTES # BLD AUTO: 1.13 X10*3/UL (ref 1.2–4.8)
LYMPHOCYTES NFR BLD AUTO: 5.7 %
MCH RBC QN AUTO: 35.5 PG (ref 26–34)
MCHC RBC AUTO-ENTMCNC: 33.9 G/DL (ref 32–36)
MCV RBC AUTO: 105 FL (ref 80–100)
MONOCYTES # BLD AUTO: 1.46 X10*3/UL (ref 0.1–1)
MONOCYTES NFR BLD AUTO: 7.4 %
NEUTROPHILS # BLD AUTO: 17.09 X10*3/UL (ref 1.2–7.7)
NEUTROPHILS NFR BLD AUTO: 86.1 %
NRBC BLD-RTO: ABNORMAL /100{WBCS}
PLATELET # BLD AUTO: 113 X10*3/UL (ref 150–450)
RBC # BLD AUTO: 3.63 X10*6/UL (ref 4.5–5.9)
WBC # BLD AUTO: 19.8 X10*3/UL (ref 4.4–11.3)

## 2025-02-21 PROCEDURE — 80053 COMPREHEN METABOLIC PANEL: CPT

## 2025-02-21 PROCEDURE — 99215 OFFICE O/P EST HI 40 MIN: CPT | Performed by: INTERNAL MEDICINE

## 2025-02-21 PROCEDURE — 2500000004 HC RX 250 GENERAL PHARMACY W/ HCPCS (ALT 636 FOR OP/ED): Performed by: INTERNAL MEDICINE

## 2025-02-21 PROCEDURE — 1159F MED LIST DOCD IN RCRD: CPT | Performed by: INTERNAL MEDICINE

## 2025-02-21 PROCEDURE — 85025 COMPLETE CBC W/AUTO DIFF WBC: CPT

## 2025-02-21 PROCEDURE — 1126F AMNT PAIN NOTED NONE PRSNT: CPT | Performed by: INTERNAL MEDICINE

## 2025-02-21 PROCEDURE — 36591 DRAW BLOOD OFF VENOUS DEVICE: CPT

## 2025-02-21 RX ORDER — HEPARIN 100 UNIT/ML
500 SYRINGE INTRAVENOUS AS NEEDED
OUTPATIENT
Start: 2025-02-21

## 2025-02-21 RX ORDER — HEPARIN SODIUM,PORCINE/PF 10 UNIT/ML
50 SYRINGE (ML) INTRAVENOUS AS NEEDED
OUTPATIENT
Start: 2025-02-21

## 2025-02-21 RX ORDER — HEPARIN 100 UNIT/ML
500 SYRINGE INTRAVENOUS AS NEEDED
Status: DISCONTINUED | OUTPATIENT
Start: 2025-02-21 | End: 2025-02-21 | Stop reason: HOSPADM

## 2025-02-21 RX ADMIN — HEPARIN 500 UNITS: 100 SYRINGE at 14:15

## 2025-02-21 ASSESSMENT — PAIN SCALES - GENERAL: PAINLEVEL_OUTOF10: 0-NO PAIN

## 2025-02-21 NOTE — PROGRESS NOTES
130 Patient ID: Krish Batista is a 69 y.o. male.  Referring Physician: Rio Lawson MD  [address] [facility], [place] [address] [place] [address]  Primary Care Provider: Matthew Small,    8/15/24, EGD and colonoscopy  A.  Duodenum, first part, biopsy:  Duodenal mucosa within normal limits.     B.  Stomach, biopsy:  Antrum and corpus mucosa within normal limits.     C.  Colon, mass at 40 cm, biopsy:  Fragments of invasive adenocarcinoma, moderately differentiated.  See note.     NOTE: Immunohistochemical stains for mismatch repair proteins are pending and the result will be reported as an addendum.     D.  Colon, 20 cm, polypectomy:  Tubular adenoma.                               MISMATCH REPAIR PROTEIN EXPRESSION     C. Colon, mass at 40 cm, biopsy: Adenocarcinoma     Protein:  Result     MLH-1:  Expression Present                                    PMS-2: Expression Present                                    MSH-2: Expression Present                                    MSH-6: Expression Present    CEA at 1.5 NG per mL on August 2, 2024    Colon, splenic flexure, resection:  -Invasive adenocarcinoma (3.1 cm), moderately differentiated.  -Carcinoma invades pericolonic tissue.  -Extramural venous and perineural invasion present.  -Resection margins are negative for carcinoma.  -Metastatic adenocarcinoma involving two of thirty-seven lymph nodes (2/37).  -Pathologic stage: pT3, pN1b.  -See comment and synoptic report.  Comment:  Movat stains (blocks A4 and A5) confirm the venous invasion.    Electronically signed by Sharonda Maguire MD PhD on 9/30/2024 at 1350      [facility]   By the signature on this report, the individual or group listed as making the Final Interpretation/Diagnosis certifies that they have reviewed this case.    Case Summary Report   COLON AND RECTUM: Resection, Including Transanal Disk Excision of Rectal Neoplasms   8th Edition - Protocol posted: 6/22/2022COLON AND RECTUM: RESECTION, INCLUDING  TRANSANAL DISK EXCISION OF RECTAL NEOPLASMS - All Specimens  SPECIMEN   Procedure  resection   TUMOR   Tumor Site  Splenic flexure   Histologic Type  Adenocarcinoma   Histologic Grade  G2, moderately differentiated   Tumor Size  Greatest dimension (Centimeters): 3.1 cm   Tumor Extent  Invades through muscularis propria into the pericolonic or perirectal tissue   Macroscopic Tumor Perforation  Not identified   Lymphovascular Invasion  Large vessel (venous), extramural   Perineural Invasion  Present   Tumor Bud Score  Intermediate (5-9)   Treatment Effect  No known presurgical therapy   MARGINS   Margin Status for Invasive Carcinoma  All margins negative for invasive carcinoma   Closest Margin(s) to Invasive Carcinoma  Radial (circumferential) or mesenteric   Distance from Invasive Carcinoma to Closest Margin  Greater than 1 cm   Margin Status for Non-Invasive Tumor  All margins negative for high-grade dysplasia / intramucosal carcinoma and low-grade dysplasia   REGIONAL LYMPH NODES   Regional Lymph Node Status  Tumor present in regional lymph node(s)   Number of Lymph Nodes with Tumor  2   Number of Lymph Nodes Examined  37   Tumor Deposits  Not identified   PATHOLOGIC STAGE CLASSIFICATION (pTNM, AJCC 8th Edition)   Reporting of pT, pN, and (when applicable) pM categories is based on information available to the pathologist at the time the report is issued. As per the AJCC (Chapter 1, 8th Ed.) it is the managing physician’s responsibility to establish the final pathologic stage based upon all pertinent information, including but potentially not limited to this pathology report.   pT Category  pT3   pN Category  pN1b   .        Subjective    HPI  The patient was referred to me by Dr. Clrak for further evaluation and management of newly diagnosed biopsy-proven left colon adenocarcinoma.  The patient presented to Dr. Small on March 25, 2024 complaining of fatigue and malaise.  Further evaluation revealed significant  anemia at hemoglobin 8.1 g/dL ferritin at 8 NG per mL iron saturation 3%.  The patient was referred to GI services however he failed to follow through.  He was referred to surgery as well.  Repeat hemoglobin on July 11, 2024 was 7.2 g/dL.  He then presented to ER on August 1, 2024 secondary to progressive fatigue and weakness.  He was diagnosed with severe microcytic anemia hemoglobin 7.4 g/dL.  The patient received 2 units of packed red blood cell transfusions.  Upper and lower endoscopy on August 3, 2024 revealed a mass at 40 cm in the anal verge.  Histology consistent with adenocarcinoma.  In ER the patient was found to be in atrial fibrillation/flutter with RVR.  Cardiology was consulted.  He spontaneously converted to normal sinus rhythm.  Color Doppler echocardiogram revealed normal ejection fraction.  He was placed on metoprolol 50 mg p.o. twice daily with a Holter monitor.  Anticoagulation was not recommended.  Colonoscopy on August 15, 2024 revealed internal small hemorrhoids.  1 8 mm sessile polyp 20 centimeter from anal verge.  This was snared.  Single friable and ulcerated mass not transferred stable 40 cm from the anal verge, covering the whole circumference; bleeding occurred before intervention.  Performed cold forceps biopsy with partial removal: Tattooed distal to the finding with spot.  CT scan of chest abdomen pelvis on August 1, 2024 with IV contrast did not reveal any obvious metastatic disease.  A PET scan revealed bilateral pleural effusion right greater than left.  Thoracentesis was performed on September 3, 2024 cytology is pending.  The patient claims that he is feeling better after thoracentesis.    At interview on September 4, 2024 he was accompanied by his niece denied history of weight loss, fevers, night sweats, chest pain, nausea, vomiting, hematemesis, melena, hematochezia and hematuria.  Since last evaluation the patient has had multiple admissions to the hospital for myocardial  infarction requiring cardiac catheterization, status post surgery/left hemicolectomy on September 15, 2024, GI bleed hypotension requiring iron infusion as well as blood transfusions.  The patient was discharged and claims that he is beginning to feel better although has pedal edema as well as anterior abdominal wall edema.  The patient and family had come for follow-up on November 8, 2024 regarding history of pT3, PN 1B, M0 adenocarcinoma of the colon s/p resection.  During previous evaluation the patient had presented with severe iron deficiency anemia as well as diarrhea.  C. difficile studies were negative.  Diarrhea is resolved.  [id number] was placed.  Initiate intravenous Feraheme for iron 10 mg/week x 2 weeks and also initiate adjuvant chemotherapy using FOLFOX to be given every 2 weeks for 12 cycles.  Cycle 1 on November 4, 2024    The patient and family had come for follow-up on December 13 , 2024 regarding history of pT3, PN 1B, M0 adenocarcinoma of the colon s/p resection.  During previous evaluation the patient had presented with severe iron deficiency anemia as well as diarrhea.  C. difficile studies were negative.   The patient received cycle 3,  2 weeks earlier and complained of easy fatigability and diarrhea that was easily controlled with Imodium.  Will reduce dose by 25% with cycle 4 and going forward.    The patient and family had come for follow-up on December 27, 2024 regarding history of pT3, PN 1B, M0 adenocarcinoma of colon s/p resection.  Receiving adjuvant chemotherapy dose reduced 25%.  Has received and tolerated 4 cycles without any problems.     The patient and family had come for follow-up on 2/7/25 regarding history of pT3, PN 1B, M0 adenocarcinoma of colon s/p resection.  Receiving adjuvant chemotherapy dose reduced 25%.  Has received and tolerated 7 cycles without any problems.    The patient and family had come for follow-up on 2/21/25 regarding history of pT3, PN 1B, M0  adenocarcinoma of colon s/p resection.  Receiving adjuvant chemotherapy dose reduced 25%.  Has received and tolerated 8 cycles without any problems.  Past medical history:    Iron deficiency anemia, adenocarcinoma of descending colon, history of Raynaud's phenomenon.  Past surgical history:    Sinus surgery July 13, 2021, history of undescended testis surgery, and tonsillectomy.      Review of Systems   All other systems reviewed and are negative.       Objective   BSA: 2 meters squared  /75   Pulse 68   Temp 35.9 °C (96.6 °F)   Resp 16   Wt 78.1 kg (172 lb 2.9 oz)   SpO2 99%   BMI 23.19 kg/m²     No family history on file.  Oncology History   Adenocarcinoma, colon (Multi)   8/23/2024 Initial Diagnosis    Adenocarcinoma, colon (Multi)     11/4/2024 -  Chemotherapy    mFOLFOX6 (Fluorouracil Continuous Infusion / Leucovorin / Oxaliplatin), 14 Day Cycles       The patient is 69 years old, single has no children retired as a .  Krish Batista  reports that he has never smoked. He has never used smokeless tobacco.  He  reports that he does not currently use alcohol.  He  reports no history of drug use.    Physical Exam  Constitutional:       Appearance: Normal appearance.   HENT:      Head: Normocephalic and atraumatic.      Nose: Nose normal.      Mouth/Throat:      Mouth: Mucous membranes are moist.      Pharynx: Oropharynx is clear.   Eyes:      Extraocular Movements: Extraocular movements intact.      Conjunctiva/sclera: Conjunctivae normal.      Pupils: Pupils are equal, round, and reactive to light.   Cardiovascular:      Rate and Rhythm: Normal rate and regular rhythm.   Pulmonary:      Effort: Pulmonary effort is normal.      Breath sounds: Normal breath sounds.   Abdominal:      General: Abdomen is flat. Bowel sounds are normal.      Palpations: Abdomen is soft.   Musculoskeletal:         General: Normal range of motion.      Cervical back: Normal range of motion and neck supple.    Neurological:      General: No focal deficit present.      Mental Status: He is alert and oriented to person, place, and time. Mental status is at baseline.   Psychiatric:         Mood and Affect: Mood normal.         Behavior: Behavior normal.         Thought Content: Thought content normal.         Judgment: Judgment normal.         Performance Status:  Symptomatic; in bed <50% of the day    Assessment/Plan    The patient is a 69-year-old man presented with easy fatigability shortness of breath on exertion.  Diagnosed with iron deficiency anemia.  Cologuard was positive.  The patient was referred to GI services but did not follow through.  Eventually presented to ER with above symptoms did receive 2 units of packed red blood cell transfusions.  He did develop atrial fibrillation/flutter while in ER but spontaneously converted to normal sinus rhythm was placed on metoprolol.  Colonoscopy revealed a mass 40 cm from anal verge biopsy/histology consistent with adenocarcinoma.  Initially CT scan of chest abdomen pelvis did not reveal any evidence of metastatic disease.  But a PET scan revealed large right pleural effusion and small left pleural effusion.  Thoracentesis performed on right pleural effusion on September 3, 2024 cytology is pending.  If negative would seek opinion about hilar/mediastinal lymphadenopathy.    Iron deficiency anemia:    The patient is still symptomatic with anemia.  I have recommended intravenous Feraheme 510 mg/week x 2 weeks.  Effect side effects explained.  The patient understood appreciated all the details provided and was grateful.  T3 N1 M0 adenocarcinoma of sigmoid colon s/p resection on September 15, 2024.    The patient and family had come for follow-up on October 3, 2024 as described above multiple admissions requiring stay in ICU, history of MI GI bleed requiring blood and iron transfusions.  Physical examination revealed pedal edema and CBC revealed hemoglobin 9.2 g/dL.  I have  recommended evaluation of anemia as well as Doppler ultrasound.  Discussed in detail about options such as do-nothing, consider adjuvant chemotherapy using FOLFOX which might elicit 25% survival benefit.  The patient is agreeable to start chemotherapy.  This will necessitate port placement.  Return in 2 weeks.  The patient and family had come for follow-up on November 8, 2024 regarding history of pT3, PN 1B, M0 adenocarcinoma of the colon s/p resection.  During previous evaluation the patient had presented with severe iron deficiency anemia as well as diarrhea.  C. difficile studies were negative.  Diarrhea is resolved.  [id number]-v-[id number] was placed.  Initiate intravenous Feraheme for iron 10 mg/week x 2 weeks and also initiate adjuvant chemotherapy using FOLFOX to be given every 2 weeks for 12 cycles beginning November for, 2024    The patient and family had come for follow-up on December 13 , 2024 regarding history of pT3, PN 1B, M0 adenocarcinoma of the colon s/p resection.  During previous evaluation the patient had presented with severe iron deficiency anemia as well as diarrhea.  C. difficile studies were negative.   The patient received cycle 3,  2 weeks earlier and complained of easy fatigability and diarrhea that was easily controlled with Imodium.  Will reduce dose by 25% with cycle 4 and going forward.    The patient and family had come for follow-up on December 27, 2024 regarding history of pT3, PN 1B, M0 adenocarcinoma of colon s/p resection.  Receiving adjuvant chemotherapy dose reduced 25%.  Has received and tolerated 4 cycles without any problems.  Physical examination within normal limits.  Cycle 5 on December 30, 2024.     The patient and family had come for follow-up on 1/24/25 regarding history of pT3, PN 1B, M0 adenocarcinoma of colon s/p resection.  Receiving adjuvant chemotherapy dose reduced 25%.  Has received and tolerated 5 cycles without any problems.  Reviewed lab data with the patient.   Cycle 7 on January 28, 2025 at 25% dose reduction.  Return in 2 weeks.     The patient and family had come for follow-up on 2/7/25 regarding history of pT3, PN 1B, M0 adenocarcinoma of colon s/p resection.  Receiving adjuvant chemotherapy dose reduced 25%.  Has received and tolerated 7 cycles without any problems physical examination within normal limits reviewed lab data with the patient.  Cycle 8 on February 12, 2025.    The patient and family had come for follow-up on 2/21/25 regarding history of pT3, PN 1B, M0 adenocarcinoma of colon s/p resection.  Receiving adjuvant chemotherapy dose reduced 25%.  Has received and tolerated 8 cycles without any problems.  Cycle 9 on February 26, 2025.  Return in 2 weeks.    Thank you for allowing me to participate in care of your patient if you have any questions please feel free to call me.      Diagnoses and all orders for this visit:  Microcytic anemia  -     Clinic Appointment Request ([place] appt needed please); Future  Adenocarcinoma, colon (Multi)  -     Referral to Hematology and Oncology  -     Clinic Appointment Request ([place] appt needed please); Future  Other orders  -     heparin flush 10 unit/mL syringe 50 Units  -     heparin flush 100 unit/mL syringe 500 Units  -     alteplase (Cathflo Activase) injection 2 mg  -     ferumoxytol (Feraheme) 510 mg in sodium chloride 0.9% 117 mL IV  -     sodium chloride 0.9 % bolus 500 mL  -     dextrose 5 % in water (D5W) bolus  -     diphenhydrAMINE (BENADryl) injection 50 mg  -     methylPREDNISolone sod succinate (SOLU-Medrol) 40 mg/mL injection 40 mg  -     famotidine PF (Pepcid) injection 20 mg  -     EPINEPHrine (Epipen) injection syringe 0.3 mg  -     albuterol 2.5 mg /3 mL (0.083 %) nebulizer solution 3 mL           Rio Lawson MD

## 2025-02-21 NOTE — PROGRESS NOTES
"Patient seen by Dr. Duque today in clinic. Reinforcement education provided regarding next steps with plan of care.      PER DR. DUQUE'S FUV NOTE TODAY: \"The patient and family had come for follow-up on 2/21/25 regarding history of pT3, PN 1B, M0 adenocarcinoma of colon s/p resection.  Receiving adjuvant chemotherapy dose reduced 25%.  Has received and tolerated 8 cycles without any problems.  Cycle 9 on February 26, 2025.  Return in 2 weeks. \"    Denies complaints, to continue with C#9 Folfox next week.  Dr. Duque discussing scans after cycle 12.  Patient verbalizes understanding of plan of care via teachback method.             "

## 2025-02-21 NOTE — PROGRESS NOTES
[facility] Infusion Note     Krish Batista is a 69 y.o. year old male here today,02/21/25,  in the [facility] infusion center for a CBC and CMP to be drawn from his port today.    Pt was seen and assessed by the provider.       Medications given:  Administrations This Visit       heparin flush 100 unit/mL syringe 500 Units       Admin Date  02/21/2025 Action  Given Dose  500 Units Route  intra-catheter Documented By  Anne Marie Villalobos, RN                    Pt tolerated CVAD blood draw well and remained in the exam room for his dr appointment. Pt had no further questions or concerns at this time.

## 2025-02-22 LAB
ALBUMIN SERPL BCP-MCNC: 4 G/DL (ref 3.4–5)
ALP SERPL-CCNC: 137 U/L (ref 33–136)
ALT SERPL W P-5'-P-CCNC: 26 U/L (ref 10–52)
ANION GAP SERPL CALC-SCNC: 16 MMOL/L (ref 10–20)
AST SERPL W P-5'-P-CCNC: 28 U/L (ref 9–39)
BILIRUB SERPL-MCNC: 0.7 MG/DL (ref 0–1.2)
BUN SERPL-MCNC: 19 MG/DL (ref 6–23)
CALCIUM SERPL-MCNC: 8.7 MG/DL (ref 8.6–10.6)
CHLORIDE SERPL-SCNC: 106 MMOL/L (ref 98–107)
CO2 SERPL-SCNC: 25 MMOL/L (ref 21–32)
CREAT SERPL-MCNC: 0.93 MG/DL (ref 0.5–1.3)
EGFRCR SERPLBLD CKD-EPI 2021: 88 ML/MIN/1.73M*2
GLUCOSE SERPL-MCNC: 102 MG/DL (ref 74–99)
POTASSIUM SERPL-SCNC: 3.9 MMOL/L (ref 3.5–5.3)
PROT SERPL-MCNC: 6.1 G/DL (ref 6.4–8.2)
SODIUM SERPL-SCNC: 143 MMOL/L (ref 136–145)

## 2025-02-26 ENCOUNTER — INFUSION (OUTPATIENT)
Dept: HEMATOLOGY/ONCOLOGY | Facility: CLINIC | Age: 70
End: 2025-02-26
Payer: MEDICARE

## 2025-02-26 VITALS
DIASTOLIC BLOOD PRESSURE: 67 MMHG | HEART RATE: 91 BPM | OXYGEN SATURATION: 96 % | TEMPERATURE: 97.9 F | SYSTOLIC BLOOD PRESSURE: 124 MMHG | WEIGHT: 171.41 LBS | BODY MASS INDEX: 23.22 KG/M2 | RESPIRATION RATE: 16 BRPM | HEIGHT: 72 IN

## 2025-02-26 DIAGNOSIS — C18.9 ADENOCARCINOMA, COLON (MULTI): ICD-10-CM

## 2025-02-26 DIAGNOSIS — D50.9 MICROCYTIC ANEMIA: ICD-10-CM

## 2025-02-26 DIAGNOSIS — C18.9 STAGE III CARCINOMA OF COLON (MULTI): ICD-10-CM

## 2025-02-26 PROCEDURE — 96411 CHEMO IV PUSH ADDL DRUG: CPT

## 2025-02-26 PROCEDURE — 96413 CHEMO IV INFUSION 1 HR: CPT

## 2025-02-26 PROCEDURE — 96415 CHEMO IV INFUSION ADDL HR: CPT

## 2025-02-26 PROCEDURE — 96375 TX/PRO/DX INJ NEW DRUG ADDON: CPT | Mod: INF

## 2025-02-26 PROCEDURE — 96416 CHEMO PROLONG INFUSE W/PUMP: CPT

## 2025-02-26 PROCEDURE — 2500000004 HC RX 250 GENERAL PHARMACY W/ HCPCS (ALT 636 FOR OP/ED): Performed by: INTERNAL MEDICINE

## 2025-02-26 RX ORDER — FAMOTIDINE 10 MG/ML
20 INJECTION, SOLUTION INTRAVENOUS ONCE AS NEEDED
Status: DISCONTINUED | OUTPATIENT
Start: 2025-02-26 | End: 2025-02-26 | Stop reason: HOSPADM

## 2025-02-26 RX ORDER — EPINEPHRINE 0.3 MG/.3ML
0.3 INJECTION SUBCUTANEOUS EVERY 5 MIN PRN
Status: DISCONTINUED | OUTPATIENT
Start: 2025-02-26 | End: 2025-02-26 | Stop reason: HOSPADM

## 2025-02-26 RX ORDER — DEXAMETHASONE 4 MG/1
12 TABLET ORAL ONCE
Status: COMPLETED | OUTPATIENT
Start: 2025-02-26 | End: 2025-02-26

## 2025-02-26 RX ORDER — DIPHENHYDRAMINE HYDROCHLORIDE 50 MG/ML
50 INJECTION INTRAMUSCULAR; INTRAVENOUS AS NEEDED
Status: DISCONTINUED | OUTPATIENT
Start: 2025-02-26 | End: 2025-02-26 | Stop reason: HOSPADM

## 2025-02-26 RX ORDER — ALBUTEROL SULFATE 0.83 MG/ML
3 SOLUTION RESPIRATORY (INHALATION) AS NEEDED
Status: DISCONTINUED | OUTPATIENT
Start: 2025-02-26 | End: 2025-02-26 | Stop reason: HOSPADM

## 2025-02-26 RX ORDER — PROCHLORPERAZINE MALEATE 10 MG
10 TABLET ORAL EVERY 6 HOURS PRN
Status: DISCONTINUED | OUTPATIENT
Start: 2025-02-26 | End: 2025-02-26 | Stop reason: HOSPADM

## 2025-02-26 RX ORDER — PALONOSETRON 0.05 MG/ML
0.25 INJECTION, SOLUTION INTRAVENOUS ONCE
Status: COMPLETED | OUTPATIENT
Start: 2025-02-26 | End: 2025-02-26

## 2025-02-26 RX ORDER — LORAZEPAM 2 MG/ML
1 INJECTION INTRAMUSCULAR AS NEEDED
Status: DISCONTINUED | OUTPATIENT
Start: 2025-02-26 | End: 2025-02-26 | Stop reason: HOSPADM

## 2025-02-26 RX ORDER — HEPARIN SODIUM,PORCINE/PF 10 UNIT/ML
50 SYRINGE (ML) INTRAVENOUS AS NEEDED
Status: CANCELLED | OUTPATIENT
Start: 2025-02-26

## 2025-02-26 RX ORDER — HEPARIN 100 UNIT/ML
500 SYRINGE INTRAVENOUS AS NEEDED
Status: CANCELLED | OUTPATIENT
Start: 2025-02-26

## 2025-02-26 RX ORDER — FLUOROURACIL 50 MG/ML
300 INJECTION, SOLUTION INTRAVENOUS ONCE
Status: COMPLETED | OUTPATIENT
Start: 2025-02-26 | End: 2025-02-26

## 2025-02-26 RX ORDER — PROCHLORPERAZINE EDISYLATE 5 MG/ML
10 INJECTION INTRAMUSCULAR; INTRAVENOUS EVERY 6 HOURS PRN
Status: DISCONTINUED | OUTPATIENT
Start: 2025-02-26 | End: 2025-02-26 | Stop reason: HOSPADM

## 2025-02-26 RX ADMIN — PALONOSETRON HYDROCHLORIDE 250 MCG: 0.25 INJECTION INTRAVENOUS at 09:31

## 2025-02-26 RX ADMIN — FLUOROURACIL 3750 MG: 50 INJECTION, SOLUTION INTRAVENOUS at 12:14

## 2025-02-26 RX ADMIN — OXALIPLATIN 135 MG: 5 INJECTION, SOLUTION INTRAVENOUS at 10:01

## 2025-02-26 RX ADMIN — FLUOROURACIL 625 MG: 50 INJECTION, SOLUTION INTRAVENOUS at 12:14

## 2025-02-26 RX ADMIN — DEXAMETHASONE 12 MG: 4 TABLET ORAL at 09:31

## 2025-02-26 ASSESSMENT — PAIN SCALES - GENERAL: PAINLEVEL_OUTOF10: 0-NO PAIN

## 2025-02-26 NOTE — PROGRESS NOTES
"Patient here for C9D1 of mFOLFOX.   Port accessed without incident.  Patient admits to some   \"neuropathy\" on face when outside in the cold and on tongue when he drinks cold.  Reviewed with patient to make sure oral intake is at least room temp to prevent numbness/tingling/bronchospasm.  Reviewed with patient possible complication of bronchospasm and he repeated back that it could cause not being able to breathe. States worst neuropathy gets on face and tongue is a 4/10 and currently 2/10.  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  Denies in lower extremities. Patient tolerated infusion without incident/complaints.  Port site benign.  Pump verified as operating correctly.  Patient aware of disconnect appt on Friday- change to 1030.  Patient independently ambulatory off unit in NAD and without complaints.  Gait steady.  Call back instructions reviewed.  Patient verbalized understanding.   "

## 2025-02-28 ENCOUNTER — INFUSION (OUTPATIENT)
Dept: HEMATOLOGY/ONCOLOGY | Facility: CLINIC | Age: 70
End: 2025-02-28
Payer: MEDICARE

## 2025-02-28 VITALS
OXYGEN SATURATION: 100 % | RESPIRATION RATE: 16 BRPM | DIASTOLIC BLOOD PRESSURE: 62 MMHG | SYSTOLIC BLOOD PRESSURE: 140 MMHG | HEART RATE: 60 BPM | TEMPERATURE: 97.5 F

## 2025-02-28 DIAGNOSIS — D50.9 MICROCYTIC ANEMIA: ICD-10-CM

## 2025-02-28 DIAGNOSIS — C18.9 ADENOCARCINOMA, COLON (MULTI): ICD-10-CM

## 2025-02-28 DIAGNOSIS — C18.9 STAGE III CARCINOMA OF COLON (MULTI): ICD-10-CM

## 2025-02-28 PROCEDURE — 96372 THER/PROPH/DIAG INJ SC/IM: CPT

## 2025-02-28 PROCEDURE — 96523 IRRIG DRUG DELIVERY DEVICE: CPT

## 2025-02-28 PROCEDURE — 2500000004 HC RX 250 GENERAL PHARMACY W/ HCPCS (ALT 636 FOR OP/ED): Performed by: INTERNAL MEDICINE

## 2025-02-28 PROCEDURE — 2500000004 HC RX 250 GENERAL PHARMACY W/ HCPCS (ALT 636 FOR OP/ED): Mod: JZ,TB | Performed by: INTERNAL MEDICINE

## 2025-02-28 RX ORDER — HEPARIN 100 UNIT/ML
500 SYRINGE INTRAVENOUS AS NEEDED
OUTPATIENT
Start: 2025-02-28

## 2025-02-28 RX ORDER — HEPARIN SODIUM,PORCINE/PF 10 UNIT/ML
50 SYRINGE (ML) INTRAVENOUS AS NEEDED
OUTPATIENT
Start: 2025-02-28

## 2025-02-28 RX ORDER — HEPARIN 100 UNIT/ML
500 SYRINGE INTRAVENOUS AS NEEDED
Status: DISCONTINUED | OUTPATIENT
Start: 2025-02-28 | End: 2025-02-28 | Stop reason: HOSPADM

## 2025-02-28 RX ADMIN — PEGFILGRASTIM 6 MG: 6 INJECTION SUBCUTANEOUS at 10:53

## 2025-02-28 RX ADMIN — HEPARIN 500 UNITS: 100 SYRINGE at 10:54

## 2025-02-28 ASSESSMENT — PAIN SCALES - GENERAL: PAINLEVEL_OUTOF10: 0-NO PAIN

## 2025-02-28 NOTE — PROGRESS NOTES
Patient in clinic for pump disconnect following fluorouracil infusion via CADD pump. Patient denied any issues or concerns over the last several days while medication infusion. Pump Dc'd, mediport flushed with saline and heparin per order. Bandaid applied. Neulasta injection given to left arm per order. Patient tolerated. No other issues or concerns. Patient DC to home in stable condition

## 2025-02-28 NOTE — PROGRESS NOTES
PATIENT EDUCATION  Learner: patient  Educated on: pump dc. Mediport deaccess. Neulasta injection  Readiness: acceptance  Preferred learning method: preferred: listening  Method used: explanation  Response: demonstrated understanding  Barriers: None  Preferred language: English

## 2025-03-07 ENCOUNTER — EDUCATION (OUTPATIENT)
Dept: HEMATOLOGY/ONCOLOGY | Facility: CLINIC | Age: 70
End: 2025-03-07

## 2025-03-07 ENCOUNTER — OFFICE VISIT (OUTPATIENT)
Dept: HEMATOLOGY/ONCOLOGY | Facility: CLINIC | Age: 70
End: 2025-03-07
Payer: MEDICARE

## 2025-03-07 ENCOUNTER — INFUSION (OUTPATIENT)
Dept: HEMATOLOGY/ONCOLOGY | Facility: CLINIC | Age: 70
End: 2025-03-07
Payer: MEDICARE

## 2025-03-07 VITALS
TEMPERATURE: 98.4 F | BODY MASS INDEX: 22.79 KG/M2 | DIASTOLIC BLOOD PRESSURE: 70 MMHG | SYSTOLIC BLOOD PRESSURE: 136 MMHG | RESPIRATION RATE: 16 BRPM | HEART RATE: 68 BPM | OXYGEN SATURATION: 98 % | HEIGHT: 73 IN | WEIGHT: 171.96 LBS

## 2025-03-07 DIAGNOSIS — C18.9 STAGE III CARCINOMA OF COLON (MULTI): ICD-10-CM

## 2025-03-07 DIAGNOSIS — C18.9 ADENOCARCINOMA, COLON (MULTI): Primary | ICD-10-CM

## 2025-03-07 DIAGNOSIS — D50.9 MICROCYTIC ANEMIA: ICD-10-CM

## 2025-03-07 DIAGNOSIS — C18.9 ADENOCARCINOMA, COLON (MULTI): ICD-10-CM

## 2025-03-07 LAB
ALBUMIN SERPL BCP-MCNC: 3.4 G/DL (ref 3.4–5)
ALP SERPL-CCNC: 127 U/L (ref 33–136)
ALT SERPL W P-5'-P-CCNC: 20 U/L (ref 10–52)
ANION GAP SERPL CALC-SCNC: 12 MMOL/L (ref 10–20)
AST SERPL W P-5'-P-CCNC: 22 U/L (ref 9–39)
BASOPHILS # BLD AUTO: 0.02 X10*3/UL (ref 0–0.1)
BASOPHILS NFR BLD AUTO: 0.1 %
BILIRUB SERPL-MCNC: 0.6 MG/DL (ref 0–1.2)
BUN SERPL-MCNC: 18 MG/DL (ref 6–23)
CALCIUM SERPL-MCNC: 7.9 MG/DL (ref 8.6–10.6)
CHLORIDE SERPL-SCNC: 109 MMOL/L (ref 98–107)
CO2 SERPL-SCNC: 23 MMOL/L (ref 21–32)
CREAT SERPL-MCNC: 0.85 MG/DL (ref 0.5–1.3)
EGFRCR SERPLBLD CKD-EPI 2021: >90 ML/MIN/1.73M*2
EOSINOPHIL # BLD AUTO: 0.14 X10*3/UL (ref 0–0.7)
EOSINOPHIL NFR BLD AUTO: 1 %
ERYTHROCYTE [DISTWIDTH] IN BLOOD BY AUTOMATED COUNT: 15.2 % (ref 11.5–14.5)
GLUCOSE SERPL-MCNC: 102 MG/DL (ref 74–99)
HCT VFR BLD AUTO: 36.1 % (ref 41–52)
HGB BLD-MCNC: 12.2 G/DL (ref 13.5–17.5)
IMM GRANULOCYTES # BLD AUTO: 0.05 X10*3/UL (ref 0–0.7)
IMM GRANULOCYTES NFR BLD AUTO: 0.3 % (ref 0–0.9)
LYMPHOCYTES # BLD AUTO: 1.07 X10*3/UL (ref 1.2–4.8)
LYMPHOCYTES NFR BLD AUTO: 7.4 %
MCH RBC QN AUTO: 36.2 PG (ref 26–34)
MCHC RBC AUTO-ENTMCNC: 33.8 G/DL (ref 32–36)
MCV RBC AUTO: 107 FL (ref 80–100)
MONOCYTES # BLD AUTO: 1.29 X10*3/UL (ref 0.1–1)
MONOCYTES NFR BLD AUTO: 8.9 %
NEUTROPHILS # BLD AUTO: 11.86 X10*3/UL (ref 1.2–7.7)
NEUTROPHILS NFR BLD AUTO: 82.3 %
NRBC BLD-RTO: ABNORMAL /100{WBCS}
PLATELET # BLD AUTO: 101 X10*3/UL (ref 150–450)
POTASSIUM SERPL-SCNC: 3.4 MMOL/L (ref 3.5–5.3)
PROT SERPL-MCNC: 5.3 G/DL (ref 6.4–8.2)
RBC # BLD AUTO: 3.37 X10*6/UL (ref 4.5–5.9)
SODIUM SERPL-SCNC: 141 MMOL/L (ref 136–145)
WBC # BLD AUTO: 14.4 X10*3/UL (ref 4.4–11.3)

## 2025-03-07 PROCEDURE — 1159F MED LIST DOCD IN RCRD: CPT | Performed by: INTERNAL MEDICINE

## 2025-03-07 PROCEDURE — 36591 DRAW BLOOD OFF VENOUS DEVICE: CPT

## 2025-03-07 PROCEDURE — 99215 OFFICE O/P EST HI 40 MIN: CPT | Performed by: INTERNAL MEDICINE

## 2025-03-07 PROCEDURE — 85025 COMPLETE CBC W/AUTO DIFF WBC: CPT

## 2025-03-07 PROCEDURE — 3008F BODY MASS INDEX DOCD: CPT | Performed by: INTERNAL MEDICINE

## 2025-03-07 PROCEDURE — 80053 COMPREHEN METABOLIC PANEL: CPT

## 2025-03-07 PROCEDURE — 1126F AMNT PAIN NOTED NONE PRSNT: CPT | Performed by: INTERNAL MEDICINE

## 2025-03-07 PROCEDURE — 2500000004 HC RX 250 GENERAL PHARMACY W/ HCPCS (ALT 636 FOR OP/ED): Performed by: INTERNAL MEDICINE

## 2025-03-07 RX ORDER — FAMOTIDINE 10 MG/ML
20 INJECTION, SOLUTION INTRAVENOUS ONCE AS NEEDED
OUTPATIENT
Start: 2025-03-26

## 2025-03-07 RX ORDER — EPINEPHRINE 0.3 MG/.3ML
0.3 INJECTION SUBCUTANEOUS EVERY 5 MIN PRN
OUTPATIENT
Start: 2025-04-09

## 2025-03-07 RX ORDER — PALONOSETRON 0.05 MG/ML
0.25 INJECTION, SOLUTION INTRAVENOUS ONCE
OUTPATIENT
Start: 2025-04-09

## 2025-03-07 RX ORDER — PROCHLORPERAZINE MALEATE 10 MG
10 TABLET ORAL EVERY 6 HOURS PRN
OUTPATIENT
Start: 2025-03-26

## 2025-03-07 RX ORDER — DEXAMETHASONE 4 MG/1
12 TABLET ORAL ONCE
OUTPATIENT
Start: 2025-03-26

## 2025-03-07 RX ORDER — DEXAMETHASONE 4 MG/1
12 TABLET ORAL ONCE
OUTPATIENT
Start: 2025-03-12

## 2025-03-07 RX ORDER — HEPARIN SODIUM,PORCINE/PF 10 UNIT/ML
50 SYRINGE (ML) INTRAVENOUS AS NEEDED
OUTPATIENT
Start: 2025-03-07

## 2025-03-07 RX ORDER — EPINEPHRINE 0.3 MG/.3ML
0.3 INJECTION SUBCUTANEOUS EVERY 5 MIN PRN
OUTPATIENT
Start: 2025-03-12

## 2025-03-07 RX ORDER — EPINEPHRINE 0.3 MG/.3ML
0.3 INJECTION SUBCUTANEOUS EVERY 5 MIN PRN
OUTPATIENT
Start: 2025-03-26

## 2025-03-07 RX ORDER — DEXAMETHASONE 4 MG/1
12 TABLET ORAL ONCE
OUTPATIENT
Start: 2025-04-09

## 2025-03-07 RX ORDER — ALBUTEROL SULFATE 0.83 MG/ML
3 SOLUTION RESPIRATORY (INHALATION) AS NEEDED
OUTPATIENT
Start: 2025-04-09

## 2025-03-07 RX ORDER — PROCHLORPERAZINE MALEATE 10 MG
10 TABLET ORAL EVERY 6 HOURS PRN
OUTPATIENT
Start: 2025-03-12

## 2025-03-07 RX ORDER — ALBUTEROL SULFATE 0.83 MG/ML
3 SOLUTION RESPIRATORY (INHALATION) AS NEEDED
OUTPATIENT
Start: 2025-03-26

## 2025-03-07 RX ORDER — LORAZEPAM 2 MG/ML
1 INJECTION INTRAMUSCULAR AS NEEDED
OUTPATIENT
Start: 2025-04-09

## 2025-03-07 RX ORDER — FAMOTIDINE 10 MG/ML
20 INJECTION, SOLUTION INTRAVENOUS ONCE AS NEEDED
OUTPATIENT
Start: 2025-04-09

## 2025-03-07 RX ORDER — FLUOROURACIL 50 MG/ML
300 INJECTION, SOLUTION INTRAVENOUS ONCE
OUTPATIENT
Start: 2025-04-09

## 2025-03-07 RX ORDER — PROCHLORPERAZINE EDISYLATE 5 MG/ML
10 INJECTION INTRAMUSCULAR; INTRAVENOUS EVERY 6 HOURS PRN
OUTPATIENT
Start: 2025-03-12

## 2025-03-07 RX ORDER — DIPHENHYDRAMINE HYDROCHLORIDE 50 MG/ML
50 INJECTION INTRAMUSCULAR; INTRAVENOUS AS NEEDED
OUTPATIENT
Start: 2025-03-26

## 2025-03-07 RX ORDER — PALONOSETRON 0.05 MG/ML
0.25 INJECTION, SOLUTION INTRAVENOUS ONCE
OUTPATIENT
Start: 2025-03-12

## 2025-03-07 RX ORDER — PROCHLORPERAZINE EDISYLATE 5 MG/ML
10 INJECTION INTRAMUSCULAR; INTRAVENOUS EVERY 6 HOURS PRN
OUTPATIENT
Start: 2025-04-09

## 2025-03-07 RX ORDER — ALBUTEROL SULFATE 0.83 MG/ML
3 SOLUTION RESPIRATORY (INHALATION) AS NEEDED
OUTPATIENT
Start: 2025-03-12

## 2025-03-07 RX ORDER — PROCHLORPERAZINE EDISYLATE 5 MG/ML
10 INJECTION INTRAMUSCULAR; INTRAVENOUS EVERY 6 HOURS PRN
OUTPATIENT
Start: 2025-03-26

## 2025-03-07 RX ORDER — PROCHLORPERAZINE MALEATE 10 MG
10 TABLET ORAL EVERY 6 HOURS PRN
OUTPATIENT
Start: 2025-04-09

## 2025-03-07 RX ORDER — PALONOSETRON 0.05 MG/ML
0.25 INJECTION, SOLUTION INTRAVENOUS ONCE
OUTPATIENT
Start: 2025-03-26

## 2025-03-07 RX ORDER — DIPHENHYDRAMINE HYDROCHLORIDE 50 MG/ML
50 INJECTION INTRAMUSCULAR; INTRAVENOUS AS NEEDED
OUTPATIENT
Start: 2025-04-09

## 2025-03-07 RX ORDER — LORAZEPAM 2 MG/ML
1 INJECTION INTRAMUSCULAR AS NEEDED
OUTPATIENT
Start: 2025-03-26

## 2025-03-07 RX ORDER — LORAZEPAM 2 MG/ML
1 INJECTION INTRAMUSCULAR AS NEEDED
OUTPATIENT
Start: 2025-03-12

## 2025-03-07 RX ORDER — FLUOROURACIL 50 MG/ML
300 INJECTION, SOLUTION INTRAVENOUS ONCE
OUTPATIENT
Start: 2025-03-12

## 2025-03-07 RX ORDER — HEPARIN 100 UNIT/ML
500 SYRINGE INTRAVENOUS AS NEEDED
Status: DISCONTINUED | OUTPATIENT
Start: 2025-03-07 | End: 2025-03-07 | Stop reason: HOSPADM

## 2025-03-07 RX ORDER — FAMOTIDINE 10 MG/ML
20 INJECTION, SOLUTION INTRAVENOUS ONCE AS NEEDED
OUTPATIENT
Start: 2025-03-12

## 2025-03-07 RX ORDER — DIPHENHYDRAMINE HYDROCHLORIDE 50 MG/ML
50 INJECTION INTRAMUSCULAR; INTRAVENOUS AS NEEDED
OUTPATIENT
Start: 2025-03-12

## 2025-03-07 RX ORDER — FLUOROURACIL 50 MG/ML
300 INJECTION, SOLUTION INTRAVENOUS ONCE
OUTPATIENT
Start: 2025-03-26

## 2025-03-07 RX ORDER — HEPARIN 100 UNIT/ML
500 SYRINGE INTRAVENOUS AS NEEDED
OUTPATIENT
Start: 2025-03-07

## 2025-03-07 RX ADMIN — HEPARIN 500 UNITS: 100 SYRINGE at 09:15

## 2025-03-07 ASSESSMENT — PAIN SCALES - GENERAL: PAINLEVEL_OUTOF10: 0-NO PAIN

## 2025-03-07 NOTE — PROGRESS NOTES
Patient ID: Krish Batista is a 69 y.o. male.  Referring Physician: Rio Lawson MD  [facility], [place] [address] [place] [address]  Primary Care Provider: Matthew Small,    8/15/24, EGD and colonoscopy  A.  Duodenum, first part, biopsy:  Duodenal mucosa within normal limits.     B.  Stomach, biopsy:  Antrum and corpus mucosa within normal limits.     C.  Colon, mass at 40 cm, biopsy:  Fragments of invasive adenocarcinoma, moderately differentiated.  See note.     NOTE: Immunohistochemical stains for mismatch repair proteins are pending and the result will be reported as an addendum.     D.  Colon, 20 cm, polypectomy:  Tubular adenoma.                               MISMATCH REPAIR PROTEIN EXPRESSION     C. Colon, mass at 40 cm, biopsy: Adenocarcinoma     Protein:  Result     MLH-1:  Expression Present                                    PMS-2: Expression Present                                    MSH-2: Expression Present                                    MSH-6: Expression Present    CEA at 1.5 NG per mL on August 2, 2024    Colon, splenic flexure, resection:  -Invasive adenocarcinoma (3.1 cm), moderately differentiated.  -Carcinoma invades pericolonic tissue.  -Extramural venous and perineural invasion present.  -Resection margins are negative for carcinoma.  -Metastatic adenocarcinoma involving two of thirty-seven lymph nodes (2/37).  -Pathologic stage: pT3, pN1b.  -See comment and synoptic report.  Comment:  Movat stains (blocks A4 and A5) confirm the venous invasion.    Electronically signed by Sharonda Maguire MD PhD on 9/30/2024 at 1350      [facility]   By the signature on this report, the individual or group listed as making the Final Interpretation/Diagnosis certifies that they have reviewed this case.    Case Summary Report   COLON AND RECTUM: Resection, Including Transanal Disk Excision of Rectal Neoplasms   8th Edition - Protocol posted: 6/22/2022COLON AND RECTUM: RESECTION, INCLUDING  TRANSANAL DISK EXCISION OF RECTAL NEOPLASMS - All Specimens  SPECIMEN   Procedure  resection   TUMOR   Tumor Site  Splenic flexure   Histologic Type  Adenocarcinoma   Histologic Grade  G2, moderately differentiated   Tumor Size  Greatest dimension (Centimeters): 3.1 cm   Tumor Extent  Invades through muscularis propria into the pericolonic or perirectal tissue   Macroscopic Tumor Perforation  Not identified   Lymphovascular Invasion  Large vessel (venous), extramural   Perineural Invasion  Present   Tumor Bud Score  Intermediate (5-9)   Treatment Effect  No known presurgical therapy   MARGINS   Margin Status for Invasive Carcinoma  All margins negative for invasive carcinoma   Closest Margin(s) to Invasive Carcinoma  Radial (circumferential) or mesenteric   Distance from Invasive Carcinoma to Closest Margin  Greater than 1 cm   Margin Status for Non-Invasive Tumor  All margins negative for high-grade dysplasia / intramucosal carcinoma and low-grade dysplasia   REGIONAL LYMPH NODES   Regional Lymph Node Status  Tumor present in regional lymph node(s)   Number of Lymph Nodes with Tumor  2   Number of Lymph Nodes Examined  37   Tumor Deposits  Not identified   PATHOLOGIC STAGE CLASSIFICATION (pTNM, AJCC 8th Edition)   Reporting of pT, pN, and (when applicable) pM categories is based on information available to the pathologist at the time the report is issued. As per the AJCC (Chapter 1, 8th Ed.) it is the managing physician’s responsibility to establish the final pathologic stage based upon all pertinent information, including but potentially not limited to this pathology report.   pT Category  pT3   pN Category  pN1b   .        Subjective    HPI  The patient was referred to me by Dr. Clark for further evaluation and management of newly diagnosed biopsy-proven left colon adenocarcinoma.  The patient presented to Dr. Small on March 25, 2024 complaining of fatigue and malaise.  Further evaluation revealed significant  anemia at hemoglobin 8.1 g/dL ferritin at 8 NG per mL iron saturation 3%.  The patient was referred to GI services however he failed to follow through.  He was referred to surgery as well.  Repeat hemoglobin on July 11, 2024 was 7.2 g/dL.  He then presented to ER on August 1, 2024 secondary to progressive fatigue and weakness.  He was diagnosed with severe microcytic anemia hemoglobin 7.4 g/dL.  The patient received 2 units of packed red blood cell transfusions.  Upper and lower endoscopy on August 3, 2024 revealed a mass at 40 cm in the anal verge.  Histology consistent with adenocarcinoma.  In ER the patient was found to be in atrial fibrillation/flutter with RVR.  Cardiology was consulted.  He spontaneously converted to normal sinus rhythm.  Color Doppler echocardiogram revealed normal ejection fraction.  He was placed on metoprolol 50 mg p.o. twice daily with a Holter monitor.  Anticoagulation was not recommended.  Colonoscopy on August 15, 2024 revealed internal small hemorrhoids.  1 8 mm sessile polyp 20 centimeter from anal verge.  This was snared.  Single friable and ulcerated mass not transferred stable 40 cm from the anal verge, covering the whole circumference; bleeding occurred before intervention.  Performed cold forceps biopsy with partial removal: Tattooed distal to the finding with spot.  CT scan of chest abdomen pelvis on August 1, 2024 with IV contrast did not reveal any obvious metastatic disease.  A PET scan revealed bilateral pleural effusion right greater than left.  Thoracentesis was performed on September 3, 2024 cytology is pending.  The patient claims that he is feeling better after thoracentesis.    At interview on September 4, 2024 he was accompanied by his niece denied history of weight loss, fevers, night sweats, chest pain, nausea, vomiting, hematemesis, melena, hematochezia and hematuria.  Since last evaluation the patient has had multiple admissions to the hospital for myocardial  infarction requiring cardiac catheterization, status post surgery/left hemicolectomy on September 15, 2024, GI bleed hypotension requiring iron infusion as well as blood transfusions.  The patient was discharged and claims that he is beginning to feel better although has pedal edema as well as anterior abdominal wall edema.  The patient and family had come for follow-up on November 8, 2024 regarding history of pT3, PN 1B, M0 adenocarcinoma of the colon s/p resection.  During previous evaluation the patient had presented with severe iron deficiency anemia as well as diarrhea.  C. difficile studies were negative.  Diarrhea is resolved.  [id number]-m-[id number] was placed.  Initiate intravenous Feraheme for iron 10 mg/week x 2 weeks and also initiate adjuvant chemotherapy using FOLFOX to be given every 2 weeks for 12 cycles.  Cycle 1 on November 4, 2024    The patient and family had come for follow-up on December 13 , 2024 regarding history of pT3, PN 1B, M0 adenocarcinoma of the colon s/p resection.  During previous evaluation the patient had presented with severe iron deficiency anemia as well as diarrhea.  C. difficile studies were negative.   The patient received cycle 3,  2 weeks earlier and complained of easy fatigability and diarrhea that was easily controlled with Imodium.  Will reduce dose by 25% with cycle 4 and going forward.    The patient and family had come for follow-up on December 27, 2024 regarding history of pT3, PN 1B, M0 adenocarcinoma of colon s/p resection.  Receiving adjuvant chemotherapy dose reduced 25%.  Has received and tolerated 4 cycles without any problems.     The patient and family had come for follow-up on 2/7/25 regarding history of pT3, PN 1B, M0 adenocarcinoma of colon s/p resection.  Receiving adjuvant chemotherapy dose reduced 25%.  Has received and tolerated 7 cycles without any problems.    The patient and family had come for follow-up on 3/7/25/25 regarding history of pT3, PN 1B, M0  "adenocarcinoma of colon s/p resection.  Receiving adjuvant chemotherapy dose reduced 25%.  Has received and tolerated 9 cycles without any problems.  Past medical history:    Iron deficiency anemia, adenocarcinoma of descending colon, history of Raynaud's phenomenon.  Past surgical history:    Sinus surgery July 13, 2021, history of undescended testis surgery, and tonsillectomy.      Review of Systems   All other systems reviewed and are negative.       Objective   BSA: 2 meters squared  /70   Pulse 68   Temp 36.9 °C (98.4 °F) (Temporal)   Resp 16   Ht 1.842 m (6' 0.52\")   Wt 78 kg (171 lb 15.3 oz)   SpO2 98%   BMI 22.99 kg/m²     No family history on file.  Oncology History   Adenocarcinoma, colon (Multi)   8/23/2024 Initial Diagnosis    Adenocarcinoma, colon (Multi)     11/4/2024 -  Chemotherapy    mFOLFOX6 (Fluorouracil Continuous Infusion / Leucovorin / Oxaliplatin), 14 Day Cycles       The patient is 69 years old, single has no children retired as a .  Krish Batista  reports that he has never smoked. He has never used smokeless tobacco.  He  reports that he does not currently use alcohol.  He  reports no history of drug use.    Physical Exam  Constitutional:       Appearance: Normal appearance.   HENT:      Head: Normocephalic and atraumatic.      Nose: Nose normal.      Mouth/Throat:      Mouth: Mucous membranes are moist.      Pharynx: Oropharynx is clear.   Eyes:      Extraocular Movements: Extraocular movements intact.      Conjunctiva/sclera: Conjunctivae normal.      Pupils: Pupils are equal, round, and reactive to light.   Cardiovascular:      Rate and Rhythm: Normal rate and regular rhythm.   Pulmonary:      Effort: Pulmonary effort is normal.      Breath sounds: Normal breath sounds.   Abdominal:      General: Abdomen is flat. Bowel sounds are normal.      Palpations: Abdomen is soft.   Musculoskeletal:         General: Normal range of motion.      Cervical back: Normal range of " motion and neck supple.   Neurological:      General: No focal deficit present.      Mental Status: He is alert and oriented to person, place, and time. Mental status is at baseline.   Psychiatric:         Mood and Affect: Mood normal.         Behavior: Behavior normal.         Thought Content: Thought content normal.         Judgment: Judgment normal.         Performance Status:  Symptomatic; in bed <50% of the day    Assessment/Plan    The patient is a 69-year-old man presented with easy fatigability shortness of breath on exertion.  Diagnosed with iron deficiency anemia.  Cologuard was positive.  The patient was referred to GI services but did not follow through.  Eventually presented to ER with above symptoms did receive 2 units of packed red blood cell transfusions.  He did develop atrial fibrillation/flutter while in ER but spontaneously converted to normal sinus rhythm was placed on metoprolol.  Colonoscopy revealed a mass 40 cm from anal verge biopsy/histology consistent with adenocarcinoma.  Initially CT scan of chest abdomen pelvis did not reveal any evidence of metastatic disease.  But a PET scan revealed large right pleural effusion and small left pleural effusion.  Thoracentesis performed on right pleural effusion on September 3, 2024 cytology is pending.  If negative would seek opinion about hilar/mediastinal lymphadenopathy.    Iron deficiency anemia:    The patient is still symptomatic with anemia.  I have recommended intravenous Feraheme 510 mg/week x 2 weeks.  Effect side effects explained.  The patient understood appreciated all the details provided and was grateful.  T3 N1 M0 adenocarcinoma of sigmoid colon s/p resection on September 15, 2024.    The patient and family had come for follow-up on October 3, 2024 as described above multiple admissions requiring stay in ICU, history of MI GI bleed requiring blood and iron transfusions.  Physical examination revealed pedal edema and CBC revealed  hemoglobin 9.2 g/dL.  I have recommended evaluation of anemia as well as Doppler ultrasound.  Discussed in detail about options such as do-nothing, consider adjuvant chemotherapy using FOLFOX which might elicit 25% survival benefit.  The patient is agreeable to start chemotherapy.  This will necessitate port placement.  Return in 2 weeks.  The patient and family had come for follow-up on November 8, 2024 regarding history of pT3, PN 1B, M0 adenocarcinoma of the colon s/p resection.  During previous evaluation the patient had presented with severe iron deficiency anemia as well as diarrhea.  C. difficile studies were negative.  Diarrhea is resolved.  [id number] was placed.  Initiate intravenous Feraheme for iron 10 mg/week x 2 weeks and also initiate adjuvant chemotherapy using FOLFOX to be given every 2 weeks for 12 cycles beginning November for, 2024    The patient and family had come for follow-up on December 13 , 2024 regarding history of pT3, PN 1B, M0 adenocarcinoma of the colon s/p resection.  During previous evaluation the patient had presented with severe iron deficiency anemia as well as diarrhea.  C. difficile studies were negative.   The patient received cycle 3,  2 weeks earlier and complained of easy fatigability and diarrhea that was easily controlled with Imodium.  Will reduce dose by 25% with cycle 4 and going forward.    The patient and family had come for follow-up on December 27, 2024 regarding history of pT3, PN 1B, M0 adenocarcinoma of colon s/p resection.  Receiving adjuvant chemotherapy dose reduced 25%.  Has received and tolerated 4 cycles without any problems.  Physical examination within normal limits.  Cycle 5 on December 30, 2024.     The patient and family had come for follow-up on 1/24/25 regarding history of pT3, PN 1B, M0 adenocarcinoma of colon s/p resection.  Receiving adjuvant chemotherapy dose reduced 25%.  Has received and tolerated 5 cycles without any problems.  Reviewed lab  data with the patient.  Cycle 7 on January 28, 2025 at 25% dose reduction.  Return in 2 weeks.     The patient and family had come for follow-up on 2/7/25 regarding history of pT3, PN 1B, M0 adenocarcinoma of colon s/p resection.  Receiving adjuvant chemotherapy dose reduced 25%.  Has received and tolerated 7 cycles without any problems physical examination within normal limits reviewed lab data with the patient.  Cycle 8 on February 12, 2025.    The patient and family had come for follow-up on 3/7/25 regarding history of pT3, PN 1B, M0 adenocarcinoma of colon s/p resection.  Receiving adjuvant chemotherapy dose reduced 25%.  Has received and tolerated 8 cycles without any problems.  Cycle 10 on March 10, 2025.  Return in 2 weeks.    Thank you for allowing me to participate in care of your patient if you have any questions please feel free to call me.      Diagnoses and all orders for this visit:  Microcytic anemia  -     Clinic Appointment Request ([facility] appt needed please); Future  Adenocarcinoma, colon (Multi)  -     Referral to Hematology and Oncology  -     Clinic Appointment Request ([facility] appt needed please); Future  Other orders  -     heparin flush 10 unit/mL syringe 50 Units  -     heparin flush 100 unit/mL syringe 500 Units  -     alteplase (Cathflo Activase) injection 2 mg  -     ferumoxytol (Feraheme) 510 mg in sodium chloride 0.9% 117 mL IV  -     sodium chloride 0.9 % bolus 500 mL  -     dextrose 5 % in water (D5W) bolus  -     diphenhydrAMINE (BENADryl) injection 50 mg  -     methylPREDNISolone sod succinate (SOLU-Medrol) 40 mg/mL injection 40 mg  -     famotidine PF (Pepcid) injection 20 mg  -     EPINEPHrine (Epipen) injection syringe 0.3 mg  -     albuterol 2.5 mg /3 mL (0.083 %) nebulizer solution 3 mL           Rio Lawson MD

## 2025-03-07 NOTE — PROGRESS NOTES
"Patient seen by Dr. Duque today in clinic. Reinforcement education provided regarding next steps with plan of care.      PER DR. DUQUE'S FUV NOTE TODAY: \"The patient and family had come for follow-up on 3/7/25 regarding history of pT3, PN 1B, M0 adenocarcinoma of colon s/p resection.  Receiving adjuvant chemotherapy dose reduced 25%.  Has received and tolerated 8 cycles without any problems.  Cycle 10 on March 10, 2025.  Return in 2 weeks. \"    Pt scheduled for C#10 Folfox on 3/12/25.  Reports he is feeling well and denies any complaints.  Patient verbalizes understanding of plan of care via teachback method.             "

## 2025-03-12 ENCOUNTER — INFUSION (OUTPATIENT)
Dept: HEMATOLOGY/ONCOLOGY | Facility: CLINIC | Age: 70
End: 2025-03-12
Payer: MEDICARE

## 2025-03-12 VITALS
SYSTOLIC BLOOD PRESSURE: 130 MMHG | RESPIRATION RATE: 16 BRPM | WEIGHT: 173.06 LBS | DIASTOLIC BLOOD PRESSURE: 65 MMHG | HEART RATE: 63 BPM | TEMPERATURE: 98.2 F | HEIGHT: 72 IN | OXYGEN SATURATION: 98 % | BODY MASS INDEX: 23.44 KG/M2

## 2025-03-12 DIAGNOSIS — C18.9 STAGE III CARCINOMA OF COLON (MULTI): ICD-10-CM

## 2025-03-12 DIAGNOSIS — C18.9 ADENOCARCINOMA, COLON (MULTI): ICD-10-CM

## 2025-03-12 PROCEDURE — 96415 CHEMO IV INFUSION ADDL HR: CPT

## 2025-03-12 PROCEDURE — 96411 CHEMO IV PUSH ADDL DRUG: CPT

## 2025-03-12 PROCEDURE — 2500000004 HC RX 250 GENERAL PHARMACY W/ HCPCS (ALT 636 FOR OP/ED): Performed by: INTERNAL MEDICINE

## 2025-03-12 PROCEDURE — 96413 CHEMO IV INFUSION 1 HR: CPT

## 2025-03-12 PROCEDURE — 96375 TX/PRO/DX INJ NEW DRUG ADDON: CPT | Mod: INF

## 2025-03-12 PROCEDURE — 96416 CHEMO PROLONG INFUSE W/PUMP: CPT

## 2025-03-12 RX ORDER — DIPHENHYDRAMINE HYDROCHLORIDE 50 MG/ML
50 INJECTION INTRAMUSCULAR; INTRAVENOUS AS NEEDED
Status: DISCONTINUED | OUTPATIENT
Start: 2025-03-12 | End: 2025-03-12 | Stop reason: HOSPADM

## 2025-03-12 RX ORDER — FLUOROURACIL 50 MG/ML
300 INJECTION, SOLUTION INTRAVENOUS ONCE
Status: COMPLETED | OUTPATIENT
Start: 2025-03-12 | End: 2025-03-12

## 2025-03-12 RX ORDER — DEXAMETHASONE 4 MG/1
12 TABLET ORAL ONCE
Status: COMPLETED | OUTPATIENT
Start: 2025-03-12 | End: 2025-03-12

## 2025-03-12 RX ORDER — FAMOTIDINE 10 MG/ML
20 INJECTION, SOLUTION INTRAVENOUS ONCE AS NEEDED
Status: DISCONTINUED | OUTPATIENT
Start: 2025-03-12 | End: 2025-03-12 | Stop reason: HOSPADM

## 2025-03-12 RX ORDER — LORAZEPAM 2 MG/ML
1 INJECTION INTRAMUSCULAR AS NEEDED
Status: DISCONTINUED | OUTPATIENT
Start: 2025-03-12 | End: 2025-03-12 | Stop reason: HOSPADM

## 2025-03-12 RX ORDER — PROCHLORPERAZINE EDISYLATE 5 MG/ML
10 INJECTION INTRAMUSCULAR; INTRAVENOUS EVERY 6 HOURS PRN
Status: DISCONTINUED | OUTPATIENT
Start: 2025-03-12 | End: 2025-03-12 | Stop reason: HOSPADM

## 2025-03-12 RX ORDER — EPINEPHRINE 0.3 MG/.3ML
0.3 INJECTION SUBCUTANEOUS EVERY 5 MIN PRN
Status: DISCONTINUED | OUTPATIENT
Start: 2025-03-12 | End: 2025-03-12 | Stop reason: HOSPADM

## 2025-03-12 RX ORDER — ALBUTEROL SULFATE 0.83 MG/ML
3 SOLUTION RESPIRATORY (INHALATION) AS NEEDED
Status: DISCONTINUED | OUTPATIENT
Start: 2025-03-12 | End: 2025-03-12 | Stop reason: HOSPADM

## 2025-03-12 RX ORDER — PROCHLORPERAZINE MALEATE 10 MG
10 TABLET ORAL EVERY 6 HOURS PRN
Status: DISCONTINUED | OUTPATIENT
Start: 2025-03-12 | End: 2025-03-12 | Stop reason: HOSPADM

## 2025-03-12 RX ORDER — PALONOSETRON 0.05 MG/ML
0.25 INJECTION, SOLUTION INTRAVENOUS ONCE
Status: COMPLETED | OUTPATIENT
Start: 2025-03-12 | End: 2025-03-12

## 2025-03-12 RX ADMIN — FLUOROURACIL 625 MG: 50 INJECTION, SOLUTION INTRAVENOUS at 13:57

## 2025-03-12 RX ADMIN — PALONOSETRON HYDROCHLORIDE 0.25 MG: 0.25 INJECTION INTRAVENOUS at 11:12

## 2025-03-12 RX ADMIN — DEXAMETHASONE 12 MG: 4 TABLET ORAL at 11:11

## 2025-03-12 RX ADMIN — FLUOROURACIL 3750 MG: 50 INJECTION, SOLUTION INTRAVENOUS at 14:08

## 2025-03-12 RX ADMIN — OXALIPLATIN 135 MG: 5 INJECTION, SOLUTION, CONCENTRATE INTRAVENOUS at 11:31

## 2025-03-12 ASSESSMENT — PAIN SCALES - GENERAL: PAINLEVEL_OUTOF10: 0-NO PAIN

## 2025-03-14 ENCOUNTER — INFUSION (OUTPATIENT)
Dept: HEMATOLOGY/ONCOLOGY | Facility: CLINIC | Age: 70
End: 2025-03-14
Payer: MEDICARE

## 2025-03-14 VITALS
DIASTOLIC BLOOD PRESSURE: 60 MMHG | OXYGEN SATURATION: 98 % | HEART RATE: 64 BPM | TEMPERATURE: 97.5 F | SYSTOLIC BLOOD PRESSURE: 131 MMHG | RESPIRATION RATE: 14 BRPM

## 2025-03-14 DIAGNOSIS — C18.9 STAGE III CARCINOMA OF COLON (MULTI): ICD-10-CM

## 2025-03-14 DIAGNOSIS — C18.9 ADENOCARCINOMA, COLON (MULTI): ICD-10-CM

## 2025-03-14 DIAGNOSIS — D50.9 MICROCYTIC ANEMIA: ICD-10-CM

## 2025-03-14 PROCEDURE — 96372 THER/PROPH/DIAG INJ SC/IM: CPT

## 2025-03-14 PROCEDURE — 2500000004 HC RX 250 GENERAL PHARMACY W/ HCPCS (ALT 636 FOR OP/ED): Mod: JZ,TB | Performed by: INTERNAL MEDICINE

## 2025-03-14 PROCEDURE — 2500000004 HC RX 250 GENERAL PHARMACY W/ HCPCS (ALT 636 FOR OP/ED): Performed by: INTERNAL MEDICINE

## 2025-03-14 RX ORDER — HEPARIN SODIUM,PORCINE/PF 10 UNIT/ML
50 SYRINGE (ML) INTRAVENOUS AS NEEDED
OUTPATIENT
Start: 2025-03-14

## 2025-03-14 RX ORDER — HEPARIN 100 UNIT/ML
500 SYRINGE INTRAVENOUS AS NEEDED
OUTPATIENT
Start: 2025-03-14

## 2025-03-14 RX ORDER — HEPARIN 100 UNIT/ML
500 SYRINGE INTRAVENOUS AS NEEDED
Status: DISCONTINUED | OUTPATIENT
Start: 2025-03-14 | End: 2025-03-14 | Stop reason: HOSPADM

## 2025-03-14 RX ADMIN — HEPARIN 500 UNITS: 100 SYRINGE at 12:22

## 2025-03-14 RX ADMIN — PEGFILGRASTIM 6 MG: 6 INJECTION SUBCUTANEOUS at 12:16

## 2025-03-14 NOTE — PROGRESS NOTES
Patient is here today for neulasta injection/ CADD infusion pump discontinued from 5-FU infusion. Administration completed without incident.  Mediport verified for excellent blood return/flushed per orders.  Pump collected with patient's name and secured in Infusion department. - no complications since last being seen-  b/h/ lung sounds not auscultated  Patient tolerated treatments well.  No complaints. Call back instructions reviewed.    Patient verbalizes understanding of plan of care.  Ambulated off unit without difficulty, steady gait. Accompanied by great-nephew.

## 2025-03-21 ENCOUNTER — INFUSION (OUTPATIENT)
Dept: HEMATOLOGY/ONCOLOGY | Facility: CLINIC | Age: 70
End: 2025-03-21
Payer: MEDICARE

## 2025-03-21 ENCOUNTER — EDUCATION (OUTPATIENT)
Dept: HEMATOLOGY/ONCOLOGY | Facility: CLINIC | Age: 70
End: 2025-03-21

## 2025-03-21 ENCOUNTER — OFFICE VISIT (OUTPATIENT)
Dept: HEMATOLOGY/ONCOLOGY | Facility: CLINIC | Age: 70
End: 2025-03-21
Payer: MEDICARE

## 2025-03-21 VITALS
TEMPERATURE: 97.5 F | SYSTOLIC BLOOD PRESSURE: 133 MMHG | OXYGEN SATURATION: 98 % | HEART RATE: 71 BPM | DIASTOLIC BLOOD PRESSURE: 73 MMHG | RESPIRATION RATE: 16 BRPM | WEIGHT: 172.4 LBS | BODY MASS INDEX: 23.22 KG/M2

## 2025-03-21 DIAGNOSIS — C18.9 STAGE III CARCINOMA OF COLON (MULTI): ICD-10-CM

## 2025-03-21 DIAGNOSIS — C18.9 ADENOCARCINOMA, COLON (MULTI): ICD-10-CM

## 2025-03-21 DIAGNOSIS — D50.9 MICROCYTIC ANEMIA: ICD-10-CM

## 2025-03-21 LAB
BASOPHILS # BLD AUTO: 0.03 X10*3/UL (ref 0–0.1)
BASOPHILS NFR BLD AUTO: 0.2 %
EOSINOPHIL # BLD AUTO: 0.09 X10*3/UL (ref 0–0.7)
EOSINOPHIL NFR BLD AUTO: 0.7 %
ERYTHROCYTE [DISTWIDTH] IN BLOOD BY AUTOMATED COUNT: 14.5 % (ref 11.5–14.5)
HCT VFR BLD AUTO: 37.6 % (ref 41–52)
HGB BLD-MCNC: 12.8 G/DL (ref 13.5–17.5)
IMM GRANULOCYTES # BLD AUTO: 0.06 X10*3/UL (ref 0–0.7)
IMM GRANULOCYTES NFR BLD AUTO: 0.5 % (ref 0–0.9)
LYMPHOCYTES # BLD AUTO: 0.83 X10*3/UL (ref 1.2–4.8)
LYMPHOCYTES NFR BLD AUTO: 6.9 %
MCH RBC QN AUTO: 36.7 PG (ref 26–34)
MCHC RBC AUTO-ENTMCNC: 34 G/DL (ref 32–36)
MCV RBC AUTO: 108 FL (ref 80–100)
MONOCYTES # BLD AUTO: 0.93 X10*3/UL (ref 0.1–1)
MONOCYTES NFR BLD AUTO: 7.7 %
NEUTROPHILS # BLD AUTO: 10.17 X10*3/UL (ref 1.2–7.7)
NEUTROPHILS NFR BLD AUTO: 84 %
NRBC BLD-RTO: ABNORMAL /100{WBCS}
PLATELET # BLD AUTO: 101 X10*3/UL (ref 150–450)
RBC # BLD AUTO: 3.49 X10*6/UL (ref 4.5–5.9)
WBC # BLD AUTO: 12.1 X10*3/UL (ref 4.4–11.3)

## 2025-03-21 PROCEDURE — 99215 OFFICE O/P EST HI 40 MIN: CPT | Performed by: INTERNAL MEDICINE

## 2025-03-21 PROCEDURE — 1126F AMNT PAIN NOTED NONE PRSNT: CPT | Performed by: INTERNAL MEDICINE

## 2025-03-21 PROCEDURE — 2500000004 HC RX 250 GENERAL PHARMACY W/ HCPCS (ALT 636 FOR OP/ED): Performed by: INTERNAL MEDICINE

## 2025-03-21 PROCEDURE — 80053 COMPREHEN METABOLIC PANEL: CPT

## 2025-03-21 PROCEDURE — 1159F MED LIST DOCD IN RCRD: CPT | Performed by: INTERNAL MEDICINE

## 2025-03-21 PROCEDURE — 36591 DRAW BLOOD OFF VENOUS DEVICE: CPT

## 2025-03-21 PROCEDURE — 85025 COMPLETE CBC W/AUTO DIFF WBC: CPT

## 2025-03-21 RX ORDER — HEPARIN SODIUM,PORCINE/PF 10 UNIT/ML
50 SYRINGE (ML) INTRAVENOUS AS NEEDED
OUTPATIENT
Start: 2025-03-21

## 2025-03-21 RX ORDER — HEPARIN 100 UNIT/ML
500 SYRINGE INTRAVENOUS AS NEEDED
OUTPATIENT
Start: 2025-03-21

## 2025-03-21 RX ORDER — HEPARIN 100 UNIT/ML
500 SYRINGE INTRAVENOUS AS NEEDED
Status: DISCONTINUED | OUTPATIENT
Start: 2025-03-21 | End: 2025-03-21 | Stop reason: HOSPADM

## 2025-03-21 RX ADMIN — HEPARIN 500 UNITS: 100 SYRINGE at 09:21

## 2025-03-21 ASSESSMENT — PAIN SCALES - GENERAL: PAINLEVEL_OUTOF10: 0-NO PAIN

## 2025-03-21 NOTE — PROGRESS NOTES
Patient ID: Krish Batista is a 69 y.o. male.  Referring Physician: Rio Lawson MD  [facility], [address] [place] [address]  Primary Care Provider: Matthew Small,    8/15/24, EGD and colonoscopy  A.  Duodenum, first part, biopsy:  Duodenal mucosa within normal limits.     B.  Stomach, biopsy:  Antrum and corpus mucosa within normal limits.     C.  Colon, mass at 40 cm, biopsy:  Fragments of invasive adenocarcinoma, moderately differentiated.  See note.     NOTE: Immunohistochemical stains for mismatch repair proteins are pending and the result will be reported as an addendum.     D.  Colon, 20 cm, polypectomy:  Tubular adenoma.                               MISMATCH REPAIR PROTEIN EXPRESSION     C. Colon, mass at 40 cm, biopsy: Adenocarcinoma     Protein:  Result     MLH-1:  Expression Present                                    PMS-2: Expression Present                                    MSH-2: Expression Present                                    MSH-6: Expression Present    CEA at 1.5 NG per mL on August 2, 2024    Colon, splenic flexure, resection:  -Invasive adenocarcinoma (3.1 cm), moderately differentiated.  -Carcinoma invades pericolonic tissue.  -Extramural venous and perineural invasion present.  -Resection margins are negative for carcinoma.  -Metastatic adenocarcinoma involving two of thirty-seven lymph nodes (2/37).  -Pathologic stage: pT3, pN1b.  -See comment and synoptic report.  Comment:  Movat stains (blocks A4 and A5) confirm the venous invasion.    Electronically signed by Sharonda Maguire MD PhD on 9/30/2024 at 1350      [facility]   By the signature on this report, the individual or group listed as making the Final Interpretation/Diagnosis certifies that they have reviewed this case.    Case Summary Report   COLON AND RECTUM: Resection, Including Transanal Disk Excision of Rectal Neoplasms   8th Edition - Protocol posted: 6/22/2022COLON AND RECTUM: RESECTION, INCLUDING  TRANSANAL DISK EXCISION OF RECTAL NEOPLASMS - All Specimens  SPECIMEN   Procedure  resection   TUMOR   Tumor Site  Splenic flexure   Histologic Type  Adenocarcinoma   Histologic Grade  G2, moderately differentiated   Tumor Size  Greatest dimension (Centimeters): 3.1 cm   Tumor Extent  Invades through muscularis propria into the pericolonic or perirectal tissue   Macroscopic Tumor Perforation  Not identified   Lymphovascular Invasion  Large vessel (venous), extramural   Perineural Invasion  Present   Tumor Bud Score  Intermediate (5-9)   Treatment Effect  No known presurgical therapy   MARGINS   Margin Status for Invasive Carcinoma  All margins negative for invasive carcinoma   Closest Margin(s) to Invasive Carcinoma  Radial (circumferential) or mesenteric   Distance from Invasive Carcinoma to Closest Margin  Greater than 1 cm   Margin Status for Non-Invasive Tumor  All margins negative for high-grade dysplasia / intramucosal carcinoma and low-grade dysplasia   REGIONAL LYMPH NODES   Regional Lymph Node Status  Tumor present in regional lymph node(s)   Number of Lymph Nodes with Tumor  2   Number of Lymph Nodes Examined  37   Tumor Deposits  Not identified   PATHOLOGIC STAGE CLASSIFICATION (pTNM, AJCC 8th Edition)   Reporting of pT, pN, and (when applicable) pM categories is based on information available to the pathologist at the time the report is issued. As per the AJCC (Chapter 1, 8th Ed.) it is the managing physician’s responsibility to establish the final pathologic stage based upon all pertinent information, including but potentially not limited to this pathology report.   pT Category  pT3   pN Category  pN1b   .        Subjective    HPI  The patient was referred to me by Dr. Clark for further evaluation and management of newly diagnosed biopsy-proven left colon adenocarcinoma.  The patient presented to Dr. Small on March 25, 2024 complaining of fatigue and malaise.  Further evaluation revealed significant  anemia at hemoglobin 8.1 g/dL ferritin at 8 NG per mL iron saturation 3%.  The patient was referred to GI services however he failed to follow through.  He was referred to surgery as well.  Repeat hemoglobin on July 11, 2024 was 7.2 g/dL.  He then presented to ER on August 1, 2024 secondary to progressive fatigue and weakness.  He was diagnosed with severe microcytic anemia hemoglobin 7.4 g/dL.  The patient received 2 units of packed red blood cell transfusions.  Upper and lower endoscopy on August 3, 2024 revealed a mass at 40 cm in the anal verge.  Histology consistent with adenocarcinoma.  In ER the patient was found to be in atrial fibrillation/flutter with RVR.  Cardiology was consulted.  He spontaneously converted to normal sinus rhythm.  Color Doppler echocardiogram revealed normal ejection fraction.  He was placed on metoprolol 50 mg p.o. twice daily with a Holter monitor.  Anticoagulation was not recommended.  Colonoscopy on August 15, 2024 revealed internal small hemorrhoids.  1 8 mm sessile polyp 20 centimeter from anal verge.  This was snared.  Single friable and ulcerated mass not transferred stable 40 cm from the anal verge, covering the whole circumference; bleeding occurred before intervention.  Performed cold forceps biopsy with partial removal: Tattooed distal to the finding with spot.  CT scan of chest abdomen pelvis on August 1, 2024 with IV contrast did not reveal any obvious metastatic disease.  A PET scan revealed bilateral pleural effusion right greater than left.  Thoracentesis was performed on September 3, 2024 cytology is pending.  The patient claims that he is feeling better after thoracentesis.    At interview on September 4, 2024 he was accompanied by his niece denied history of weight loss, fevers, night sweats, chest pain, nausea, vomiting, hematemesis, melena, hematochezia and hematuria.  Since last evaluation the patient has had multiple admissions to the hospital for myocardial  infarction requiring cardiac catheterization, status post surgery/left hemicolectomy on September 15, 2024, GI bleed hypotension requiring iron infusion as well as blood transfusions.  The patient was discharged and claims that he is beginning to feel better although has pedal edema as well as anterior abdominal wall edema.  The patient and family had come for follow-up on November 8, 2024 regarding history of pT3, PN 1B, M0 adenocarcinoma of the colon s/p resection.  During previous evaluation the patient had presented with severe iron deficiency anemia as well as diarrhea.  C. difficile studies were negative.  Diarrhea is resolved.  Nkuqjz-r-Otth was placed.  Initiate intravenous Feraheme for iron 10 mg/week x 2 weeks and also initiate adjuvant chemotherapy using FOLFOX to be given every 2 weeks for 12 cycles.  Cycle 1 on November 4, 2024    The patient and family had come for follow-up on December 13 , 2024 regarding history of pT3, PN 1B, M0 adenocarcinoma of the colon s/p resection.  During previous evaluation the patient had presented with severe iron deficiency anemia as well as diarrhea.  C. difficile studies were negative.   The patient received cycle 3,  2 weeks earlier and complained of easy fatigability and diarrhea that was easily controlled with Imodium.  Will reduce dose by 25% with cycle 4 and going forward.    The patient and family had come for follow-up on December 27, 2024 regarding history of pT3, PN 1B, M0 adenocarcinoma of colon s/p resection.  Receiving adjuvant chemotherapy dose reduced 25%.  Has received and tolerated 4 cycles without any problems.     The patient and family had come for follow-up on 2/7/25 regarding history of pT3, PN 1B, M0 adenocarcinoma of colon s/p resection.  Receiving adjuvant chemotherapy dose reduced 25%.  Has received and tolerated 7 cycles without any problems.    The patient and family had come for follow-up on 3/21/25 regarding history of pT3, PN 1B, M0  adenocarcinoma of colon s/p resection.  Receiving adjuvant chemotherapy dose reduced 25%.  Has received and tolerated 10 cycles without any problems.  Past medical history:    Iron deficiency anemia, adenocarcinoma of descending colon, history of Raynaud's phenomenon.  Past surgical history:    Sinus surgery July 13, 2021, history of undescended testis surgery, and tonsillectomy.      Review of Systems   All other systems reviewed and are negative.       Objective   BSA: 2 meters squared  /73   Pulse 71   Temp 36.4 °C (97.5 °F) (Temporal)   Resp 16   Wt 78.2 kg (172 lb 6.4 oz)   SpO2 98%   BMI 23.22 kg/m²     No family history on file.  Oncology History   Adenocarcinoma, colon (Multi)   8/23/2024 Initial Diagnosis    Adenocarcinoma, colon (Multi)     11/4/2024 -  Chemotherapy    mFOLFOX6 (Fluorouracil Continuous Infusion / Leucovorin / Oxaliplatin), 14 Day Cycles       The patient is 69 years old, single has no children retired as a .  Krish Batista  reports that he has never smoked. He has never used smokeless tobacco.  He  reports that he does not currently use alcohol.  He  reports no history of drug use.    Physical Exam  Constitutional:       Appearance: Normal appearance.   HENT:      Head: Normocephalic and atraumatic.      Nose: Nose normal.      Mouth/Throat:      Mouth: Mucous membranes are moist.      Pharynx: Oropharynx is clear.   Eyes:      Extraocular Movements: Extraocular movements intact.      Conjunctiva/sclera: Conjunctivae normal.      Pupils: Pupils are equal, round, and reactive to light.   Cardiovascular:      Rate and Rhythm: Normal rate and regular rhythm.   Pulmonary:      Effort: Pulmonary effort is normal.      Breath sounds: Normal breath sounds.   Abdominal:      General: Abdomen is flat. Bowel sounds are normal.      Palpations: Abdomen is soft.   Musculoskeletal:         General: Normal range of motion.      Cervical back: Normal range of motion and neck supple.    Neurological:      General: No focal deficit present.      Mental Status: He is alert and oriented to person, place, and time. Mental status is at baseline.   Psychiatric:         Mood and Affect: Mood normal.         Behavior: Behavior normal.         Thought Content: Thought content normal.         Judgment: Judgment normal.         Performance Status:  Symptomatic; in bed <50% of the day    Assessment/Plan    The patient is a 69-year-old man presented with easy fatigability shortness of breath on exertion.  Diagnosed with iron deficiency anemia.  Cologuard was positive.  The patient was referred to GI services but did not follow through.  Eventually presented to ER with above symptoms did receive 2 units of packed red blood cell transfusions.  He did develop atrial fibrillation/flutter while in ER but spontaneously converted to normal sinus rhythm was placed on metoprolol.  Colonoscopy revealed a mass 40 cm from anal verge biopsy/histology consistent with adenocarcinoma.  Initially CT scan of chest abdomen pelvis did not reveal any evidence of metastatic disease.  But a PET scan revealed large right pleural effusion and small left pleural effusion.  Thoracentesis performed on right pleural effusion on September 3, 2024 cytology is pending.  If negative would seek opinion about hilar/mediastinal lymphadenopathy.    Iron deficiency anemia:    The patient is still symptomatic with anemia.  I have recommended intravenous Feraheme 510 mg/week x 2 weeks.  Effect side effects explained.  The patient understood appreciated all the details provided and was grateful.  T3 N1 M0 adenocarcinoma of sigmoid colon s/p resection on September 15, 2024.    The patient and family had come for follow-up on October 3, 2024 as described above multiple admissions requiring stay in ICU, history of MI GI bleed requiring blood and iron transfusions.  Physical examination revealed pedal edema and CBC revealed hemoglobin 9.2 g/dL.  I have  recommended evaluation of anemia as well as Doppler ultrasound.  Discussed in detail about options such as do-nothing, consider adjuvant chemotherapy using FOLFOX which might elicit 25% survival benefit.  The patient is agreeable to start chemotherapy.  This will necessitate port placement.  Return in 2 weeks.  The patient and family had come for follow-up on November 8, 2024 regarding history of pT3, PN 1B, M0 adenocarcinoma of the colon s/p resection.  During previous evaluation the patient had presented with severe iron deficiency anemia as well as diarrhea.  C. difficile studies were negative.  Diarrhea is resolved.  Qewatv-p-Enii was placed.  Initiate intravenous Feraheme for iron 10 mg/week x 2 weeks and also initiate adjuvant chemotherapy using FOLFOX to be given every 2 weeks for 12 cycles beginning November for, 2024    The patient and family had come for follow-up on December 13 , 2024 regarding history of pT3, PN 1B, M0 adenocarcinoma of the colon s/p resection.  During previous evaluation the patient had presented with severe iron deficiency anemia as well as diarrhea.  C. difficile studies were negative.   The patient received cycle 3,  2 weeks earlier and complained of easy fatigability and diarrhea that was easily controlled with Imodium.  Will reduce dose by 25% with cycle 4 and going forward.    The patient and family had come for follow-up on December 27, 2024 regarding history of pT3, PN 1B, M0 adenocarcinoma of colon s/p resection.  Receiving adjuvant chemotherapy dose reduced 25%.  Has received and tolerated 4 cycles without any problems.  Physical examination within normal limits.  Cycle 5 on December 30, 2024.     The patient and family had come for follow-up on 1/24/25 regarding history of pT3, PN 1B, M0 adenocarcinoma of colon s/p resection.  Receiving adjuvant chemotherapy dose reduced 25%.  Has received and tolerated 5 cycles without any problems.  Reviewed lab data with the patient.   Cycle 7 on January 28, 2025 at 25% dose reduction.  Return in 2 weeks.     The patient and family had come for follow-up on 2/7/25 regarding history of pT3, PN 1B, M0 adenocarcinoma of colon s/p resection.  Receiving adjuvant chemotherapy dose reduced 25%.  Has received and tolerated 7 cycles without any problems physical examination within normal limits reviewed lab data with the patient.  Cycle 8 on February 12, 2025.    The patient and family had come for follow-up on 3/21/25 regarding history of pT3, PN 1B, M0 adenocarcinoma of colon s/p resection.  Receiving adjuvant chemotherapy dose reduced 25%.  Has received and tolerated 8 cycles without any problems.  Cycle 11 on March 26, 2025.  Return in 2 weeks.    Thank you for allowing me to participate in care of your patient if you have any questions please feel free to call me.      Diagnoses and all orders for this visit:  Microcytic anemia  -     Clinic Appointment Request ([facility] appt needed please); Future  Adenocarcinoma, colon (Multi)  -     Referral to Hematology and Oncology  -     Clinic Appointment Request ([facility] appt needed please); Future  Other orders  -     heparin flush 10 unit/mL syringe 50 Units  -     heparin flush 100 unit/mL syringe 500 Units  -     alteplase (Cathflo Activase) injection 2 mg  -     ferumoxytol (Feraheme) 510 mg in sodium chloride 0.9% 117 mL IV  -     sodium chloride 0.9 % bolus 500 mL  -     dextrose 5 % in water (D5W) bolus  -     diphenhydrAMINE (BENADryl) injection 50 mg  -     methylPREDNISolone sod succinate (SOLU-Medrol) 40 mg/mL injection 40 mg  -     famotidine PF (Pepcid) injection 20 mg  -     EPINEPHrine (Epipen) injection syringe 0.3 mg  -     albuterol 2.5 mg /3 mL (0.083 %) nebulizer solution 3 mL           Rio Lawson MD

## 2025-03-22 LAB
ALBUMIN SERPL BCP-MCNC: 3.8 G/DL (ref 3.4–5)
ALP SERPL-CCNC: 138 U/L (ref 33–136)
ALT SERPL W P-5'-P-CCNC: 20 U/L (ref 10–52)
ANION GAP SERPL CALC-SCNC: 12 MMOL/L (ref 10–20)
AST SERPL W P-5'-P-CCNC: 28 U/L (ref 9–39)
BILIRUB SERPL-MCNC: 0.6 MG/DL (ref 0–1.2)
BUN SERPL-MCNC: 17 MG/DL (ref 6–23)
CALCIUM SERPL-MCNC: 8.8 MG/DL (ref 8.6–10.6)
CHLORIDE SERPL-SCNC: 105 MMOL/L (ref 98–107)
CO2 SERPL-SCNC: 25 MMOL/L (ref 21–32)
CREAT SERPL-MCNC: 0.84 MG/DL (ref 0.5–1.3)
EGFRCR SERPLBLD CKD-EPI 2021: >90 ML/MIN/1.73M*2
GLUCOSE SERPL-MCNC: 145 MG/DL (ref 74–99)
POTASSIUM SERPL-SCNC: 3.9 MMOL/L (ref 3.5–5.3)
PROT SERPL-MCNC: 5.9 G/DL (ref 6.4–8.2)
SODIUM SERPL-SCNC: 138 MMOL/L (ref 136–145)

## 2025-03-26 ENCOUNTER — INFUSION (OUTPATIENT)
Dept: HEMATOLOGY/ONCOLOGY | Facility: CLINIC | Age: 70
End: 2025-03-26
Payer: MEDICARE

## 2025-03-26 VITALS
SYSTOLIC BLOOD PRESSURE: 131 MMHG | DIASTOLIC BLOOD PRESSURE: 77 MMHG | RESPIRATION RATE: 16 BRPM | TEMPERATURE: 98.6 F | WEIGHT: 174.94 LBS | HEIGHT: 72 IN | BODY MASS INDEX: 23.69 KG/M2 | OXYGEN SATURATION: 100 % | HEART RATE: 68 BPM

## 2025-03-26 DIAGNOSIS — C18.9 STAGE III CARCINOMA OF COLON: ICD-10-CM

## 2025-03-26 DIAGNOSIS — C18.9 ADENOCARCINOMA, COLON: ICD-10-CM

## 2025-03-26 PROCEDURE — 96416 CHEMO PROLONG INFUSE W/PUMP: CPT

## 2025-03-26 PROCEDURE — 2500000004 HC RX 250 GENERAL PHARMACY W/ HCPCS (ALT 636 FOR OP/ED): Mod: JZ,TB | Performed by: INTERNAL MEDICINE

## 2025-03-26 PROCEDURE — 2500000004 HC RX 250 GENERAL PHARMACY W/ HCPCS (ALT 636 FOR OP/ED): Performed by: INTERNAL MEDICINE

## 2025-03-26 PROCEDURE — 2500000004 HC RX 250 GENERAL PHARMACY W/ HCPCS (ALT 636 FOR OP/ED)

## 2025-03-26 PROCEDURE — 96415 CHEMO IV INFUSION ADDL HR: CPT

## 2025-03-26 PROCEDURE — 2500000001 HC RX 250 WO HCPCS SELF ADMINISTERED DRUGS (ALT 637 FOR MEDICARE OP): Performed by: INTERNAL MEDICINE

## 2025-03-26 PROCEDURE — 96411 CHEMO IV PUSH ADDL DRUG: CPT

## 2025-03-26 PROCEDURE — 96413 CHEMO IV INFUSION 1 HR: CPT

## 2025-03-26 PROCEDURE — 96376 TX/PRO/DX INJ SAME DRUG ADON: CPT

## 2025-03-26 PROCEDURE — 96361 HYDRATE IV INFUSION ADD-ON: CPT | Mod: INF

## 2025-03-26 RX ORDER — EPINEPHRINE 0.3 MG/.3ML
0.3 INJECTION SUBCUTANEOUS EVERY 5 MIN PRN
Status: DISCONTINUED | OUTPATIENT
Start: 2025-03-26 | End: 2025-03-27 | Stop reason: HOSPADM

## 2025-03-26 RX ORDER — LORAZEPAM 2 MG/ML
1 INJECTION INTRAMUSCULAR AS NEEDED
Status: DISCONTINUED | OUTPATIENT
Start: 2025-03-26 | End: 2025-03-27 | Stop reason: HOSPADM

## 2025-03-26 RX ORDER — FAMOTIDINE 10 MG/ML
20 INJECTION, SOLUTION INTRAVENOUS ONCE AS NEEDED
Status: COMPLETED | OUTPATIENT
Start: 2025-03-26 | End: 2025-03-26

## 2025-03-26 RX ORDER — PALONOSETRON 0.05 MG/ML
0.25 INJECTION, SOLUTION INTRAVENOUS ONCE
Status: COMPLETED | OUTPATIENT
Start: 2025-03-26 | End: 2025-03-26

## 2025-03-26 RX ORDER — DEXAMETHASONE 4 MG/1
12 TABLET ORAL ONCE
Status: COMPLETED | OUTPATIENT
Start: 2025-03-26 | End: 2025-03-26

## 2025-03-26 RX ORDER — FLUOROURACIL 50 MG/ML
300 INJECTION, SOLUTION INTRAVENOUS ONCE
Status: COMPLETED | OUTPATIENT
Start: 2025-03-26 | End: 2025-03-26

## 2025-03-26 RX ORDER — ALBUTEROL SULFATE 0.83 MG/ML
3 SOLUTION RESPIRATORY (INHALATION) AS NEEDED
Status: DISCONTINUED | OUTPATIENT
Start: 2025-03-26 | End: 2025-03-27 | Stop reason: HOSPADM

## 2025-03-26 RX ORDER — DIPHENHYDRAMINE HYDROCHLORIDE 50 MG/ML
50 INJECTION, SOLUTION INTRAMUSCULAR; INTRAVENOUS AS NEEDED
Status: COMPLETED | OUTPATIENT
Start: 2025-03-26 | End: 2025-03-26

## 2025-03-26 RX ORDER — PROCHLORPERAZINE EDISYLATE 5 MG/ML
10 INJECTION INTRAMUSCULAR; INTRAVENOUS EVERY 6 HOURS PRN
Status: DISCONTINUED | OUTPATIENT
Start: 2025-03-26 | End: 2025-03-27 | Stop reason: HOSPADM

## 2025-03-26 RX ORDER — PROCHLORPERAZINE MALEATE 10 MG
10 TABLET ORAL EVERY 6 HOURS PRN
Status: DISCONTINUED | OUTPATIENT
Start: 2025-03-26 | End: 2025-03-27 | Stop reason: HOSPADM

## 2025-03-26 RX ADMIN — PALONOSETRON HYDROCHLORIDE 0.25 MG: 0.25 INJECTION INTRAVENOUS at 09:31

## 2025-03-26 RX ADMIN — FAMOTIDINE 20 MG: 10 INJECTION, SOLUTION INTRAVENOUS at 12:02

## 2025-03-26 RX ADMIN — OXALIPLATIN 135 MG: 5 INJECTION, SOLUTION, CONCENTRATE INTRAVENOUS at 10:25

## 2025-03-26 RX ADMIN — SODIUM CHLORIDE 500 ML: 9 INJECTION, SOLUTION INTRAVENOUS at 13:00

## 2025-03-26 RX ADMIN — FLUOROURACIL 3750 MG: 50 INJECTION, SOLUTION INTRAVENOUS at 15:30

## 2025-03-26 RX ADMIN — DIPHENHYDRAMINE HYDROCHLORIDE 50 MG: 50 INJECTION INTRAMUSCULAR; INTRAVENOUS at 12:44

## 2025-03-26 RX ADMIN — DEXTROSE MONOHYDRATE 250 ML: 50 INJECTION, SOLUTION INTRAVENOUS at 11:57

## 2025-03-26 RX ADMIN — DEXAMETHASONE 12 MG: 4 TABLET ORAL at 09:31

## 2025-03-26 RX ADMIN — METHYLPREDNISOLONE SODIUM SUCCINATE 40 MG: 40 INJECTION, POWDER, FOR SOLUTION INTRAMUSCULAR; INTRAVENOUS at 12:09

## 2025-03-26 RX ADMIN — FLUOROURACIL 625 MG: 50 INJECTION, SOLUTION INTRAVENOUS at 15:27

## 2025-03-26 RX ADMIN — PROCHLORPERAZINE MALEATE 10 MG: 10 TABLET ORAL at 12:02

## 2025-03-26 ASSESSMENT — PAIN SCALES - GENERAL: PAINLEVEL_OUTOF10: 0-NO PAIN

## 2025-03-26 ASSESSMENT — ENCOUNTER SYMPTOMS
ABDOMINAL PAIN: 1
NAUSEA: 1
DIZZINESS: 1

## 2025-03-26 NOTE — PROGRESS NOTES
"Patient ID: Krish Batista is a 69 y.o. male.  Referring Physician: Rio Lawson MD  [address] [facility], [address]  [place] [address]  Primary Care Provider: Matthew Small DO      Subjective    HPI    Review of Systems - Oncology     Objective   BSA: 2.01 meters squared  /77   Pulse 68   Temp 37 °C (98.6 °F)   Resp 16   Ht 1.835 m (6' 0.24\")   Wt 79.3 kg (174 lb 15 oz)   SpO2 100%   BMI 23.57 kg/m²     No family history on file.  Oncology History   Adenocarcinoma, colon (Multi)   8/23/2024 Initial Diagnosis    Adenocarcinoma, colon (Multi)     11/4/2024 -  Chemotherapy    mFOLFOX6 (Fluorouracil Continuous Infusion / Leucovorin / Oxaliplatin), 14 Day Cycles         Krihs Batista  reports that he has never smoked. He has never used smokeless tobacco.  He  reports that he does not currently use alcohol.  He  reports no history of drug use.    Physical Exam    Nurses called to room by patient at 1100. 1/2 through oxalitplatin. C/o feeling woosy. Flushed face upset stomach and nausea.   Oxaliplatin stopped. CNP to room.  NS WO. Rescue meds: pepcid and compazine methylpred given.  Nurisng staff with patient throughout.     Patient slowly feeling better. Remained flushed       Spoke with Dr. Lawson. Agreed to observe and if patient feeling better restart oxaliplatin at 1/2 rate.    Patient then began to have burning eyes and they were red. Bendadryl IV given. Observed for about 45 mins. Paitent began to feel much better.     Oxaliplatin restarted at 1/2 rate. Patient completed infusion without futher incident. All symptoms resolved. Patient discharged to home with great niece chapo.     Performance Status:  {ECOG performance status:88057}    Assessment/Plan          {Assess/PlanSmartLinks:61855}           INF 05 KRUNAL                         "

## 2025-03-26 NOTE — PROGRESS NOTES
"Provider notified by nursing staff of patient's new complaints of nausea, abdominal cramping, and \"whooziness\" assisted through his C11/12 oxaliplatin infusion. Infusion stopped at 1152. VS 36, HR 65, R 16, 155/76.     On arrival, pt appears hemodynamically stable and in no acute distress. He reports he felt the abdominal cramping similiarly with his last reaction but this time it feels more significant. Family member at bedside. A D5W 500cc bag was already infusing prior to provider's arrival. Pt noticeably began to slowly develop facial flushing as well during this encounter. Meds administered: 1L NS bolus ordered, 20mg IVP Pepcid once, 40 IVP Solumedrol once, 10m PO compazine once. Watchful monitoring provided, pt then reported mild \"burning\" sensation in his eyes, 50mg IVP Benadrly administered. Dr. Lawson notified of AE and recommended to await for return of symptomatic improvement and then reduce infusion rate by 50% and restart infusion. If pt were to have reoccurrence of symptoms, then infusion to be stopped immediately. Pt began to report improvement in symptoms and return to baseline, infusion restarted with close monitoring.   "

## 2025-03-26 NOTE — PROGRESS NOTES
"Patient ID: Krish Batista is a 69 y.o. male.  Referring Physician: Rio Lawson MD  [address] [facility], [address]  [place] [address]  Primary Care Provider: Matthew Small,       Subjective    Patient c/o flushing, whoosiness and abdominal cramping approximately 1/2 through oxaliplatin bag.  CNP kendall Santana to room and nursing staff.  Oxaliplatin stopped     NS IV began. Given rescue meds:  Compazine po, pepcid IV, solumedrol IV.  VS stable during this time.    Symptoms slowly improving.   1235 Flushing lessened. Abdominal cramping resolved.   C/o fatigue. Stated eyes are irriatated.     Spoke with Dr. Lawson. Agreed to hold off on IV benadryl to see if symptoms resolve without.    If patient returns to baseline after 30 mins, restart oxaliplatin at 1/2 rate.  Any signs of continued reaction stop and hold oxaliplatin. Continue with 5 FU     Patient given benadryl IV to help with eye burning. Additional 500ml NS IV.  Currently resting.     1354 restarted at 1/2 rate. Will monitor for any signs of reaction. Patient aware to call with symptoms.       Review of Systems   Gastrointestinal:  Positive for abdominal pain and nausea.   Neurological:  Positive for dizziness.    Flushing     Objective   BSA: 2.01 meters squared  /81   Pulse 64   Temp 36.7 °C (98.1 °F)   Resp 18   Ht 1.835 m (6' 0.24\")   Wt 79.3 kg (174 lb 15 oz)   SpO2 100%   BMI 23.57 kg/m²     No family history on file.  Oncology History   Adenocarcinoma, colon (Multi)   8/23/2024 Initial Diagnosis    Adenocarcinoma, colon (Multi)     11/4/2024 -  Chemotherapy    mFOLFOX6 (Fluorouracil Continuous Infusion / Leucovorin / Oxaliplatin), 14 Day Cycles         Krish Batista  reports that he has never smoked. He has never used smokeless tobacco.  He  reports that he does not currently use alcohol.  He  reports no history of drug use.    Physical Exam    Performance Status:  Asymptomatic    Assessment/Plan               "       INF 05 KRUNAL

## 2025-03-28 ENCOUNTER — INFUSION (OUTPATIENT)
Dept: HEMATOLOGY/ONCOLOGY | Facility: CLINIC | Age: 70
End: 2025-03-28
Payer: MEDICARE

## 2025-03-28 VITALS
SYSTOLIC BLOOD PRESSURE: 118 MMHG | DIASTOLIC BLOOD PRESSURE: 91 MMHG | TEMPERATURE: 96.8 F | HEART RATE: 59 BPM | RESPIRATION RATE: 16 BRPM | OXYGEN SATURATION: 100 %

## 2025-03-28 DIAGNOSIS — C18.9 STAGE III CARCINOMA OF COLON: ICD-10-CM

## 2025-03-28 DIAGNOSIS — C18.9 ADENOCARCINOMA, COLON: ICD-10-CM

## 2025-03-28 PROCEDURE — 96372 THER/PROPH/DIAG INJ SC/IM: CPT

## 2025-03-28 PROCEDURE — 2500000004 HC RX 250 GENERAL PHARMACY W/ HCPCS (ALT 636 FOR OP/ED): Mod: JZ,TB | Performed by: INTERNAL MEDICINE

## 2025-03-28 PROCEDURE — 2500000004 HC RX 250 GENERAL PHARMACY W/ HCPCS (ALT 636 FOR OP/ED)

## 2025-03-28 RX ORDER — HEPARIN 100 UNIT/ML
SYRINGE INTRAVENOUS
Status: COMPLETED
Start: 2025-03-28 | End: 2025-03-28

## 2025-03-28 RX ADMIN — HEPARIN 500 UNITS: 100 SYRINGE at 14:07

## 2025-03-28 RX ADMIN — PEGFILGRASTIM 6 MG: 6 INJECTION SUBCUTANEOUS at 14:07

## 2025-03-28 ASSESSMENT — PAIN SCALES - GENERAL: PAINLEVEL_OUTOF10: 0-NO PAIN

## 2025-04-04 ENCOUNTER — INFUSION (OUTPATIENT)
Dept: HEMATOLOGY/ONCOLOGY | Facility: CLINIC | Age: 70
End: 2025-04-04
Payer: MEDICARE

## 2025-04-04 ENCOUNTER — SOCIAL WORK (OUTPATIENT)
Dept: HEMATOLOGY/ONCOLOGY | Facility: CLINIC | Age: 70
End: 2025-04-04

## 2025-04-04 ENCOUNTER — OFFICE VISIT (OUTPATIENT)
Dept: HEMATOLOGY/ONCOLOGY | Facility: CLINIC | Age: 70
End: 2025-04-04
Payer: MEDICARE

## 2025-04-04 VITALS
OXYGEN SATURATION: 98 % | BODY MASS INDEX: 23.22 KG/M2 | HEART RATE: 68 BPM | SYSTOLIC BLOOD PRESSURE: 134 MMHG | RESPIRATION RATE: 16 BRPM | TEMPERATURE: 96.8 F | WEIGHT: 172.4 LBS | DIASTOLIC BLOOD PRESSURE: 53 MMHG

## 2025-04-04 DIAGNOSIS — C18.9 STAGE III CARCINOMA OF COLON: ICD-10-CM

## 2025-04-04 DIAGNOSIS — C18.9 ADENOCARCINOMA, COLON: ICD-10-CM

## 2025-04-04 DIAGNOSIS — D50.9 MICROCYTIC ANEMIA: ICD-10-CM

## 2025-04-04 LAB
BASOPHILS # BLD AUTO: 0.02 X10*3/UL (ref 0–0.1)
BASOPHILS NFR BLD AUTO: 0.1 %
EOSINOPHIL # BLD AUTO: 0.17 X10*3/UL (ref 0–0.7)
EOSINOPHIL NFR BLD AUTO: 1.2 %
ERYTHROCYTE [DISTWIDTH] IN BLOOD BY AUTOMATED COUNT: 13.8 % (ref 11.5–14.5)
HCT VFR BLD AUTO: 37.3 % (ref 41–52)
HGB BLD-MCNC: 12.6 G/DL (ref 13.5–17.5)
IMM GRANULOCYTES # BLD AUTO: 0.06 X10*3/UL (ref 0–0.7)
IMM GRANULOCYTES NFR BLD AUTO: 0.4 % (ref 0–0.9)
LYMPHOCYTES # BLD AUTO: 0.91 X10*3/UL (ref 1.2–4.8)
LYMPHOCYTES NFR BLD AUTO: 6.5 %
MCH RBC QN AUTO: 37.1 PG (ref 26–34)
MCHC RBC AUTO-ENTMCNC: 33.8 G/DL (ref 32–36)
MCV RBC AUTO: 110 FL (ref 80–100)
MONOCYTES # BLD AUTO: 1.16 X10*3/UL (ref 0.1–1)
MONOCYTES NFR BLD AUTO: 8.3 %
NEUTROPHILS # BLD AUTO: 11.67 X10*3/UL (ref 1.2–7.7)
NEUTROPHILS NFR BLD AUTO: 83.5 %
NRBC BLD-RTO: ABNORMAL /100{WBCS}
PLATELET # BLD AUTO: 98 X10*3/UL (ref 150–450)
RBC # BLD AUTO: 3.4 X10*6/UL (ref 4.5–5.9)
WBC # BLD AUTO: 14 X10*3/UL (ref 4.4–11.3)

## 2025-04-04 PROCEDURE — 99215 OFFICE O/P EST HI 40 MIN: CPT | Performed by: INTERNAL MEDICINE

## 2025-04-04 PROCEDURE — 2500000004 HC RX 250 GENERAL PHARMACY W/ HCPCS (ALT 636 FOR OP/ED): Performed by: INTERNAL MEDICINE

## 2025-04-04 PROCEDURE — 1159F MED LIST DOCD IN RCRD: CPT | Performed by: INTERNAL MEDICINE

## 2025-04-04 PROCEDURE — 1126F AMNT PAIN NOTED NONE PRSNT: CPT | Performed by: INTERNAL MEDICINE

## 2025-04-04 PROCEDURE — 96523 IRRIG DRUG DELIVERY DEVICE: CPT

## 2025-04-04 PROCEDURE — 85025 COMPLETE CBC W/AUTO DIFF WBC: CPT

## 2025-04-04 PROCEDURE — 80053 COMPREHEN METABOLIC PANEL: CPT

## 2025-04-04 PROCEDURE — G2211 COMPLEX E/M VISIT ADD ON: HCPCS | Performed by: INTERNAL MEDICINE

## 2025-04-04 RX ORDER — HEPARIN 100 UNIT/ML
500 SYRINGE INTRAVENOUS AS NEEDED
Status: DISCONTINUED | OUTPATIENT
Start: 2025-04-04 | End: 2025-04-04 | Stop reason: HOSPADM

## 2025-04-04 RX ORDER — HEPARIN SODIUM,PORCINE/PF 10 UNIT/ML
50 SYRINGE (ML) INTRAVENOUS AS NEEDED
OUTPATIENT
Start: 2025-04-04

## 2025-04-04 RX ORDER — DIPHENHYDRAMINE HCL 25 MG
25 CAPSULE ORAL ONCE
OUTPATIENT
Start: 2025-04-09 | End: 2025-04-09

## 2025-04-04 RX ORDER — HEPARIN SODIUM,PORCINE/PF 10 UNIT/ML
50 SYRINGE (ML) INTRAVENOUS AS NEEDED
Status: DISCONTINUED | OUTPATIENT
Start: 2025-04-04 | End: 2025-04-04 | Stop reason: HOSPADM

## 2025-04-04 RX ORDER — HEPARIN 100 UNIT/ML
500 SYRINGE INTRAVENOUS AS NEEDED
OUTPATIENT
Start: 2025-04-04

## 2025-04-04 RX ADMIN — HEPARIN 500 UNITS: 100 SYRINGE at 09:59

## 2025-04-04 ASSESSMENT — PAIN SCALES - GENERAL: PAINLEVEL_OUTOF10: 0-NO PAIN

## 2025-04-04 NOTE — PROGRESS NOTES
Patient here for FUV with Dr. Lawson and pretx labs.  For C12 on 4/9/25.  For CT in about 4 weeks with FUV after.  Patient independently ambulatory off unit in NAD and without complaints.  Gait steady.  Call back instructions reviewed.  Patient verbalized understanding.

## 2025-04-04 NOTE — PROGRESS NOTES
This  stopped in to follow up with this pt while he was here for treatment.   The pt's niece was with him.   The pt reports that he has been staying with niece during treatment to help with the side effects and care.     The pt reports that his birthday is the next day and he is looking forward to going home and spending time with his dog.     The pt and his niece report that family are coming to the pt's home to celebrate his birthday this weekend and the pt is looking forward to this.     The pt has no other SW needs at this time.

## 2025-04-04 NOTE — PROGRESS NOTES
Patient ID: Krish Batista is a 69 y.o. male.  Referring Physician: Rio Lawson MD  [facility], [place] [address] [place] [address]  Primary Care Provider: Matthew Small,    8/15/24, EGD and colonoscopy  A.  Duodenum, first part, biopsy:  Duodenal mucosa within normal limits.     B.  Stomach, biopsy:  Antrum and corpus mucosa within normal limits.     C.  Colon, mass at 40 cm, biopsy:  Fragments of invasive adenocarcinoma, moderately differentiated.  See note.     NOTE: Immunohistochemical stains for mismatch repair proteins are pending and the result will be reported as an addendum.     D.  Colon, 20 cm, polypectomy:  Tubular adenoma.                               MISMATCH REPAIR PROTEIN EXPRESSION     C. Colon, mass at 40 cm, biopsy: Adenocarcinoma     Protein:  Result     MLH-1:  Expression Present                                    PMS-2: Expression Present                                    MSH-2: Expression Present                                    MSH-6: Expression Present    CEA at 1.5 NG per mL on August 2, 2024    Colon, splenic flexure, resection:  -Invasive adenocarcinoma (3.1 cm), moderately differentiated.  -Carcinoma invades pericolonic tissue.  -Extramural venous and perineural invasion present.  -Resection margins are negative for carcinoma.  -Metastatic adenocarcinoma involving two of thirty-seven lymph nodes (2/37).  -Pathologic stage: pT3, pN1b.  -See comment and synoptic report.  Comment:  Movat stains (blocks A4 and A5) confirm the venous invasion.    Electronically signed by Sharonda Maguire MD PhD on 9/30/2024 at 1350      [facility]   By the signature on this report, the individual or group listed as making the Final Interpretation/Diagnosis certifies that they have reviewed this case.    Case Summary Report   COLON AND RECTUM: Resection, Including Transanal Disk Excision of Rectal Neoplasms   8th Edition - Protocol posted: 6/22/2022COLON AND RECTUM: RESECTION, INCLUDING  TRANSANAL DISK EXCISION OF RECTAL NEOPLASMS - All Specimens  SPECIMEN   Procedure  resection   TUMOR   Tumor Site  Splenic flexure   Histologic Type  Adenocarcinoma   Histologic Grade  G2, moderately differentiated   Tumor Size  Greatest dimension (Centimeters): 3.1 cm   Tumor Extent  Invades through muscularis propria into the pericolonic or perirectal tissue   Macroscopic Tumor Perforation  Not identified   Lymphovascular Invasion  Large vessel (venous), extramural   Perineural Invasion  Present   Tumor Bud Score  Intermediate (5-9)   Treatment Effect  No known presurgical therapy   MARGINS   Margin Status for Invasive Carcinoma  All margins negative for invasive carcinoma   Closest Margin(s) to Invasive Carcinoma  Radial (circumferential) or mesenteric   Distance from Invasive Carcinoma to Closest Margin  Greater than 1 cm   Margin Status for Non-Invasive Tumor  All margins negative for high-grade dysplasia / intramucosal carcinoma and low-grade dysplasia   REGIONAL LYMPH NODES   Regional Lymph Node Status  Tumor present in regional lymph node(s)   Number of Lymph Nodes with Tumor  2   Number of Lymph Nodes Examined  37   Tumor Deposits  Not identified   PATHOLOGIC STAGE CLASSIFICATION (pTNM, AJCC 8th Edition)   Reporting of pT, pN, and (when applicable) pM categories is based on information available to the pathologist at the time the report is issued. As per the AJCC (Chapter 1, 8th Ed.) it is the managing physician’s responsibility to establish the final pathologic stage based upon all pertinent information, including but potentially not limited to this pathology report.   pT Category  pT3   pN Category  pN1b   .        Subjective    HPI  The patient was referred to me by Dr. Clark for further evaluation and management of newly diagnosed biopsy-proven left colon adenocarcinoma.  The patient presented to Dr. Small on March 25, 2024 complaining of fatigue and malaise.  Further evaluation revealed significant  anemia at hemoglobin 8.1 g/dL ferritin at 8 NG per mL iron saturation 3%.  The patient was referred to GI services however he failed to follow through.  He was referred to surgery as well.  Repeat hemoglobin on July 11, 2024 was 7.2 g/dL.  He then presented to ER on August 1, 2024 secondary to progressive fatigue and weakness.  He was diagnosed with severe microcytic anemia hemoglobin 7.4 g/dL.  The patient received 2 units of packed red blood cell transfusions.  Upper and lower endoscopy on August 3, 2024 revealed a mass at 40 cm in the anal verge.  Histology consistent with adenocarcinoma.  In ER the patient was found to be in atrial fibrillation/flutter with RVR.  Cardiology was consulted.  He spontaneously converted to normal sinus rhythm.  Color Doppler echocardiogram revealed normal ejection fraction.  He was placed on metoprolol 50 mg p.o. twice daily with a Holter monitor.  Anticoagulation was not recommended.  Colonoscopy on August 15, 2024 revealed internal small hemorrhoids.  1 8 mm sessile polyp 20 centimeter from anal verge.  This was snared.  Single friable and ulcerated mass not transferred stable 40 cm from the anal verge, covering the whole circumference; bleeding occurred before intervention.  Performed cold forceps biopsy with partial removal: Tattooed distal to the finding with spot.  CT scan of chest abdomen pelvis on August 1, 2024 with IV contrast did not reveal any obvious metastatic disease.  A PET scan revealed bilateral pleural effusion right greater than left.  Thoracentesis was performed on September 3, 2024 cytology is pending.  The patient claims that he is feeling better after thoracentesis.    At interview on September 4, 2024 he was accompanied by his niece denied history of weight loss, fevers, night sweats, chest pain, nausea, vomiting, hematemesis, melena, hematochezia and hematuria.  Since last evaluation the patient has had multiple admissions to the hospital for myocardial  infarction requiring cardiac catheterization, status post surgery/left hemicolectomy on September 15, 2024, GI bleed hypotension requiring iron infusion as well as blood transfusions.  The patient was discharged and claims that he is beginning to feel better although has pedal edema as well as anterior abdominal wall edema.  The patient and family had come for follow-up on November 8, 2024 regarding history of pT3, PN 1B, M0 adenocarcinoma of the colon s/p resection.  During previous evaluation the patient had presented with severe iron deficiency anemia as well as diarrhea.  C. difficile studies were negative.  Diarrhea is resolved.  [id number] was placed.  Initiate intravenous Feraheme for iron 10 mg/week x 2 weeks and also initiate adjuvant chemotherapy using FOLFOX to be given every 2 weeks for 12 cycles.  Cycle 1 on November 4, 2024    The patient and family had come for follow-up on December 13 , 2024 regarding history of pT3, PN 1B, M0 adenocarcinoma of the colon s/p resection.  During previous evaluation the patient had presented with severe iron deficiency anemia as well as diarrhea.  C. difficile studies were negative.   The patient received cycle 3,  2 weeks earlier and complained of easy fatigability and diarrhea that was easily controlled with Imodium.  Will reduce dose by 25% with cycle 4 and going forward.    The patient and family had come for follow-up on December 27, 2024 regarding history of pT3, PN 1B, M0 adenocarcinoma of colon s/p resection.  Receiving adjuvant chemotherapy dose reduced 25%.  Has received and tolerated 4 cycles without any problems.     The patient and family had come for follow-up on 2/7/25 regarding history of pT3, PN 1B, M0 adenocarcinoma of colon s/p resection.  Receiving adjuvant chemotherapy dose reduced 25%.  Has received and tolerated 7 cycles without any problems.    The patient and family had come for follow-up on 3/21/25 regarding history of pT3, PN 1B, M0  adenocarcinoma of colon s/p resection.  Receiving adjuvant chemotherapy dose reduced 25%.  Has received and tolerated 10 cycles without any problems.    The patient and family had come for follow-up on 4/4/25 regarding history of pT3, PN 1B, M0 adenocarcinoma of colon s/p resection.  Receiving adjuvant chemotherapy dose reduced 25%.  Has received and tolerated 11 cycles without any problems.  During cycle 11, oxaliplatin infusion the patient experienced dizziness and fatigue.  But did well with slower infusion.  Past medical history:    Iron deficiency anemia, adenocarcinoma of descending colon, history of Raynaud's phenomenon.  Past surgical history:    Sinus surgery July 13, 2021, history of undescended testis surgery, and tonsillectomy.      Review of Systems   All other systems reviewed and are negative.       Objective   BSA: 2 meters squared  /53   Pulse 68   Temp 36 °C (96.8 °F)   Resp 16   Wt 78.2 kg (172 lb 6.4 oz)   SpO2 98%   BMI 23.22 kg/m²     No family history on file.  Oncology History   Adenocarcinoma, colon   8/23/2024 Initial Diagnosis    Adenocarcinoma, colon (Multi)     11/4/2024 -  Chemotherapy    mFOLFOX6 (Fluorouracil Continuous Infusion / Leucovorin / Oxaliplatin), 14 Day Cycles       The patient is 69 years old, single has no children retired as a .  Krish Batista  reports that he has never smoked. He has never used smokeless tobacco.  He  reports that he does not currently use alcohol.  He  reports no history of drug use.    Physical Exam  Constitutional:       Appearance: Normal appearance.   HENT:      Head: Normocephalic and atraumatic.      Nose: Nose normal.      Mouth/Throat:      Mouth: Mucous membranes are moist.      Pharynx: Oropharynx is clear.   Eyes:      Extraocular Movements: Extraocular movements intact.      Conjunctiva/sclera: Conjunctivae normal.      Pupils: Pupils are equal, round, and reactive to light.   Cardiovascular:      Rate and Rhythm:  Normal rate and regular rhythm.   Pulmonary:      Effort: Pulmonary effort is normal.      Breath sounds: Normal breath sounds.   Abdominal:      General: Abdomen is flat. Bowel sounds are normal.      Palpations: Abdomen is soft.   Musculoskeletal:         General: Normal range of motion.      Cervical back: Normal range of motion and neck supple.   Neurological:      General: No focal deficit present.      Mental Status: He is alert and oriented to person, place, and time. Mental status is at baseline.   Psychiatric:         Mood and Affect: Mood normal.         Behavior: Behavior normal.         Thought Content: Thought content normal.         Judgment: Judgment normal.         Performance Status:  Symptomatic; in bed <50% of the day    Assessment/Plan    The patient is a 69-year-old man presented with easy fatigability shortness of breath on exertion.  Diagnosed with iron deficiency anemia.  Cologuard was positive.  The patient was referred to GI services but did not follow through.  Eventually presented to ER with above symptoms did receive 2 units of packed red blood cell transfusions.  He did develop atrial fibrillation/flutter while in ER but spontaneously converted to normal sinus rhythm was placed on metoprolol.  Colonoscopy revealed a mass 40 cm from anal verge biopsy/histology consistent with adenocarcinoma.  Initially CT scan of chest abdomen pelvis did not reveal any evidence of metastatic disease.  But a PET scan revealed large right pleural effusion and small left pleural effusion.  Thoracentesis performed on right pleural effusion on September 3, 2024 cytology is pending.  If negative would seek opinion about hilar/mediastinal lymphadenopathy.    Iron deficiency anemia:    The patient is still symptomatic with anemia.  I have recommended intravenous Feraheme 510 mg/week x 2 weeks.  Effect side effects explained.  The patient understood appreciated all the details provided and was grateful.  T3 N1 M0  adenocarcinoma of sigmoid colon s/p resection on September 15, 2024.    The patient and family had come for follow-up on October 3, 2024 as described above multiple admissions requiring stay in ICU, history of MI GI bleed requiring blood and iron transfusions.  Physical examination revealed pedal edema and CBC revealed hemoglobin 9.2 g/dL.  I have recommended evaluation of anemia as well as Doppler ultrasound.  Discussed in detail about options such as do-nothing, consider adjuvant chemotherapy using FOLFOX which might elicit 25% survival benefit.  The patient is agreeable to start chemotherapy.  This will necessitate port placement.  Return in 2 weeks.  The patient and family had come for follow-up on November 8, 2024 regarding history of pT3, PN 1B, M0 adenocarcinoma of the colon s/p resection.  During previous evaluation the patient had presented with severe iron deficiency anemia as well as diarrhea.  C. difficile studies were negative.  Diarrhea is resolved.  Rwjiub-y-[id number] was placed.  Initiate intravenous Feraheme for iron 10 mg/week x 2 weeks and also initiate adjuvant chemotherapy using FOLFOX to be given every 2 weeks for 12 cycles beginning November for, 2024    The patient and family had come for follow-up on December 13 , 2024 regarding history of pT3, PN 1B, M0 adenocarcinoma of the colon s/p resection.  During previous evaluation the patient had presented with severe iron deficiency anemia as well as diarrhea.  C. difficile studies were negative.   The patient received cycle 3,  2 weeks earlier and complained of easy fatigability and diarrhea that was easily controlled with Imodium.  Will reduce dose by 25% with cycle 4 and going forward.    The patient and family had come for follow-up on December 27, 2024 regarding history of pT3, PN 1B, M0 adenocarcinoma of colon s/p resection.  Receiving adjuvant chemotherapy dose reduced 25%.  Has received and tolerated 4 cycles without any problems.  Physical  examination within normal limits.  Cycle 5 on December 30, 2024.     The patient and family had come for follow-up on 1/24/25 regarding history of pT3, PN 1B, M0 adenocarcinoma of colon s/p resection.  Receiving adjuvant chemotherapy dose reduced 25%.  Has received and tolerated 5 cycles without any problems.  Reviewed lab data with the patient.  Cycle 7 on January 28, 2025 at 25% dose reduction.  Return in 2 weeks.     The patient and family had come for follow-up on 2/7/25 regarding history of pT3, PN 1B, M0 adenocarcinoma of colon s/p resection.  Receiving adjuvant chemotherapy dose reduced 25%.  Has received and tolerated 7 cycles without any problems physical examination within normal limits reviewed lab data with the patient.  Cycle 8 on February 12, 2025.    The patient and family had come for follow-up on 3/21/25 regarding history of pT3, PN 1B, M0 adenocarcinoma of colon s/p resection.  Receiving adjuvant chemotherapy dose reduced 25%.  Has received and tolerated 8 cycles without any problems.  Cycle 11 on March 26, 2025.  Return in 2 weeks.    The patient and family had come for follow-up on 4/4/25 regarding history of pT3, PN 1B, M0 adenocarcinoma of colon s/p resection.  Receiving adjuvant chemotherapy dose reduced 25%.  Has received and tolerated 11 cycles without any problems.  During cycle 11, oxaliplatin infusion the patient experienced dizziness and fatigue.  But did well with slower infusion.  Recommend slower infusion for 4 oxaliplatin's, cycle 12.  Recommend restaging CT scan chest abdomen pelvis.  Return in 4 weeks.    Thank you for allowing me to participate in care of your patient if you have any questions please feel free to call me.      Diagnoses and all orders for this visit:  Microcytic anemia  -     Clinic Appointment Request ([place] appt needed please); Future  Adenocarcinoma, colon (Multi)  -     Referral to Hematology and Oncology  -     Clinic Appointment Request ([facility] appt needed please); Future  Other orders  -     heparin flush 10 unit/mL syringe 50 Units  -     heparin flush 100 unit/mL syringe 500 Units  -     alteplase (Cathflo Activase) injection 2 mg  -     ferumoxytol (Feraheme) 510 mg in sodium chloride 0.9% 117 mL IV  -     sodium chloride 0.9 % bolus 500 mL  -     dextrose 5 % in water (D5W) bolus  -     diphenhydrAMINE (BENADryl) injection 50 mg  -     methylPREDNISolone sod succinate (SOLU-Medrol) 40 mg/mL injection 40 mg  -     famotidine PF (Pepcid) injection 20 mg  -     EPINEPHrine (Epipen) injection syringe 0.3 mg  -     albuterol 2.5 mg /3 mL (0.083 %) nebulizer solution 3 mL           Rio Lawson MD

## 2025-04-05 LAB
ALBUMIN SERPL BCP-MCNC: 3.8 G/DL (ref 3.4–5)
ALP SERPL-CCNC: 147 U/L (ref 33–136)
ALT SERPL W P-5'-P-CCNC: 21 U/L (ref 10–52)
ANION GAP SERPL CALC-SCNC: 14 MMOL/L (ref 10–20)
AST SERPL W P-5'-P-CCNC: 29 U/L (ref 9–39)
BILIRUB SERPL-MCNC: 0.6 MG/DL (ref 0–1.2)
BUN SERPL-MCNC: 18 MG/DL (ref 6–23)
CALCIUM SERPL-MCNC: 8.7 MG/DL (ref 8.6–10.6)
CHLORIDE SERPL-SCNC: 108 MMOL/L (ref 98–107)
CO2 SERPL-SCNC: 24 MMOL/L (ref 21–32)
CREAT SERPL-MCNC: 0.84 MG/DL (ref 0.5–1.3)
EGFRCR SERPLBLD CKD-EPI 2021: >90 ML/MIN/1.73M*2
GLUCOSE SERPL-MCNC: 109 MG/DL (ref 74–99)
POTASSIUM SERPL-SCNC: 3.8 MMOL/L (ref 3.5–5.3)
PROT SERPL-MCNC: 5.9 G/DL (ref 6.4–8.2)
SODIUM SERPL-SCNC: 142 MMOL/L (ref 136–145)

## 2025-04-09 ENCOUNTER — INFUSION (OUTPATIENT)
Dept: HEMATOLOGY/ONCOLOGY | Facility: CLINIC | Age: 70
End: 2025-04-09
Payer: MEDICARE

## 2025-04-09 VITALS
BODY MASS INDEX: 23.35 KG/M2 | OXYGEN SATURATION: 99 % | TEMPERATURE: 97.7 F | DIASTOLIC BLOOD PRESSURE: 72 MMHG | WEIGHT: 172.4 LBS | RESPIRATION RATE: 14 BRPM | HEIGHT: 72 IN | SYSTOLIC BLOOD PRESSURE: 109 MMHG | HEART RATE: 74 BPM

## 2025-04-09 DIAGNOSIS — D50.9 MICROCYTIC ANEMIA: ICD-10-CM

## 2025-04-09 DIAGNOSIS — C18.9 ADENOCARCINOMA, COLON: ICD-10-CM

## 2025-04-09 DIAGNOSIS — C18.9 STAGE III CARCINOMA OF COLON: ICD-10-CM

## 2025-04-09 PROCEDURE — 96415 CHEMO IV INFUSION ADDL HR: CPT

## 2025-04-09 PROCEDURE — 96411 CHEMO IV PUSH ADDL DRUG: CPT

## 2025-04-09 PROCEDURE — 96416 CHEMO PROLONG INFUSE W/PUMP: CPT

## 2025-04-09 PROCEDURE — 2500000004 HC RX 250 GENERAL PHARMACY W/ HCPCS (ALT 636 FOR OP/ED): Performed by: INTERNAL MEDICINE

## 2025-04-09 PROCEDURE — 96413 CHEMO IV INFUSION 1 HR: CPT

## 2025-04-09 PROCEDURE — 96375 TX/PRO/DX INJ NEW DRUG ADDON: CPT | Mod: INF

## 2025-04-09 PROCEDURE — 2500000001 HC RX 250 WO HCPCS SELF ADMINISTERED DRUGS (ALT 637 FOR MEDICARE OP): Performed by: INTERNAL MEDICINE

## 2025-04-09 RX ORDER — HEPARIN SODIUM,PORCINE/PF 10 UNIT/ML
50 SYRINGE (ML) INTRAVENOUS AS NEEDED
Status: CANCELLED | OUTPATIENT
Start: 2025-04-09

## 2025-04-09 RX ORDER — HEPARIN 100 UNIT/ML
500 SYRINGE INTRAVENOUS AS NEEDED
Status: CANCELLED | OUTPATIENT
Start: 2025-04-09

## 2025-04-09 RX ORDER — FLUOROURACIL 50 MG/ML
300 INJECTION, SOLUTION INTRAVENOUS ONCE
Status: COMPLETED | OUTPATIENT
Start: 2025-04-09 | End: 2025-04-09

## 2025-04-09 RX ORDER — ALBUTEROL SULFATE 0.83 MG/ML
3 SOLUTION RESPIRATORY (INHALATION) AS NEEDED
Status: DISCONTINUED | OUTPATIENT
Start: 2025-04-09 | End: 2025-04-09 | Stop reason: HOSPADM

## 2025-04-09 RX ORDER — PROCHLORPERAZINE MALEATE 10 MG
10 TABLET ORAL EVERY 6 HOURS PRN
Status: DISCONTINUED | OUTPATIENT
Start: 2025-04-09 | End: 2025-04-09 | Stop reason: HOSPADM

## 2025-04-09 RX ORDER — DEXAMETHASONE 4 MG/1
12 TABLET ORAL ONCE
Status: COMPLETED | OUTPATIENT
Start: 2025-04-09 | End: 2025-04-09

## 2025-04-09 RX ORDER — DIPHENHYDRAMINE HYDROCHLORIDE 50 MG/ML
50 INJECTION, SOLUTION INTRAMUSCULAR; INTRAVENOUS AS NEEDED
Status: DISCONTINUED | OUTPATIENT
Start: 2025-04-09 | End: 2025-04-09 | Stop reason: HOSPADM

## 2025-04-09 RX ORDER — FAMOTIDINE 10 MG/ML
20 INJECTION, SOLUTION INTRAVENOUS ONCE AS NEEDED
Status: DISCONTINUED | OUTPATIENT
Start: 2025-04-09 | End: 2025-04-09 | Stop reason: HOSPADM

## 2025-04-09 RX ORDER — PROCHLORPERAZINE EDISYLATE 5 MG/ML
10 INJECTION INTRAMUSCULAR; INTRAVENOUS EVERY 6 HOURS PRN
Status: DISCONTINUED | OUTPATIENT
Start: 2025-04-09 | End: 2025-04-09 | Stop reason: HOSPADM

## 2025-04-09 RX ORDER — LORAZEPAM 2 MG/ML
1 INJECTION INTRAMUSCULAR AS NEEDED
Status: DISCONTINUED | OUTPATIENT
Start: 2025-04-09 | End: 2025-04-09 | Stop reason: HOSPADM

## 2025-04-09 RX ORDER — HEPARIN 100 UNIT/ML
500 SYRINGE INTRAVENOUS AS NEEDED
Status: DISCONTINUED | OUTPATIENT
Start: 2025-04-09 | End: 2025-04-09 | Stop reason: HOSPADM

## 2025-04-09 RX ORDER — DIPHENHYDRAMINE HCL 25 MG
25 CAPSULE ORAL ONCE
Status: COMPLETED | OUTPATIENT
Start: 2025-04-09 | End: 2025-04-09

## 2025-04-09 RX ORDER — PALONOSETRON 0.05 MG/ML
0.25 INJECTION, SOLUTION INTRAVENOUS ONCE
Status: COMPLETED | OUTPATIENT
Start: 2025-04-09 | End: 2025-04-09

## 2025-04-09 RX ORDER — EPINEPHRINE 0.3 MG/.3ML
0.3 INJECTION SUBCUTANEOUS EVERY 5 MIN PRN
Status: DISCONTINUED | OUTPATIENT
Start: 2025-04-09 | End: 2025-04-09 | Stop reason: HOSPADM

## 2025-04-09 RX ADMIN — DIPHENHYDRAMINE HYDROCHLORIDE 25 MG: 25 CAPSULE ORAL at 09:27

## 2025-04-09 RX ADMIN — FLUOROURACIL 3750 MG: 50 INJECTION, SOLUTION INTRAVENOUS at 15:15

## 2025-04-09 RX ADMIN — DEXAMETHASONE 12 MG: 4 TABLET ORAL at 09:27

## 2025-04-09 RX ADMIN — FLUOROURACIL 625 MG: 50 INJECTION, SOLUTION INTRAVENOUS at 15:15

## 2025-04-09 RX ADMIN — PALONOSETRON HYDROCHLORIDE 0.25 MG: 0.25 INJECTION INTRAVENOUS at 09:28

## 2025-04-09 RX ADMIN — OXALIPLATIN 135 MG: 5 INJECTION, SOLUTION INTRAVENOUS at 10:08

## 2025-04-09 ASSESSMENT — PAIN SCALES - GENERAL: PAINLEVEL_OUTOF10: 0-NO PAIN

## 2025-04-09 NOTE — PROGRESS NOTES
Patient here today with daughter for last cycle of treatment!  Port accessed without incident.  Premeds given and oxali infused slowly over 4 hours without incident.   Patient tolerated treatment without complaints.  CADD pump verified as operating before patient discharge.   Port site benign.  Good blood return noted prior to connection.  Patient aware of disconnect time on Friday.  Patient independently ambulatory off unit in NAD and without complaints.  Gait steady.  Call back instructions reviewed.  Patient verbalized understanding.

## 2025-04-11 ENCOUNTER — INFUSION (OUTPATIENT)
Dept: HEMATOLOGY/ONCOLOGY | Facility: CLINIC | Age: 70
End: 2025-04-11
Payer: MEDICARE

## 2025-04-11 VITALS
OXYGEN SATURATION: 99 % | WEIGHT: 176.59 LBS | RESPIRATION RATE: 16 BRPM | DIASTOLIC BLOOD PRESSURE: 78 MMHG | HEART RATE: 63 BPM | SYSTOLIC BLOOD PRESSURE: 139 MMHG | BODY MASS INDEX: 23.79 KG/M2 | TEMPERATURE: 97.9 F

## 2025-04-11 DIAGNOSIS — C18.9 STAGE III CARCINOMA OF COLON: ICD-10-CM

## 2025-04-11 DIAGNOSIS — C18.9 ADENOCARCINOMA, COLON: ICD-10-CM

## 2025-04-11 DIAGNOSIS — D50.9 MICROCYTIC ANEMIA: ICD-10-CM

## 2025-04-11 PROCEDURE — 2500000004 HC RX 250 GENERAL PHARMACY W/ HCPCS (ALT 636 FOR OP/ED): Performed by: INTERNAL MEDICINE

## 2025-04-11 PROCEDURE — 2500000004 HC RX 250 GENERAL PHARMACY W/ HCPCS (ALT 636 FOR OP/ED): Mod: JZ,TB | Performed by: INTERNAL MEDICINE

## 2025-04-11 PROCEDURE — 96372 THER/PROPH/DIAG INJ SC/IM: CPT

## 2025-04-11 RX ORDER — HEPARIN 100 UNIT/ML
500 SYRINGE INTRAVENOUS AS NEEDED
Status: DISCONTINUED | OUTPATIENT
Start: 2025-04-11 | End: 2025-04-11 | Stop reason: HOSPADM

## 2025-04-11 RX ORDER — HEPARIN SODIUM,PORCINE/PF 10 UNIT/ML
50 SYRINGE (ML) INTRAVENOUS AS NEEDED
OUTPATIENT
Start: 2025-04-11

## 2025-04-11 RX ORDER — HEPARIN 100 UNIT/ML
500 SYRINGE INTRAVENOUS AS NEEDED
OUTPATIENT
Start: 2025-04-11

## 2025-04-11 RX ADMIN — PEGFILGRASTIM 6 MG: 6 INJECTION SUBCUTANEOUS at 13:28

## 2025-04-11 RX ADMIN — HEPARIN 500 UNITS: 100 SYRINGE at 13:33

## 2025-04-11 ASSESSMENT — PAIN SCALES - GENERAL: PAINLEVEL_OUTOF10: 0-NO PAIN

## 2025-04-11 NOTE — PROGRESS NOTES
Patient is here today for pump disconnect and Neulasta injection - no complications since last being seen-CADD infusion pump discontinued from 5-FU infusion. Administration completed without incident.  Mediport verified for excellent blood return/flushed per orders.  Pump collected with patient's name and secured in Infusion department.   b/h/ lung sounds not auscultated  Patient tolerated treatments well.  No complaints. Call back instructions reviewed.    Patient verbalizes understanding of plan of care.  Ambulated off unit without difficulty, steady gait.  Accompanied by family member.

## 2025-04-15 DIAGNOSIS — C18.9 ADENOCARCINOMA, COLON: ICD-10-CM

## 2025-04-28 ENCOUNTER — TELEPHONE (OUTPATIENT)
Dept: HEMATOLOGY/ONCOLOGY | Facility: CLINIC | Age: 70
End: 2025-04-28
Payer: MEDICARE

## 2025-04-28 NOTE — TELEPHONE ENCOUNTER
Reason for Conversation  FUV Dr. Lawson    Background   Pt currently scheduled for FUV Dr. Lawson on 5/2/25 with CT CAP schedueld on 5/8/25.  Message left for patient to return my call to bump his FUV  to 5/9/25 to allow CT scan results prior to seeing Dr. Lawson    Disposition   ADDENDUM:  4/29/25  reached patient and he is agreeable to move FUV from 5/2/25 to 5/9/25 @ 9:20 slot.

## 2025-05-02 ENCOUNTER — APPOINTMENT (OUTPATIENT)
Dept: HEMATOLOGY/ONCOLOGY | Facility: CLINIC | Age: 70
End: 2025-05-02
Payer: MEDICARE

## 2025-05-06 NOTE — H&P
"Established Patient Visit     Patient presents for 6-month colon cancer follow-up.    Patient just received his 12th cycle of FOLFOX on 4/11/2025.  The 4/4/2025 note from Dr. Lawson was reviewed.  CT chest abdomen pelvis ordered for 5/8/2025.  The patient overall tolerated the FOLFOX quite well.  His main issue is neuropathy of the toes, ball of the foot, fingertips, and occasionally his buttocks.  This is slowly improving.  He had no significant GI symptoms during the therapy.  He has no abdominal pain or back pain.    Patient saw Dr. Archibald from cardiology on 2/18/2025.  Note reviewed.  Patient remains in sinus rhythm and off anticoagulation.  In fact he is off all cardiac meds.  The metoprolol and amiodarone were discontinued as well.    12/3/2024:  Chest x-ray performed 11/29/2024 and is improved compared to 10/24/2024.  Patient still has a small persistent right pleural effusion with some possible right basilar airspace disease.  However, the the interstitial infiltrates have resolved.  Overall improved compared to previous.  I called the patient and discussed the results with him today.     Follow-up as scheduled    11/20/2024:  Mr. Batista presents for his postoperative visit from insertion of a central venous implanted port in the left subclavian vein on 10/24/2024.  Please see that note for details.  Postoperative chest x-ray revealed excellent positioning of the port with the tip of the catheter overlying the superior vena cava, with no evidence of pneumothorax.  Chest x-ray at that time did show some bibasilar airspace consolidation left greater than right and perhaps some small pleural effusions.  There are also some interstitial infiltrates possibly edema.  Follow-up chest x-ray is warranted.     The patient saw Dr. Lawson on October 25 and 11 8, and open note reviewed, with the following plan: \"Initiate intravenous Feraheme for iron 10 mg/week x 2 weeks and also initiate adjuvant chemotherapy using " "FOLFOX to be given every 2 weeks for 12 cycles beginning November 4, 2024.\"       Patient is doing well overall.  The port is working well.  He is tolerating the chemotherapy.  He has occasional diarrhea but most of his bowel movements are formed.  No abdominal pain fever chills or sweats.  His main issue however is weight loss.  He is down to 185 pounds per his scale yesterday.  Today in the office he is 189 pounds.  He was around 210 pounds prior to the operation.     He is currently in the midst of his second cycle of FOLFOX.  He will turn in the infusion pump tomorrow.     10/16/2024:  Mr. Batista presents for his posthospitalization/postoperative clinic visit following his recent admission and operation, and subsequent readmission to the hospital, and also for preoperative visit with regard to port placement.     On 9/15/2024 (in the midst of his admission from 9/11/2024 - 9/19/2024 for paroxysmal atrial fibrillation and NSTEMI/demand ischemia) the patient underwent the following procedure:     LAPAROSCOPIC SEGMENTAL COLECTOMY WITH MOBILIZATION OF SPLENIC FLEXURE/ POSSIBLE OPEN  65713 - SC LAPAROSCOPY COLECTOMY PARTIAL W/ANASTOMOSIS     SC LAPS MOBLJ SPLENIC FLXR PFRMD W/PRTL COLECTOMY [31824]     Pathology revealed:     FINAL DIAGNOSIS   A.  Colon, splenic flexure, resection:  -Invasive adenocarcinoma (3.1 cm), moderately differentiated.  -Carcinoma invades pericolonic tissue.  -Extramural venous and perineural invasion present.  -Resection margins are negative for carcinoma.  -Metastatic adenocarcinoma involving two of thirty-seven lymph nodes (2/37).  -Pathologic stage: pT3, pN1b.  -See comment and synoptic report.      Postoperatively, patient was discharged to home on Eliquis for his paroxysmal atrial fibrillation.  His postoperative course was shortly thereafter complicated by postoperative bleeding,  both intra-abdominally (peritoneal cavity) and intraluminally (anastomotic bleed).  This required a " rehospitalization from 9/23/2024 - 9/29/2024, during which the patient received an initial dose of Kcentra, a total of 6 units of PRBC, 4 packs of FFP and vitamin K.  The anticoagulation was stopped on admission and not reinstituted.  Operative therapy was not necessary, and the bleeding stopped spontaneously.       Since discharge, the patient saw Dr. Lawson on 10/3/2024 and that note was reviewed.  FOLFOX was recommended and the patient agrees.  Therefore, central venous plan a port is indicated and recommended.     The patient saw his cardiologist Dr. Archibald on 10/8/2024 and it was recommended to continue amiodarone 20 mg p.o. daily and add isosorbide 30 mg daily.  The anticoagulation remains discontinued.     Recent laboratory values on 10/4/2024 reviewed.  CBC revealed improvement and stabilization of the H&H at 10.1 and 34.2.  Platelet count was normal.  CMP was unremarkable except for a total bilirubin of 1.8.     Patient presents again today with his niece, Mariama.  Since discharge the patient has been doing well overall.  His main complaints are tiredness and fatigue.  His appetite is also diminished.  He is having bowel movements which are nonbloody but these are loose.  He is passing a large volume of flatus also.  He did have some right upper quadrant intermittent abdominal pain initially but this has completely resolved.  His lower extremity edema has resolved.  He has lost weight.  He is 196 pounds at the August visit to my office and is now 190 pounds 6 ounces.     With regard to the discomfort, he is taking gabapentin 300 mg at night.  He is also taking an occasional ibuprofen.     9/14/2024:     Our service discussed with John from the thoracic service and mediastinoscopy/mediastinal lymph node biopsy not indicated at this point     Will proceed with operative therapy tomorrow morning as previously described -laparoscopic, possible open, segmental colectomy with mobilization of splenic flexure      Preoperative preoperative orders instituted  Mechanical and antibiotic bowel preparation  Will follow  ERP perioperatively     Change prophylactic Lovenox to heparin  Will likely institute for anticoagulation postoperative day 1 or 2     9/13/2024:  Krish Batista is a 69 y.o. male, well-known to me, presenting with chest pain.  The patient presented with PAF/flutter with RVR as well as chest pain on 9/12/2024.  EMR was reviewed.  Cardiac workup revealed a elevated troponin.  Patient was medically converted to sinus rhythm.  Cardiac catheterization revealed nonobstructive coronary disease.     General surgery service asked to evaluate this patient with regard to continued treatment of his known adenocarcinoma of the colon.  For details, please see my detailed initial office consultation dated 8/23/2024 as well as chart update/documentation dated 8/28/2024.     Patient underwent PET scan imaging on 8/23/2024 which revealed:     IMPRESSION:  1. Intense focus of hypermetabolic activity corresponding to masslike  thickening within the left descending colon, likely corresponding to  patient's biopsy-proven adenocarcinoma of the colon.  2. Moderate right and small left pleural effusion with superimposed  atelectasis, without hypermetabolic activity. No definite  hypermetabolic lung nodules, with 2-3 mm nodules within the lungs  below resolution of PET. Recommend short-term CT chest for follow-up.  3. Mild hypermetabolic mediastinal and hilar lymph nodes are likely  reactive. This can also be followed up with a short-term CT chest.  4. No other evidence of hypermetabolic disease throughout the body.      I personally reviewed the report and the images.  The hypermetabolic cavity in the colon is at the splenic flexure.  There is only mild hypermetabolic activity within the mediastinum and hilar lymph nodes and these were thought to be reactive.     Patient underwent ultrasound-guided right thoracentesis on 9/3/2024.   Please see that report for details.  This 575 cc of clear fluid was obtained and the cytology was negative.     8/28/2024:  Patient was seen by me for initial office consultation for a biopsy-proven adenocarcinoma the left colon on 8/23/2024.  Please see that note for details.     Preoperative laboratory values 8/23/2024 revealed that the H&H improved to 9.0, and 32.2, from previous of 7.6 and 27.0 on 8/3/2024.  WBC and platelet count were normal.  BMP that same day was normal.  Serum CEA was also normal at 1.5 UG/L.     CT of the chest with IV contrast was performed on 8/27/2024 and this revealed new bilateral pleural effusions, moderately large on the right and small on the left with atelectasis.  This is new since his recent CT scan of the abdomen pelvis on 8/1/2024.  In addition, enlarged mediastinal lymph nodes are identified measuring 6 to 10 mm in diameter.  There are multiple, at least 3.  These findings are worrisome for metastatic disease.     I did talk to Dr. Luis Daniel Archibald from cardiology on 8/26/2024 and the patient is cleared for operative therapy.  Please see the update of Dr. Archibald's note dated 8/6/2024.     Impression: Adenocarcinoma, left colon.  CT imaging reveals mediastinal lymphadenopathy and new bilateral pleural effusions, right greater than left.  Rule out metastatic disease.     Plan:     Will cancel operation scheduled for 8/29/2024  PET scan to rule out metastatic disease with regard to the mediastinal lymph nodes  Interventional radiology for right pleurocentesis  to obtain pleural fluid for cytology to rule out metastatic disease  Refer the patient to Dr. Rio Lawson from medical oncology  Will call patient with the above results, and further management plan     8/23/2024:  Krish Batista is a 69 y.o. male who presents on referral from Dr. Alton Steiner for surgical evaluation and treatment of a biopsy-proven left colon adenocarcinoma.  The patient's primary physician is   Matthew Small.       The patient saw Dr. Small on 3/25/2024 complaining of fatigue and malaise.  Workup ensued.  Laboratory values from 3/28/2024 revealed significant anemia at 8.1 and 28.2.  Cologuard was ordered and this was collected on 4/9/2024.  This resulted as positive on 4/23/2024.  Shortly thereafter, on 4/28/2024, Dr. Small referred the patient to gastroenterology.  The patient however failed to follow through.  Dr. Small saw him back in the office on 7/11/2024 to emphasize the importance of the referral.  He was also referred to general surgery.  Again the patient never followed through.  Repeat H&H on 7/11/2024 was 7.2 and 27.6.     The patient ultimately presented to the emergency department at [facility] on 8/1/2024 secondary to increasing fatigue and weakness.  He was diagnosed with severe microcytic anemia.  H&H on admission was 7.4 and 27.4.  He was transfused 2 units of packed red blood cells.  Gastroenterology was consulted and recommended outpatient follow-up upper and lower endoscopy.  On discharge 2 days later, on 8/3/2024, his H&H was 7.6 and 27.0.   In the emergency department, the patient was found to be in atrial flutter/fibrillation with RVR.  Cardiology was consulted.  He spontaneously converted to normal sinus rhythm.  Echocardiogram during that admission revealed normal ejection fraction without significant valvular pathology.  Anticoagulation was not initiated.     The patient did follow-up with Dr. Luis Daniel Archibald from cardiology on 8/6/2024.  Recommendation was continued metoprolol 50 mg p.o. twice daily with a Holter monitor.  Again, anticoagulation was not recommended.     Thereafter, the patient underwent upper and lower endoscopy on 8/15/2024 per Dr. Alton Steiner as an outpatient:     EGD revealed:  Findings  Z-line 42 cm from the incisors. Mild erythema of the left corniculate tubercle in the larynx  Mild erythematous mucosa in the body of the stomach and antrum; performed cold  forceps biopsy to rule out H. pylori  The duodenal bulb, 1st part of the duodenum and 2nd part of the duodenum appeared normal. Performed random biopsy using biopsy forceps to rule out celiac disease.  Colonoscopy revealed:  Findings  Internal small hemorrhoids  One 8 mm sessile polyp 20 cm from the anal verge; lift performed with eleview injected into the submucosa; completely removed target lesion en bloc by EMR and retrieved specimen. EMR was performed with a cold snare. Post-procedure bleeding was not visualized  Single friable and ulcerated mass (not traversable) 40 cm from the anal verge, covering the whole circumference; bleeding occurred before intervention; performed cold forceps biopsy with partial removal; tattooed distal to the finding with spot     Pathology revealed:   FINAL DIAGNOSIS   A.  Duodenum, first part, biopsy:  Duodenal mucosa within normal limits.     B.  Stomach, biopsy:  Antrum and corpus mucosa within normal limits.     C.  Colon, mass at 40 cm, biopsy:  Fragments of invasive adenocarcinoma, moderately differentiated.     D.  Colon, 20 cm, polypectomy:  Tubular adenoma.      On admission to the hospital on 8/1/2024, the patient did undergo imaging.  PA lateral chest x-ray was negative.  CT scan was performed of the abdomen pelvis with IV contrast.  I personally reviewed the report and the images.  This was read as essentially negative.  Please see the report for details.  On my retrospective review, there may be a mass at the splenic flexure.       To me, the patient notes a similar history as above.  He notes that he did not pursue workup with a Cologuard initially because he had more important family things to take care of.  Nonetheless, the patient ultimately presented to the emergency department on the advice of Dr. Small as his tiredness and fatigue progressed to the point that he could not walk back and forth to the mailbox.  He became very tired and somewhat short of breath.      Since discharge from the hospital, the patient still has tiredness and fatigue.  He is eating.  He notes that he does have left mid abdominal pain for a number of months.  He notes that the tiredness and fatigue started in March of this year but really worsened in May and June.  At that same time he noticed some black stools.  He noted occasional blood with the bowel movements also.  He denies fever chills sweats nausea vomiting diarrhea or constipation.     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  He still has a high riding right testicle.     Patient is single and lives in a house in [place].  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  He has never been .  He has no children.  He presents today Mariama Arriola, his niece and medical POA.     Past Medical History   Medical History           Past Medical History:   Diagnosis Date    Personal history of other diseases of the circulatory system       History of Raynaud's syndrome            Surgical History    Surgical History             Past Surgical History:   Procedure Laterality Date    OTHER SURGICAL HISTORY   07/13/2021     Sinus surgery    OTHER SURGICAL HISTORY   07/13/2021     Testicular surgery    OTHER SURGICAL HISTORY   07/13/2021     Tonsillectomy            Allergies   RX Allergies             Allergies   Allergen Reactions    Amoxicillin Unknown       Pt cannot remember reaction            Home Meds          Current Outpatient Medications   Medication Instructions    ferrous sulfate 65 mg, oral, Daily, Do not crush, chew, or split.    [START ON 9/2/2024] metoprolol tartrate (LOPRESSOR) 50 mg, oral, 2 times daily    multivitamin with minerals tablet 1 tablet, oral, Daily    pantoprazole (PROTONIX) 40 mg, oral, Daily, Do not crush, chew, or split.         Family History    Family History   No family history on file.         Social History  Social  History   Social History              Tobacco Use    Smoking status: Never    Smokeless tobacco: Never            Review Of Systems    Review of Systems     General: Not Present- Obesity, HIV, MRSA, Recent Cold/Flu,  and VRE.  HEENT: Not Present- Migraine, Cataracts, Glaucoma, Macular Degeneration and Retinal Detachment.  Respiratory:Not Present- Asthma, Chronic Cough, Difficulty Breathing on Exertion, Difficulty Breathing at Rest, Emphysema, Frequent Bronchitis, Home CPAP/BiPAP, Home Oxygen, Pulmonary Embolus, Pneumonia/TB, Sleep Apnea and Snoring.  Cardiovascular: Not Present- Chest Pain, Congestive Heart Failure, Heart Attack, Coronary Artery Disease, Heart Stent, High Cholesterol/Lipids,Hypertension, Internal Defibrillator, Irregular Heart Beat, Mitral Valve Prolapse, Murmur, Pacemaker and Peripheral Vascular Disease.  Gastrointestinal: Not Present- Heartburn, Hepatitis, Hiatal Hernia, Jaundice, Reflux, Stomach Ulcer and IBS.  Male Genitourinary: Not Present- Enlarged Prostate, Kidney Failure, Kidney Stones, Dialysis and Urinary Tract Infection.  Musculoskeletal: Not Present- Arthritis, Back Pain and Fibromyalgia.  Neurological: Not Present- Headaches, Numbness, Tingling, Seizures, Stroke,  Shunt and Weakness.  Psychiatric: Not Present- Anxiety, Bipolar, Depression and Panic Attacks.  Endocrine: Not Present- Diabetes, Hyperthyroidism, Hypothyroidism and Low Blood Sugar.  Hematology: Not Present- Abnormal Bleeding and Blood Clots.     Vitals  There were no vitals taken for this visit.     Weight today is 180 pounds.  This is down 9 pounds from previous on 11/20/2024 of 189.     Physical Exam   General Complete Physical Exam     The physical exam findings are as follows:     General Appearance - Cooperative and Well groomed. Not in acute distress. Build & Nutrition - Well nourished.  Posture - Normal posture. Gait - Normal. Hydration - Well hydrated.     Integumentary  General Characteristics: Overall  examination of the patient's skin reveals - no rashes, no suspicious lesions, no bruises and no evidence of scars. Color - normal coloration of skin. Skin Moisture - normal skin moisture. Temperature - normal  warmth is noted. Texture - normal skin texture.     Head and Neck  Head - normocephalic, atraumatic with no lesions or palpable masses.  Neck  Global Assessment - full range of motion. no bruit auscultated on the right, no bruit auscultated on the left, non-tender, no lymphadenopathy, no palpable mass on the right and no palpable mass on the left.  Trachea - midline.  Thyroid Gland Characteristics - no palpable nodules, normal position, symmetric and smooth.     Eyes  Sclera/Conjunctiva - Bilateral - Normal. Pupil - Bilateral - Accommodating, Direct reaction to light normal and Equal.     ENMT  Global Assessment  Upon examination of the ears, nose, mouth and throat - examination of the oral cavity reveals normal lips, teeth, gums and oral mucosa and hard and soft palates, tongue, tonsils and posterior pharynx are moist and symmetric without lesions.     Chest and Lung Exam  Inspection: Shape - Symmetric. Movements - Symmetrical. Accessory muscles - No use of accessory muscles in breathing.  Percussion:  Quality and Intensity: - Percussion normal.  Palpation: - Palpation normal.  Auscultation:  Breath sounds: - Normal and equal bilaterally.  Adventitious sounds: - none bilaterally.     Port is intact in the left upper chest; incision well-healed no evidence of infection     Cardiovascular  Inspection: Carotid artery - Bilateral - Inspection Normal.  Palpation/Percussion: Examination by palpation and percussion reveals - No Thrills.  Cardiac Borders - Normal.  Auscultation: Rhythm - Regular. Rate: Regular.  Heart Sounds - Normal heart sounds, S1 WNL and S2 WNL.  Murmurs & Other Heart Sounds: Auscultation of the heart reveals - No Murmurs or other extra heart sounds.     Abdomen  Inspection: Inspection of the  abdomen reveals - No Hernias.  Palpation/Percussion: Palpation and Percussion of the abdomen reveal -no tenderness and No Palpable abdominal masses.  Liver: - Normal.  Spleen: - Normal.  Auscultation: Auscultation of the abdomen reveals - Bowel sounds normal.  Surgical scars: Old tangential right inguinal     New abdominal incisions from recent laparoscopic colectomy: 7 cm midline periumbilical, laparoscopic x 4, one in each quadrant all well-healed     Inguinal and External Genitalia     The patient was examined supine, and standing, with and without Valsalva. There is no evidence of inguinal or femoral hernia or lymphadenopathy bilaterally. The testes are mildly atrophic and symmetric, with no masses, nodules, or tenderness.  The patient's left testicle in its normal position at the base of the scrotum.  The right testicle is very high riding near the right external ring.     Rectal - Did not examine.     Peripheral Vascular  Lower Extremity: Inspection - Bilateral - No Varicose veins.  Palpation: Edema - Bilateral - No edema.     Musculoskeletal  Global Assessment  Right Upper Extremity - no deformities, masses or tenderness, no known fractures, normal strength and tone and  normal range of motion without pain. Left Upper Extremity - no deformities, masses or tenderness, no known fractures,  normal strength and tone and normal range of motion without pain. Right Lower Extremity - no deformities, masses or  tenderness, no known fractures, normal strength and tone and normal range of motion without pain. Left Lower  Extremity - no deformities, masses or tenderness, no known fractures, normal strength and tone and normal range of  motion without pain.     Lymphatic  Head & Neck  General Head & Neck Lymphatics:  Bilateral: Description - Normal.  Axillary  General Axillary Region:  Bilateral: Description - Normal.  Femoral & Inguinal  Generalized Femoral & Inguinal Lymphatics:  Bilateral: Description - Normal.      Assessment/Plan      Mr. 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  He is status post laparoscopic segmental colectomy with mobilization of the splenic flexure on 9/15/2024.     Postoperatively, patient was discharged to home on Eliquis for his paroxysmal atrial fibrillation.  His postoperative course was subsequent complicated by postoperative bleeding both intra-abdominally and intraluminally (anastomotic bleed).  This required a rehospitalization from 9/23/2024 - 9/29/2024 during which the patient received an initial dose of Kcentra, a total of 6 units of PRBC, 4 packs of FFP and vitamin K.  The anticoagulation was stopped on admission and not reinstituted.  Operative therapy was not necessary and the bleeding stopped spontaneously.       S/p insertion Central venous implanted port, left SCV, 10/24/2024     FOLFOX commenced 11/4/2024     Satisfactory clinical follow-up.     Plan:    Will arrange 1 year colonoscopy roughly 8/15/2025 or shortly thereafter - Dr. Steiner    Restaging per Dr. Lawson    Follow-up with me in 6 months for recheck

## 2025-05-07 ENCOUNTER — APPOINTMENT (OUTPATIENT)
Dept: SURGERY | Facility: CLINIC | Age: 70
End: 2025-05-07
Payer: MEDICARE

## 2025-05-07 VITALS
OXYGEN SATURATION: 97 % | HEART RATE: 74 BPM | SYSTOLIC BLOOD PRESSURE: 146 MMHG | RESPIRATION RATE: 18 BRPM | TEMPERATURE: 98 F | DIASTOLIC BLOOD PRESSURE: 88 MMHG | WEIGHT: 180 LBS | BODY MASS INDEX: 24.38 KG/M2 | HEIGHT: 72 IN

## 2025-05-07 DIAGNOSIS — Z12.11 COLON CANCER SCREENING: Primary | ICD-10-CM

## 2025-05-07 DIAGNOSIS — C18.9 STAGE III CARCINOMA OF COLON: Primary | ICD-10-CM

## 2025-05-07 DIAGNOSIS — D50.0 IRON DEFICIENCY ANEMIA DUE TO CHRONIC BLOOD LOSS: ICD-10-CM

## 2025-05-07 PROCEDURE — 99213 OFFICE O/P EST LOW 20 MIN: CPT | Performed by: SURGERY

## 2025-05-07 PROCEDURE — 3008F BODY MASS INDEX DOCD: CPT | Performed by: SURGERY

## 2025-05-07 PROCEDURE — 1159F MED LIST DOCD IN RCRD: CPT | Performed by: SURGERY

## 2025-05-08 ENCOUNTER — HOSPITAL ENCOUNTER (OUTPATIENT)
Dept: RADIOLOGY | Facility: CLINIC | Age: 70
Discharge: HOME | End: 2025-05-08
Payer: MEDICARE

## 2025-05-08 DIAGNOSIS — Z12.11 COLON CANCER SCREENING: ICD-10-CM

## 2025-05-08 DIAGNOSIS — C18.9 STAGE III CARCINOMA OF COLON: ICD-10-CM

## 2025-05-08 DIAGNOSIS — C18.9 ADENOCARCINOMA, COLON: ICD-10-CM

## 2025-05-08 PROCEDURE — 2550000001 HC RX 255 CONTRASTS: Mod: JW | Performed by: INTERNAL MEDICINE

## 2025-05-08 PROCEDURE — 71260 CT THORAX DX C+: CPT

## 2025-05-08 RX ORDER — SOD SULF/POT CHLORIDE/MAG SULF 1.479 G
12 TABLET ORAL 2 TIMES DAILY
Qty: 24 TABLET | Refills: 0 | Status: SHIPPED | OUTPATIENT
Start: 2025-05-08 | End: 2025-05-08

## 2025-05-08 RX ORDER — POLYETHYLENE GLYCOL-3350 AND ELECTROLYTES WITH FLAVOR PACK 240; 5.84; 2.98; 6.72; 22.72 G/278.26G; G/278.26G; G/278.26G; G/278.26G; G/278.26G
4000 POWDER, FOR SOLUTION ORAL ONCE
Qty: 4000 ML | Refills: 0 | Status: SHIPPED | OUTPATIENT
Start: 2025-05-08 | End: 2025-05-08

## 2025-05-08 RX ADMIN — IOHEXOL 70 ML: 350 INJECTION, SOLUTION INTRAVENOUS at 10:21

## 2025-05-09 ENCOUNTER — INFUSION (OUTPATIENT)
Dept: HEMATOLOGY/ONCOLOGY | Facility: CLINIC | Age: 70
End: 2025-05-09
Payer: MEDICARE

## 2025-05-09 ENCOUNTER — EDUCATION (OUTPATIENT)
Dept: HEMATOLOGY/ONCOLOGY | Facility: CLINIC | Age: 70
End: 2025-05-09

## 2025-05-09 ENCOUNTER — OFFICE VISIT (OUTPATIENT)
Dept: HEMATOLOGY/ONCOLOGY | Facility: CLINIC | Age: 70
End: 2025-05-09
Payer: MEDICARE

## 2025-05-09 VITALS
RESPIRATION RATE: 16 BRPM | WEIGHT: 180.34 LBS | HEART RATE: 80 BPM | OXYGEN SATURATION: 99 % | TEMPERATURE: 98.1 F | DIASTOLIC BLOOD PRESSURE: 73 MMHG | BODY MASS INDEX: 24.3 KG/M2 | SYSTOLIC BLOOD PRESSURE: 143 MMHG

## 2025-05-09 DIAGNOSIS — D50.9 MICROCYTIC ANEMIA: ICD-10-CM

## 2025-05-09 DIAGNOSIS — C18.9 ADENOCARCINOMA, COLON: ICD-10-CM

## 2025-05-09 DIAGNOSIS — C18.9 STAGE III CARCINOMA OF COLON: ICD-10-CM

## 2025-05-09 PROCEDURE — 2500000004 HC RX 250 GENERAL PHARMACY W/ HCPCS (ALT 636 FOR OP/ED): Mod: JZ | Performed by: INTERNAL MEDICINE

## 2025-05-09 PROCEDURE — 1126F AMNT PAIN NOTED NONE PRSNT: CPT | Performed by: INTERNAL MEDICINE

## 2025-05-09 PROCEDURE — 1159F MED LIST DOCD IN RCRD: CPT | Performed by: INTERNAL MEDICINE

## 2025-05-09 PROCEDURE — 99214 OFFICE O/P EST MOD 30 MIN: CPT | Performed by: INTERNAL MEDICINE

## 2025-05-09 PROCEDURE — 96523 IRRIG DRUG DELIVERY DEVICE: CPT

## 2025-05-09 RX ORDER — HEPARIN SODIUM,PORCINE/PF 10 UNIT/ML
50 SYRINGE (ML) INTRAVENOUS AS NEEDED
OUTPATIENT
Start: 2025-05-09

## 2025-05-09 RX ORDER — HEPARIN 100 UNIT/ML
500 SYRINGE INTRAVENOUS AS NEEDED
Status: DISCONTINUED | OUTPATIENT
Start: 2025-05-09 | End: 2025-05-09 | Stop reason: HOSPADM

## 2025-05-09 RX ORDER — HEPARIN SODIUM,PORCINE/PF 10 UNIT/ML
50 SYRINGE (ML) INTRAVENOUS AS NEEDED
Status: DISCONTINUED | OUTPATIENT
Start: 2025-05-09 | End: 2025-05-09 | Stop reason: HOSPADM

## 2025-05-09 RX ORDER — HEPARIN 100 UNIT/ML
500 SYRINGE INTRAVENOUS AS NEEDED
OUTPATIENT
Start: 2025-05-09

## 2025-05-09 RX ADMIN — HEPARIN 500 UNITS: 100 SYRINGE at 10:21

## 2025-05-09 ASSESSMENT — PAIN SCALES - GENERAL: PAINLEVEL_OUTOF10: 0-NO PAIN

## 2025-05-09 NOTE — PROGRESS NOTES
[facility] Infusion Note     Krish Batista is a 70 y.o. year old male here today,05/09/25,  in the [facility] infusion center for his port to be accessed and flushed.     Pt was seen and assessed by the provider prior to his infusion appointment.     Medications given:  Administrations This Visit       heparin flush 100 unit/mL syringe 500 Units       Admin Date  05/09/2025 Action  Given Dose  500 Units Route  intra-catheter Documented By  Anne Marie Villalobos, RN                Pt tolerated CVAD access well and was discharged to home in stable condition. Pt ambulated  off the unit independently with a slow and steady gait. Pt had no further questions or concerns at this time.

## 2025-05-09 NOTE — PROGRESS NOTES
"Patient seen by Dr. Duque today in clinic. Reinforcement education provided regarding next steps with plan of care.      PER DR. DUQUE'S FUV NOTE TODAY: \" The patient and family had come for follow-up on 5/9/25 regarding history of pT3, PN 1B, M0 adenocarcinoma of colon s/p resection.  Receiving adjuvant chemotherapy dose reduced 25%.  Has received and tolerated 11 cycles without any problems.  Completed 12 cycles of adjuvant chemotherapy using FOLFOX.  Does complain of tingling numbness in fingers and toes.  CT scan of chest abdomen pelvis on May 9, 2025 did not reveal any new site of disease in chest abdomen pelvis.  Hemoperitoneum has significantly improved residual small amount of fluid.  New lower lobe predominant centrilobular groundglass opacities are seen likely bronchitis.  Pleural effusions have resolved.  Patient to return in 3 months\"    FUV in 3 mos with Port flush in between.  Patient verbalizes understanding of plan of care via teachback method.             "

## 2025-05-09 NOTE — PROGRESS NOTES
Patient ID: Krish Batista is a 70 y.o. male.  Referring Physician: Rio Lawson MD  [address] [facility], [place] [address] [place] [address]  Primary Care Provider: Matthew Small,    8/15/24, EGD and colonoscopy  A.  Duodenum, first part, biopsy:  Duodenal mucosa within normal limits.     B.  Stomach, biopsy:  Antrum and corpus mucosa within normal limits.     C.  Colon, mass at 40 cm, biopsy:  Fragments of invasive adenocarcinoma, moderately differentiated.  See note.     NOTE: Immunohistochemical stains for mismatch repair proteins are pending and the result will be reported as an addendum.     D.  Colon, 20 cm, polypectomy:  Tubular adenoma.                               MISMATCH REPAIR PROTEIN EXPRESSION     C. Colon, mass at 40 cm, biopsy: Adenocarcinoma     Protein:  Result     MLH-1:  Expression Present                                    PMS-2: Expression Present                                    MSH-2: Expression Present                                    MSH-6: Expression Present    CEA at 1.5 NG per mL on August 2, 2024    Colon, splenic flexure, resection:  -Invasive adenocarcinoma (3.1 cm), moderately differentiated.  -Carcinoma invades pericolonic tissue.  -Extramural venous and perineural invasion present.  -Resection margins are negative for carcinoma.  -Metastatic adenocarcinoma involving two of thirty-seven lymph nodes (2/37).  -Pathologic stage: pT3, pN1b.  -See comment and synoptic report.  Comment:  Movat stains (blocks A4 and A5) confirm the venous invasion.    Electronically signed by Sharonda Maguire MD PhD on 9/30/2024 at 1350      [facility]   By the signature on this report, the individual or group listed as making the Final Interpretation/Diagnosis certifies that they have reviewed this case.    Case Summary Report   COLON AND RECTUM: Resection, Including Transanal Disk Excision of Rectal Neoplasms   8th Edition - Protocol posted: 6/22/2022COLON AND RECTUM: RESECTION, INCLUDING  TRANSANAL DISK EXCISION OF RECTAL NEOPLASMS - All Specimens  SPECIMEN   Procedure  resection   TUMOR   Tumor Site  Splenic flexure   Histologic Type  Adenocarcinoma   Histologic Grade  G2, moderately differentiated   Tumor Size  Greatest dimension (Centimeters): 3.1 cm   Tumor Extent  Invades through muscularis propria into the pericolonic or perirectal tissue   Macroscopic Tumor Perforation  Not identified   Lymphovascular Invasion  Large vessel (venous), extramural   Perineural Invasion  Present   Tumor Bud Score  Intermediate (5-9)   Treatment Effect  No known presurgical therapy   MARGINS   Margin Status for Invasive Carcinoma  All margins negative for invasive carcinoma   Closest Margin(s) to Invasive Carcinoma  Radial (circumferential) or mesenteric   Distance from Invasive Carcinoma to Closest Margin  Greater than 1 cm   Margin Status for Non-Invasive Tumor  All margins negative for high-grade dysplasia / intramucosal carcinoma and low-grade dysplasia   REGIONAL LYMPH NODES   Regional Lymph Node Status  Tumor present in regional lymph node(s)   Number of Lymph Nodes with Tumor  2   Number of Lymph Nodes Examined  37   Tumor Deposits  Not identified   PATHOLOGIC STAGE CLASSIFICATION (pTNM, AJCC 8th Edition)   Reporting of pT, pN, and (when applicable) pM categories is based on information available to the pathologist at the time the report is issued. As per the AJCC (Chapter 1, 8th Ed.) it is the managing physician’s responsibility to establish the final pathologic stage based upon all pertinent information, including but potentially not limited to this pathology report.   pT Category  pT3   pN Category  pN1b   .        Subjective    HPI  The patient was referred to me by Dr. Clark for further evaluation and management of newly diagnosed biopsy-proven left colon adenocarcinoma.  The patient presented to Dr. Small on March 25, 2024 complaining of fatigue and malaise.  Further evaluation revealed significant  anemia at hemoglobin 8.1 g/dL ferritin at 8 NG per mL iron saturation 3%.  The patient was referred to GI services however he failed to follow through.  He was referred to surgery as well.  Repeat hemoglobin on July 11, 2024 was 7.2 g/dL.  He then presented to ER on August 1, 2024 secondary to progressive fatigue and weakness.  He was diagnosed with severe microcytic anemia hemoglobin 7.4 g/dL.  The patient received 2 units of packed red blood cell transfusions.  Upper and lower endoscopy on August 3, 2024 revealed a mass at 40 cm in the anal verge.  Histology consistent with adenocarcinoma.  In ER the patient was found to be in atrial fibrillation/flutter with RVR.  Cardiology was consulted.  He spontaneously converted to normal sinus rhythm.  Color Doppler echocardiogram revealed normal ejection fraction.  He was placed on metoprolol 50 mg p.o. twice daily with a Holter monitor.  Anticoagulation was not recommended.  Colonoscopy on August 15, 2024 revealed internal small hemorrhoids.  1 8 mm sessile polyp 20 centimeter from anal verge.  This was snared.  Single friable and ulcerated mass not transferred stable 40 cm from the anal verge, covering the whole circumference; bleeding occurred before intervention.  Performed cold forceps biopsy with partial removal: Tattooed distal to the finding with spot.  CT scan of chest abdomen pelvis on August 1, 2024 with IV contrast did not reveal any obvious metastatic disease.  A PET scan revealed bilateral pleural effusion right greater than left.  Thoracentesis was performed on September 3, 2024 cytology is pending.  The patient claims that he is feeling better after thoracentesis.    At interview on September 4, 2024 he was accompanied by his niece denied history of weight loss, fevers, night sweats, chest pain, nausea, vomiting, hematemesis, melena, hematochezia and hematuria.  Since last evaluation the patient has had multiple admissions to the hospital for myocardial  infarction requiring cardiac catheterization, status post surgery/left hemicolectomy on September 15, 2024, GI bleed hypotension requiring iron infusion as well as blood transfusions.  The patient was discharged and claims that he is beginning to feel better although has pedal edema as well as anterior abdominal wall edema.  The patient and family had come for follow-up on November 8, 2024 regarding history of pT3, PN 1B, M0 adenocarcinoma of the colon s/p resection.  During previous evaluation the patient had presented with severe iron deficiency anemia as well as diarrhea.  C. difficile studies were negative.  Diarrhea is resolved.  Fgzmww-z-[id number] was placed.  Initiate intravenous Feraheme for iron 10 mg/week x 2 weeks and also initiate adjuvant chemotherapy using FOLFOX to be given every 2 weeks for 12 cycles.  Cycle 1 on November 4, 2024    The patient and family had come for follow-up on December 13 , 2024 regarding history of pT3, PN 1B, M0 adenocarcinoma of the colon s/p resection.  During previous evaluation the patient had presented with severe iron deficiency anemia as well as diarrhea.  C. difficile studies were negative.   The patient received cycle 3,  2 weeks earlier and complained of easy fatigability and diarrhea that was easily controlled with Imodium.  Will reduce dose by 25% with cycle 4 and going forward.    The patient and family had come for follow-up on December 27, 2024 regarding history of pT3, PN 1B, M0 adenocarcinoma of colon s/p resection.  Receiving adjuvant chemotherapy dose reduced 25%.  Has received and tolerated 4 cycles without any problems.     The patient and family had come for follow-up on 2/7/25 regarding history of pT3, PN 1B, M0 adenocarcinoma of colon s/p resection.  Receiving adjuvant chemotherapy dose reduced 25%.  Has received and tolerated 7 cycles without any problems.    The patient and family had come for follow-up on 3/21/25 regarding history of pT3, PN 1B, M0  adenocarcinoma of colon s/p resection.  Receiving adjuvant chemotherapy dose reduced 25%.  Has received and tolerated 10 cycles without any problems.    The patient and family had come for follow-up on 4/4/25 regarding history of pT3, PN 1B, M0 adenocarcinoma of colon s/p resection.  Receiving adjuvant chemotherapy dose reduced 25%.  Has received and tolerated 11 cycles without any problems.  During cycle 11, oxaliplatin infusion the patient experienced dizziness and fatigue.  But did well with slower infusion.    The patient and family had come for follow-up on 5/9/25 regarding history of pT3, PN 1B, M0 adenocarcinoma of colon s/p resection.  Receiving adjuvant chemotherapy dose reduced 25%.  Has received and tolerated 11 cycles without any problems.  Completed 12 cycles of adjuvant chemotherapy using FOLFOX.  Does complain of tingling numbness in fingers and toes..  Past medical history:    Iron deficiency anemia, adenocarcinoma of descending colon, history of Raynaud's phenomenon.  Past surgical history:    Sinus surgery July 13, 2021, history of undescended testis surgery, and tonsillectomy.      Review of Systems   All other systems reviewed and are negative.       Objective   BSA: 2.04 meters squared  /73   Pulse 80   Temp 36.7 °C (98.1 °F) (Temporal)   Resp 16   Wt 81.8 kg (180 lb 5.4 oz)   SpO2 99%   BMI 24.30 kg/m²     No family history on file.  Oncology History   Adenocarcinoma, colon   8/23/2024 Initial Diagnosis    Adenocarcinoma, colon (Multi)     11/4/2024 -  Chemotherapy    mFOLFOX6 (Fluorouracil Continuous Infusion / Leucovorin / Oxaliplatin), 14 Day Cycles       The patient is 69 years old, single has no children retired as a .  Krish Batista  reports that he has never smoked. He has never used smokeless tobacco.  He  reports that he does not currently use alcohol.  He  reports no history of drug use.    Physical Exam  Constitutional:       Appearance: Normal appearance.    HENT:      Head: Normocephalic and atraumatic.      Nose: Nose normal.      Mouth/Throat:      Mouth: Mucous membranes are moist.      Pharynx: Oropharynx is clear.   Eyes:      Extraocular Movements: Extraocular movements intact.      Conjunctiva/sclera: Conjunctivae normal.      Pupils: Pupils are equal, round, and reactive to light.   Cardiovascular:      Rate and Rhythm: Normal rate and regular rhythm.   Pulmonary:      Effort: Pulmonary effort is normal.      Breath sounds: Normal breath sounds.   Abdominal:      General: Abdomen is flat. Bowel sounds are normal.      Palpations: Abdomen is soft.   Musculoskeletal:         General: Normal range of motion.      Cervical back: Normal range of motion and neck supple.   Neurological:      General: No focal deficit present.      Mental Status: He is alert and oriented to person, place, and time. Mental status is at baseline.   Psychiatric:         Mood and Affect: Mood normal.         Behavior: Behavior normal.         Thought Content: Thought content normal.         Judgment: Judgment normal.         Performance Status:  Symptomatic; in bed <50% of the day    Assessment/Plan    The patient is a 69-year-old man presented with easy fatigability shortness of breath on exertion.  Diagnosed with iron deficiency anemia.  Cologuard was positive.  The patient was referred to GI services but did not follow through.  Eventually presented to ER with above symptoms did receive 2 units of packed red blood cell transfusions.  He did develop atrial fibrillation/flutter while in ER but spontaneously converted to normal sinus rhythm was placed on metoprolol.  Colonoscopy revealed a mass 40 cm from anal verge biopsy/histology consistent with adenocarcinoma.  Initially CT scan of chest abdomen pelvis did not reveal any evidence of metastatic disease.  But a PET scan revealed large right pleural effusion and small left pleural effusion.  Thoracentesis performed on right pleural  effusion on September 3, 2024 cytology is pending.  If negative would seek opinion about hilar/mediastinal lymphadenopathy.    Iron deficiency anemia:    The patient is still symptomatic with anemia.  I have recommended intravenous Feraheme 510 mg/week x 2 weeks.  Effect side effects explained.  The patient understood appreciated all the details provided and was grateful.  T3 N1 M0 adenocarcinoma of sigmoid colon s/p resection on September 15, 2024.    The patient and family had come for follow-up on October 3, 2024 as described above multiple admissions requiring stay in ICU, history of MI GI bleed requiring blood and iron transfusions.  Physical examination revealed pedal edema and CBC revealed hemoglobin 9.2 g/dL.  I have recommended evaluation of anemia as well as Doppler ultrasound.  Discussed in detail about options such as do-nothing, consider adjuvant chemotherapy using FOLFOX which might elicit 25% survival benefit.  The patient is agreeable to start chemotherapy.  This will necessitate port placement.  Return in 2 weeks.  The patient and family had come for follow-up on November 8, 2024 regarding history of pT3, PN 1B, M0 adenocarcinoma of the colon s/p resection.  During previous evaluation the patient had presented with severe iron deficiency anemia as well as diarrhea.  C. difficile studies were negative.  Diarrhea is resolved.  Pixayx-a-Mcib was placed.  Initiate intravenous Feraheme for iron 10 mg/week x 2 weeks and also initiate adjuvant chemotherapy using FOLFOX to be given every 2 weeks for 12 cycles beginning November for, 2024    The patient and family had come for follow-up on December 13 , 2024 regarding history of pT3, PN 1B, M0 adenocarcinoma of the colon s/p resection.  During previous evaluation the patient had presented with severe iron deficiency anemia as well as diarrhea.  C. difficile studies were negative.   The patient received cycle 3,  2 weeks earlier and complained of easy  fatigability and diarrhea that was easily controlled with Imodium.  Will reduce dose by 25% with cycle 4 and going forward.    The patient and family had come for follow-up on December 27, 2024 regarding history of pT3, PN 1B, M0 adenocarcinoma of colon s/p resection.  Receiving adjuvant chemotherapy dose reduced 25%.  Has received and tolerated 4 cycles without any problems.  Physical examination within normal limits.  Cycle 5 on December 30, 2024.     The patient and family had come for follow-up on 1/24/25 regarding history of pT3, PN 1B, M0 adenocarcinoma of colon s/p resection.  Receiving adjuvant chemotherapy dose reduced 25%.  Has received and tolerated 5 cycles without any problems.  Reviewed lab data with the patient.  Cycle 7 on January 28, 2025 at 25% dose reduction.  Return in 2 weeks.     The patient and family had come for follow-up on 2/7/25 regarding history of pT3, PN 1B, M0 adenocarcinoma of colon s/p resection.  Receiving adjuvant chemotherapy dose reduced 25%.  Has received and tolerated 7 cycles without any problems physical examination within normal limits reviewed lab data with the patient.  Cycle 8 on February 12, 2025.    The patient and family had come for follow-up on 3/21/25 regarding history of pT3, PN 1B, M0 adenocarcinoma of colon s/p resection.  Receiving adjuvant chemotherapy dose reduced 25%.  Has received and tolerated 8 cycles without any problems.  Cycle 11 on March 26, 2025.  Return in 2 weeks.    The patient and family had come for follow-up on 4/4/25 regarding history of pT3, PN 1B, M0 adenocarcinoma of colon s/p resection.  Receiving adjuvant chemotherapy dose reduced 25%.  Has received and tolerated 11 cycles without any problems.  During cycle 11, oxaliplatin infusion the patient experienced dizziness and fatigue.  But did well with slower infusion.  Recommend slower infusion for 4 oxaliplatin's, cycle 12.  Recommend restaging CT scan chest abdomen pelvis.  Return in 4  weeks.    The patient and family had come for follow-up on 5/9/25 regarding history of pT3, PN 1B, M0 adenocarcinoma of colon s/p resection.  Receiving adjuvant chemotherapy dose reduced 25%.  Has received and tolerated 11 cycles without any problems.  Completed 12 cycles of adjuvant chemotherapy using FOLFOX.  Does complain of tingling numbness in fingers and toes.  CT scan of chest abdomen pelvis on May 9, 2025 did not reveal any new site of disease in chest abdomen pelvis.  Hemoperitoneum has significantly improved residual small amount of fluid.  New lower lobe predominant centrilobular groundglass opacities are seen likely bronchitis.  Pleural effusions have resolved.  Patient to return in 3 months    Thank you for allowing me to participate in care of your patient if you have any questions please feel free to call me.      Diagnoses and all orders for this visit:  Microcytic anemia  -     Clinic Appointment Request ([facility] appt needed please); Future  Adenocarcinoma, colon (Multi)  -     Referral to Hematology and Oncology  -     Clinic Appointment Request ([facility] appt needed please); Future  Other orders  -     heparin flush 10 unit/mL syringe 50 Units  -     heparin flush 100 unit/mL syringe 500 Units  -     alteplase (Cathflo Activase) injection 2 mg  -     ferumoxytol (Feraheme) 510 mg in sodium chloride 0.9% 117 mL IV  -     sodium chloride 0.9 % bolus 500 mL  -     dextrose 5 % in water (D5W) bolus  -     diphenhydrAMINE (BENADryl) injection 50 mg  -     methylPREDNISolone sod succinate (SOLU-Medrol) 40 mg/mL injection 40 mg  -     famotidine PF (Pepcid) injection 20 mg  -     EPINEPHrine (Epipen) injection syringe 0.3 mg  -     albuterol 2.5 mg /3 mL (0.083 %) nebulizer solution 3 mL           Rio Lawson MD

## 2025-06-18 ENCOUNTER — INFUSION (OUTPATIENT)
Dept: HEMATOLOGY/ONCOLOGY | Facility: CLINIC | Age: 70
End: 2025-06-18
Payer: MEDICARE

## 2025-06-18 VITALS
DIASTOLIC BLOOD PRESSURE: 78 MMHG | WEIGHT: 184.97 LBS | HEIGHT: 72 IN | TEMPERATURE: 97.9 F | RESPIRATION RATE: 16 BRPM | SYSTOLIC BLOOD PRESSURE: 162 MMHG | HEART RATE: 116 BPM | OXYGEN SATURATION: 99 % | BODY MASS INDEX: 25.05 KG/M2

## 2025-06-18 DIAGNOSIS — C18.9 STAGE III CARCINOMA OF COLON: ICD-10-CM

## 2025-06-18 DIAGNOSIS — D50.9 MICROCYTIC ANEMIA: ICD-10-CM

## 2025-06-18 DIAGNOSIS — C18.9 ADENOCARCINOMA, COLON: ICD-10-CM

## 2025-06-18 PROCEDURE — 96523 IRRIG DRUG DELIVERY DEVICE: CPT

## 2025-06-18 PROCEDURE — 2500000004 HC RX 250 GENERAL PHARMACY W/ HCPCS (ALT 636 FOR OP/ED): Performed by: INTERNAL MEDICINE

## 2025-06-18 RX ORDER — HEPARIN 100 UNIT/ML
500 SYRINGE INTRAVENOUS AS NEEDED
OUTPATIENT
Start: 2025-06-18

## 2025-06-18 RX ORDER — HEPARIN SODIUM,PORCINE/PF 10 UNIT/ML
50 SYRINGE (ML) INTRAVENOUS AS NEEDED
OUTPATIENT
Start: 2025-06-18

## 2025-06-18 RX ORDER — HEPARIN 100 UNIT/ML
500 SYRINGE INTRAVENOUS AS NEEDED
Status: DISCONTINUED | OUTPATIENT
Start: 2025-06-18 | End: 2025-06-18 | Stop reason: HOSPADM

## 2025-06-18 RX ADMIN — HEPARIN 500 UNITS: 100 SYRINGE at 10:18

## 2025-06-18 ASSESSMENT — PAIN SCALES - GENERAL: PAINLEVEL_OUTOF10: 0-NO PAIN

## 2025-07-18 ENCOUNTER — TELEPHONE (OUTPATIENT)
Dept: HEMATOLOGY/ONCOLOGY | Facility: CLINIC | Age: 70
End: 2025-07-18
Payer: MEDICARE

## 2025-07-18 DIAGNOSIS — C18.9 STAGE III CARCINOMA OF COLON: ICD-10-CM

## 2025-07-18 DIAGNOSIS — C18.9 ADENOCARCINOMA, COLON: ICD-10-CM

## 2025-07-18 RX ORDER — GABAPENTIN 300 MG/1
300 CAPSULE ORAL 3 TIMES DAILY
Qty: 90 CAPSULE | Refills: 0 | Status: CANCELLED | OUTPATIENT
Start: 2025-07-18 | End: 2025-08-17

## 2025-07-18 NOTE — TELEPHONE ENCOUNTER
Call placed to patient to inform Dr. Lawson is requesting to move his FUV on 7/25/25.  Pt agreeable and will move appts as follows:    Continue with PF/L on 7/25/25  PF/L to be sched on 9/12/25  PF/L to be sched on 10/24/25 with same day FUV Dr. 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 Pt states he previously was on Gabapentin from another provider however doesn't remember which provider prescribed this medication.  Informed pt I will send a message to dr. Lawson to see if he would send another RX for Gabapentin.  Pt agreeable to these appt changes and denies further questions or needs.

## 2025-07-21 DIAGNOSIS — C18.9 ADENOCARCINOMA, COLON: ICD-10-CM

## 2025-07-21 RX ORDER — GABAPENTIN 300 MG/1
300 CAPSULE ORAL 3 TIMES DAILY
Qty: 90 CAPSULE | Refills: 5 | Status: SHIPPED | OUTPATIENT
Start: 2025-07-21 | End: 2025-08-20

## 2025-07-25 ENCOUNTER — APPOINTMENT (OUTPATIENT)
Dept: HEMATOLOGY/ONCOLOGY | Facility: CLINIC | Age: 70
End: 2025-07-25
Payer: MEDICARE

## 2025-07-25 ENCOUNTER — INFUSION (OUTPATIENT)
Dept: HEMATOLOGY/ONCOLOGY | Facility: CLINIC | Age: 70
End: 2025-07-25
Payer: MEDICARE

## 2025-07-25 VITALS
TEMPERATURE: 98.2 F | WEIGHT: 184.63 LBS | DIASTOLIC BLOOD PRESSURE: 84 MMHG | HEIGHT: 72 IN | OXYGEN SATURATION: 97 % | HEART RATE: 114 BPM | SYSTOLIC BLOOD PRESSURE: 134 MMHG | BODY MASS INDEX: 25.01 KG/M2 | RESPIRATION RATE: 14 BRPM

## 2025-07-25 DIAGNOSIS — C18.9 ADENOCARCINOMA, COLON: ICD-10-CM

## 2025-07-25 DIAGNOSIS — D50.9 MICROCYTIC ANEMIA: ICD-10-CM

## 2025-07-25 DIAGNOSIS — C18.9 STAGE III CARCINOMA OF COLON: ICD-10-CM

## 2025-07-25 LAB
BASOPHILS # BLD AUTO: 0.02 X10*3/UL (ref 0–0.1)
BASOPHILS NFR BLD AUTO: 0.5 %
EOSINOPHIL # BLD AUTO: 0.08 X10*3/UL (ref 0–0.4)
EOSINOPHIL NFR BLD AUTO: 1.8 %
ERYTHROCYTE [DISTWIDTH] IN BLOOD BY AUTOMATED COUNT: 11.7 % (ref 11.5–14.5)
HCT VFR BLD AUTO: 38.1 % (ref 41–52)
HGB BLD-MCNC: 13.1 G/DL (ref 13.5–17.5)
IMM GRANULOCYTES # BLD AUTO: 0.01 X10*3/UL (ref 0–0.5)
IMM GRANULOCYTES NFR BLD AUTO: 0.2 % (ref 0–0.9)
LYMPHOCYTES # BLD AUTO: 0.97 X10*3/UL (ref 0.8–3)
LYMPHOCYTES NFR BLD AUTO: 22 %
MCH RBC QN AUTO: 34.8 PG (ref 26–34)
MCHC RBC AUTO-ENTMCNC: 34.4 G/DL (ref 32–36)
MCV RBC AUTO: 101 FL (ref 80–100)
MONOCYTES # BLD AUTO: 0.32 X10*3/UL (ref 0.05–0.8)
MONOCYTES NFR BLD AUTO: 7.3 %
NEUTROPHILS # BLD AUTO: 3 X10*3/UL (ref 1.6–5.5)
NEUTROPHILS NFR BLD AUTO: 68.2 %
NRBC BLD-RTO: ABNORMAL /100{WBCS}
PLATELET # BLD AUTO: 133 X10*3/UL (ref 150–450)
RBC # BLD AUTO: 3.76 X10*6/UL (ref 4.5–5.9)
WBC # BLD AUTO: 4.4 X10*3/UL (ref 4.4–11.3)

## 2025-07-25 PROCEDURE — 85025 COMPLETE CBC W/AUTO DIFF WBC: CPT

## 2025-07-25 PROCEDURE — 36591 DRAW BLOOD OFF VENOUS DEVICE: CPT

## 2025-07-25 PROCEDURE — 2500000004 HC RX 250 GENERAL PHARMACY W/ HCPCS (ALT 636 FOR OP/ED): Performed by: INTERNAL MEDICINE

## 2025-07-25 PROCEDURE — 80053 COMPREHEN METABOLIC PANEL: CPT

## 2025-07-25 PROCEDURE — 82378 CARCINOEMBRYONIC ANTIGEN: CPT

## 2025-07-25 RX ORDER — HEPARIN SODIUM,PORCINE/PF 10 UNIT/ML
50 SYRINGE (ML) INTRAVENOUS AS NEEDED
Status: DISCONTINUED | OUTPATIENT
Start: 2025-07-25 | End: 2025-07-25 | Stop reason: HOSPADM

## 2025-07-25 RX ORDER — HEPARIN SODIUM,PORCINE/PF 10 UNIT/ML
50 SYRINGE (ML) INTRAVENOUS AS NEEDED
OUTPATIENT
Start: 2025-07-25

## 2025-07-25 RX ORDER — HEPARIN 100 UNIT/ML
500 SYRINGE INTRAVENOUS AS NEEDED
Status: DISCONTINUED | OUTPATIENT
Start: 2025-07-25 | End: 2025-07-25 | Stop reason: HOSPADM

## 2025-07-25 RX ORDER — HEPARIN 100 UNIT/ML
500 SYRINGE INTRAVENOUS AS NEEDED
OUTPATIENT
Start: 2025-07-25

## 2025-07-25 RX ADMIN — HEPARIN 500 UNITS: 100 SYRINGE at 13:39

## 2025-07-25 ASSESSMENT — PAIN SCALES - GENERAL: PAINLEVEL_OUTOF10: 0-NO PAIN

## 2025-07-25 NOTE — PROGRESS NOTES
[facility] Infusion Note      Krish Batista is a 70 y.o. year old male here today,07/25/25,  in the [facility] infusion center for  following treatment regimen:  Medications given today :    Recent Labs     07/25/25  1338   NEUTROABS 3.00   *   HGB 13.1*      CrCl cannot be calculated (Patient's most recent lab result is older than the maximum 28 days allowed.).  BP Readings from Last 2 Encounters:   07/25/25 134/84   06/18/25 162/78             Administrations This Visit       heparin flush 100 unit/mL syringe 500 Units       Admin Date  07/25/2025 Action  Given Dose  500 Units Route  intra-catheter Documented By  Jah Hickman RN                    Vitals:    07/25/25 1312   BP: 134/84   Pulse: (!) 114   Resp: 14   Temp: 36.8 °C (98.2 °F)   SpO2: 97%    on intake  Vitals:    07/25/25 1312   Weight: 83.7 kg (184 lb 10.2 oz)       Body surface area is 2.07 meters squared.    Pt tolerated and was discharged to home in stable condition. Pt ambulated  off the unit with a slow and steady gait. Pt had no further questions or concerns at this time. Has follow up appointment appropriately scheduled. Verbalized understanding of follow up. Made aware that appointments and times are always available online on my chart.

## 2025-07-26 LAB
ALBUMIN SERPL BCP-MCNC: 4.1 G/DL (ref 3.4–5)
ALP SERPL-CCNC: 58 U/L (ref 33–136)
ALT SERPL W P-5'-P-CCNC: 17 U/L (ref 10–52)
ANION GAP SERPL CALC-SCNC: 12 MMOL/L (ref 10–20)
AST SERPL W P-5'-P-CCNC: 26 U/L (ref 9–39)
BILIRUB SERPL-MCNC: 0.9 MG/DL (ref 0–1.2)
BUN SERPL-MCNC: 16 MG/DL (ref 6–23)
CALCIUM SERPL-MCNC: 9.3 MG/DL (ref 8.6–10.6)
CEA SERPL-MCNC: 1.3 UG/L
CHLORIDE SERPL-SCNC: 104 MMOL/L (ref 98–107)
CO2 SERPL-SCNC: 27 MMOL/L (ref 21–32)
CREAT SERPL-MCNC: 1.02 MG/DL (ref 0.5–1.3)
EGFRCR SERPLBLD CKD-EPI 2021: 79 ML/MIN/1.73M*2
GLUCOSE SERPL-MCNC: 122 MG/DL (ref 74–99)
POTASSIUM SERPL-SCNC: 4.2 MMOL/L (ref 3.5–5.3)
PROT SERPL-MCNC: 6.3 G/DL (ref 6.4–8.2)
SODIUM SERPL-SCNC: 139 MMOL/L (ref 136–145)

## 2025-07-29 ENCOUNTER — TELEPHONE (OUTPATIENT)
Dept: HEMATOLOGY/ONCOLOGY | Facility: CLINIC | Age: 70
End: 2025-07-29
Payer: MEDICARE

## 2025-07-29 NOTE — TELEPHONE ENCOUNTER
Call returned to patient.  Informed him that the Gabapentin script went to [facility].  Pt states he will be out that way visiting his niece and will  the script at that location.  [facility] deleted from patient's chart per his request as future scripts need to go to [facility].  Pt denies further questions or needs.

## 2025-07-29 NOTE — TELEPHONE ENCOUNTER
----- Message from Nurse Jah RAMOS sent at 7/25/2025  1:51 PM EDT -----  Hi Liudmila- any word on the fili rx? + neuropathy is bothering him in the hands mostly when he came in for portflush labs #

## 2025-07-29 NOTE — TELEPHONE ENCOUNTER
Call placed to patient as I received a message from the infusion nurse that patient is asking for his Gabapentin script. Noted on 7/21/25 the script was sent per Dr. Lawson but shows it went to the [facility].  Call placed to patient to advise.

## 2025-09-01 RX ORDER — ONDANSETRON HYDROCHLORIDE 2 MG/ML
4 INJECTION, SOLUTION INTRAVENOUS ONCE AS NEEDED
OUTPATIENT
Start: 2025-09-01

## 2025-09-04 ENCOUNTER — APPOINTMENT (OUTPATIENT)
Dept: GASTROENTEROLOGY | Facility: HOSPITAL | Age: 70
End: 2025-09-04
Payer: MEDICARE

## 2025-10-07 ENCOUNTER — APPOINTMENT (OUTPATIENT)
Dept: CARDIOLOGY | Facility: CLINIC | Age: 70
End: 2025-10-07
Payer: MEDICARE

## 2025-10-29 ENCOUNTER — APPOINTMENT (OUTPATIENT)
Dept: SURGERY | Facility: CLINIC | Age: 70
End: 2025-10-29
Payer: MEDICARE

## (undated) DEVICE — TROCAR, OPTICAL, BLADELESS, 12MM, THREADED, 100MM LENGTH

## (undated) DEVICE — STAPLER, ENDO ECHELON 45MM RELOAD, WHITE, REUSABLE

## (undated) DEVICE — SCISSOR TIP, ENDOCUT, LAPAROSCOPIC

## (undated) DEVICE — STAPLER, ECHELON 3000, 45MM STD

## (undated) DEVICE — CATHETER, EXPO, MODEL-D, 6FR PIG 110CM

## (undated) DEVICE — DRAPE, UNDERBUTTOCKS

## (undated) DEVICE — DRESSING, TRANSPARENT, TEGADERM, 4 X 4-3/4 IN, NO LABEL

## (undated) DEVICE — GUIDEWIRE, INQWIRE, 3MM J, .035 X 210CM, FIXED

## (undated) DEVICE — DRESSING, GAUZE, SPONGE, 8 PLY, CURITY, 2 X 2 IN, STERILE

## (undated) DEVICE — LIGASURE, V SEALER/DIVIDER  5MM BLUNT TIP

## (undated) DEVICE — CAUTERY, PENCIL, PUSH BUTTON, SMOKE EVAC, 70MM

## (undated) DEVICE — TR BAND, RADIAL COMPRESSION, STANDARD, 24CM

## (undated) DEVICE — BASIN KIT, SINGLE

## (undated) DEVICE — DRAPE, LEGGINGS, 48 X 31 IN, STERILE, LF

## (undated) DEVICE — CUSTOM NAMIC STANDARD HEART CONVIENCE KIT W/ INTEGRATED COMPENSATOR MANIFOLD FOR UH/PARMA MEDICAL CENTER

## (undated) DEVICE — DRESSING, GAUZE, SPONGE, 12 PLY, CURITY, 4 X 4 IN, RIGID TRAY, STERILE

## (undated) DEVICE — STRIP, SKIN CLOSURE, STERI STRIP, REINFORCED, 0.5 X 4 IN

## (undated) DEVICE — Device

## (undated) DEVICE — SLEEVE, KII, Z-THREAD, 5X100CM

## (undated) DEVICE — TUBE SET, PNEUMOLAR HEATED, SMOKE EVACU, HIGH-FLOW

## (undated) DEVICE — DRESSING, DRAIN SPONGE, EXCILON AMD, 4X4 IN, 6 PLY, ST

## (undated) DEVICE — DRAPE, INSTRUMENT, W/POUCH, STERI DRAPE, 7 X 11 IN, DISPOSABLE, STERILE

## (undated) DEVICE — IRRIGATOR, HYDRO-SURG PLUS, WITH COJOINED SUCTION AND IRRIGATION

## (undated) DEVICE — STAPLER, SKIN PROXIMATE, 35 WIDE

## (undated) DEVICE — CUTTER, PROXIMATE LINEAR RELOAD, 75MM, BLUE

## (undated) DEVICE — DRAIN, WOUND, FLAT, JACKSON-PRATT, FULL PERFORATION, W/O TROCAR, 10 MM, SILICONE

## (undated) DEVICE — HOLSTER, ELECTROSURGERY ACCESSORY, STERILE

## (undated) DEVICE — HANDLE, PH, FOR YANKAUER SUCTION DEVICE

## (undated) DEVICE — APPLICATOR, CHLORAPREP, W/ORANGE TINT, 26ML

## (undated) DEVICE — DRAPE, SURGICAL, OB/GYN

## (undated) DEVICE — DISSECTOR, KITTNER, PEANUT, 5MM

## (undated) DEVICE — HOLSTER, JET SAFETY

## (undated) DEVICE — CATHETER TRAY, SURESTEP, 16FR, URINE METER W/STATLOCK

## (undated) DEVICE — TROCAR, KII OPTICAL BLADELESS 5MM Z THREAD 100MM LNGTH

## (undated) DEVICE — BAG, BANDED, 36IN X 28IN

## (undated) DEVICE — GRASPER TIP, LAPCLINCH, DISP

## (undated) DEVICE — SYRINGE, 60 CC, IRRIGATION, BULB, CONTRO-BULB, PAPER POUCH

## (undated) DEVICE — DRESSING, TRANSPARENT, TEGADERM, 2-3/8 X 2-3/4 IN

## (undated) DEVICE — SHEATH, GLIDESHEATH, SLENDER, 6FR 10CM

## (undated) DEVICE — GRASPER TIP, LONG FENESTRATED, DISP

## (undated) DEVICE — SLEEVE, VASO PRESS, CALF GARMENT, MEDIUM, GREEN

## (undated) DEVICE — CUTTER, PROX LINEAR, 75MM, REG TISSUE, W/ SAFETY LOCK OUT

## (undated) DEVICE — TROCAR SYSTEM, BALLOON, KII GELPORT, 12 X 100MM

## (undated) DEVICE — GOWN, ASTOUND, XL

## (undated) DEVICE — STAPLER, LINEAR, 3.5 60MM, RELOADABLE, BLUE

## (undated) DEVICE — CATHETER, OPTITORQUE, 5FR, TIG, 1H/100CM

## (undated) DEVICE — CARE KIT, LAPAROSCOPIC, ADVANCED

## (undated) DEVICE — CATHETER KIT, RADIAL ARTLINE, 20G X 1 3/4

## (undated) DEVICE — BAG, DECANTER

## (undated) DEVICE — PITCHER, GRADUATE, 32 OZ (1200CC), STERILE